# Patient Record
Sex: FEMALE | Race: WHITE | Employment: OTHER | URBAN - METROPOLITAN AREA
[De-identification: names, ages, dates, MRNs, and addresses within clinical notes are randomized per-mention and may not be internally consistent; named-entity substitution may affect disease eponyms.]

---

## 2017-01-02 ENCOUNTER — DOCUMENTATION ONLY (OUTPATIENT)
Dept: CASE MANAGEMENT | Facility: CLINIC | Age: 72
End: 2017-01-02

## 2017-04-10 ENCOUNTER — OFFICE VISIT (OUTPATIENT)
Dept: FAMILY MEDICINE | Facility: CLINIC | Age: 72
End: 2017-04-10

## 2017-04-10 VITALS
SYSTOLIC BLOOD PRESSURE: 126 MMHG | HEART RATE: 88 BPM | OXYGEN SATURATION: 96 % | BODY MASS INDEX: 24.19 KG/M2 | DIASTOLIC BLOOD PRESSURE: 72 MMHG | WEIGHT: 141 LBS

## 2017-04-10 DIAGNOSIS — J43.1 PANLOBULAR EMPHYSEMA (H): Primary | ICD-10-CM

## 2017-04-10 PROCEDURE — 99214 OFFICE O/P EST MOD 30 MIN: CPT | Performed by: FAMILY MEDICINE

## 2017-04-10 RX ORDER — BUDESONIDE AND FORMOTEROL FUMARATE DIHYDRATE 160; 4.5 UG/1; UG/1
AEROSOL RESPIRATORY (INHALATION)
Refills: 3 | COMMUNITY
Start: 2017-03-31 | End: 2018-03-06

## 2017-04-10 NOTE — MR AVS SNAPSHOT
After Visit Summary   4/10/2017    Sydni Mendoza    MRN: [id number]           Patient Information     Date Of Birth          1945        Visit Information        Provider Department      4/10/2017 9:45 AM Lyn Lui MD [facility]        Care Instructions    Samples of Spiriva one inhalation daily and Dulera 100/5 2 puffs twice daily are given. We will try for prior authorization for these meds as they worked better in the past.        Follow-ups after your visit        Who to contact     If you have questions or need follow up information about today's clinic visit or your schedule please contact [facility] directly at [phone].  Normal or non-critical lab and imaging results will be communicated to you by [employer], letter or phone within 4 business days after the clinic has received the results. If you do not hear from us within 7 days, please contact the clinic through [employer] or phone. If you have a critical or abnormal lab result, we will notify you by phone as soon as possible.  Submit refill requests through [employer] or call your pharmacy and they will forward the refill request to us. Please allow 3 business days for your refill to be completed.          Additional Information About Your Visit        MyChart Information     [employer] gives you secure access to your electronic health record. If you see a primary care provider, you can also send messages to your care team and make appointments. If you have questions, please call your primary care clinic.  If you do not have a primary care provider, please call [phone] and they will assist you.        Care EveryWhere ID     This is your Care EveryWhere ID. This could be used by other organizations to access your [place] medical records  [id number]        Your Vitals Were     Pulse Pulse Oximetry BMI (Body Mass Index)             88 96% 24.19 kg/m2          Blood Pressure from Last 3  Encounters:   04/10/17 126/72   12/02/16 116/70   09/26/16 122/70    Weight from Last 3 Encounters:   04/10/17 64 kg (141 lb)   12/02/16 62.6 kg (138 lb)   09/26/16 59 kg (130 lb)              Today, you had the following     No orders found for display       Primary Care Provider Office Phone # Fax #    Lyn Darlene Lui [phone]       [facility] [address]  [facility] [address]        Thank you!     Thank you for choosing [facility]  for your care. Our goal is always to provide you with excellent care. Hearing back from our patients is one way we can continue to improve our services. Please take a few minutes to complete the written survey that you may receive in the mail after your visit with us. Thank you!             Your Updated Medication List - Protect others around you: Learn how to safely use, store and throw away your medicines at www.disposemymeds.org.          This list is accurate as of: 4/10/17 10:29 AM.  Always use your most recent med list.                   Brand Name Dispense Instructions for use    aspirin 81 MG tablet      Take 1 tablet by mouth daily.       guaiFENesin 600 MG 12 hr tablet    MUCINEX    28 tablet    Take 2 tablets (1,200 mg) by mouth 2 times daily       hydrochlorothiazide 25 MG tablet    HYDRODIURIL     Take 25 mg by mouth daily       losartan 50 MG tablet    COZAAR    90 tablet    TAKE 1 TABLET BY MOUTH ONCE DAILY       Multi-vitamin Tabs tablet   Generic drug:  multivitamin, therapeutic with minerals      Take 1 tablet by mouth daily.       SYMBICORT 160-4.5 MCG/ACT Inhaler   Generic drug:  budesonide-formoterol      INL 2 PFS PO BID IN THE MORNING AND IN THE VERONICA       VENTOLIN  (90 BASE) MCG/ACT Inhaler   Generic drug:  albuterol      INL 2 PUFFS PO Q 4 TO 6 H PRN       vitamin D 2000 UNITS tablet     100 tablet    Take 2,000 Units by mouth daily.

## 2017-04-10 NOTE — PATIENT INSTRUCTIONS
Samples of Spiriva one inhalation daily and Dulera 100/5 2 puffs twice daily are given. We will try for prior authorization for these meds as they worked better in the past.

## 2017-04-10 NOTE — PROGRESS NOTES
Problem(s) Oriented visit        SUBJECTIVE:                                                    Sydni Mendoza is a 72 year old female who presents to clinic today for follow up on COPD. She had previously been on Spiriva and Dulera and got enough control that she could actually be off her oxygen for short time periods. She is no longer on these because her insurance won't pay for it any longer. She was changed to Symbicort and ventolin and is not doing well. She didn't realize how well her previous regimen was working until she was no longer on them. She now gets short of breath just getting dressed in the morning. She cannot tolerate walking even very short distances. She continues to use her pulmonary vest twice daily for 30 minutes.       Problem list, Medication list, Allergies, and Medical/Social/Surgical histories reviewed in EPIC and updated as appropriate.   Additional history: as documented    ROS:  5 point ROS completed and negative except noted above, including Gen, CV, Resp, GI, MS      Histories:   Patient Active Problem List   Diagnosis     COPD (chronic obstructive pulmonary disease) (H)     Smoking     Lung nodule     Pneumothorax, post biopsy, right     Health Care Home     Pneumonia     COPD with acute exacerbation (H)     Past Surgical History:   Procedure Laterality Date     CHEST TUBE INSERTION  9-14-12    Right ,pneumothorax post, lung biopsy     GYN SURGERY      Tubal Ligation       Social History   Substance Use Topics     Smoking status: Former Smoker     Packs/day: 1.00     Types: Cigarettes     Quit date: 8/20/2011     Smokeless tobacco: Never Used     Alcohol use 0.0 oz/week     0 Standard drinks or equivalent per week      Comment: occasional     No family history on file.        OBJECTIVE:                                                    /72  Pulse 88  Wt 64 kg (141 lb)  SpO2 96%  BMI 24.19 kg/m2  Body mass index is 24.19 kg/(m^2).   APPEARANCE: = Relaxed and in no  distress  Conj/Eyelids = noninjected and lids and lashes are without inflammation  PERRLA/Irises = Pupils Round Reactive to Light and Irisis without inflammation  Ears/Nose = External structures and Nares have usual shape and form  Ear canals and TM's = Canals are not inflammed and have none or little wax and the drums are not injected and have a light reflex   Lips/Teeth/Gums = No lesions seen, good dentition, and gums seem healthy  Oropharynx = No leukoplakia, No injection to the tissues, Normal Uvula  Neck = No anterior or posterior adenopathy appreciated.  Thyroid = Not enlarged and no masses felt  Resp effort =  Nasal cannula is on for oxygen therapy.  Breath Sounds =  distant breath sounds  Heart Rate, Rythym, & sounds (no Murm)  = Regular rate and rythym with no S3, S4, or murmer appreciated.  Recent/Remote Memory = Alert and Oriented x 3  Mood/Affect = Cooperative and interested   Labs Resulted Today:   Results for orders placed or performed in visit on 07/29/16   Basic Metabolic Panel (8) ([facility])   Result Value Ref Range    Glucose 120 (H) 65 - 99 mg/dL    Urea Nitrogen 15 8 - 27 mg/dL    Creatinine 0.58 0.57 - 1.00 mg/dL    eGFR If NonAfricn Am 94 >59 mL/min/1.73    eGFR If Africn Am 109 >59 mL/min/1.73    BUN/Creatinine Ratio 26 11 - 26    Sodium 141 134 - 144 mmol/L    Potassium 4.9 3.5 - 5.2 mmol/L    Chloride 92 (L) 97 - 108 mmol/L    Total CO2 32 (H) 18 - 28 mmol/L    Calcium 9.2 8.7 - 10.3 mg/dL    Narrative    Performed at:  01 - [facility] [address], [place], [place]  [id number]  : Vasquez Ricardo MD, Phone:  [phone]     ASSESSMENT/PLAN:                                                    Tobacco Cessation:   reports that she quit smoking about 5 years ago. Her smoking use included Cigarettes. She smoked 1.00 pack per day. She has never used smokeless tobacco.      BMI:   Estimated body mass index is 24.19 kg/(m^2) as calculated from the following:    Height as of 7/20/16:  "1.626 m (5' 4.02\").    Weight as of this encounter: 64 kg (141 lb).         Sydni was seen today for forms.    Diagnoses and all orders for this visit:    Panlobular emphysema (H)  Poor tolerance of current inhaler regimen. Prior authorization is requested from her insurance. Continue vest therapy and O2 by [place]. Forms are filled out for disability. Her current poor pulmonary status does not allow her to even get to her work place safely.      Patient Instructions   Samples of Spiriva one inhalation daily and Dulera 100/5 2 puffs twice daily are given. We will try for prior authorization for these meds as they worked better in the past.      The following health maintenance items are reviewed in Epic and correct as of today:  Health Maintenance   Topic Date Due     MAMMO SCREEN Q2 YR (SYSTEM ASSIGNED)  02/19/1987     COLON CANCER SCREEN (SYSTEM ASSIGNED)  02/19/1997     DEXA SCAN SCREENING (SYSTEM ASSIGNED)  02/19/2012     LIPID SCREEN Q5 YR FEMALE (SYSTEM ASSIGNED)  08/25/2016     PNEUMOCOCCAL (2 of 2 - PPSV23) 02/12/2017     INFLUENZA VACCINE (SYSTEM ASSIGNED)  09/01/2017     FALL RISK ASSESSMENT  04/10/2018     ADVANCE DIRECTIVE PLANNING Q5 YRS (NO INBASKET)  02/12/2021     TETANUS IMMUNIZATION (SYSTEM ASSIGNED)  02/12/2026     HEPATITIS C SCREENING  Addressed       Lyn Lui MD  [facility]  Family Practice  [facility]  [phone]    For any issues my office # is [phone]        "

## 2017-04-17 NOTE — PROGRESS NOTES
4/14/17 Philomena faxed this office note to [employer] [facility] @ [phone]    Wesley Lozada,   [facility]  [phone]

## 2017-04-26 ENCOUNTER — TRANSFERRED RECORDS (OUTPATIENT)
Dept: FAMILY MEDICINE | Facility: CLINIC | Age: 72
End: 2017-04-26

## 2017-05-09 ENCOUNTER — TELEPHONE (OUTPATIENT)
Dept: FAMILY MEDICINE | Facility: CLINIC | Age: 72
End: 2017-05-09

## 2017-05-09 NOTE — TELEPHONE ENCOUNTER
Patient stated she needs PA for her Spiriva and Dulera.  Submitted PA for Spiriva to [employer].  Received fax back stating Spiriva Handihaler was approved.  Approval dates 02/01/2017-05/02/2018.   Called [employer] for approval for Dulera.   Over the phone was able to get this  approved.  Received fax back stating approval dates 02/01/2017-05/02/2018.    Called patient to inform her of the approval. ---Per patient she tried to fill at [facility] and the price was still very high for patient.  Dulera $153.00  Spiriva $193 for 1 month.  Patient had her medication changed for now by pulmonary and she will continue with their suggestions-Symbicort and Incruse samples to try.

## 2017-06-12 DIAGNOSIS — I10 BENIGN ESSENTIAL HYPERTENSION: Primary | ICD-10-CM

## 2017-06-13 RX ORDER — HYDROCHLOROTHIAZIDE 25 MG/1
25 TABLET ORAL DAILY
Qty: 90 TABLET | Refills: 3 | Status: SHIPPED | OUTPATIENT
Start: 2017-06-13 | End: 2018-06-27

## 2017-07-19 ENCOUNTER — TRANSFERRED RECORDS (OUTPATIENT)
Dept: FAMILY MEDICINE | Facility: CLINIC | Age: 72
End: 2017-07-19

## 2018-03-06 ENCOUNTER — OFFICE VISIT (OUTPATIENT)
Dept: FAMILY MEDICINE | Facility: CLINIC | Age: 73
End: 2018-03-06

## 2018-03-06 VITALS
OXYGEN SATURATION: 99 % | SYSTOLIC BLOOD PRESSURE: 122 MMHG | RESPIRATION RATE: 22 BRPM | HEART RATE: 98 BPM | TEMPERATURE: 98.8 F | BODY MASS INDEX: 25.59 KG/M2 | WEIGHT: 149.2 LBS | DIASTOLIC BLOOD PRESSURE: 74 MMHG

## 2018-03-06 DIAGNOSIS — J44.1 COPD EXACERBATION (H): ICD-10-CM

## 2018-03-06 DIAGNOSIS — M25.561 ACUTE PAIN OF RIGHT KNEE: ICD-10-CM

## 2018-03-06 DIAGNOSIS — R23.3 EASY BRUISING: Primary | ICD-10-CM

## 2018-03-06 DIAGNOSIS — S81.811A SKIN TEAR OF RIGHT LOWER LEG WITHOUT COMPLICATION, INITIAL ENCOUNTER: ICD-10-CM

## 2018-03-06 LAB
% GRANULOCYTES: 84.3 % (ref 42.2–75.2)
HCT VFR BLD AUTO: 34.8 % (ref 35–46)
HEMOGLOBIN: 11.7 G/DL (ref 11.8–15.5)
LYMPHOCYTES NFR BLD AUTO: 11.8 % (ref 20.5–51.1)
MCH RBC QN AUTO: 30.3 PG (ref 27–31)
MCHC RBC AUTO-ENTMCNC: 33.7 G/DL (ref 33–37)
MCV RBC AUTO: 90.1 FL (ref 80–100)
MONOCYTES NFR BLD AUTO: 3.9 % (ref 1.7–9.3)
PLATELET # BLD AUTO: 298 K/UL (ref 140–450)
RBC # BLD AUTO: 3.86 X10/CMM (ref 3.7–5.2)
WBC # BLD AUTO: 9.5 X10/CMM (ref 3.8–11)

## 2018-03-06 PROCEDURE — 99214 OFFICE O/P EST MOD 30 MIN: CPT | Performed by: FAMILY MEDICINE

## 2018-03-06 PROCEDURE — 36415 COLL VENOUS BLD VENIPUNCTURE: CPT | Performed by: FAMILY MEDICINE

## 2018-03-06 PROCEDURE — 85025 COMPLETE CBC W/AUTO DIFF WBC: CPT | Performed by: FAMILY MEDICINE

## 2018-03-06 RX ORDER — IPRATROPIUM BROMIDE AND ALBUTEROL SULFATE 2.5; .5 MG/3ML; MG/3ML
3 SOLUTION RESPIRATORY (INHALATION) 2 TIMES DAILY
Refills: 0 | COMMUNITY
Start: 2018-02-22 | End: 2019-07-30

## 2018-03-06 RX ORDER — MOMETASONE FUROATE AND FORMOTEROL FUMARATE DIHYDRATE 200; 5 UG/1; UG/1
2 AEROSOL RESPIRATORY (INHALATION) 2 TIMES DAILY
COMMUNITY
Start: 2018-03-04 | End: 2021-02-23

## 2018-03-06 NOTE — MR AVS SNAPSHOT
After Visit Summary   3/6/2018    Sydni Mendoza    MRN: [id number]           Patient Information     Date Of Birth          1945        Visit Information        Provider Department      3/6/2018 9:45 AM Lyn Lui MD [facility]        Today's Diagnoses     Easy bruising    -  1    Acute pain of right knee        Skin tear of right lower leg without complication, initial encounter        COPD exacerbation (H)          Care Instructions    1. Change dressing on skin tear twice daily. Wash with soapy water daily.  2. Increase Duoneb to twice daily until cough improves.          Follow-ups after your visit        Who to contact     If you have questions or need follow up information about today's clinic visit or your schedule please contact [facility] directly at [phone].  Normal or non-critical lab and imaging results will be communicated to you by Legions[employer], letter or phone within 4 business days after the clinic has received the results. If you do not hear from us within 7 days, please contact the clinic through [employer] or phone. If you have a critical or abnormal lab result, we will notify you by phone as soon as possible.  Submit refill requests through "[employer]" or call your pharmacy and they will forward the refill request to us. Please allow 3 business days for your refill to be completed.          Additional Information About Your Visit        MyChart Information     "[employer]" gives you secure access to your electronic health record. If you see a primary care provider, you can also send messages to your care team and make appointments. If you have questions, please call your primary care clinic.  If you do not have a primary care provider, please call [phone] and they will assist you.        Care EveryWhere ID     This is your Care EveryWhere ID. This could be used by other organizations to access your [place] medical records  [id number]         Your Vitals Were     Pulse Temperature Respirations Pulse Oximetry Breastfeeding? BMI (Body Mass Index)    98 98.8  F (37.1  C) 22 99% No 25.59 kg/m2       Blood Pressure from Last 3 Encounters:   03/06/18 122/74   04/10/17 126/72   12/02/16 116/70    Weight from Last 3 Encounters:   03/06/18 67.7 kg (149 lb 3.2 oz)   04/10/17 64 kg (141 lb)   12/02/16 62.6 kg (138 lb)              Today, you had the following     No orders found for display       Primary Care Provider Office Phone # Fax #    Lyn Darlene Lui [phone]       [facility] [address]  [facility] [address]        Equal Access to Services     LIGIA LARIOS : Hadii luba jarvis hadasho Soomaali, waaxda luqadaha, qaybta kaalmada adeegyada, waxluis miguel martinez hayjose campuzano . So [facility] [phone].    ATENCIÓN: Si habla español, tiene a easton disposición servicios gratuitos de asistencia lingüística. Llame al [phone].    We comply with applicable federal civil rights laws and [place] laws. We do not discriminate on the basis of race, color, national origin, age, disability, sex, sexual orientation, or gender identity.            Thank you!     Thank you for choosing [facility]  for your care. Our goal is always to provide you with excellent care. Hearing back from our patients is one way we can continue to improve our services. Please take a few minutes to complete the written survey that you may receive in the mail after your visit with us. Thank you!             Your Updated Medication List - Protect others around you: Learn how to safely use, store and throw away your medicines at www.disposemymeds.org.          This list is accurate as of 3/6/18 10:18 AM.  Always use your most recent med list.                   Brand Name Dispense Instructions for use Diagnosis    aspirin 81 MG tablet      Take 1 tablet by mouth daily.        DULERA 200-5 MCG/ACT oral inhaler   Generic drug:   mometasone-formoterol      Inhale 2 puffs into the lungs 2 times daily        guaiFENesin 600 MG 12 hr tablet    MUCINEX    28 tablet    Take 2 tablets (1,200 mg) by mouth 2 times daily    Acute on chronic respiratory failure, unspecified whether with hypoxia or hypercapnia (H)       hydrochlorothiazide 25 MG tablet    HYDRODIURIL    90 tablet    Take 1 tablet (25 mg) by mouth daily    Benign essential hypertension       ipratropium - albuterol 0.5 mg/2.5 mg/3 mL 0.5-2.5 (3) MG/3ML neb solution    DUONEB     Inhale 3 mLs into the lungs 2 times daily        losartan 50 MG tablet    COZAAR    90 tablet    TAKE 1 TABLET BY MOUTH EVERY DAY    Peripheral edema, Benign essential hypertension       Multi-vitamin Tabs tablet   Generic drug:  multivitamin, therapeutic with minerals      Take 1 tablet by mouth daily.        SPIRIVA HANDIHALER 18 MCG capsule   Generic drug:  tiotropium     90 capsule    INHALE 1 CAPSULE BY MOUTH EVERY DAY    Chronic obstructive pulmonary disease, unspecified COPD type (H)       VENTOLIN  (90 BASE) MCG/ACT Inhaler   Generic drug:  albuterol      INL 2 PUFFS PO Q 4 TO 6 H PRN        vitamin D 2000 UNITS tablet     100 tablet    Take 2,000 Units by mouth daily.    Vitamin D deficiency

## 2018-03-06 NOTE — PATIENT INSTRUCTIONS
1. Change dressing on skin tear twice daily. Wash with soapy water daily.  2. Increase Duoneb to twice daily until cough improves.

## 2018-03-06 NOTE — PROGRESS NOTES
Problem(s) Oriented visit        SUBJECTIVE:                                                    Sydni Mendoza is a 73 year old female who presents to clinic today for cough. She has history of COPD, oxygen dependent. Cough has been present for a few weeks. She is mildly more short of breath than usual. She is using Mucinex. She gets into some coughing spells. No fever. She uses Duoneb twice daily.     She fell and injured her right knee on 3/3/18. She missed a step and fell injuring her wrists and chin as well. She seems to bruise very easily. They have been keeping it covered with Bacitracin and non-stick dressing.      Problem list, Medication list, Allergies, and Medical/Social/Surgical histories reviewed in [facility] and updated as appropriate.   Additional history: as documented    ROS:  5 point ROS completed and negative except noted above, including Gen, CV, Resp, GI, MS      Histories:   Patient Active Problem List   Diagnosis     COPD (chronic obstructive pulmonary disease) (H)     Smoking     Lung nodule     Pneumothorax, post biopsy, right     Health Care Home     Pneumonia     COPD with acute exacerbation (H)     Past Surgical History:   Procedure Laterality Date     CHEST TUBE INSERTION  9-14-12    Right ,pneumothorax post, lung biopsy     GYN SURGERY      Tubal Ligation       Social History   Substance Use Topics     Smoking status: Former Smoker     Packs/day: 1.00     Types: Cigarettes     Quit date: 8/20/2011     Smokeless tobacco: Never Used     Alcohol use 0.0 oz/week     0 Standard drinks or equivalent per week      Comment: occasional     History reviewed. No pertinent family history.        OBJECTIVE:                                                    /74  Pulse 98  Temp 98.8  F (37.1  C)  Resp 22  Wt 67.7 kg (149 lb 3.2 oz)  SpO2 99%  Breastfeeding? No  BMI 25.59 kg/m2  Body mass index is 25.59 kg/(m^2).   GENERAL APPEARANCE: Alert, no acute distress  EYES: PERRL, EOM normal,  conjunctiva and lids normal  HENT: Ears and TMs normal, oral mucosa and posterior oropharynx normal  NECK: No adenopathy,masses or thyromegaly  RESP: lungs clear to auscultation , breath sounds are distant. No apparent wheeze.   CV: normal rate, regular rhythm, no murmur or gallop  MS: extremities normal, no peripheral edema  SKIN: large full thickness skin tear is noted on the right lower leg near the anterior knee. The skin has retracted medially, there is no area to be trimmed. Area measures about 8x12 cm. No surrounding erythema. Some bruising is noted in the lateral knee. There is mild serosanguinous drainage on the dressing that is removed, but the area is dry in appearance with subcutaneous fat exposed in the entire area.   NEURO: Alert, oriented, speech and mentation normal  PSYCH: mentation appears normal, affect and mood normal   Labs Resulted Today:   Results for orders placed or performed in visit on 03/06/18   CBC with Diff/Plt ([facility])   Result Value Ref Range    WBC x10/cmm 9.5 3.8 - 11.0 x10/cmm    % Lymphocytes 11.8 (A) 20.5 - 51.1 %    % Monocytes 3.9 1.7 - 9.3 %    % Granulocytes 84.3 (A) 42.2 - 75.2 %    RBC x10/cmm 3.86 3.7 - 5.2 x10/cmm    Hemoglobin 11.7 (A) 11.8 - 15.5 g/dl    Hematocrit 34.8 (A) 35 - 46 %    MCV 90.1 80 - 100 fL    MCH 30.3 27.0 - 31.0 pg    MCHC 33.7 33.0 - 37.0 g/dL    Platelet Count 298 140 - 450 K/uL     ASSESSMENT/PLAN:                                                        Sydni was seen today for cough and fall.    Diagnoses and all orders for this visit:    Easy bruising  -     CBC with Diff/Plt ([facility])  Verify normal platelets. I suspect this is simply due to age related loss of subcutaneous fat.     Acute pain of right knee  Recommend ice and ACE.    Skin tear of right lower leg without complication, initial encounter  Bandage is changed using a petroleum jelly gauze followed by non-stick dressing, kerlix, and Coban. See instructions below. Follow up in one week for  wound check.    COPD exacerbation (H)  See instructions below. Follow up if failure to improve.          Patient Instructions   1. Change dressing on skin tear twice daily. Wash with soapy water daily.  2. Increase Duoneb to twice daily until cough improves.      The following health maintenance items are reviewed in Epic and correct as of today:  Health Maintenance   Topic Date Due     SPIROMETRY ONETIME  1945     COPD ACTION PLAN Q1 YR  1945     MAMMO SCREEN Q2 YR (SYSTEM ASSIGNED)  02/19/1997     COLON CANCER SCREEN (SYSTEM ASSIGNED)  02/19/1997     DEXA SCAN SCREENING (SYSTEM ASSIGNED)  02/19/2012     LIPID SCREEN Q5 YR FEMALE (SYSTEM ASSIGNED)  08/25/2016     PNEUMOCOCCAL (2 of 2 - PPSV23) 02/12/2017     FALL RISK ASSESSMENT  04/10/2018     INFLUENZA VACCINE (SYSTEM ASSIGNED)  09/01/2018     ADVANCE DIRECTIVE PLANNING Q5 YRS  02/12/2021     TETANUS IMMUNIZATION (SYSTEM ASSIGNED)  02/12/2026     HEPATITIS C SCREENING  Addressed       Lyn Lui MD  [facility]  Family Practice  [facility]  [phone]    For any issues my office # is [phone]

## 2018-03-14 ENCOUNTER — OFFICE VISIT (OUTPATIENT)
Dept: FAMILY MEDICINE | Facility: CLINIC | Age: 73
End: 2018-03-14

## 2018-03-14 VITALS
TEMPERATURE: 98.1 F | SYSTOLIC BLOOD PRESSURE: 104 MMHG | OXYGEN SATURATION: 94 % | WEIGHT: 148 LBS | HEART RATE: 110 BPM | RESPIRATION RATE: 22 BRPM | BODY MASS INDEX: 25.39 KG/M2 | DIASTOLIC BLOOD PRESSURE: 80 MMHG

## 2018-03-14 DIAGNOSIS — S81.811D SKIN TEAR OF LOWER LEG WITHOUT COMPLICATION, RIGHT, SUBSEQUENT ENCOUNTER: Primary | ICD-10-CM

## 2018-03-14 PROCEDURE — 99213 OFFICE O/P EST LOW 20 MIN: CPT | Performed by: FAMILY MEDICINE

## 2018-03-14 NOTE — PROGRESS NOTES
Problem(s) Oriented visit        SUBJECTIVE:                                                    Sydni Mendoza is a 73 year old female who presents to clinic today for follow up on full thickness skin tear. She has been cleaning it with warm soapy water. No significant pain. Minimal weeping on the bandage.       Problem list, Medication list, Allergies, and Medical/Social/Surgical histories reviewed in [facility] and updated as appropriate.   Additional history: as documented    ROS:  5 point ROS completed and negative except noted above, including Gen, CV, Resp, GI, MS      Histories:   Patient Active Problem List   Diagnosis     COPD (chronic obstructive pulmonary disease) (H)     Smoking     Lung nodule     Pneumothorax, post biopsy, right     Health Care Home     Pneumonia     COPD with acute exacerbation (H)     Past Surgical History:   Procedure Laterality Date     CHEST TUBE INSERTION  9-14-12    Right ,pneumothorax post, lung biopsy     GYN SURGERY      Tubal Ligation       Social History   Substance Use Topics     Smoking status: Former Smoker     Packs/day: 1.00     Types: Cigarettes     Quit date: 8/20/2011     Smokeless tobacco: Never Used     Alcohol use 0.0 oz/week     0 Standard drinks or equivalent per week      Comment: occasional     History reviewed. No pertinent family history.        OBJECTIVE:                                                    /80  Pulse 110  Temp 98.1  F (36.7  C)  Resp 22  Wt 67.1 kg (148 lb)  SpO2 94%  Breastfeeding? No  BMI 25.39 kg/m2  Body mass index is 25.39 kg/(m^2).   GENERAL APPEARANCE: Alert, no acute distress  MS: extremities normal, no peripheral edema  SKIN: no suspicious lesions or rashes, resolving ecchymosis is seen around the right knee. Skin tear is with granulation tissue, no apparent purulence. No surrounding erythema. Prior noted swelling is greatly reduced. Triangular shaped wound now measures 4.5 cm x 10.5 cm. Wound is dressed with vaseline  gauze, non-stick pad and Coban.  NEURO: Alert, oriented, speech and mentation normal  PSYCH: mentation appears normal, affect and mood normal   Labs Resulted Today:   Results for orders placed or performed in visit on 03/06/18   CBC with Diff/Plt ([facility])   Result Value Ref Range    WBC x10/cmm 9.5 3.8 - 11.0 x10/cmm    % Lymphocytes 11.8 (A) 20.5 - 51.1 %    % Monocytes 3.9 1.7 - 9.3 %    % Granulocytes 84.3 (A) 42.2 - 75.2 %    RBC x10/cmm 3.86 3.7 - 5.2 x10/cmm    Hemoglobin 11.7 (A) 11.8 - 15.5 g/dl    Hematocrit 34.8 (A) 35 - 46 %    MCV 90.1 80 - 100 fL    MCH 30.3 27.0 - 31.0 pg    MCHC 33.7 33.0 - 37.0 g/dL    Platelet Count 298 140 - 450 K/uL     ASSESSMENT/PLAN:                                                        Sydni was seen today for recheck of right knee wound.    Diagnoses and all orders for this visit:    Skin tear of lower leg without complication, right, subsequent encounter  -     DME REFERRAL for supplies to continue with dressing changes.  -     PLASTIC SURGERY REFERRAL is given as I think a skin graft will be necessary.         There are no Patient Instructions on file for this visit.    The following health maintenance items are reviewed in Epic and correct as of today:  Health Maintenance   Topic Date Due     SPIROMETRY ONETIME  1945     COPD ACTION PLAN Q1 YR  1945     MAMMO SCREEN Q2 YR (SYSTEM ASSIGNED)  02/19/1997     COLON CANCER SCREEN (SYSTEM ASSIGNED)  02/19/1997     DEXA SCAN SCREENING (SYSTEM ASSIGNED)  02/19/2012     LIPID SCREEN Q5 YR FEMALE (SYSTEM ASSIGNED)  08/25/2016     PNEUMOCOCCAL (2 of 2 - PPSV23) 02/12/2017     FALL RISK ASSESSMENT  04/10/2018     INFLUENZA VACCINE (SYSTEM ASSIGNED)  09/01/2018     ADVANCE DIRECTIVE PLANNING Q5 YRS  02/12/2021     TETANUS IMMUNIZATION (SYSTEM ASSIGNED)  02/12/2026     HEPATITIS C SCREENING  Addressed       Lyn Lui MD  [facility]  [phone]    For any issues  my office # is [phone]

## 2018-03-14 NOTE — MR AVS SNAPSHOT
"              After Visit Summary   3/14/2018    Sydni Mendoza    MRN: [id number]           Patient Information     Date Of Birth          1945        Visit Information        Provider Department      3/14/2018 1:45 PM Lyn Lui MD [facility]        Today's Diagnoses     Skin tear of lower leg without complication, right, subsequent encounter    -  1       Follow-ups after your visit        Additional Services     DME REFERRAL       Non adherent pad 3\"x4\"-1 box  Vaseline dressing 6\"x36\"-1 box  Coban Wrap 3\"-3 rolls  Refill as needed  Please be aware that coverage of these services is subject to the terms and limitations of your health insurance plan.  Call member services at your health plan with any benefit or coverage questions.      Please bring the following to your appointment:  Any x-rays, CTs or MRIs which have been performed.  Contact the facility where they were done to arrange for  prior to your scheduled appointment.    List of current medications   This referral request   Any documents/labs given to you for this referral            PLASTIC SURGERY REFERRAL       [facility]  [address]   [address]  [facility]  [address]  [phone]            PLASTIC SURGERY REFERRAL       [facility]  [address]   [address]  [facility]   [address]  [phone]  [phone]  Right lower leg extrem-Full thickness shin tear                  Who to contact     If you have questions or need follow up information about today's clinic visit or your schedule please contact [facility] directly at [phone].  Normal or non-critical lab and imaging results will be communicated to you by MyChart, letter or phone within 4 business days after the clinic has received the results. If you do not hear from us within 7 days, please contact the clinic through MyChart or phone. If you have a critical or abnormal lab result, we will notify you by " phone as soon as possible.  Submit refill requests through [employer] or call your pharmacy and they will forward the refill request to us. Please allow 3 business days for your refill to be completed.          Additional Information About Your Visit        [employer] Information     [employer] gives you secure access to your electronic health record. If you see a primary care provider, you can also send messages to your care team and make appointments. If you have questions, please call your primary care clinic.  If you do not have a primary care provider, please call [phone] and they will assist you.        Care EveryWhere ID     This is your Care EveryWhere ID. This could be used by other organizations to access your [place] medical records  [id number]        Your Vitals Were     Pulse Temperature Respirations Pulse Oximetry Breastfeeding? BMI (Body Mass Index)    110 98.1  F (36.7  C) 22 94% No 25.39 kg/m2       Blood Pressure from Last 3 Encounters:   03/14/18 104/80   03/06/18 122/74   04/10/17 126/72    Weight from Last 3 Encounters:   03/14/18 67.1 kg (148 lb)   03/06/18 67.7 kg (149 lb 3.2 oz)   04/10/17 64 kg (141 lb)              We Performed the Following     DME REFERRAL     PLASTIC SURGERY REFERRAL     PLASTIC SURGERY REFERRAL        Primary Care Provider Office Phone # Fax #    Lyn Darlene Lui [phone]       [facility] [address] [place] [address]        Equal Access to Services     [facility]: Hadii aad ku hadasho Soomaali, waaxda luqadaha, qaybta kaalmada adeegyada, adrienne campuzano . So [facility] [phone].    ATENCIÓN: Si habla español, tiene a easton disposición servicios gratuitos de asistencia lingüística. Llame al [phone].    We comply with applicable federal civil rights laws and [place] laws. We do not discriminate on the basis of race, color, national origin, age, disability, sex, sexual orientation, or gender  identity.            Thank you!     Thank you for choosing [facility]  for your care. Our goal is always to provide you with excellent care. Hearing back from our patients is one way we can continue to improve our services. Please take a few minutes to complete the written survey that you may receive in the mail after your visit with us. Thank you!             Your Updated Medication List - Protect others around you: Learn how to safely use, store and throw away your medicines at www.disposemymeds.org.          This list is accurate as of 3/14/18  9:15 PM.  Always use your most recent med list.                   Brand Name Dispense Instructions for use Diagnosis    aspirin 81 MG tablet      Take 1 tablet by mouth daily.        DULERA 200-5 MCG/ACT oral inhaler   Generic drug:  mometasone-formoterol      Inhale 2 puffs into the lungs 2 times daily        guaiFENesin 600 MG 12 hr tablet    MUCINEX    28 tablet    Take 2 tablets (1,200 mg) by mouth 2 times daily    Acute on chronic respiratory failure, unspecified whether with hypoxia or hypercapnia (H)       hydrochlorothiazide 25 MG tablet    HYDRODIURIL    90 tablet    Take 1 tablet (25 mg) by mouth daily    Benign essential hypertension       ipratropium - albuterol 0.5 mg/2.5 mg/3 mL 0.5-2.5 (3) MG/3ML neb solution    DUONEB     Inhale 3 mLs into the lungs 2 times daily        losartan 50 MG tablet    COZAAR    90 tablet    TAKE 1 TABLET BY MOUTH EVERY DAY    Peripheral edema, Benign essential hypertension       Multi-vitamin Tabs tablet   Generic drug:  multivitamin, therapeutic with minerals      Take 1 tablet by mouth daily.        SPIRIVA HANDIHALER 18 MCG capsule   Generic drug:  tiotropium     90 capsule    INHALE 1 CAPSULE BY MOUTH EVERY DAY    Chronic obstructive pulmonary disease, unspecified COPD type (H)       VENTOLIN  (90 BASE) MCG/ACT Inhaler   Generic drug:  albuterol      INL 2 PUFFS PO Q 4 TO 6 H PRN        vitamin D 2000 UNITS  tablet     100 tablet    Take 2,000 Units by mouth daily.    Vitamin D deficiency

## 2018-03-19 ENCOUNTER — HOSPITAL ENCOUNTER (OUTPATIENT)
Dept: WOUND CARE | Facility: CLINIC | Age: 73
Discharge: HOME | End: 2018-03-19
Attending: SURGERY | Admitting: SURGERY
Payer: MEDICARE

## 2018-03-19 VITALS
DIASTOLIC BLOOD PRESSURE: 83 MMHG | HEART RATE: 111 BPM | BODY MASS INDEX: 24.92 KG/M2 | HEIGHT: 64 IN | SYSTOLIC BLOOD PRESSURE: 185 MMHG | TEMPERATURE: 97.4 F | WEIGHT: 146 LBS

## 2018-03-19 DIAGNOSIS — S81.801A TRAUMATIC OPEN WOUND OF RIGHT LOWER LEG, INITIAL ENCOUNTER: Primary | ICD-10-CM

## 2018-03-19 PROCEDURE — G0463 HOSPITAL OUTPT CLINIC VISIT: HCPCS | Mod: 25

## 2018-03-19 PROCEDURE — 11042 DBRDMT SUBQ TIS 1ST 20SQCM/<: CPT | Performed by: SURGERY

## 2018-03-19 PROCEDURE — 11042 DBRDMT SUBQ TIS 1ST 20SQCM/<: CPT

## 2018-03-19 PROCEDURE — 93923 UPR/LXTR ART STDY 3+ LVLS: CPT

## 2018-03-19 PROCEDURE — 99203 OFFICE O/P NEW LOW 30 MIN: CPT | Mod: 25 | Performed by: SURGERY

## 2018-03-19 PROCEDURE — A6022 COLLAGEN DRSG>16<=48 SQ IN: HCPCS

## 2018-03-19 NOTE — PROGRESS NOTES
"[facility] Wound Healing [place] Progress Note    Subject: Sydni Mendoza was referred to see us at the wound clinic today by her primary care physician Dr. Jc Lui.  This 73-year-old patient on home oxygen due to smoking related COPD/emphysema slipped and fell 2 weeks ago hitting her right knee.  She had a large skin tear where the skin edges nonviable.  She been followed by Dr. Lui who is been applying Vaseline gauze to the area along with a gauze and Coban.  There is been some improvement of the ulceration but due to the large size was felt that she should be evaluated and treated here in our wound clinic.     PMH: Allergies: None               Current Outpatient Prescriptions:      order for DME, Equipment being ordered: [facility] Phone [phone] Fax [phone]  Primary Dressing Fibracol   Qty 1 box Secondary Dressing 4x4 nonsterile gauze Qty 200ct loaf Secondary Dressing 4\" blanquita Qty 30 Secondary Dressing 2\" medipore Qty 1 roll Secondary Dressing 4\" coban Qty  30 Length of Need: 1 month Frequency of dressing change: daily, Disp: 30 days, Rfl: 0     ipratropium - albuterol 0.5 mg/2.5 mg/3 mL (DUONEB) 0.5-2.5 (3) MG/3ML neb solution, Inhale 3 mLs into the lungs 2 times daily, Disp: , Rfl: 0     DULERA 200-5 MCG/ACT oral inhaler, Inhale 2 puffs into the lungs 2 times daily, Disp: , Rfl:      SPIRIVA HANDIHALER 18 MCG capsule, INHALE 1 CAPSULE BY MOUTH EVERY DAY, Disp: 90 capsule, Rfl: 1     losartan (COZAAR) 50 MG tablet, TAKE 1 TABLET BY MOUTH EVERY DAY, Disp: 90 tablet, Rfl: 1     hydrochlorothiazide (HYDRODIURIL) 25 MG tablet, Take 1 tablet (25 mg) by mouth daily, Disp: 90 tablet, Rfl: 3     guaiFENesin (MUCINEX) 600 MG 12 hr tablet, Take 2 tablets (1,200 mg) by mouth 2 times daily, Disp: 28 tablet, Rfl: 1     VENTOLIN  (90 BASE) MCG/ACT inhaler, INL 2 PUFFS PO Q 4 TO 6 H PRN, Disp: , Rfl: 12     aspirin 81 MG tablet, Take 1 tablet by mouth daily., Disp: , Rfl:      " "Cholecalciferol (VITAMIN D) 2000 UNITS tablet, Take 2,000 Units by mouth daily., Disp: 100 tablet, Rfl: 3     Multiple Vitamin (MULTI-VITAMIN) per tablet, Take 1 tablet by mouth daily.  , Disp: , Rfl:               Medical: COPD/emphysema related to her chronic tobacco abuse.  She quit smoking 2011.  She has been on home oxygen for approximately 6 years usually at 2-1/2 L/min.  She does increase this with activities.                          Lung biopsy for a nodule that was benign with a complication of a pneumothorax.                          Well-controlled hypertension.                            No history of diabetes, PAD, venous issues.    Lives independently with her .    FMH: Noncontributory to her particular problem.    ROS: 12 point is negative except for breathing issues and present wound issues.  She is limited in her activities due to her breathing issues.  No history of cardiac problems.   Did bruise her right calf hitting a car door many years ago with some ongoing skin discoloration but no history of ulcerations.                       Exam:  /83  Pulse 111  Temp 97.4  F (36.3  C) (Temporal)  Ht 1.626 m (5' 4\")  Wt 66.2 kg (146 lb)  BMI 25.06 kg/m2  Very pleasant alert appropriate woman.  She is on her oxygen.  Normal affect.  HEENT= normal with no carotid bruits.  Chest= somewhat distant breath sounds but no wheezing.  Cardiovascular= occasional skipped beat.  No murmur.  Abdomen= soft and nontender.  Extremities= No distal edema.  Normal sensation.                      Pedal pulses are palpable with triphasic Doppler signals in both posterior tibial arteries and distal anterior tibial arteries and right dorsalis pedis artery with some decrease in the left dorsalis pedis artery of no clinical concern.    Ulceration over the right lateral knee area measures 9.8 x 4.4 x 0.1 cm.  Small amount of skin necrosis is located at the 2 o'clock position.  Undermining of 0.8 cm from the 3 to 5 " o'clock position.  A thick fibrin is noted over the wound but otherwise granulation tissue underlying appears to be healthy.    Procedure:  Time out was called, informed consent obtained, and topical anesthetic of 4% lidocaine was applied per standing protocol, debridement was performed using a #15 blade down to and including subcutaneous tissue.  We removed all fibrin and slough with over 90% of the wound had very healthy robust granulation tissue.  Necrotic skin edge with underlying hematoma at the 2 o'clock position was excised on the bleeding epithelium.  Rest of the skin edges were slightly debrided also.  Good bleeding was noted indicative of a well-perfused wound.  Bleeding controlled with light pressure. Patient tolerated procedure well.    Impression: Traumatic calf ulceration.  We excised down to healthy margins still leaving a small amount of undermining from the 3 to 5 o'clock position that should be able to heal over.  We discussed whether one would allow this wound to heal by secondary intent or whether split-thickness skin graft would be beneficial.  At the present time she wants to continue with conservative treatment.    We will change to a Fibracol dressing that she will change daily which may hasten wound healing.  She will need weekly to every other week wound debridement to remove the fibrin slough and stimulate the wound healing if she decides to go conservatively.  If she decides on split-thickness skin grafting I would suspect that the wound/ulcer may be ready for this in approximately 2 weeks and we discussed the surgical procedure which can be performed under local anesthetic with sedation which would be better with her underlying pulmonary problems as an outpatient or an overnight stay.    Plan: We will dress the wounds with Fibracol.  Patient will return to the clinic in 1 weeks time    Mamadou Calvo MD  03/19/18 10:57 AM

## 2018-03-19 NOTE — DISCHARGE INSTRUCTIONS
[facility]  [address] [facility] [address], [place] [address]  Appointment Phone [phone] [facility]visors [phone]    Sydni Mendoza      1945    Wound Dressing Change to right knee  Cleanse wound and surrounding skin with: soap and water  Cover wound with Fibracol cut to size of wound (this will dissolve into the wound)  Cover dressing with gauze, secure with roll gauze and coban  Change dressing daily    Skin grafting is an option. This would require a surgery in the operating room then wearing a wound vac for one week post op.    A diet high in protein is important for wound healing, we recommend getting 60 grams of protein per day. Taking protein shakes or bars are a good way to get extra protein in your diet.      Mamadou Calvo M.D.. March 19, 2018    Call us at [phone] if you have any questions about your wounds, have redness or swelling around your wound, have a fever of 101 or greater or if you have any other problems or concerns. We answer the phone Monday through Friday 8 am to 4 pm, please leave a message as we check the voicemail frequently throughout the day.     Follow up with Justine Kc PA-C in one week and Dr. Calvo in 2 weeks

## 2018-03-19 NOTE — MR AVS SNAPSHOT
MRN:[id number]                      After Visit Summary   3/19/2018    Sydni Mendoza    MRN: [id number]           Visit Information        Provider Department      3/19/2018 10:15 AM Mamadou Calvo MD [facility] [place]          Further instructions from your care team             [facility] [place]  [address] [facility] [address], [place] [address]  Appointment Phone [phone] Nurse Advisors [phone]    Sydni Mendoza      1945    Wound Dressing Change to right knee  Cleanse wound and surrounding skin with: soap and water  Cover wound with Fibracol cut to size of wound (this will dissolve into the wound)  Cover dressing with gauze, secure with roll gauze and coban  Change dressing daily    Skin grafting is an option. This would require a surgery in the operating room then wearing a wound vac for one week post op.    A diet high in protein is important for wound healing, we recommend getting 60 grams of protein per day. Taking protein shakes or bars are a good way to get extra protein in your diet.      Mamadou Calvo M.D.. March 19, 2018    Call us at [phone] if you have any questions about your wounds, have redness or swelling around your wound, have a fever of 101 or greater or if you have any other problems or concerns. We answer the phone Monday through Friday 8 am to 4 pm, please leave a message as we check the voicemail frequently throughout the day.     Follow up with Justine cK PA-C in one week and Dr. Calvo in 2 weeks            MyChart Information     [employer] gives you secure access to your electronic health record. If you see a primary care provider, you can also send messages to your care team and make appointments. If you have questions, please call your primary care clinic.  If you do not have a primary care provider, please call [phone] and they will assist you.        Care  EveryWhere ID     This is your Care EveryWhere ID. This could be used by other organizations to access your [place] medical records  [id number]        Equal Access to Services     LIGIA LARIOS : Andrzej Herman, srini crespo, rochelle malave, adrienne saeed. So [facility] [phone].    ATENCIÓN: Si habla español, tiene a easton disposición servicios gratuitos de asistencia lingüística. Llame al [phone].    We comply with applicable federal civil rights laws and [place] laws. We do not discriminate on the basis of race, color, national origin, age, disability, sex, sexual orientation, or gender identity.

## 2018-03-27 ENCOUNTER — HOSPITAL ENCOUNTER (OUTPATIENT)
Dept: WOUND CARE | Facility: CLINIC | Age: 73
Discharge: HOME | End: 2018-03-27
Attending: PHYSICIAN ASSISTANT | Admitting: PHYSICIAN ASSISTANT
Payer: MEDICARE

## 2018-03-27 VITALS — TEMPERATURE: 97.2 F | SYSTOLIC BLOOD PRESSURE: 161 MMHG | DIASTOLIC BLOOD PRESSURE: 97 MMHG | HEART RATE: 110 BPM

## 2018-03-27 PROCEDURE — 11042 DBRDMT SUBQ TIS 1ST 20SQCM/<: CPT

## 2018-03-27 PROCEDURE — A6022 COLLAGEN DRSG>16<=48 SQ IN: HCPCS

## 2018-03-27 PROCEDURE — 11042 DBRDMT SUBQ TIS 1ST 20SQCM/<: CPT | Performed by: PHYSICIAN ASSISTANT

## 2018-03-27 NOTE — DISCHARGE INSTRUCTIONS
[facility]  [address] [facility] [address], [place] [address]  Appointment Phone [phone] Nurse [facility]visors [phone]     Sydni Mendoza      1945     Wound Dressing Change to right knee  Cleanse wound and surrounding skin with: soap and water  Cover wound with Fibracol cut to size of wound (this will dissolve into the wound)  Cover dressing with gauze, secure with roll gauze and coban  Change dressing daily     Skin grafting is an option. This would require a surgery in the operating room then wearing a wound vac for one week post op.     A diet high in protein is important for wound healing, we recommend getting 60 grams of protein per day. Taking protein shakes or bars are a good way to get extra protein in your diet.      Philomena Kc PA-C     Call us at [phone] if you have any questions about your wounds, have redness or swelling around your wound, have a fever of 101 or greater or if you have any other problems or concerns. We answer the phone Monday through Friday 8 am to 4 pm, please leave a message as we check the voicemail frequently throughout the day.      Follow up with Philomena Kc PA-C in one week and Dr. Calvo in 2 weeks

## 2018-03-27 NOTE — PROGRESS NOTES
[place] [facility]    HISTORY OF PRESENT ILLNESS: Ms. Sydni Mendoza is a 73-year-old woman who returns to our clinic to follow-up on a traumatic right knee ulceration. She slipped and hit her knee and suffered a large skin tear. She saw Dr. Calvo last week who discussed a possible skin graft and recommended weekly debridements.     WOUND CARE: Dressing wound with FibraCol and gauze daily.     DATE WOUND ACQUIRED: 3/2018    PAST MEDICAL HISTORY:  has a past medical history of COPD (chronic obstructive pulmonary disease) (H); Lung nodule (9-14-12); and Smoker (9-15-12).    MEDICATIONS:   Current Outpatient Prescriptions   Medication     order for DME     ipratropium - albuterol 0.5 mg/2.5 mg/3 mL (DUONEB) 0.5-2.5 (3) MG/3ML neb solution     DULERA 200-5 MCG/ACT oral inhaler     SPIRIVA HANDIHALER 18 MCG capsule     losartan (COZAAR) 50 MG tablet     hydrochlorothiazide (HYDRODIURIL) 25 MG tablet     guaiFENesin (MUCINEX) 600 MG 12 hr tablet     VENTOLIN  (90 BASE) MCG/ACT inhaler     aspirin 81 MG tablet     Cholecalciferol (VITAMIN D) 2000 UNITS tablet     Multiple Vitamin (MULTI-VITAMIN) per tablet     No current facility-administered medications for this encounter.        REVIEW OF SYSTEMS:  CONSTITUTIONAL: Denies fevers or acute illness  CARDIOVASCULAR: Denies circulatory issues or previous vascular procedures  ENDOCRINE: Denies diabetes    VITALS: BP (!) 161/97  Pulse 110  Temp 97.2  F (36.2  C) (Temporal)    PHYSICAL EXAM:  GENERAL: Patient is alert and oriented and in no acute distress  INTEGUMENTARY:   WOUND ASSESSMENT #1:     Location: right knee     Size: 8.4 cm x 3.1 cm with a depth of 0.1 cm    Drainage: moderate and copious amount of serosanguinous drainage    Wound description: primarily granular, minimal dried exudate and slough    PROCEDURE: Per standing orders, topical 4% lidocaine was applied to the wound by the [facility]. Timeout was called and informed consent was obtained.  Using a 15 blade a surgical debridement was performed down to and including subcutaneous tissue of <20cm. Hemostasis was achieved with pressure. The patient tolerated the procedure well.     ASSESSMENT: ulcer of right knee with fat-layer exposed    PLAN:   1. Primary dressing: FibraCol; Secondary dressing: gauze    FOLLOW-UP: 1 week    SELINA MORALES PA-C

## 2018-03-27 NOTE — MR AVS SNAPSHOT
MRN:[id number]                      After Visit Summary   3/27/2018    Sydni Mendoza    MRN: [id number]           Visit Information        Provider Department      3/27/2018 10:00 AM Gia Kc PA-C [facility]        Your next 10 appointments already scheduled     Apr 03, 2018 10:00 AM CDT   Return Visit with Gia Kc PA-C   [facility] ([facility])    [address]  [address]  [facility] [address] [phone]            Apr 09, 2018 10:30 AM CDT   Return Visit with Mamadou Calvo MD   [facility] ([facility])    [address]  [address]  [facility] [address] [phone]                Further instructions from your care team       [facility]  [address] [place] [address] [facility] [address], [facility] [address]  Appointment Phone [phone] Nurse Advisors [phone]     Sydni E Reji      1945     Wound Dressing Change to right knee  Cleanse wound and surrounding skin with: soap and water  Cover wound with Fibracol cut to size of wound (this will dissolve into the wound)  Cover dressing with gauze, secure with roll gauze and coban  Change dressing daily     Skin grafting is an option. This would require a surgery in the operating room then wearing a wound vac for one week post op.     A diet high in protein is important for wound healing, we recommend getting 60 grams of protein per day. Taking protein shakes or bars are a good way to get extra protein in your diet.      Philomena Kc PA-C     Call us at [phone] if you have any questions about your wounds, have redness or swelling around your wound, have a fever of 101 or greater or if you have any other problems or concerns. We answer the phone Monday through Friday 8 am to 4 pm, please leave a message as we check the voicemail  frequently throughout the day.      Follow up with Philomena Kc PA-C in one week and Dr. Calvo in 2 weeks      [employer] Information     [employer] gives you secure access to your electronic health record. If you see a primary care provider, you can also send messages to your care team and make appointments. If you have questions, please call your primary care clinic.  If you do not have a primary care provider, please call [phone] and they will assist you.        Care EveryWhere ID     This is your Care EveryWhere ID. This could be used by other organizations to access your [place] medical records  [id number]        Equal Access to Services     LIGIA LARIOS : Andrzej Herman, srini crespo, adrienne espinosa. So [facility] [phone].    ATENCIÓN: Si habla español, tiene a easton disposición servicios gratuitos de asistencia lingüística. Llame al [phone].    We comply with applicable federal civil rights laws and [place] laws. We do not discriminate on the basis of race, color, national origin, age, disability, sex, sexual orientation, or gender identity.

## 2018-04-02 ENCOUNTER — TRANSFERRED RECORDS (OUTPATIENT)
Dept: FAMILY MEDICINE | Facility: CLINIC | Age: 73
End: 2018-04-02

## 2018-04-02 ENCOUNTER — TRANSFERRED RECORDS (OUTPATIENT)
Dept: HEALTH INFORMATION MANAGEMENT | Facility: CLINIC | Age: 73
End: 2018-04-02

## 2018-04-02 DIAGNOSIS — J44.9 CHRONIC OBSTRUCTIVE PULMONARY DISEASE, UNSPECIFIED COPD TYPE (H): ICD-10-CM

## 2018-04-03 ENCOUNTER — HOSPITAL ENCOUNTER (OUTPATIENT)
Dept: WOUND CARE | Facility: CLINIC | Age: 73
Discharge: HOME | End: 2018-04-03
Attending: PHYSICIAN ASSISTANT | Admitting: PHYSICIAN ASSISTANT
Payer: MEDICARE

## 2018-04-03 VITALS
TEMPERATURE: 97.2 F | DIASTOLIC BLOOD PRESSURE: 80 MMHG | HEART RATE: 100 BPM | SYSTOLIC BLOOD PRESSURE: 150 MMHG | RESPIRATION RATE: 16 BRPM

## 2018-04-03 DIAGNOSIS — I10 BENIGN ESSENTIAL HYPERTENSION: ICD-10-CM

## 2018-04-03 DIAGNOSIS — L97.812: Primary | ICD-10-CM

## 2018-04-03 DIAGNOSIS — R60.0 PERIPHERAL EDEMA: ICD-10-CM

## 2018-04-03 PROCEDURE — 11042 DBRDMT SUBQ TIS 1ST 20SQCM/<: CPT | Performed by: PHYSICIAN ASSISTANT

## 2018-04-03 PROCEDURE — 11042 DBRDMT SUBQ TIS 1ST 20SQCM/<: CPT

## 2018-04-03 PROCEDURE — A6022 COLLAGEN DRSG>16<=48 SQ IN: HCPCS

## 2018-04-03 PROCEDURE — A6213 FOAM DRG >16<=48 SQ IN W/BDR: HCPCS

## 2018-04-03 RX ORDER — TIOTROPIUM BROMIDE 18 UG/1
CAPSULE ORAL; RESPIRATORY (INHALATION)
Qty: 90 CAPSULE | Refills: 3 | Status: SHIPPED | OUTPATIENT
Start: 2018-04-03 | End: 2018-07-02 | Stop reason: ALTCHOICE

## 2018-04-03 NOTE — IP AVS SNAPSHOT
[facility] [place]    [address] [facility] [address] [place] [address]    Phone:  [phone]                                       After Visit Summary   4/3/2018    Sydni Mendoza    MRN: [id number]           After Visit Summary Signature Page     I have received my discharge instructions, and my questions have been answered. I have discussed any challenges I see with this plan with the nurse or doctor.    ..........................................................................................................................................  Patient/Patient Representative Signature      ..........................................................................................................................................  Patient Representative Print Name and Relationship to Patient    ..................................................               ................................................  Date                                            Time    ..........................................................................................................................................  Reviewed by Signature/Title    ...................................................              ..............................................  Date                                                            Time

## 2018-04-03 NOTE — DISCHARGE INSTRUCTIONS
[facility]  [address] [facility] [address], [place] [address]  Appointment Phone [phone] Nurse Advisors [phone]      Sydni Mendoza      1945      Wound Dressing Change to right knee  Cleanse wound and surrounding skin with: soap and water  Cover wound with Fibracol cut to size of wound (this will dissolve into the wound)  Cover dressing with gauze, secure with roll gauze and coban and change daily  *OR COVER WITH MEPILEX BORDER AND CHANGE EVERY OTHER DAY*      Skin grafting is an option. This would require a surgery in the operating room then wearing a wound vac for one week post op.      A diet high in protein is important for wound healing, we recommend getting 60 grams of protein per day. Taking protein shakes or bars are a good way to get extra protein in your diet.       Philomena Kc PA-C      Call us at [phone] if you have any questions about your wounds, have redness or swelling around your wound, have a fever of 101 or greater or if you have any other problems or concerns. We answer the phone Monday through Friday 8 am to 4 pm, please leave a message as we check the voicemail frequently throughout the day.     CALL US IF YOU'D LIKE US TO ORDER MORE MEPILEX COVER BANDAGES      Follow up with Dr. Calvo in 1 week

## 2018-04-03 NOTE — MR AVS SNAPSHOT
MRN:[id number]                      After Visit Summary   4/3/2018    Sydni Mendoza    MRN: [id number]           Visit Information        Provider Department      4/3/2018 10:00 AM Gia Kc PA-C [facility] [place]        Your next 10 appointments already scheduled     Apr 09, 2018 10:30 AM CDT   Return Visit with Mamadou Calvo MD   [facility] [place] ([facility])    [address]  [address]  [facility] [address] [phone]                Further instructions from your care team       [facility] [place]  [address] [facility] [address], [facility] [address]  Appointment Phone [phone] Nurse Advisors [phone]      Sydni E Reji      1945      Wound Dressing Change to right knee  Cleanse wound and surrounding skin with: soap and water  Cover wound with Fibracol cut to size of wound (this will dissolve into the wound)  Cover dressing with gauze, secure with roll gauze and coban and change daily  *OR COVER WITH MEPILEX BORDER AND CHANGE EVERY OTHER DAY*      Skin grafting is an option. This would require a surgery in the operating room then wearing a wound vac for one week post op.      A diet high in protein is important for wound healing, we recommend getting 60 grams of protein per day. Taking protein shakes or bars are a good way to get extra protein in your diet.       Philomena Kc PA-C      Call us at [phone] if you have any questions about your wounds, have redness or swelling around your wound, have a fever of 101 or greater or if you have any other problems or concerns. We answer the phone Monday through Friday 8 am to 4 pm, please leave a message as we check the voicemail frequently throughout the day.     CALL US IF YOU'D LIKE US TO ORDER MORE MEPILEX COVER BANDAGES      Follow up with Dr. Calvo in 1 week      MyChart Information     MyChart  gives you secure access to your electronic health record. If you see a primary care provider, you can also send messages to your care team and make appointments. If you have questions, please call your primary care clinic.  If you do not have a primary care provider, please call [phone] and they will assist you.        Care EveryWhere ID     This is your Care EveryWhere ID. This could be used by other organizations to access your [place] medical records  [id number]        Equal Access to Services     LIGIA LARIOS : Andrzej Herman, srini crespo, qajoselito kaalfrancisco j malave, adrienne saeed. So [facility] [phone].    ATENCIÓN: Si habla español, tiene a easton disposición servicios gratuitos de asistencia lingüística. Billyame al [phone].    We comply with applicable federal civil rights laws and [place] laws. We do not discriminate on the basis of race, color, national origin, age, disability, sex, sexual orientation, or gender identity.

## 2018-04-03 NOTE — PROGRESS NOTES
[facility]    HISTORY OF PRESENT ILLNESS: Ms. Sydni Mendoza is a 73-year-old woman who returns to our clinic to follow-up on a traumatic right knee ulceration. She slipped and hit her knee and suffered a large skin tear. She initially saw Dr. Calvo who discussed a possible skin graft and recommended weekly debridements. Her wound has improved significantly in size and is granular today.     WOUND CARE: Dressing wound with FibraCol and gauze daily.     DATE WOUND ACQUIRED: 3/2018    PAST MEDICAL HISTORY:  has a past medical history of COPD (chronic obstructive pulmonary disease) (H); Lung nodule (9-14-12); and Smoker (9-15-12).    MEDICATIONS:   Current Outpatient Prescriptions   Medication     order for DME     ipratropium - albuterol 0.5 mg/2.5 mg/3 mL (DUONEB) 0.5-2.5 (3) MG/3ML neb solution     DULERA 200-5 MCG/ACT oral inhaler     SPIRIVA HANDIHALER 18 MCG capsule     losartan (COZAAR) 50 MG tablet     hydrochlorothiazide (HYDRODIURIL) 25 MG tablet     guaiFENesin (MUCINEX) 600 MG 12 hr tablet     VENTOLIN  (90 BASE) MCG/ACT inhaler     aspirin 81 MG tablet     Cholecalciferol (VITAMIN D) 2000 UNITS tablet     Multiple Vitamin (MULTI-VITAMIN) per tablet     No current facility-administered medications for this encounter.      VITALS: /80  Pulse 100  Temp 97.2  F (36.2  C) (Temporal)  Resp 16    PHYSICAL EXAM:  GENERAL: Patient is alert and oriented and in no acute distress  INTEGUMENTARY:   WOUND ASSESSMENT #1:     Location: right knee     Size: 7.6 cm x 2.7 cm with a depth of 0.1 cm, undermining on medial edge of 0.6 cm    Drainage: moderate and copious amount of serosanguinous drainage    Wound description: primarily granular, minimal dried exudate and slough    PROCEDURE: Per standing orders, topical 4% lidocaine was applied to the wound by the [facility]. Timeout was called and informed consent was obtained. Using a 15 blade a surgical debridement was performed down to and  including subcutaneous tissue of <20cm. Hemostasis was achieved with pressure. The patient tolerated the procedure well.     ASSESSMENT: ulcer of right knee with fat-layer exposed    PLAN:   1. Continue dressing with FibraCol  2. Will switch to Mepilex Border cover dressings and decrease frequency of dressing changes to every other day    FOLLOW-UP: 1 week    SELINA MORALES PA-C

## 2018-04-03 NOTE — IP AVS SNAPSHOT
MRN:[id number]                      After Visit Summary   4/3/2018    Sydni Mendoza    MRN: [id number]           Visit Information        Provider Department      4/3/2018 10:00 AM Gia Kc PA-C [facility]           Review of your medicines      Notice     Cannot display patient medications because the patient has not yet been checked in.             Protect others around you: Learn how to safely use, store and throw away your medicines at www.disposemymeds.org.         Follow-ups after your visit        Your next 10 appointments already scheduled     Apr 03, 2018 10:00 AM CDT   Return Visit with Gia Kc PA-C   [facility] ([facility])    [address]  [address]  [facility] [address] [phone]            Apr 09, 2018 10:30 AM CDT   Return Visit with Mamadou Calvo MD   [facility] ([facility])    [address]  [address]  [facility] [address] [phone]               Care Instructions         Additional Information About Your Visit        MyChart Information     [employer] gives you secure access to your electronic health record. If you see a primary care provider, you can also send messages to your care team and make appointments. If you have questions, please call your primary care clinic.  If you do not have a primary care provider, please call [phone] and they will assist you.        Care EveryWhere ID     This is your Care EveryWhere ID. This could be used by other organizations to access your [place] medical records  [id number]         Primary Care Provider Office Phone # Fax #    Lyn Darlene Lui [phone]      Equal Access to Services     LIGIA LARIOS AH: Hadii luba Herman, waaxda luqadaha, qaybta kaalmada frieda, adrienne saeed. So  [facility] [place] [facility] [phone].    ATENCIÓN: Si habla gabriella, tiene a easton disposición servicios gratuitos de asistencia lingüística. Jin al [phone].    We comply with applicable federal civil rights laws and [place] laws. We do not discriminate on the basis of race, color, national origin, age, disability, sex, sexual orientation, or gender identity.            Thank you!     Thank you for choosing [facility] for your care. Our goal is always to provide you with excellent care. Hearing back from our patients is one way we can continue to improve our services. Please take a few minutes to complete the written survey that you may receive in the mail after you visit with us. Thank you!             Medication List: This is a list of all your medications and when to take them. Check marks below indicate your daily home schedule. Keep this list as a reference.      Notice     Cannot display patient medications because the patient has not yet been checked in.

## 2018-04-04 RX ORDER — LOSARTAN POTASSIUM 50 MG/1
50 TABLET ORAL DAILY
Qty: 90 TABLET | Refills: 1 | Status: SHIPPED | OUTPATIENT
Start: 2018-04-04 | End: 2018-09-28

## 2018-04-09 ENCOUNTER — HOSPITAL ENCOUNTER (OUTPATIENT)
Dept: WOUND CARE | Facility: CLINIC | Age: 73
Discharge: HOME | End: 2018-04-09
Attending: SURGERY | Admitting: SURGERY
Payer: MEDICARE

## 2018-04-09 VITALS — HEART RATE: 105 BPM | SYSTOLIC BLOOD PRESSURE: 141 MMHG | DIASTOLIC BLOOD PRESSURE: 116 MMHG | TEMPERATURE: 97.8 F

## 2018-04-09 PROCEDURE — 11042 DBRDMT SUBQ TIS 1ST 20SQCM/<: CPT

## 2018-04-09 PROCEDURE — A6022 COLLAGEN DRSG>16<=48 SQ IN: HCPCS

## 2018-04-09 PROCEDURE — A6213 FOAM DRG >16<=48 SQ IN W/BDR: HCPCS

## 2018-04-09 PROCEDURE — 11042 DBRDMT SUBQ TIS 1ST 20SQCM/<: CPT | Performed by: SURGERY

## 2018-04-09 NOTE — DISCHARGE INSTRUCTIONS
[facility]  [address] [facility] [address], [place] [address]  Appointment Phone [phone] Nurse Advisors [phone]    Sydni Mendoza      1945    Wound Dressing Change to right knee  Cleanse wound with soap and water  Cover wound with Fibracol cut to size of wound (this will dissolve into the wound)  Cover wound with 6x6 mepilex border  Change dressing every other day    Skin grafting not needed, wound is healing on it's own     Mamadou Calvo M.D. April 9, 2018    Call us at [phone] if you have any questions about your wounds, have redness or swelling around your wound, have a fever of 101 or greater or if you have any other problems or concerns. We answer the phone Monday through Friday 8 am to 4 pm, please leave a message as we check the voicemail frequently throughout the day.     Follow up with Philomena Kc PA-C in 2 weeks

## 2018-04-09 NOTE — PROGRESS NOTES
[facility] [place] Progress Note    Subject: Sydni Mendoza returns for follow-up of her traumatic right lateral ankle ulcer.  This occurred during a fall.  She is very good arterial blood supply.  She is on home oxygen due to COPD but is no longer a smoker and this is been a concern about healing.    When I first evaluated her the ulcer measured 9.8 x 4.4 cm.  She has been using Fibracol and changing this every other day with a Mepilex border for the last several weeks.  Last measurement was 7.6 x 2.7.    She denies any pain.  Reports her appetite is good.      Exam:  BP (!) 141/116  Pulse 105  Temp 97.8  F (36.6  C) (Temporal)  Alert and appropriate.  She is on her oxygen.  No edema nor cellulitis.  +3 dorsalis pedis pulse.  The ulcerated area measures 6.3 x 2.5 cm the depth of 0.1 cm    There is an area of undermining from that to the 4 o'clock position of 0.8 cm.  She has been placing the Fibracol under this undermined area.    Mild fibrin and slough is noted of the wound but overall granulation bed looks excellent.    Blood pressure was rechecked was 136/94    Procedure:  Time out was called, informed consent obtained, and topical anesthetic of 4% lidocaine was applied per standing protocol, debridement was performed using a #15 blade down to and including subcutaneous tissue.  We removed also fibrin and slough down to very healthy granulation tissue.  I excised the undermined anterior flap preserving the skin edge with good bleeding being noted.  100% granulation is noted on the posterior tissue aspect of the flap.  Skin edges were debrided 0.1 cm circumferentially down to good healthy bleeding tissue.  Bleeding controlled with light pressure. Patient tolerated procedure well.    Impression: Wound is doing well.'s decreasing in size.  I do not feel the split-thickness skin graft is indicated and I discussed this with the patient.  We will continue with the Fibracol but avoid placing this  under the flap to see if it will become adherent.  She will changes every other day.    If there is still an undermined area on her next visit in 2 weeks consider excision of the overlying skin edge which actually may hasten healing and this is been discussed.    Plan: We will dress the wounds with Fibracol and Mepilex foam border every other day.  Patient will return to the clinic in 2 weeks time    Mamadou Calvo MD  04/09/18 10:42 AM

## 2018-04-09 NOTE — MR AVS SNAPSHOT
MRN:[id number]                      After Visit Summary   4/9/2018    Sydni Mendoza    MRN: [id number]           Visit Information        Provider Department      4/9/2018 10:30 AM Mamadou Calvo MD [facility] [place]        Your next 10 appointments already scheduled     May 03, 2018 10:30 AM CDT   Pulmonary Eval with Sh Pulmonary Rehab 1   [facility] ([facility])    [address]  [facility] [address] [phone]                Further instructions from your care team             [facility] [place]  [address] [facility] [address], [facility] [address]  Appointment Phone [phone] Nurse Advisors [phone]    Sydni Joyneryesica      1945    Wound Dressing Change to right knee  Cleanse wound with soap and water  Cover wound with Fibracol cut to size of wound (this will dissolve into the wound)  Cover wound with 6x6 mepilex border  Change dressing every other day    Skin grafting not needed, wound is healing on it's own     Mamadou Calvo M.D. April 9, 2018    Call us at [phone] if you have any questions about your wounds, have redness or swelling around your wound, have a fever of 101 or greater or if you have any other problems or concerns. We answer the phone Monday through Friday 8 am to 4 pm, please leave a message as we check the voicemail frequently throughout the day.     Follow up with Philomena Kc PA-C in 2 weeks             [employer] Information     [employer] gives you secure access to your electronic health record. If you see a primary care provider, you can also send messages to your care team and make appointments. If you have questions, please call your primary care clinic.  If you do not have a primary care provider, please call [phone] and they will assist you.        Care EveryWhere ID     This is your Care EveryWhere ID. This could be used  by other organizations to access your [place] medical records  [id number]        Equal Access to Services     LIGIA LARIOS : Andrzej Herman, srini crespo, adrienne espinosa. [facility] [phone].    ATENCIÓN: Si habla español, tiene a easton disposición servicios gratuitos de asistencia lingüística. Llame al [phone].    We comply with applicable federal civil rights laws and [place] laws. We do not discriminate on the basis of race, color, national origin, age, disability, sex, sexual orientation, or gender identity.

## 2018-04-24 ENCOUNTER — HOSPITAL ENCOUNTER (OUTPATIENT)
Dept: WOUND CARE | Facility: CLINIC | Age: 73
Discharge: HOME | End: 2018-04-24
Attending: PHYSICIAN ASSISTANT | Admitting: PHYSICIAN ASSISTANT
Payer: MEDICARE

## 2018-04-24 VITALS
SYSTOLIC BLOOD PRESSURE: 158 MMHG | TEMPERATURE: 97.2 F | HEART RATE: 108 BPM | DIASTOLIC BLOOD PRESSURE: 80 MMHG | RESPIRATION RATE: 16 BRPM

## 2018-04-24 DIAGNOSIS — T14.8XXA OPEN WOUND: Primary | ICD-10-CM

## 2018-04-24 PROCEDURE — A6212 FOAM DRG <=16 SQ IN W/BORDER: HCPCS

## 2018-04-24 PROCEDURE — 17250 CHEM CAUT OF GRANLTJ TISSUE: CPT | Performed by: PHYSICIAN ASSISTANT

## 2018-04-24 PROCEDURE — 17250 CHEM CAUT OF GRANLTJ TISSUE: CPT

## 2018-04-24 PROCEDURE — A6248 HYDROGEL DRSG GEL FILLER: HCPCS

## 2018-04-24 NOTE — DISCHARGE INSTRUCTIONS
[facility]  [address] [facility] [address], [place] [address]  Appointment Phone [phone] Nurse Advisors [phone]    Sydni Mendoza      1945    Wound Dressing Change:Right Knee  Cleanse wound with:soap and water  Use Fibracol until gone than switch to wound gel  Cover wound with woundres gel   Cover wound with mepilex   Change dressing Monday Wed and Friday .     Gia Kc PA-C. April 24, 2018    Call us at [phone] if you have any questions about your wounds, have redness or swelling around your wound, have a fever of 101 or greater or if you have any other problems or concerns. We answer the phone Monday through Friday 8 am to 4 pm, please leave a message as we check the voicemail frequently throughout the day.     Follow up with Provider - 2 weeks

## 2018-04-24 NOTE — PROGRESS NOTES
[facility]    HISTORY OF PRESENT ILLNESS: Ms. Sydni Mendoza is a 73-year-old woman who returns to our clinic to follow-up on a traumatic right knee ulceration. She slipped and hit her knee and suffered a large skin tear. She continues to steadily improve with each visit. The size and depth have both decreased since last visit.     WOUND CARE: Dressing wound with FibraCol and Mepilex.      DATE WOUND ACQUIRED: 3/2018    PAST MEDICAL HISTORY:  has a past medical history of COPD (chronic obstructive pulmonary disease) (H); Lung nodule (9-14-12); and Smoker (9-15-12).    MEDICATIONS:   Current Outpatient Prescriptions   Medication     aspirin 81 MG tablet     Cholecalciferol (VITAMIN D) 2000 UNITS tablet     DULERA 200-5 MCG/ACT oral inhaler     guaiFENesin (MUCINEX) 600 MG 12 hr tablet     hydrochlorothiazide (HYDRODIURIL) 25 MG tablet     ipratropium - albuterol 0.5 mg/2.5 mg/3 mL (DUONEB) 0.5-2.5 (3) MG/3ML neb solution     losartan (COZAAR) 50 MG tablet     Multiple Vitamin (MULTI-VITAMIN) per tablet     tiotropium (SPIRIVA HANDIHALER) 18 MCG capsule     VENTOLIN  (90 BASE) MCG/ACT inhaler     No current facility-administered medications for this encounter.      VITALS: /80  Pulse 108  Temp 97.2  F (36.2  C)  Resp 16    PHYSICAL EXAM:  GENERAL: Patient is alert and oriented and in no acute distress  INTEGUMENTARY:   WOUND ASSESSMENT #1:     Location: right knee     Size: 2.9 cm x 1.5 cm with a depth of 0.1 cm    Drainage: moderate and copious amount of serosanguinous drainage    Wound description: primarily granular, minimal dried exudate and slough    PROCEDURE: After verbal consent was obtained, hypergranulation tissue was treated with silver nitrate. Patient tolerated this well.     ASSESSMENT: ulcer of right knee with fat-layer exposed    PLAN:   1. Continue dressing with FibraCol    FOLLOW-UP: 2 weeks    SELINA MORALES PA-C

## 2018-04-24 NOTE — MR AVS SNAPSHOT
MRN:[id number]                      After Visit Summary   4/24/2018    Sydni Mendoza    MRN: [id number]           Visit Information        Provider Department      4/24/2018 10:15 AM Gia Kc PA-C [facility] [place]        Your next 10 appointments already scheduled     May 03, 2018 10:30 AM CDT   Pulmonary Eval with Sh Pulmonary Rehab 1   [facility] ([facility])    [address], [address]  [facility] [address] [phone]                Further instructions from your care team             [facility] [place]  [address] [facility] [address], [facility] [address]  Appointment Phone [phone] Nurse Advisors [phone]    Sydni E Reji      1945    Wound Dressing Change:Right Knee  Cleanse wound with:soap and water  Use Fibracol until gone than switch to wound gel  Cover wound with woundres gel   Cover wound with mepilex   Change dressing Monday Wed and Friday .     Gia Kc PA-C. April 24, 2018    Call us at [phone] if you have any questions about your wounds, have redness or swelling around your wound, have a fever of 101 or greater or if you have any other problems or concerns. We answer the phone Monday through Friday 8 am to 4 pm, please leave a message as we check the voicemail frequently throughout the day.     Follow up with Provider - 2 weeks             MyChart Information     [employer] gives you secure access to your electronic health record. If you see a primary care provider, you can also send messages to your care team and make appointments. If you have questions, please call your primary care clinic.  If you do not have a primary care provider, please call [phone] and they will assist you.        Care EveryWhere ID     This is your Care EveryWhere ID. This could be used by other organizations to access your [facility]  records  [id number]        Equal Access to Services     LIGIA LARIOS : Andrzej Herman, srini crespo, rochelle malave, adrienne saeed. So [facility] [phone].    ATENCIÓN: Si habla español, tiene a easton disposición servicios gratuitos de asistencia lingüística. Llame al [phone].    We comply with applicable federal civil rights laws and [place] laws. We do not discriminate on the basis of race, color, national origin, age, disability, sex, sexual orientation, or gender identity.

## 2018-05-03 ENCOUNTER — HOSPITAL ENCOUNTER (OUTPATIENT)
Dept: CARDIAC REHAB | Facility: CLINIC | Age: 73
End: 2018-05-03
Attending: INTERNAL MEDICINE
Payer: MEDICARE

## 2018-05-08 ENCOUNTER — HOSPITAL ENCOUNTER (OUTPATIENT)
Dept: WOUND CARE | Facility: CLINIC | Age: 73
Discharge: HOME | End: 2018-05-08
Attending: PHYSICIAN ASSISTANT | Admitting: PHYSICIAN ASSISTANT
Payer: MEDICARE

## 2018-05-08 VITALS
RESPIRATION RATE: 14 BRPM | SYSTOLIC BLOOD PRESSURE: 153 MMHG | TEMPERATURE: 98.3 F | HEART RATE: 14 BPM | DIASTOLIC BLOOD PRESSURE: 69 MMHG

## 2018-05-08 PROCEDURE — 99213 OFFICE O/P EST LOW 20 MIN: CPT | Performed by: PHYSICIAN ASSISTANT

## 2018-05-08 PROCEDURE — 97602 WOUND(S) CARE NON-SELECTIVE: CPT

## 2018-05-08 NOTE — PROGRESS NOTES
[facility]    HISTORY OF PRESENT ILLNESS: Ms. Sydni Mendoza is a 73-year-old woman who returns to our clinic to follow-up on a traumatic right knee ulceration. She slipped and hit her knee and suffered a large skin tear. She continues to steadily improve with each visit. There is only a very small area of ulceration today.    WOUND CARE: Dressing wound with wound gel and a BandAid    DATE WOUND ACQUIRED: 3/2018    PAST MEDICAL HISTORY:  has a past medical history of COPD (chronic obstructive pulmonary disease) (H); Lung nodule (9-14-12); and Smoker (9-15-12).    MEDICATIONS:   Current Outpatient Prescriptions   Medication     aspirin 81 MG tablet     Cholecalciferol (VITAMIN D) 2000 UNITS tablet     DULERA 200-5 MCG/ACT oral inhaler     guaiFENesin (MUCINEX) 600 MG 12 hr tablet     hydrochlorothiazide (HYDRODIURIL) 25 MG tablet     ipratropium - albuterol 0.5 mg/2.5 mg/3 mL (DUONEB) 0.5-2.5 (3) MG/3ML neb solution     losartan (COZAAR) 50 MG tablet     Multiple Vitamin (MULTI-VITAMIN) per tablet     tiotropium (SPIRIVA HANDIHALER) 18 MCG capsule     VENTOLIN  (90 BASE) MCG/ACT inhaler     No current facility-administered medications for this encounter.      VITALS: /69  Pulse (!) 14  Temp 98.3  F (36.8  C)  Resp 14    PHYSICAL EXAM:  GENERAL: Patient is alert and oriented and in no acute distress  INTEGUMENTARY:   WOUND ASSESSMENT #1:     Location: right knee     Size: 0.5 cm x 0.5 cm with a depth of 0.1 cm    Drainage: moderate and copious amount of serosanguinous drainage    Wound description: granular    PROCEDURE: Per standing order, topical 4% lidocaine was applied to the wound by the [facility]. The wound was mechanically debrided.     ASSESSMENT: ulcer of right knee with fat-layer exposed    PLAN:   1. Continue dressing with gel and a bandaid until completely epithelialized and drainage stops.     FOLLOW-UP: EDGAR MORALES PA-C

## 2018-05-08 NOTE — DISCHARGE INSTRUCTIONS
[facility]  [address] [facility] [address], [place] [address]  Appointment Phone [phone] Nurse Advisors [phone]    Sydni Mendoza      1945    Wound Dressing Change:Right knee  Cleanse wound with:soap and water  Cover wound with small amount of gel   Cover wound with bandaid   Change dressing daily.     Gia Kc PA-C. May 8, 2018    Call us at [phone] if you have any questions about your wounds, have redness or swelling around your wound, have a fever of 101 or greater or if you have any other problems or concerns. We answer the phone Monday through Friday 8 am to 4 pm, please leave a message as we check the voicemail frequently throughout the day.     Follow up with Provider - If needed.

## 2018-05-08 NOTE — MR AVS SNAPSHOT
MRN:[id number]                      After Visit Summary   5/8/2018    Sydni Mendoza    MRN: [id number]           Visit Information        Provider Department      5/8/2018 10:15 AM Gia Kc PA-C [facility] [place]        Your next 10 appointments already scheduled     May 10, 2018 10:30 AM CDT   CONSULT with  Pulmonary Rehab 2   [facility])    [address]  [place] [address]   [phone]            May 15, 2018 12:00 PM CDT   Pulmonary Treatment with  Pulmonary Rehab 2   [facility])    [address]  [facility] [address]   [phone]            May 18, 2018 11:00 AM CDT   Pulmonary Treatment with  Pulmonary Rehab 1   [facility])    [address]  [facility] [address]   [phone]            May 22, 2018 12:00 PM CDT   Pulmonary Treatment with  Pulmonary Rehab 2   [facility] ([facility])    [address]  [facility] [address]   [phone]            May 24, 2018 12:00 PM CDT   Pulmonary Treatment with  Pulmonary Rehab 2   [facility] ([facility])    [address]  [place] [address]   [phone]            May 29, 2018 12:00 PM CDT   Pulmonary Treatment with  Pulmonary Rehab 2   [facility] ([facility])    [address]  [place] [address]   [phone]            May 31, 2018 12:00 PM CDT   Pulmonary Treatment with  Pulmonary Rehab 2   [facility] ([facility])    [address]  [facility] [address]   [phone]            Jun 05, 2018 12:00 PM CDT   Pulmonary Treatment with  Pulmonary Rehab 2   [place]  [facility] ([facility])    [address]  [place] [address]   [phone]            Jun 07, 2018 12:00 PM CDT   Pulmonary Treatment with Sh Pulmonary Rehab 2   [facility] ([facility])    [address]  [place] [address] [phone]            Jun 12, 2018 12:00 PM CDT   Pulmonary Treatment with Sh Pulmonary Rehab 2   [facility] ([facility])    [address]  [place] [address] [phone]                Further instructions from your care team             [facility]  [address], [place] [address]  Appointment Phone [phone] Nurse Advisors [phone]    Sydni Mendoza      1945    Wound Dressing Change:Right knee  Cleanse wound with:soap and water  Cover wound with small amount of gel   Cover wound with bandaid   Change dressing daily.     Gia Kc PA-C. May 8, 2018    Call us at [phone] if you have any questions about your wounds, have redness or swelling around your wound, have a fever of 101 or greater or if you have any other problems or concerns. We answer the phone Monday through Friday 8 am to 4 pm, please leave a message as we check the voicemail frequently throughout the day.     Follow up with Provider - If needed.              [employer] Information     [employer] gives you secure access to your electronic health record. If you see a primary care provider, you can also send messages to your care team and make appointments. If you have questions, please call your primary care clinic.  If you do not have a primary care provider, please call [phone] and they will assist you.        Care EveryWhere ID     This is your Care EveryWhere ID. This could be used by other organizations to access your [place] medical records  [id number]        Equal Access to  Services     [facility]: Andrzej Herman, waadada luqadaha, qaybta kaalfrancisco j malave, adrienne saeed. [facility] [phone].    ATENCIÓN: Si habla español, tiene a easton disposición servicios gratuitos de asistencia lingüística. Llame al [phone].    We comply with applicable federal civil rights laws and [place] laws. We do not discriminate on the basis of race, color, national origin, age, disability, sex, sexual orientation, or gender identity.

## 2018-05-10 ENCOUNTER — HOSPITAL ENCOUNTER (OUTPATIENT)
Dept: CARDIAC REHAB | Facility: CLINIC | Age: 73
End: 2018-05-10
Attending: INTERNAL MEDICINE
Payer: MEDICARE

## 2018-05-10 PROCEDURE — 40000244 ZZH STATISTIC VISIT PULM REHAB: Performed by: CLINICAL EXERCISE PHYSIOLOGIST

## 2018-05-10 PROCEDURE — G0424 PULMONARY REHAB W EXER: HCPCS | Performed by: CLINICAL EXERCISE PHYSIOLOGIST

## 2018-05-15 ENCOUNTER — HOSPITAL ENCOUNTER (OUTPATIENT)
Dept: CARDIAC REHAB | Facility: CLINIC | Age: 73
End: 2018-05-15
Attending: INTERNAL MEDICINE
Payer: MEDICARE

## 2018-05-15 PROCEDURE — 40000244 ZZH STATISTIC VISIT PULM REHAB: Performed by: CLINICAL EXERCISE PHYSIOLOGIST

## 2018-05-15 PROCEDURE — G0424 PULMONARY REHAB W EXER: HCPCS | Performed by: CLINICAL EXERCISE PHYSIOLOGIST

## 2018-05-18 ENCOUNTER — HOSPITAL ENCOUNTER (OUTPATIENT)
Dept: CARDIAC REHAB | Facility: CLINIC | Age: 73
End: 2018-05-18
Attending: INTERNAL MEDICINE
Payer: MEDICARE

## 2018-05-18 PROCEDURE — 40000244 ZZH STATISTIC VISIT PULM REHAB: Performed by: CLINICAL EXERCISE PHYSIOLOGIST

## 2018-05-18 PROCEDURE — 40000116 ZZH STATISTIC OP CR VISIT: Performed by: CLINICAL EXERCISE PHYSIOLOGIST

## 2018-05-18 PROCEDURE — G0424 PULMONARY REHAB W EXER: HCPCS | Performed by: CLINICAL EXERCISE PHYSIOLOGIST

## 2018-05-18 PROCEDURE — 93798 PHYS/QHP OP CAR RHAB W/ECG: CPT | Performed by: CLINICAL EXERCISE PHYSIOLOGIST

## 2018-05-22 ENCOUNTER — HOSPITAL ENCOUNTER (OUTPATIENT)
Dept: CARDIAC REHAB | Facility: CLINIC | Age: 73
End: 2018-05-22
Attending: INTERNAL MEDICINE
Payer: MEDICARE

## 2018-05-22 PROCEDURE — 40000244 ZZH STATISTIC VISIT PULM REHAB: Performed by: CLINICAL EXERCISE PHYSIOLOGIST

## 2018-05-22 PROCEDURE — G0424 PULMONARY REHAB W EXER: HCPCS | Performed by: CLINICAL EXERCISE PHYSIOLOGIST

## 2018-05-25 ENCOUNTER — OFFICE VISIT (OUTPATIENT)
Dept: FAMILY MEDICINE | Facility: CLINIC | Age: 73
End: 2018-05-25

## 2018-05-25 VITALS
OXYGEN SATURATION: 98 % | DIASTOLIC BLOOD PRESSURE: 78 MMHG | WEIGHT: 145 LBS | HEART RATE: 89 BPM | SYSTOLIC BLOOD PRESSURE: 158 MMHG | BODY MASS INDEX: 24.89 KG/M2

## 2018-05-25 DIAGNOSIS — J44.1 COPD WITH ACUTE EXACERBATION (H): Primary | ICD-10-CM

## 2018-05-25 PROCEDURE — 99213 OFFICE O/P EST LOW 20 MIN: CPT | Performed by: NURSE PRACTITIONER

## 2018-05-25 RX ORDER — AZITHROMYCIN 250 MG/1
TABLET, FILM COATED ORAL
Qty: 6 TABLET | Refills: 0 | Status: SHIPPED | OUTPATIENT
Start: 2018-05-25 | End: 2018-08-15

## 2018-05-25 RX ORDER — IPRATROPIUM BROMIDE AND ALBUTEROL 20; 100 UG/1; UG/1
SPRAY, METERED RESPIRATORY (INHALATION)
Refills: 11 | COMMUNITY
Start: 2018-04-02 | End: 2019-07-30

## 2018-05-25 RX ORDER — PREDNISONE 20 MG/1
40 TABLET ORAL DAILY
Qty: 10 TABLET | Refills: 0 | Status: SHIPPED | OUTPATIENT
Start: 2018-05-25 | End: 2018-05-30

## 2018-05-25 NOTE — MR AVS SNAPSHOT
After Visit Summary   5/25/2018    Sydni Mendoza    MRN: [id number]           Patient Information     Date Of Birth          1945        Visit Information        Provider Department      5/25/2018 8:30 AM Chayito Mixon APRN CNP [facility]        Today's Diagnoses     COPD with acute exacerbation (H)    -  1       Follow-ups after your visit        Follow-up notes from your care team     Return if symptoms worsen or fail to improve, for Recheck If symptoms worsen or fail to improve..      Your next 10 appointments already scheduled     May 29, 2018 12:00 PM CDT   Pulmonary Treatment with Sh Pulmonary Rehab 2   [facility] ([facility])    [address]  [facility] [address]   [phone]            May 31, 2018 12:00 PM CDT   Pulmonary Treatment with  Pulmonary Rehab 2   [facility])    [address]  [facility] [address]   [phone]            Jun 05, 2018 12:00 PM CDT   Pulmonary Treatment with  Pulmonary Rehab 2   [facility] ([facility])    [address]  [facility] [address]   [phone]            Jun 07, 2018 12:00 PM CDT   Pulmonary Treatment with Sh Pulmonary Rehab 2   [facility] ([facility])    [address]  [facility] [address]   [phone]            Jun 12, 2018 12:00 PM CDT   Pulmonary Treatment with Sh Pulmonary Rehab 2   [facility] ([facility])    [address]  [facility] [address]   [phone]            George 15, 2018  2:00 PM CDT   Pulmonary Treatment with  Pulmonary Rehab 1   [facility] ([facility])    [address]  [facility] [address]   [phone]            Jun 19, 2018 12:00 PM CDT   Pulmonary  Treatment with Sh Pulmonary Rehab 2   [facility] ([facility])    [address]  [place] [address]   [phone]            Jun 21, 2018 12:00 PM CDT   Pulmonary Treatment with Sh Pulmonary Rehab 2   [facility] ([facility])    [address]  [place] [address]   [phone]            Jun 26, 2018 12:00 PM CDT   Pulmonary Treatment with Sh Pulmonary Rehab 2   [facility] ([facility])    [address]  [place] [address]   [phone]            Jun 28, 2018 12:00 PM CDT   Pulmonary Treatment with Sh Pulmonary Rehab 2   [facility] ([facility])    [address]  [place] [address]   [phone]              Who to contact     If you have questions or need follow up information about today's clinic visit or your schedule please contact [facility] directly at [phone].  Normal or non-critical lab and imaging results will be communicated to you by [employer], letter or phone within 4 business days after the clinic has received the results. If you do not hear from us within 7 days, please contact the clinic through [employer] or phone. If you have a critical or abnormal lab result, we will notify you by phone as soon as possible.  Submit refill requests through [employer] or call your pharmacy and they will forward the refill request to us. Please allow 3 business days for your refill to be completed.          Additional Information About Your Visit        [employer] Information     [employer] gives you secure access to your electronic health record. If you see a primary care provider, you can also send messages to your care team and make appointments. If you have questions, please call your primary care clinic.  If you do not have a primary care provider, please call [phone] and they  will assist you.        Care EveryWhere ID     This is your Care EveryWhere ID. This could be used by other organizations to access your [facility] medical records  [id number]        Your Vitals Were     Pulse Pulse Oximetry BMI (Body Mass Index)             89 98% 24.89 kg/m2          Blood Pressure from Last 3 Encounters:   05/25/18 158/78   05/08/18 153/69   04/24/18 158/80    Weight from Last 3 Encounters:   05/25/18 65.8 kg (145 lb)   03/19/18 66.2 kg (146 lb)   03/14/18 67.1 kg (148 lb)              Today, you had the following     No orders found for display         Today's Medication Changes          These changes are accurate as of 5/25/18  9:31 AM.  If you have any questions, ask your nurse or doctor.               Start taking these medicines.        Dose/Directions    azithromycin 250 MG tablet   Commonly known as:  ZITHROMAX   Used for:  COPD with acute exacerbation (H)   Started by:  Chayito Mixon APRN CNP        Two tablets first day, then one tablet daily for four days.   Quantity:  6 tablet   Refills:  0       predniSONE 20 MG tablet   Commonly known as:  DELTASONE   Used for:  COPD with acute exacerbation (H)   Started by:  Chayito Mixon APRN CNP        Dose:  40 mg   Take 2 tablets (40 mg) by mouth daily for 5 days   Quantity:  10 tablet   Refills:  0            Where to get your medicines      Some of these will need a paper prescription and others can be bought over the counter.  Ask your nurse if you have questions.     Bring a paper prescription for each of these medications     azithromycin 250 MG tablet    predniSONE 20 MG tablet                Primary Care Provider Office Phone # Fax #    Lyn Lui [phone] [address] [place] [address]        Equal Access to Services     LIGIA LARIOS AH: Andrzej Herman, srini crespo, adrienne espinosa. So [facility]  [phone].    ATENCIÓN: Si mindy quirzo, tiene a easton disposición servicios gratuitos de asistencia lingüística. Jin negrete [phone].    We comply with applicable federal civil rights laws and [place] laws. We do not discriminate on the basis of race, color, national origin, age, disability, sex, sexual orientation, or gender identity.            Thank you!     Thank you for choosing [facility]  for your care. Our goal is always to provide you with excellent care. Hearing back from our patients is one way we can continue to improve our services. Please take a few minutes to complete the written survey that you may receive in the mail after your visit with us. Thank you!             Your Updated Medication List - Protect others around you: Learn how to safely use, store and throw away your medicines at www.disposemymeds.org.          This list is accurate as of 5/25/18  9:31 AM.  Always use your most recent med list.                   Brand Name Dispense Instructions for use Diagnosis    aspirin 81 MG tablet      Take 1 tablet by mouth daily.        azithromycin 250 MG tablet    ZITHROMAX    6 tablet    Two tablets first day, then one tablet daily for four days.    COPD with acute exacerbation (H)       DULERA 200-5 MCG/ACT oral inhaler   Generic drug:  mometasone-formoterol      Inhale 2 puffs into the lungs 2 times daily        guaiFENesin 600 MG 12 hr tablet    MUCINEX    28 tablet    Take 2 tablets (1,200 mg) by mouth 2 times daily    Acute on chronic respiratory failure, unspecified whether with hypoxia or hypercapnia (H)       hydrochlorothiazide 25 MG tablet    HYDRODIURIL    90 tablet    Take 1 tablet (25 mg) by mouth daily    Benign essential hypertension       * ipratropium - albuterol 0.5 mg/2.5 mg/3 mL 0.5-2.5 (3) MG/3ML neb solution    DUONEB     Inhale 3 mLs into the lungs 2 times daily        * COMBIVENT RESPIMAT  MCG/ACT inhaler   Generic drug:  Ipratropium-Albuterol       INHALE 1 PUFF 4 TIMES DAILY, MAY TAKE ADDITIONAL PUFFS AS NEEDED. DO NOT EXCEED 6 PUFFS/24 HOURS        losartan 50 MG tablet    COZAAR    90 tablet    Take 1 tablet (50 mg) by mouth daily    Peripheral edema, Benign essential hypertension       Multi-vitamin Tabs tablet   Generic drug:  multivitamin, therapeutic with minerals      Take 1 tablet by mouth daily.        predniSONE 20 MG tablet    DELTASONE    10 tablet    Take 2 tablets (40 mg) by mouth daily for 5 days    COPD with acute exacerbation (H)       tiotropium 18 MCG capsule    SPIRIVA HANDIHALER    90 capsule    INHALE 1 CAPSULE BY MOUTH EVERY DAY    Chronic obstructive pulmonary disease, unspecified COPD type (H)       VENTOLIN  (90 Base) MCG/ACT Inhaler   Generic drug:  albuterol      INL 2 PUFFS PO Q 4 TO 6 H PRN        vitamin D 2000 units tablet     100 tablet    Take 2,000 Units by mouth daily.    Vitamin D deficiency       * Notice:  This list has 2 medication(s) that are the same as other medications prescribed for you. Read the directions carefully, and ask your doctor or other care provider to review them with you.

## 2018-05-25 NOTE — PROGRESS NOTES
SUBJECTIVE:   Sydni Mendoza is a 73 year old female who is here today with: Pt has COPD x 6 years.  CC:Coughand symptoms of no fever, no chills and positive wheeze.    Serious symptoms include: none applicable.    Onset of symptoms was 1 month ago.   Course of illness is worsening.  none applicable exposures.   Treatment measures tried include Inhaler and Nebulizer.    All Medications, Allergies and Histories reviewed and current as of todays visit.    OBJECTIVE:   /78  Pulse 89  Wt 65.8 kg (145 lb)  SpO2 98%  BMI 24.89 kg/m2    GENERAL: healthy, alert and mild distress  EYES:Lids and Conjunctival normal  EAR: CANAL and Left TM is normal: no effusions, no erythema, and normal landmarks, CANAL and Right TM is normal: no effusions, no erythema, and normal landmarks.  NOSE: normal  OROPHARYNX:normal.  NECK: normal, supple and no adenopathy  LUNGS:inspiratory wheezes throughout  HEART :regular rate and rhythm and no murmurs, clicks, or gallops  ABDOMEN:non-tender  SKIN:Warm, Dry and No Rash    ASSESSMENT:   1)    (J44.1) COPD with acute exacerbation (H)  (primary encounter diagnosis)  Comment:   Plan: predniSONE (DELTASONE) 20 MG tablet,         azithromycin (ZITHROMAX) 250 MG tablet

## 2018-05-29 ENCOUNTER — HOSPITAL ENCOUNTER (OUTPATIENT)
Dept: CARDIAC REHAB | Facility: CLINIC | Age: 73
End: 2018-05-29
Attending: INTERNAL MEDICINE
Payer: MEDICARE

## 2018-05-29 PROCEDURE — 40000244 ZZH STATISTIC VISIT PULM REHAB

## 2018-05-29 PROCEDURE — G0424 PULMONARY REHAB W EXER: HCPCS

## 2018-05-31 ENCOUNTER — HOSPITAL ENCOUNTER (OUTPATIENT)
Dept: CARDIAC REHAB | Facility: CLINIC | Age: 73
End: 2018-05-31
Attending: INTERNAL MEDICINE
Payer: MEDICARE

## 2018-05-31 PROCEDURE — G0424 PULMONARY REHAB W EXER: HCPCS | Performed by: CLINICAL EXERCISE PHYSIOLOGIST

## 2018-05-31 PROCEDURE — 40000244 ZZH STATISTIC VISIT PULM REHAB: Performed by: CLINICAL EXERCISE PHYSIOLOGIST

## 2018-06-05 ENCOUNTER — HOSPITAL ENCOUNTER (OUTPATIENT)
Dept: CARDIAC REHAB | Facility: CLINIC | Age: 73
End: 2018-06-05
Attending: INTERNAL MEDICINE
Payer: MEDICARE

## 2018-06-05 PROCEDURE — 40000244 ZZH STATISTIC VISIT PULM REHAB: Performed by: CLINICAL EXERCISE PHYSIOLOGIST

## 2018-06-05 PROCEDURE — G0424 PULMONARY REHAB W EXER: HCPCS | Performed by: CLINICAL EXERCISE PHYSIOLOGIST

## 2018-06-07 ENCOUNTER — HOSPITAL ENCOUNTER (OUTPATIENT)
Dept: CARDIAC REHAB | Facility: CLINIC | Age: 73
End: 2018-06-07
Attending: INTERNAL MEDICINE
Payer: MEDICARE

## 2018-06-07 PROCEDURE — G0424 PULMONARY REHAB W EXER: HCPCS | Performed by: CLINICAL EXERCISE PHYSIOLOGIST

## 2018-06-07 PROCEDURE — 40000244 ZZH STATISTIC VISIT PULM REHAB: Performed by: CLINICAL EXERCISE PHYSIOLOGIST

## 2018-06-12 ENCOUNTER — HOSPITAL ENCOUNTER (OUTPATIENT)
Dept: CARDIAC REHAB | Facility: CLINIC | Age: 73
End: 2018-06-12
Attending: INTERNAL MEDICINE
Payer: MEDICARE

## 2018-06-12 PROCEDURE — 40000244 ZZH STATISTIC VISIT PULM REHAB: Performed by: CLINICAL EXERCISE PHYSIOLOGIST

## 2018-06-12 PROCEDURE — G0424 PULMONARY REHAB W EXER: HCPCS | Performed by: CLINICAL EXERCISE PHYSIOLOGIST

## 2018-06-15 ENCOUNTER — HOSPITAL ENCOUNTER (OUTPATIENT)
Dept: CARDIAC REHAB | Facility: CLINIC | Age: 73
End: 2018-06-15
Attending: INTERNAL MEDICINE
Payer: MEDICARE

## 2018-06-15 PROCEDURE — 40000244 ZZH STATISTIC VISIT PULM REHAB: Performed by: REHABILITATION PRACTITIONER

## 2018-06-15 PROCEDURE — G0424 PULMONARY REHAB W EXER: HCPCS | Performed by: REHABILITATION PRACTITIONER

## 2018-06-19 ENCOUNTER — HOSPITAL ENCOUNTER (OUTPATIENT)
Dept: CARDIAC REHAB | Facility: CLINIC | Age: 73
End: 2018-06-19
Attending: INTERNAL MEDICINE
Payer: MEDICARE

## 2018-06-19 PROCEDURE — 40000244 ZZH STATISTIC VISIT PULM REHAB: Performed by: CLINICAL EXERCISE PHYSIOLOGIST

## 2018-06-19 PROCEDURE — G0424 PULMONARY REHAB W EXER: HCPCS | Performed by: CLINICAL EXERCISE PHYSIOLOGIST

## 2018-06-26 ENCOUNTER — HOSPITAL ENCOUNTER (OUTPATIENT)
Dept: CARDIAC REHAB | Facility: CLINIC | Age: 73
End: 2018-06-26
Attending: INTERNAL MEDICINE
Payer: MEDICARE

## 2018-06-26 PROCEDURE — G0424 PULMONARY REHAB W EXER: HCPCS

## 2018-06-26 PROCEDURE — 40000244 ZZH STATISTIC VISIT PULM REHAB

## 2018-06-27 DIAGNOSIS — I10 BENIGN ESSENTIAL HYPERTENSION: ICD-10-CM

## 2018-06-27 RX ORDER — HYDROCHLOROTHIAZIDE 25 MG/1
25 TABLET ORAL DAILY
Qty: 30 TABLET | Refills: 1 | Status: SHIPPED | OUTPATIENT
Start: 2018-06-27 | End: 2018-08-29

## 2018-06-28 ENCOUNTER — HOSPITAL ENCOUNTER (OUTPATIENT)
Dept: CARDIAC REHAB | Facility: CLINIC | Age: 73
End: 2018-06-28
Attending: INTERNAL MEDICINE
Payer: MEDICARE

## 2018-06-28 PROCEDURE — 40000244 ZZH STATISTIC VISIT PULM REHAB: Performed by: REHABILITATION PRACTITIONER

## 2018-06-28 PROCEDURE — G0424 PULMONARY REHAB W EXER: HCPCS | Performed by: REHABILITATION PRACTITIONER

## 2018-07-02 ENCOUNTER — TELEPHONE (OUTPATIENT)
Dept: FAMILY MEDICINE | Facility: CLINIC | Age: 73
End: 2018-07-02

## 2018-07-02 DIAGNOSIS — J44.1 COPD EXACERBATION (H): Primary | ICD-10-CM

## 2018-07-03 ENCOUNTER — HOSPITAL ENCOUNTER (OUTPATIENT)
Dept: CARDIAC REHAB | Facility: CLINIC | Age: 73
End: 2018-07-03
Attending: INTERNAL MEDICINE
Payer: MEDICARE

## 2018-07-03 PROCEDURE — G0424 PULMONARY REHAB W EXER: HCPCS

## 2018-07-03 PROCEDURE — 40000244 ZZH STATISTIC VISIT PULM REHAB

## 2018-07-05 ENCOUNTER — HOSPITAL ENCOUNTER (OUTPATIENT)
Dept: CARDIAC REHAB | Facility: CLINIC | Age: 73
End: 2018-07-05
Attending: INTERNAL MEDICINE
Payer: MEDICARE

## 2018-07-05 PROCEDURE — G0424 PULMONARY REHAB W EXER: HCPCS | Performed by: CLINICAL EXERCISE PHYSIOLOGIST

## 2018-07-05 PROCEDURE — 40000244 ZZH STATISTIC VISIT PULM REHAB: Performed by: CLINICAL EXERCISE PHYSIOLOGIST

## 2018-07-10 ENCOUNTER — HOSPITAL ENCOUNTER (OUTPATIENT)
Dept: CARDIAC REHAB | Facility: CLINIC | Age: 73
End: 2018-07-10
Attending: INTERNAL MEDICINE
Payer: MEDICARE

## 2018-07-10 PROCEDURE — 40000244 ZZH STATISTIC VISIT PULM REHAB: Performed by: REHABILITATION PRACTITIONER

## 2018-07-10 PROCEDURE — G0424 PULMONARY REHAB W EXER: HCPCS | Performed by: REHABILITATION PRACTITIONER

## 2018-07-13 ENCOUNTER — HOSPITAL ENCOUNTER (OUTPATIENT)
Dept: CARDIAC REHAB | Facility: CLINIC | Age: 73
End: 2018-07-13
Attending: INTERNAL MEDICINE
Payer: MEDICARE

## 2018-07-13 PROCEDURE — 40000116 ZZH STATISTIC OP CR VISIT: Performed by: CLINICAL EXERCISE PHYSIOLOGIST

## 2018-07-13 PROCEDURE — 93798 PHYS/QHP OP CAR RHAB W/ECG: CPT | Performed by: CLINICAL EXERCISE PHYSIOLOGIST

## 2018-07-17 ENCOUNTER — HOSPITAL ENCOUNTER (OUTPATIENT)
Dept: CARDIAC REHAB | Facility: CLINIC | Age: 73
End: 2018-07-17
Attending: INTERNAL MEDICINE
Payer: MEDICARE

## 2018-07-17 PROCEDURE — G0424 PULMONARY REHAB W EXER: HCPCS

## 2018-07-17 PROCEDURE — 40000244 ZZH STATISTIC VISIT PULM REHAB

## 2018-08-15 ENCOUNTER — OFFICE VISIT (OUTPATIENT)
Dept: FAMILY MEDICINE | Facility: CLINIC | Age: 73
End: 2018-08-15

## 2018-08-15 VITALS
RESPIRATION RATE: 14 BRPM | HEART RATE: 90 BPM | WEIGHT: 143.2 LBS | DIASTOLIC BLOOD PRESSURE: 70 MMHG | SYSTOLIC BLOOD PRESSURE: 132 MMHG | BODY MASS INDEX: 24.58 KG/M2 | OXYGEN SATURATION: 94 %

## 2018-08-15 DIAGNOSIS — J43.1 PANLOBULAR EMPHYSEMA (H): Primary | ICD-10-CM

## 2018-08-15 DIAGNOSIS — I10 BENIGN ESSENTIAL HYPERTENSION: ICD-10-CM

## 2018-08-15 DIAGNOSIS — Z13.6 SCREENING FOR CARDIOVASCULAR CONDITION: ICD-10-CM

## 2018-08-15 DIAGNOSIS — Z87.891 HISTORY OF SMOKING GREATER THAN 50 PACK YEARS: ICD-10-CM

## 2018-08-15 DIAGNOSIS — R23.3 BRUISES EASILY: ICD-10-CM

## 2018-08-15 PROCEDURE — 99214 OFFICE O/P EST MOD 30 MIN: CPT | Performed by: FAMILY MEDICINE

## 2018-08-15 PROCEDURE — 80061 LIPID PANEL: CPT | Mod: 90 | Performed by: FAMILY MEDICINE

## 2018-08-15 PROCEDURE — 80053 COMPREHEN METABOLIC PANEL: CPT | Mod: 90 | Performed by: FAMILY MEDICINE

## 2018-08-15 PROCEDURE — 36415 COLL VENOUS BLD VENIPUNCTURE: CPT | Performed by: FAMILY MEDICINE

## 2018-08-15 NOTE — MR AVS SNAPSHOT
After Visit Summary   8/15/2018    Sydni Mendoza    MRN: [id number]           Patient Information     Date Of Birth          1945        Visit Information        Provider Department      8/15/2018 10:45 AM Lyn Lui MD [facility]        Today's Diagnoses     Panlobular emphysema (H)    -  1    Benign essential hypertension        Screening for cardiovascular condition        Bruises easily        History of smoking greater than 50 pack years          Care Instructions    Decrease aspirin to 81 mg every other day.          Follow-ups after your visit        Additional Services     [facility]       Referral to [facility]  Phone: [phone]  Fax: [phone]  Reason for referral: CT-screen lung cancer                  Who to contact     If you have questions or need follow up information about today's clinic visit or your schedule please contact [facility] directly at [phone].  Normal or non-critical lab and imaging results will be communicated to you by SupplierSynchart, letter or phone within 4 business days after the clinic has received the results. If you do not hear from us within 7 days, please contact the clinic through SupplierSynchart or phone. If you have a critical or abnormal lab result, we will notify you by phone as soon as possible.  Submit refill requests through [employer] or call your pharmacy and they will forward the refill request to us. Please allow 3 business days for your refill to be completed.          Additional Information About Your Visit        MyChart Information     [employer] gives you secure access to your electronic health record. If you see a primary care provider, you can also send messages to your care team and make appointments. If you have questions, please call your primary care clinic.  If you do not have a primary care provider, please call [phone] and they will assist you.        Care EveryWhere ID      This is your Care EveryWhere ID. This could be used by other organizations to access your [facility] medical records  [id number]        Your Vitals Were     Pulse Respirations Pulse Oximetry Breastfeeding? BMI (Body Mass Index)       90 14 94% No 24.58 kg/m2        Blood Pressure from Last 3 Encounters:   08/15/18 132/70   05/25/18 158/78   05/08/18 153/69    Weight from Last 3 Encounters:   08/15/18 65 kg (143 lb 3.2 oz)   05/25/18 65.8 kg (145 lb)   03/19/18 66.2 kg (146 lb)              We Performed the Following     Comp. Metabolic Panel (14) ([facility])     Lipid Panel ([facility])     Referral to [facility]        Primary Care Provider Office Phone # Fax #    Lyn Darlene Lui [phone] [address] [place] [address]        Equal Access to Services     TARUN LARIOS : Hadii aad ku hadasho Soomaali, waaxda luqadaha, qaybta kaalmada adeegyada, waxay katharinein hayjessen minh campuzano . So [facility] [phone].    ATENCIÓN: Si habla español, tiene a easton disposición servicios gratuitos de asistencia lingüística. Llame al [phone].    We comply with applicable federal civil rights laws and [place] laws. We do not discriminate on the basis of race, color, national origin, age, disability, sex, sexual orientation, or gender identity.            Thank you!     Thank you for choosing [facility]  for your care. Our goal is always to provide you with excellent care. Hearing back from our patients is one way we can continue to improve our services. Please take a few minutes to complete the written survey that you may receive in the mail after your visit with us. Thank you!             Your Updated Medication List - Protect others around you: Learn how to safely use, store and throw away your medicines at www.disposemymeds.org.          This list is accurate as of 8/15/18  5:41 PM.  Always use your most recent med list.                   Brand Name Dispense  Instructions for use Diagnosis    aspirin 81 MG tablet      Take 1 tablet by mouth daily.        DULERA 200-5 MCG/ACT oral inhaler   Generic drug:  mometasone-formoterol      Inhale 2 puffs into the lungs 2 times daily        guaiFENesin 600 MG 12 hr tablet    MUCINEX    28 tablet    Take 2 tablets (1,200 mg) by mouth 2 times daily    Acute on chronic respiratory failure, unspecified whether with hypoxia or hypercapnia (H)       hydrochlorothiazide 25 MG tablet    HYDRODIURIL    30 tablet    Take 1 tablet (25 mg) by mouth daily    Benign essential hypertension       * ipratropium - albuterol 0.5 mg/2.5 mg/3 mL 0.5-2.5 (3) MG/3ML neb solution    DUONEB     Inhale 3 mLs into the lungs 2 times daily        * COMBIVENT RESPIMAT  MCG/ACT inhaler   Generic drug:  Ipratropium-Albuterol      INHALE 1 PUFF 4 TIMES DAILY, MAY TAKE ADDITIONAL PUFFS AS NEEDED. DO NOT EXCEED 6 PUFFS/24 HOURS        losartan 50 MG tablet    COZAAR    90 tablet    Take 1 tablet (50 mg) by mouth daily    Peripheral edema, Benign essential hypertension       Multi-vitamin Tabs tablet   Generic drug:  multivitamin, therapeutic with minerals      Take 1 tablet by mouth daily.        umeclidinium 62.5 MCG/INH inhaler    INCRUSE ELLIPTA    3 Inhaler    Inhale 1 puff into the lungs daily    COPD exacerbation (H)       vitamin D 2000 units tablet     100 tablet    Take 2,000 Units by mouth daily.    Vitamin D deficiency       * Notice:  This list has 2 medication(s) that are the same as other medications prescribed for you. Read the directions carefully, and ask your doctor or other care provider to review them with you.

## 2018-08-15 NOTE — PROGRESS NOTES
Problem(s) Oriented visit        SUBJECTIVE:                                                    Sydni Mendoza is a 73 year old female who presents to clinic today for recheck of blood pressure. SHe does not take her blood pressure at home, although she has a home cuff. She attended Pulmonary Rehab weekly and blood pressures done there were consistently 120-130s/60-80s. She denies any chest pain or pressure with exertion. The Pulmonary Rehab did not help her to tolerate more activity.     She also points out significant bruising all over. She takes an 81 mg ASA. She gets large hematomas from small bumps.       Problem list, Medication list, Allergies, and Medical/Social/Surgical histories reviewed in EPIC and updated as appropriate.   Additional history: as documented    ROS:  5 point ROS completed and negative except noted above, including Gen, CV, Resp, GI, MS      Histories:   Patient Active Problem List   Diagnosis     COPD (chronic obstructive pulmonary disease) (H)     Smoking     Lung nodule     Pneumothorax, post biopsy, right     Health Care Home     Pneumonia     COPD with acute exacerbation (H)     Past Surgical History:   Procedure Laterality Date     CHEST TUBE INSERTION  9-14-12    Right ,pneumothorax post, lung biopsy     GYN SURGERY      Tubal Ligation       Social History   Substance Use Topics     Smoking status: Former Smoker     Packs/day: 1.00     Years: 50.00     Types: Cigarettes     Quit date: 8/20/2011     Smokeless tobacco: Never Used     Alcohol use 0.0 oz/week     0 Standard drinks or equivalent per week      Comment: occasional     No family history on file.        OBJECTIVE:                                                    /70  Pulse 90  Resp 14  Wt 65 kg (143 lb 3.2 oz)  SpO2 94%  Breastfeeding? No  BMI 24.58 kg/m2  Body mass index is 24.58 kg/(m^2).   GENERAL APPEARANCE: Alert, no acute distress  NECK: No adenopathy,masses or thyromegaly  RESP: diminished breath  sounds throughout  CV: normal rate, regular rhythm, no murmur or gallop  MS: extremities normal, no peripheral edema  NEURO: Alert, oriented, speech and mentation normal  PSYCH: mentation appears normal, affect and mood normal   Labs Resulted Today:   Results for orders placed or performed in visit on 03/06/18   CBC with Diff/Plt ([facility])   Result Value Ref Range    WBC x10/cmm 9.5 3.8 - 11.0 x10/cmm    % Lymphocytes 11.8 (A) 20.5 - 51.1 %    % Monocytes 3.9 1.7 - 9.3 %    % Granulocytes 84.3 (A) 42.2 - 75.2 %    RBC x10/cmm 3.86 3.7 - 5.2 x10/cmm    Hemoglobin 11.7 (A) 11.8 - 15.5 g/dl    Hematocrit 34.8 (A) 35 - 46 %    MCV 90.1 80 - 100 fL    MCH 30.3 27.0 - 31.0 pg    MCHC 33.7 33.0 - 37.0 g/dL    Platelet Count 298 140 - 450 K/uL     ASSESSMENT/PLAN:                                                        Sydni was seen today for recheck.    Diagnoses and all orders for this visit:    Panlobular emphysema (H)  Continue current inhalers. Stable exam today.    Benign essential hypertension  Well controlled, continue losartan and hydrochlorothiazide. Continue low salt diet and encouraged better hydration.    Screening for cardiovascular condition  -     Lipid Panel ([facility])  -     Comp. Metabolic Panel (14) ([facility])    Bruises easily  Recent normal CBC. Recommend taking ASA only every other day.    History of smoking greater than 50 pack years  -     Referral to [facility] for CT to screen for Pulmonary nodules.      Patient Instructions   Decrease aspirin to 81 mg every other day.      The following health maintenance items are reviewed in Epic and correct as of today:  Health Maintenance   Topic Date Due     COPD ACTION PLAN Q1 YR  1945     PHQ-2 Q1 YR  02/19/1959     MAMMO SCREEN Q2 YR (SYSTEM ASSIGNED)  02/19/1997     COLON CANCER SCREEN (SYSTEM ASSIGNED)  02/19/1997     DEXA SCAN SCREENING (SYSTEM ASSIGNED)  02/19/2012     LIPID SCREEN Q5 YR FEMALE (SYSTEM ASSIGNED)  08/25/2016     PNEUMOCOCCAL (2  of 2 - PPSV23) 02/12/2017     INFLUENZA VACCINE (1) 09/01/2018     FALL RISK ASSESSMENT  05/25/2019     ADVANCE DIRECTIVE PLANNING Q5 YRS  02/12/2021     TETANUS IMMUNIZATION (SYSTEM ASSIGNED)  02/12/2026     SPIROMETRY ONETIME  Completed     HEPATITIS C SCREENING  Addressed       Lyn Lui MD  [facility]  Family Practice  [facility]  [phone]    For any issues my office # is [phone]

## 2018-08-16 LAB
ALBUMIN SERPL-MCNC: 4.6 G/DL (ref 3.5–4.8)
ALBUMIN/GLOB SERPL: 2.3 {RATIO} (ref 1.2–2.2)
ALP SERPL-CCNC: 57 IU/L (ref 39–117)
ALT SERPL-CCNC: 12 IU/L (ref 0–32)
AST SERPL-CCNC: 15 IU/L (ref 0–40)
BILIRUB SERPL-MCNC: 0.5 MG/DL (ref 0–1.2)
BUN SERPL-MCNC: 14 MG/DL (ref 8–27)
BUN/CREATININE RATIO: 22 (ref 12–28)
CALCIUM SERPL-MCNC: 9.5 MG/DL (ref 8.7–10.3)
CHLORIDE SERPLBLD-SCNC: 94 MMOL/L (ref 96–106)
CHOLEST SERPL-MCNC: 195 MG/DL (ref 100–199)
CREAT SERPL-MCNC: 0.65 MG/DL (ref 0.57–1)
EGFR IF AFRICN AM: 103 ML/MIN/1.73
EGFR IF NONAFRICN AM: 90 ML/MIN/1.73
GLOBULIN, TOTAL: 2 G/DL (ref 1.5–4.5)
GLUCOSE SERPL-MCNC: 113 MG/DL (ref 65–99)
HDLC SERPL-MCNC: 111 MG/DL
LDL/HDL RATIO: 0.7 RATIO (ref 0–3.2)
LDLC SERPL CALC-MCNC: 74 MG/DL (ref 0–99)
POTASSIUM SERPL-SCNC: 4.1 MMOL/L (ref 3.5–5.2)
PROT SERPL-MCNC: 6.6 G/DL (ref 6–8.5)
SODIUM SERPL-SCNC: 139 MMOL/L (ref 134–144)
TOTAL CO2: 29 MMOL/L (ref 20–29)
TRIGL SERPL-MCNC: 50 MG/DL (ref 0–149)
VLDLC SERPL CALC-MCNC: 10 MG/DL (ref 5–40)

## 2018-08-20 ENCOUNTER — TRANSFERRED RECORDS (OUTPATIENT)
Dept: FAMILY MEDICINE | Facility: CLINIC | Age: 73
End: 2018-08-20

## 2018-08-23 ENCOUNTER — TELEPHONE (OUTPATIENT)
Dept: FAMILY MEDICINE | Facility: CLINIC | Age: 73
End: 2018-08-23

## 2018-08-23 DIAGNOSIS — Z13.6 SCREENING FOR ABDOMINAL AORTIC ANEURYSM: Primary | ICD-10-CM

## 2018-08-23 NOTE — TELEPHONE ENCOUNTER
Called patient with result of CT of chest.  Informed her that pulmonary images are stable and CT should be repeated annually.  Also noted are calcifications in renal arteries but patient's creatinine was  good on last blood draw.  Recommended is screening for AAA.  Referral sent to [facility].  They will call patient to schedule.

## 2018-08-29 ENCOUNTER — TRANSFERRED RECORDS (OUTPATIENT)
Dept: FAMILY MEDICINE | Facility: CLINIC | Age: 73
End: 2018-08-29

## 2018-08-29 DIAGNOSIS — I10 BENIGN ESSENTIAL HYPERTENSION: ICD-10-CM

## 2018-08-29 RX ORDER — HYDROCHLOROTHIAZIDE 25 MG/1
25 TABLET ORAL DAILY
Qty: 90 TABLET | Refills: 3 | Status: SHIPPED | OUTPATIENT
Start: 2018-08-29 | End: 2019-08-23

## 2018-08-29 NOTE — LETTER
[facility]  [address] [place]  [address]  Phone: [phone]    August 30, 2018      Sydni Mendoza                                                                                                                                 [address]  [facility] [address]            Dear Sydni,    The results of your recent imaging tests showed  No abdominal aortic aneurysm..   If you have any questions or concerns, please call the clinic at [phone] and make a follow up appointment with me.      Sincerely,    Lyn Lui M.D.

## 2018-09-28 DIAGNOSIS — I10 BENIGN ESSENTIAL HYPERTENSION: ICD-10-CM

## 2018-09-28 DIAGNOSIS — R60.0 PERIPHERAL EDEMA: ICD-10-CM

## 2018-09-29 RX ORDER — LOSARTAN POTASSIUM 50 MG/1
50 TABLET ORAL DAILY
Qty: 90 TABLET | Refills: 1 | Status: SHIPPED | OUTPATIENT
Start: 2018-09-29 | End: 2019-03-27

## 2018-11-07 ENCOUNTER — TRANSFERRED RECORDS (OUTPATIENT)
Dept: FAMILY MEDICINE | Facility: CLINIC | Age: 73
End: 2018-11-07

## 2018-12-14 ENCOUNTER — OFFICE VISIT (OUTPATIENT)
Dept: FAMILY MEDICINE | Facility: CLINIC | Age: 73
End: 2018-12-14

## 2018-12-14 VITALS
OXYGEN SATURATION: 96 % | RESPIRATION RATE: 16 BRPM | HEART RATE: 105 BPM | SYSTOLIC BLOOD PRESSURE: 121 MMHG | BODY MASS INDEX: 23.52 KG/M2 | WEIGHT: 137 LBS | DIASTOLIC BLOOD PRESSURE: 70 MMHG

## 2018-12-14 DIAGNOSIS — Z99.81 OXYGEN DEPENDENT: ICD-10-CM

## 2018-12-14 DIAGNOSIS — J44.1 COPD WITH ACUTE EXACERBATION (H): ICD-10-CM

## 2018-12-14 DIAGNOSIS — Z87.891 FORMER SMOKER: ICD-10-CM

## 2018-12-14 DIAGNOSIS — J44.1 COPD EXACERBATION (H): Primary | ICD-10-CM

## 2018-12-14 PROCEDURE — 99214 OFFICE O/P EST MOD 30 MIN: CPT | Performed by: FAMILY MEDICINE

## 2018-12-14 RX ORDER — AZITHROMYCIN 250 MG/1
TABLET, FILM COATED ORAL
Qty: 6 TABLET | Refills: 1 | Status: SHIPPED | OUTPATIENT
Start: 2018-12-14 | End: 2019-07-30

## 2018-12-14 RX ORDER — PREDNISONE 20 MG/1
40 TABLET ORAL DAILY
Qty: 10 TABLET | Refills: 1 | Status: SHIPPED | OUTPATIENT
Start: 2018-12-14 | End: 2018-12-19

## 2018-12-14 NOTE — PROGRESS NOTES
SUBJECTIVE:   Sydni Mendoza is a 73 year old female who presents to clinic today for the following health issues:    Saw Pulmonary in September Doxycycline helped some, but then right back  No imaging  Travel on  Exposure no  O2 2Liters  No steroids  Nebbing 2 times per day  Compresion vest twice a day  Inhalers    No fever  No chills  No HA  No Sinus pressure  No GERD  No CP  SOB same  Edema chronic ankle    Sleep sleeping upright 5 hours, nap daily 1/2 hour  Appetite ok  Exercise homemaking     helps    Smoking exsmoker  ETOH no  Street drugs/MJ no  Caffeine no        Concern - Cough  Onset: 2 month    Description:   Ongoing cough for 2 month, green phelm    Intensity: severe    Progression of Symptoms:  worsening    Accompanying Signs & Symptoms:  none    Previous history of similar problem:   COPD    Precipitating factors:   Worsened by: none    Alleviating factors:  Improved by: none    Therapies Tried and outcome: none        Problem list and histories reviewed & adjusted, as indicated.  Additional history: as documented    Patient Active Problem List   Diagnosis     COPD (chronic obstructive pulmonary disease) (H)     Smoking     Lung nodule     Pneumothorax, post biopsy, right     Health Care Home     Pneumonia     COPD with acute exacerbation (H)     Past Surgical History:   Procedure Laterality Date     CHEST TUBE INSERTION  12    Right ,pneumothorax post, lung biopsy     GYN SURGERY      Tubal Ligation       Social History     Tobacco Use     Smoking status: Former Smoker     Packs/day: 1.00     Years: 50.00     Pack years: 50.00     Types: Cigarettes     Last attempt to quit: 2011     Years since quittin.3     Smokeless tobacco: Never Used   Substance Use Topics     Alcohol use: Yes     Alcohol/week: 0.0 oz     Comment: occasional     No family history on file.      Current Outpatient Medications   Medication Sig Dispense Refill     Cholecalciferol (VITAMIN D) 2000 UNITS  tablet Take 2,000 Units by mouth daily. 100 tablet 3     DULERA 200-5 MCG/ACT oral inhaler Inhale 2 puffs into the lungs 2 times daily       guaiFENesin (MUCINEX) 600 MG 12 hr tablet Take 2 tablets (1,200 mg) by mouth 2 times daily 28 tablet 1     hydrochlorothiazide (HYDRODIURIL) 25 MG tablet Take 1 tablet (25 mg) by mouth daily 90 tablet 3     ipratropium - albuterol 0.5 mg/2.5 mg/3 mL (DUONEB) 0.5-2.5 (3) MG/3ML neb solution Inhale 3 mLs into the lungs 2 times daily  0     losartan (COZAAR) 50 MG tablet Take 1 tablet (50 mg) by mouth daily 90 tablet 1     Multiple Vitamin (MULTI-VITAMIN) per tablet Take 1 tablet by mouth daily.         umeclidinium (INCRUSE ELLIPTA) 62.5 MCG/INH oral inhaler Inhale 1 puff into the lungs daily 3 Inhaler 1     aspirin 81 MG tablet Take 1 tablet by mouth daily.       COMBIVENT RESPIMAT  MCG/ACT inhaler INHALE 1 PUFF 4 TIMES DAILY, MAY TAKE ADDITIONAL PUFFS AS NEEDED. DO NOT EXCEED 6 PUFFS/24 HOURS  11     No Known Allergies  Recent Labs   Lab Test 08/15/18  1343 07/29/16  1359 07/25/16  0725 07/24/16  0930  07/19/16  0618  02/22/16  0600  08/25/11  0520 08/24/11  1830   A1C  --   --   --   --   --  5.7  --   --   --   --   --    LDL 74  --   --   --   --   --   --   --   --  75  --      --   --   --   --   --   --   --   --  76  --    TRIG 50  --   --   --   --   --   --   --   --  81  --    ALT 12  --   --   --   --   --   --  26  --   --  19   CR 0.65 0.58 0.48* 0.47*   < > 0.41*   < > 0.52   < >  --  0.62   GFRESTIMATED  --   --  >90  Non  GFR Calc   >90  Non  GFR Calc     < > >90  Non  GFR Calc     < > >90  Non  GFR Calc     < >  --  >90   GFRESTBLACK  --   --  >90   GFR Calc   >90   GFR Calc     < > >90   GFR Calc     < > >90   GFR Calc     < >  --  >90   POTASSIUM 4.1 4.9 3.8 4.2   < > 3.7   < > 4.5   < >  --  3.5    < > = values in this  interval not displayed.      BP Readings from Last 3 Encounters:   12/14/18 121/70   08/15/18 132/70   05/25/18 158/78    Wt Readings from Last 3 Encounters:   12/14/18 62.1 kg (137 lb)   08/15/18 65 kg (143 lb 3.2 oz)   05/25/18 65.8 kg (145 lb)                  Labs reviewed in EPIC    Reviewed and updated as needed this visit by clinical staff       Reviewed and updated as needed this visit by Provider         ROS:  Constitutional, HEENT, cardiovascular, pulmonary, GI, , musculoskeletal, neuro, skin, endocrine and psych systems are negative, except as otherwise noted.    OBJECTIVE:     /70   Pulse 105   Resp 16   Wt 62.1 kg (137 lb)   SpO2 96%   BMI 23.52 kg/m    Body mass index is 23.52 kg/m .  GENERAL: healthy, alert and no distress  EYES: Eyes grossly normal to inspection, PERRL and conjunctivae and sclerae normal  HENT: ear canals and TM's normal, nose and mouth without ulcers or lesions  NECK: no adenopathy, no asymmetry, masses, or scars and thyroid normal to palpation  RESP: lungs clear to auscultation - no rales, rhonchi or wheezes Deep bronchial cough  CV: regular rate and rhythm, normal S1 S2, no S3 or S4, no murmur, click or rub, no peripheral edema and peripheral pulses strong  ABDOMEN: soft, nontender, no hepatosplenomegaly, no masses and bowel sounds normal  MS: no gross musculoskeletal defects noted, no edema  SKIN: no suspicious lesions or rashes  NEURO: Normal strength and tone, mentation intact and speech normal  PSYCH: mentation appears normal, affect normal/bright  LYMPH: no cervical, supraclavicular, axillary, or inguinal adenopathy    Diagnostic Test Results:  Results for orders placed or performed in visit on 08/15/18   Lipid Panel ([facility])   Result Value Ref Range    Cholesterol 195 100 - 199 mg/dL    Triglycerides 50 0 - 149 mg/dL    HDL Cholesterol 111 >39 mg/dL    VLDL Cholesterol Wong 10 5 - 40 mg/dL    LDL Cholesterol Calculated 74 0 - 99 mg/dL    LDL/HDL Ratio 0.7 0.0 -  3.2 ratio    Narrative    Performed at:  01 - [facility] [address], [place], [place]  [id number]  : Kai Yo MD, Phone:  [phone]   Comp. Metabolic Panel (14) ([facility])   Result Value Ref Range    Glucose 113 (H) 65 - 99 mg/dL    Urea Nitrogen 14 8 - 27 mg/dL    Creatinine 0.65 0.57 - 1.00 mg/dL    eGFR If NonAfricn Am 90 >59 mL/min/1.73    eGFR If Africn Am 103 >59 mL/min/1.73    BUN/Creatinine Ratio 22 12 - 28    Sodium 139 134 - 144 mmol/L    Potassium 4.1 3.5 - 5.2 mmol/L    Chloride 94 (L) 96 - 106 mmol/L    Total CO2 29 20 - 29 mmol/L    Calcium 9.5 8.7 - 10.3 mg/dL    Protein Total 6.6 6.0 - 8.5 g/dL    Albumin 4.6 3.5 - 4.8 g/dL    Globulin, Total 2.0 1.5 - 4.5 g/dL    A/G Ratio 2.3 (H) 1.2 - 2.2    Bilirubin Total 0.5 0.0 - 1.2 mg/dL    Alkaline Phosphatase 57 39 - 117 IU/L    AST 15 0 - 40 IU/L    ALT 12 0 - 32 IU/L    Narrative    Performed at:  01 - [facility] [address], [place], [place]  [id number]  : Kai Yo MD, Phone:  [phone]       ASSESSMENT/PLAN:     ASSESSMENT / PLAN:  (J44.1) COPD exacerbation (H)  (primary encounter diagnosis)  Comment:   Plan: azithromycin (ZITHROMAX) 250 MG tablet,         predniSONE (DELTASONE) 20 MG tablet                (Z99.81) Oxygen dependent  Comment:   Plan: continue care per pulmonary    (Z87.891) Former smoker  Comment:   Plan: Continue not smoking        Patient Instructions   Zithromax antibiotic  Prednisone burst    Follow up if not better in 4 weeks or getting worse      Kiana Santillan MD  [facility]

## 2019-03-27 DIAGNOSIS — I10 BENIGN ESSENTIAL HYPERTENSION: ICD-10-CM

## 2019-03-27 DIAGNOSIS — R60.0 PERIPHERAL EDEMA: ICD-10-CM

## 2019-03-27 RX ORDER — LOSARTAN POTASSIUM 50 MG/1
50 TABLET ORAL DAILY
Qty: 90 TABLET | Refills: 1 | Status: SHIPPED | OUTPATIENT
Start: 2019-03-27 | End: 2019-08-31

## 2019-03-27 RX ORDER — LOSARTAN POTASSIUM 50 MG/1
50 TABLET ORAL DAILY
Qty: 90 TABLET | Refills: 1 | Status: SHIPPED | OUTPATIENT
Start: 2019-03-27 | End: 2019-09-20

## 2019-05-20 ENCOUNTER — TRANSFERRED RECORDS (OUTPATIENT)
Dept: FAMILY MEDICINE | Facility: CLINIC | Age: 74
End: 2019-05-20

## 2019-06-03 ENCOUNTER — TELEPHONE (OUTPATIENT)
Dept: FAMILY MEDICINE | Facility: CLINIC | Age: 74
End: 2019-06-03

## 2019-06-03 NOTE — TELEPHONE ENCOUNTER
Patient calls asking if due for pneumonia vaccine?   [facility] informed her they received notice from insurance that she is due for pneumonia vaccine.     Reviewed chart and MIIC. Patient had Prevnar-13 on 2/12/16.   Pneumovax-23 in 2002. No mention in pulmonary consults of pneumonia vaccine status.   Advised patient she would be due for a Pneumovax 23 booster. She may get this at our clinic or pharmacy. Patient agrees and plans to get at pharmacy.    Leslie Millard RN

## 2019-07-30 ENCOUNTER — OFFICE VISIT (OUTPATIENT)
Dept: FAMILY MEDICINE | Facility: CLINIC | Age: 74
End: 2019-07-30

## 2019-07-30 VITALS
WEIGHT: 140.4 LBS | SYSTOLIC BLOOD PRESSURE: 122 MMHG | RESPIRATION RATE: 16 BRPM | TEMPERATURE: 98.2 F | BODY MASS INDEX: 24.1 KG/M2 | OXYGEN SATURATION: 97 % | HEART RATE: 114 BPM | DIASTOLIC BLOOD PRESSURE: 80 MMHG

## 2019-07-30 DIAGNOSIS — F17.210 SMOKES WITH GREATER THAN 30 PACK YEAR HISTORY: ICD-10-CM

## 2019-07-30 DIAGNOSIS — R60.0 PERIPHERAL EDEMA: ICD-10-CM

## 2019-07-30 DIAGNOSIS — J44.9 COPD, SEVERE (H): ICD-10-CM

## 2019-07-30 DIAGNOSIS — J44.1 COPD EXACERBATION (H): ICD-10-CM

## 2019-07-30 DIAGNOSIS — Z23 NEED FOR VACCINATION FOR STREP PNEUMONIAE: Primary | ICD-10-CM

## 2019-07-30 DIAGNOSIS — Z99.81 SUPPLEMENTAL OXYGEN DEPENDENT: ICD-10-CM

## 2019-07-30 DIAGNOSIS — R00.0 SINUS TACHYCARDIA: ICD-10-CM

## 2019-07-30 PROCEDURE — 99214 OFFICE O/P EST MOD 30 MIN: CPT | Mod: 25 | Performed by: FAMILY MEDICINE

## 2019-07-30 PROCEDURE — 90732 PPSV23 VACC 2 YRS+ SUBQ/IM: CPT | Performed by: FAMILY MEDICINE

## 2019-07-30 PROCEDURE — 90471 IMMUNIZATION ADMIN: CPT | Performed by: FAMILY MEDICINE

## 2019-07-30 RX ORDER — LEVALBUTEROL INHALATION SOLUTION 1.25 MG/3ML
SOLUTION RESPIRATORY (INHALATION)
Qty: 220 ML | Refills: 11 | Status: SHIPPED | OUTPATIENT
Start: 2019-07-30 | End: 2019-08-31

## 2019-07-30 RX ORDER — AZITHROMYCIN 250 MG/1
TABLET, FILM COATED ORAL
Qty: 6 TABLET | Refills: 0 | Status: SHIPPED | OUTPATIENT
Start: 2019-07-30 | End: 2019-08-12

## 2019-07-30 RX ORDER — IPRATROPIUM BROMIDE 0.2 MG/ML
SOLUTION, NON-ORAL INHALATION
Qty: 180 ML | Refills: 11 | Status: SHIPPED | OUTPATIENT
Start: 2019-07-30 | End: 2019-08-31

## 2019-07-30 RX ORDER — PREDNISONE 20 MG/1
20 TABLET ORAL 2 TIMES DAILY
Qty: 10 TABLET | Refills: 0 | Status: SHIPPED | OUTPATIENT
Start: 2019-07-30 | End: 2019-08-12

## 2019-07-30 NOTE — PROGRESS NOTES
Problem(s) Oriented visit        SUBJECTIVE:                                                    Sydni Mendoza is a 74 year old female who presents to clinic today for two weeks of progressive cough with occasional coughing jags that will last 10-15 minutes. She gets some relief from hot water with honey and lemon. No fever. She is more short of breath than usual. She does need to increase her oxygen if she has a fit or moves around. She typically uses 3L/minute, but will increase it to 4L/min. Cough is only minimally productive.       Problem list, Medication list, Allergies, and Medical/Social/Surgical histories reviewed in [facility] and updated as appropriate.   Additional history: as documented    ROS:  5 point ROS completed and negative except noted above, including Gen, CV, Resp, GI, MS      Histories:   Patient Active Problem List   Diagnosis     COPD (chronic obstructive pulmonary disease) (H)     Smoking     Lung nodule     Pneumothorax, post biopsy, right     Health Care Home     Pneumonia     COPD with acute exacerbation (H)     Past Surgical History:   Procedure Laterality Date     CHEST TUBE INSERTION  12    Right ,pneumothorax post, lung biopsy     GYN SURGERY      Tubal Ligation       Social History     Tobacco Use     Smoking status: Former Smoker     Packs/day: 1.00     Years: 50.00     Pack years: 50.00     Types: Cigarettes     Last attempt to quit: 2011     Years since quittin.9     Smokeless tobacco: Never Used   Substance Use Topics     Alcohol use: Yes     Alcohol/week: 0.0 oz     Comment: occasional     No family history on file.        OBJECTIVE:                                                    /80   Pulse 114   Temp 98.2  F (36.8  C) (Oral)   Resp 16   Wt 63.7 kg (140 lb 6.4 oz)   SpO2 97%   BMI 24.10 kg/m    Body mass index is 24.1 kg/m .   GENERAL APPEARANCE: Alert, no acute distress  HENT: Ears and TMs normal, oral mucosa and posterior oropharynx normal  NECK:  No adenopathy,masses or thyromegaly  RESP: poor breath sounds throughout, dry cough noted  CV: mild tachycardia, regular rhythm, no murmur or gallop  MS: 2+ edema in the feet and ankles, 1+ to the mid tibial area  NEURO: Alert, oriented, speech and mentation normal  PSYCH: mentation appears normal, affect and mood normal   Labs Resulted Today:   Results for orders placed or performed in visit on 08/15/18   Lipid Panel ([facility])   Result Value Ref Range    Cholesterol 195 100 - 199 mg/dL    Triglycerides 50 0 - 149 mg/dL    HDL Cholesterol 111 >39 mg/dL    VLDL Cholesterol Wong 10 5 - 40 mg/dL    LDL Cholesterol Calculated 74 0 - 99 mg/dL    LDL/HDL Ratio 0.7 0.0 - 3.2 ratio    Narrative    Performed at:  01 - [facility] [address], [place], [place]  [id number]  : Kai Yo MD, Phone:  [phone]   Comp. Metabolic Panel (14) ([facility])   Result Value Ref Range    Glucose 113 (H) 65 - 99 mg/dL    Urea Nitrogen 14 8 - 27 mg/dL    Creatinine 0.65 0.57 - 1.00 mg/dL    eGFR If NonAfricn Am 90 >59 mL/min/1.73    eGFR If Africn Am 103 >59 mL/min/1.73    BUN/Creatinine Ratio 22 12 - 28    Sodium 139 134 - 144 mmol/L    Potassium 4.1 3.5 - 5.2 mmol/L    Chloride 94 (L) 96 - 106 mmol/L    Total CO2 29 20 - 29 mmol/L    Calcium 9.5 8.7 - 10.3 mg/dL    Protein Total 6.6 6.0 - 8.5 g/dL    Albumin 4.6 3.5 - 4.8 g/dL    Globulin, Total 2.0 1.5 - 4.5 g/dL    A/G Ratio 2.3 (H) 1.2 - 2.2    Bilirubin Total 0.5 0.0 - 1.2 mg/dL    Alkaline Phosphatase 57 39 - 117 IU/L    AST 15 0 - 40 IU/L    ALT 12 0 - 32 IU/L    Narrative    Performed at:  01 - [facility]  [employer] [place], [place]  [id number]  : Kai Yo MD, Phone:  [phone]     ASSESSMENT/PLAN:                                                        Sydni was seen today for cough.    Diagnoses and all orders for this visit:    Need for vaccination for Strep pneumoniae  -     PNEUMOCOCCAL VACCINE,ADULT,SQ OR IM  -     ADMIN  PNEUMOCOCCAL VACCINE    COPD, severe (H)  -     levalbuterol (XOPENEX) 1.25 MG/3ML neb solution; 1.25 mg by neb BID, may increase to QID as needed  -     ipratropium (ATROVENT) 0.02 % nebulizer solution; 0.5 mg by neb BID, may increase to QID prn cough/dyspnea  -     Referral to [facility]  No tolerance of albuterol due to tachycardia. Change to Xopenex, as she will then be able to use more often.     COPD exacerbation (H)  -     azithromycin (ZITHROMAX) 250 MG tablet; Two tablets first day, then one tablet daily for four days.  -     predniSONE (DELTASONE) 20 MG tablet; Take 1 tablet (20 mg) by mouth 2 times daily for 5 days  Return if failure to improve.    Smokes with greater than 30 pack year history  -     Referral to [facility] for lung cancer screening annual CT.    Supplemental oxygen dependent    Peripheral edema  Recommend increase water, decrease salt, return to using compression stockings, elevate legs.      Patient Instructions   In place of DuoNeb, you will now use levalbuterol (Xopenex) and iprtropium (Atrovent) in nebulizer. This should provide the same benefit without the fast heart rate.     Refer to [facility] for CT chest for lung cancer screening.      The following health maintenance items are reviewed in Epic and correct as of today:  Health Maintenance   Topic Date Due     DEXA  1945     COPD ACTION PLAN  1945     MAMMO SCREENING  1945     COLONOSCOPY  02/19/1957     ZOSTER IMMUNIZATION (1 of 2) 02/19/1997     MEDICARE ANNUAL WELLNESS VISIT  02/19/2012     PNEUMOCOCCAL IMMUNIZATION 65+ LOW/MEDIUM RISK (2 of 2 - PPSV23) 02/12/2017     PHQ-2  01/01/2019     FALL RISK ASSESSMENT  05/25/2019     INFLUENZA VACCINE (1) 09/01/2019     ADVANCE CARE PLANNING  02/12/2021     LIPID  08/15/2023     DTAP/TDAP/TD IMMUNIZATION (2 - Td) 02/12/2026     SPIROMETRY  Completed     HEPATITIS C SCREENING  Addressed     IPV IMMUNIZATION  Aged Out     MENINGITIS IMMUNIZATION  Aged  Out       Lyn Lui MD  [facility]  [phone]    For any issues my office # is [phone]

## 2019-07-30 NOTE — PATIENT INSTRUCTIONS
In place of DuoNeb, you will now use levalbuterol (Xopenex) and iprtropium (Atrovent) in nebulizer. This should provide the same benefit without the fast heart rate.     Refer to [facility] for CT chest for lung cancer screening.

## 2019-08-12 ENCOUNTER — OFFICE VISIT (OUTPATIENT)
Dept: FAMILY MEDICINE | Facility: CLINIC | Age: 74
End: 2019-08-12

## 2019-08-12 VITALS
HEART RATE: 98 BPM | WEIGHT: 137 LBS | DIASTOLIC BLOOD PRESSURE: 80 MMHG | SYSTOLIC BLOOD PRESSURE: 136 MMHG | BODY MASS INDEX: 23.52 KG/M2 | OXYGEN SATURATION: 97 % | RESPIRATION RATE: 16 BRPM

## 2019-08-12 DIAGNOSIS — M25.561 ACUTE PAIN OF RIGHT KNEE: ICD-10-CM

## 2019-08-12 DIAGNOSIS — M54.16 LUMBAR BACK PAIN WITH RADICULOPATHY AFFECTING LOWER EXTREMITY: ICD-10-CM

## 2019-08-12 DIAGNOSIS — J44.1 COPD WITH ACUTE EXACERBATION (H): Primary | ICD-10-CM

## 2019-08-12 PROCEDURE — 99213 OFFICE O/P EST LOW 20 MIN: CPT | Performed by: FAMILY MEDICINE

## 2019-08-12 PROCEDURE — 71046 X-RAY EXAM CHEST 2 VIEWS: CPT | Performed by: FAMILY MEDICINE

## 2019-08-12 RX ORDER — PREDNISONE 10 MG/1
TABLET ORAL
Qty: 30 TABLET | Refills: 0 | Status: SHIPPED | OUTPATIENT
Start: 2019-08-12 | End: 2019-08-31

## 2019-08-12 NOTE — PROGRESS NOTES
Problem(s) Oriented visit        SUBJECTIVE:                                                    Sydni Mendoza is a 74 year old female who presents to clinic today for follow up on COPD exacerbation. She was treated 19 with azithromycin and prednisone. She felt better by about 4 days later. She feels that she went back to her normal baseline and then had return of symptoms one week ago. She is congested with clear mucous in her nose and yellow mucous in her chest. She denies fever. She increased her oxygen from 2.5 L to 3L.       Problem list, Medication list, Allergies, and Medical/Social/Surgical histories reviewed in [facility] and updated as appropriate.   Additional history: as documented    ROS:  5 point ROS completed and negative except noted above, including Gen, CV, Resp, GI, MS      Histories:   Patient Active Problem List   Diagnosis     COPD (chronic obstructive pulmonary disease) (H)     Smoking     Lung nodule     Pneumothorax, post biopsy, right     Health Care Home     Pneumonia     COPD with acute exacerbation (H)     Past Surgical History:   Procedure Laterality Date     CHEST TUBE INSERTION  12    Right ,pneumothorax post, lung biopsy     GYN SURGERY      Tubal Ligation       Social History     Tobacco Use     Smoking status: Former Smoker     Packs/day: 1.00     Years: 50.00     Pack years: 50.00     Types: Cigarettes     Last attempt to quit: 2011     Years since quittin.9     Smokeless tobacco: Never Used   Substance Use Topics     Alcohol use: Yes     Alcohol/week: 0.0 oz     Comment: occasional     No family history on file.        OBJECTIVE:                                                    /80   Pulse 98   Resp 16   Wt 62.1 kg (137 lb)   SpO2 97%   BMI 23.52 kg/m    Body mass index is 23.52 kg/m .   GENERAL APPEARANCE: Alert, no acute distress  EYES: PERRL, EOM normal, conjunctiva and lids normal  HENT: Ears and TMs normal, oral mucosa and posterior oropharynx  normal, clear rhinitis  NECK: No adenopathy,masses or thyromegaly  RESP: prolonged expiratory phase, diminished breath sounds, deep and wet cough appreciated  CV: normal rate, regular rhythm, no murmur or gallop  ABDOMEN: soft, no organomegaly, masses or tenderness  NEURO: Alert, oriented, speech and mentation normal  PSYCH: mentation appears normal, affect and mood normal   Labs Resulted Today:   Results for orders placed or performed in visit on 08/15/18   Lipid Panel ([facility])   Result Value Ref Range    Cholesterol 195 100 - 199 mg/dL    Triglycerides 50 0 - 149 mg/dL    HDL Cholesterol 111 >39 mg/dL    VLDL Cholesterol Wong 10 5 - 40 mg/dL    LDL Cholesterol Calculated 74 0 - 99 mg/dL    LDL/HDL Ratio 0.7 0.0 - 3.2 ratio    Narrative    Performed at:  01 - [facility] [address], [place], [place]  [id number]  : Kai Yo MD, Phone:  [phone]   Comp. Metabolic Panel (14) ([facility])   Result Value Ref Range    Glucose 113 (H) 65 - 99 mg/dL    Urea Nitrogen 14 8 - 27 mg/dL    Creatinine 0.65 0.57 - 1.00 mg/dL    eGFR If NonAfricn Am 90 >59 mL/min/1.73    eGFR If Africn Am 103 >59 mL/min/1.73    BUN/Creatinine Ratio 22 12 - 28    Sodium 139 134 - 144 mmol/L    Potassium 4.1 3.5 - 5.2 mmol/L    Chloride 94 (L) 96 - 106 mmol/L    Total CO2 29 20 - 29 mmol/L    Calcium 9.5 8.7 - 10.3 mg/dL    Protein Total 6.6 6.0 - 8.5 g/dL    Albumin 4.6 3.5 - 4.8 g/dL    Globulin, Total 2.0 1.5 - 4.5 g/dL    A/G Ratio 2.3 (H) 1.2 - 2.2    Bilirubin Total 0.5 0.0 - 1.2 mg/dL    Alkaline Phosphatase 57 39 - 117 IU/L    AST 15 0 - 40 IU/L    ALT 12 0 - 32 IU/L    Narrative    Performed at:  01 - [facility]  [employer] [place] [address]  [id number]  : Kai Yo MD, Phone:  [phone]     ASSESSMENT/PLAN:                                                        Sydni was seen today for cough.    Diagnoses and all orders for this visit:    COPD with acute exacerbation (H)  -     X-ray Chest 2  vws*  -     predniSONE (DELTASONE) 10 MG tablet; Take 4 tablets (40 mg) by mouth daily for 3 days, THEN 3 tablets (30 mg) daily for 3 days, THEN 2 tablets (20 mg) daily for 3 days, THEN 1 tablet (10 mg) daily for 3 days.  Start prednisone again and taper over the next 12 days. If she worsens or fails to improve she will fill Rx for doxycycline 100 mg BID for 10 days.    Lumbar back pain with radiculopathy affecting lower extremity  -     [facility]; Future    Right knee pain  -     [facility]; Future        There are no Patient Instructions on file for this visit.    The following health maintenance items are reviewed in Epic and correct as of today:  Health Maintenance   Topic Date Due     DEXA  1945     COPD ACTION PLAN  1945     MAMMO SCREENING  1945     COLONOSCOPY  02/19/1957     ZOSTER IMMUNIZATION (1 of 2) 02/19/1997     MEDICARE ANNUAL WELLNESS VISIT  02/19/2012     PHQ-2  01/01/2019     FALL RISK ASSESSMENT  05/25/2019     INFLUENZA VACCINE (1) 09/01/2019     ADVANCE CARE PLANNING  02/12/2021     LIPID  08/15/2023     DTAP/TDAP/TD IMMUNIZATION (2 - Td) 02/12/2026     SPIROMETRY  Completed     PNEUMOCOCCAL IMMUNIZATION 65+ LOW/MEDIUM RISK  Completed     HEPATITIS C SCREENING  Addressed     IPV IMMUNIZATION  Aged Out     MENINGITIS IMMUNIZATION  Aged Out       Lyn Lui MD  [facility]  Family Practice  [facility]  [phone]    For any issues my office # is [phone]

## 2019-08-22 DIAGNOSIS — I10 BENIGN ESSENTIAL HYPERTENSION: ICD-10-CM

## 2019-08-22 NOTE — TELEPHONE ENCOUNTER
HYDROCHLOROTHIAZIDE  LOV 8/12/19  LAST LABS 8/15/19    BP Readings from Last 3 Encounters:   08/12/19 136/80   07/30/19 122/80   12/14/18 121/70     Last Comprehensive Metabolic Panel:  Sodium   Date Value Ref Range Status   08/15/2018 139 134 - 144 mmol/L Final     Potassium   Date Value Ref Range Status   08/15/2018 4.1 3.5 - 5.2 mmol/L Final     Chloride   Date Value Ref Range Status   08/15/2018 94 (L) 96 - 106 mmol/L Final     Carbon Dioxide   Date Value Ref Range Status   07/25/2016 41 (H) 20 - 32 mmol/L Final     Anion Gap   Date Value Ref Range Status   07/25/2016 3 3 - 14 mmol/L Final     Glucose   Date Value Ref Range Status   08/15/2018 113 (H) 65 - 99 mg/dL Final     Urea Nitrogen   Date Value Ref Range Status   08/15/2018 14 8 - 27 mg/dL Final     BUN/Creatinine Ratio   Date Value Ref Range Status   08/15/2018 22 12 - 28 Final     Creatinine   Date Value Ref Range Status   08/15/2018 0.65 0.57 - 1.00 mg/dL Final     GFR Estimate   Date Value Ref Range Status   07/25/2016 >90  Non  GFR Calc   >60 mL/min/1.7m2 Final     Calcium   Date Value Ref Range Status   08/15/2018 9.5 8.7 - 10.3 mg/dL Final

## 2019-08-23 RX ORDER — HYDROCHLOROTHIAZIDE 25 MG/1
25 TABLET ORAL DAILY
Qty: 90 TABLET | Refills: 3 | Status: ON HOLD | OUTPATIENT
Start: 2019-08-23 | End: 2019-09-04

## 2019-08-23 NOTE — TELEPHONE ENCOUNTER
Hello, we would like you to come in to have your electrolytes monitored. Please set up an appointment at your convenience.

## 2019-08-28 ENCOUNTER — TELEPHONE (OUTPATIENT)
Dept: FAMILY MEDICINE | Facility: CLINIC | Age: 74
End: 2019-08-28

## 2019-08-28 DIAGNOSIS — J44.1 COPD WITH ACUTE EXACERBATION (H): Primary | ICD-10-CM

## 2019-08-28 RX ORDER — CEFUROXIME AXETIL 500 MG/1
500 TABLET ORAL 2 TIMES DAILY
Qty: 20 TABLET | Refills: 0 | Status: ON HOLD | OUTPATIENT
Start: 2019-08-28 | End: 2019-09-04

## 2019-08-28 NOTE — TELEPHONE ENCOUNTER
Patient calls with update on COPD exacerbation since 8/12/19 visit with Dr. Lui.   Took prednisone taper as ordered but waited a week to start the doxycycline and finished 10 days of this 3 days ago. Using ipratropium nebs QID and Dulera and Incruse as prescribed. Feels about 80% better- still dry cough and breathing still a problem. Denies wheezing, chest tightness or pain.   Plan: per Dr. Lui - cefuroxime 500mg BID x 10 days and RTC if symptoms fail to improve or if worsens. Did offer clinic appt - patient prefers antibiotics and RTC if needed. Rx sent to pharmacy.  Leslie Millard RN

## 2019-08-31 ENCOUNTER — APPOINTMENT (OUTPATIENT)
Dept: GENERAL RADIOLOGY | Facility: CLINIC | Age: 74
DRG: 189 | End: 2019-08-31
Attending: EMERGENCY MEDICINE
Payer: COMMERCIAL

## 2019-08-31 ENCOUNTER — APPOINTMENT (OUTPATIENT)
Dept: CT IMAGING | Facility: CLINIC | Age: 74
DRG: 189 | End: 2019-08-31
Attending: EMERGENCY MEDICINE
Payer: COMMERCIAL

## 2019-08-31 ENCOUNTER — HOSPITAL ENCOUNTER (INPATIENT)
Facility: CLINIC | Age: 74
LOS: 4 days | Discharge: HOME | DRG: 189 | End: 2019-09-04
Attending: EMERGENCY MEDICINE | Admitting: INTERNAL MEDICINE
Payer: COMMERCIAL

## 2019-08-31 DIAGNOSIS — J44.9 COPD, SEVERE (H): ICD-10-CM

## 2019-08-31 DIAGNOSIS — J44.1 COPD EXACERBATION (H): ICD-10-CM

## 2019-08-31 DIAGNOSIS — R60.0 PERIPHERAL EDEMA: ICD-10-CM

## 2019-08-31 DIAGNOSIS — R79.89 LOW TSH LEVEL: ICD-10-CM

## 2019-08-31 DIAGNOSIS — E87.1 HYPONATREMIA: ICD-10-CM

## 2019-08-31 DIAGNOSIS — J96.01 ACUTE RESPIRATORY FAILURE WITH HYPOXIA (H): ICD-10-CM

## 2019-08-31 PROBLEM — J96.21 ACUTE AND CHRONIC RESPIRATORY FAILURE WITH HYPOXIA (H): Status: ACTIVE | Noted: 2019-08-31

## 2019-08-31 LAB
ALBUMIN SERPL-MCNC: 3.5 G/DL (ref 3.4–5)
ALBUMIN UR-MCNC: NEGATIVE MG/DL
ALP SERPL-CCNC: 51 U/L (ref 40–150)
ALT SERPL W P-5'-P-CCNC: 22 U/L (ref 0–50)
ANION GAP SERPL CALCULATED.3IONS-SCNC: 5 MMOL/L (ref 3–14)
APPEARANCE UR: CLEAR
AST SERPL W P-5'-P-CCNC: 18 U/L (ref 0–45)
BASE EXCESS BLDA CALC-SCNC: 5.2 MMOL/L
BASOPHILS # BLD AUTO: 0 10E9/L (ref 0–0.2)
BASOPHILS NFR BLD AUTO: 0.1 %
BILIRUB SERPL-MCNC: 0.6 MG/DL (ref 0.2–1.3)
BILIRUB UR QL STRIP: NEGATIVE
BUN SERPL-MCNC: 9 MG/DL (ref 7–30)
CALCIUM SERPL-MCNC: 8.2 MG/DL (ref 8.5–10.1)
CHLORIDE SERPL-SCNC: 80 MMOL/L (ref 94–109)
CO2 BLDCOV-SCNC: 32 MMOL/L (ref 21–28)
CO2 SERPL-SCNC: 33 MMOL/L (ref 20–32)
COLOR UR AUTO: ABNORMAL
CREAT BLD-MCNC: 0.6 MG/DL (ref 0.52–1.04)
CREAT SERPL-MCNC: 0.49 MG/DL (ref 0.52–1.04)
DIFFERENTIAL METHOD BLD: ABNORMAL
EOSINOPHIL # BLD AUTO: 0 10E9/L (ref 0–0.7)
EOSINOPHIL NFR BLD AUTO: 0.3 %
ERYTHROCYTE [DISTWIDTH] IN BLOOD BY AUTOMATED COUNT: 11.7 % (ref 10–15)
GFR SERPL CREATININE-BSD FRML MDRD: >90 ML/MIN/{1.73_M2}
GFR SERPL CREATININE-BSD FRML MDRD: >90 ML/MIN/{1.73_M2}
GLUCOSE SERPL-MCNC: 129 MG/DL (ref 70–99)
GLUCOSE UR STRIP-MCNC: NEGATIVE MG/DL
HCO3 BLD-SCNC: 30 MMOL/L (ref 21–28)
HCT VFR BLD AUTO: 34.1 % (ref 35–47)
HGB BLD-MCNC: 12.1 G/DL (ref 11.7–15.7)
HGB UR QL STRIP: NEGATIVE
IMM GRANULOCYTES # BLD: 0.1 10E9/L (ref 0–0.4)
IMM GRANULOCYTES NFR BLD: 0.3 %
INTERPRETATION ECG - MUSE: NORMAL
KETONES UR STRIP-MCNC: 10 MG/DL
LACTATE BLD-SCNC: 2.2 MMOL/L (ref 0.7–2.1)
LEUKOCYTE ESTERASE UR QL STRIP: NEGATIVE
LYMPHOCYTES # BLD AUTO: 1.4 10E9/L (ref 0.8–5.3)
LYMPHOCYTES NFR BLD AUTO: 9.1 %
MCH RBC QN AUTO: 30 PG (ref 26.5–33)
MCHC RBC AUTO-ENTMCNC: 35.5 G/DL (ref 31.5–36.5)
MCV RBC AUTO: 85 FL (ref 78–100)
MONOCYTES # BLD AUTO: 1.3 10E9/L (ref 0–1.3)
MONOCYTES NFR BLD AUTO: 8.2 %
MUCOUS THREADS #/AREA URNS LPF: PRESENT /LPF
NEUTROPHILS # BLD AUTO: 12.5 10E9/L (ref 1.6–8.3)
NEUTROPHILS NFR BLD AUTO: 82 %
NITRATE UR QL: NEGATIVE
NRBC # BLD AUTO: 0 10*3/UL
NRBC BLD AUTO-RTO: 0 /100
NT-PROBNP SERPL-MCNC: 159 PG/ML (ref 0–900)
OSMOLALITY SERPL: 247 MMOL/KG (ref 280–301)
OSMOLALITY UR: 439 MMOL/KG (ref 100–1200)
OXYHGB MFR BLD: 99 % (ref 92–100)
PCO2 BLD: 47 MM HG (ref 35–45)
PCO2 BLDV: 66 MM HG (ref 40–50)
PH BLD: 7.42 PH (ref 7.35–7.45)
PH BLDV: 7.3 PH (ref 7.32–7.43)
PH UR STRIP: 6.5 PH (ref 5–7)
PLATELET # BLD AUTO: 356 10E9/L (ref 150–450)
PO2 BLD: 250 MM HG (ref 80–105)
PO2 BLDV: 64 MM HG (ref 25–47)
POTASSIUM SERPL-SCNC: 3.8 MMOL/L (ref 3.4–5.3)
PROT SERPL-MCNC: 6.7 G/DL (ref 6.8–8.8)
RBC # BLD AUTO: 4.03 10E12/L (ref 3.8–5.2)
RBC #/AREA URNS AUTO: <1 /HPF (ref 0–2)
SAO2 % BLDV FROM PO2: 89 %
SODIUM SERPL-SCNC: 118 MMOL/L (ref 133–144)
SODIUM UR-SCNC: 63 MMOL/L
SOURCE: ABNORMAL
SP GR UR STRIP: 1.01 (ref 1–1.03)
SQUAMOUS #/AREA URNS AUTO: <1 /HPF (ref 0–1)
T4 FREE SERPL-MCNC: 1.51 NG/DL (ref 0.76–1.46)
TROPONIN I SERPL-MCNC: <0.015 UG/L (ref 0–0.04)
TSH SERPL DL<=0.005 MIU/L-ACNC: 0.21 MU/L (ref 0.4–4)
UROBILINOGEN UR STRIP-MCNC: NORMAL MG/DL (ref 0–2)
WBC # BLD AUTO: 15.3 10E9/L (ref 4–11)
WBC #/AREA URNS AUTO: 1 /HPF (ref 0–5)

## 2019-08-31 PROCEDURE — 94640 AIRWAY INHALATION TREATMENT: CPT

## 2019-08-31 PROCEDURE — 99207 ZZC CDG-CRITICAL CARE TIME NOT DOCUMENTED: CPT | Performed by: INTERNAL MEDICINE

## 2019-08-31 PROCEDURE — 82565 ASSAY OF CREATININE: CPT

## 2019-08-31 PROCEDURE — 36600 WITHDRAWAL OF ARTERIAL BLOOD: CPT

## 2019-08-31 PROCEDURE — 40000275 ZZH STATISTIC RCP TIME EA 10 MIN

## 2019-08-31 PROCEDURE — 84439 ASSAY OF FREE THYROXINE: CPT | Performed by: EMERGENCY MEDICINE

## 2019-08-31 PROCEDURE — 84443 ASSAY THYROID STIM HORMONE: CPT | Performed by: EMERGENCY MEDICINE

## 2019-08-31 PROCEDURE — 25000125 ZZHC RX 250: Performed by: EMERGENCY MEDICINE

## 2019-08-31 PROCEDURE — 83930 ASSAY OF BLOOD OSMOLALITY: CPT | Performed by: EMERGENCY MEDICINE

## 2019-08-31 PROCEDURE — 25000128 H RX IP 250 OP 636: Performed by: EMERGENCY MEDICINE

## 2019-08-31 PROCEDURE — 85025 COMPLETE CBC W/AUTO DIFF WBC: CPT | Performed by: EMERGENCY MEDICINE

## 2019-08-31 PROCEDURE — 25000128 H RX IP 250 OP 636: Performed by: INTERNAL MEDICINE

## 2019-08-31 PROCEDURE — 71275 CT ANGIOGRAPHY CHEST: CPT

## 2019-08-31 PROCEDURE — 83880 ASSAY OF NATRIURETIC PEPTIDE: CPT | Performed by: EMERGENCY MEDICINE

## 2019-08-31 PROCEDURE — 96367 TX/PROPH/DG ADDL SEQ IV INF: CPT

## 2019-08-31 PROCEDURE — 40000276 ZZH STATISTIC RCP TIME ED VENT EA 10 MIN

## 2019-08-31 PROCEDURE — 96365 THER/PROPH/DIAG IV INF INIT: CPT | Mod: 59

## 2019-08-31 PROCEDURE — 83605 ASSAY OF LACTIC ACID: CPT

## 2019-08-31 PROCEDURE — 87040 BLOOD CULTURE FOR BACTERIA: CPT | Performed by: EMERGENCY MEDICINE

## 2019-08-31 PROCEDURE — 94660 CPAP INITIATION&MGMT: CPT

## 2019-08-31 PROCEDURE — 99223 1ST HOSP IP/OBS HIGH 75: CPT | Mod: AI | Performed by: INTERNAL MEDICINE

## 2019-08-31 PROCEDURE — 99285 EMERGENCY DEPT VISIT HI MDM: CPT | Mod: 25

## 2019-08-31 PROCEDURE — 84300 ASSAY OF URINE SODIUM: CPT | Performed by: INTERNAL MEDICINE

## 2019-08-31 PROCEDURE — 93005 ELECTROCARDIOGRAM TRACING: CPT

## 2019-08-31 PROCEDURE — 81001 URINALYSIS AUTO W/SCOPE: CPT | Performed by: INTERNAL MEDICINE

## 2019-08-31 PROCEDURE — 82803 BLOOD GASES ANY COMBINATION: CPT

## 2019-08-31 PROCEDURE — 93308 TTE F-UP OR LMTD: CPT

## 2019-08-31 PROCEDURE — 25800030 ZZH RX IP 258 OP 636: Performed by: EMERGENCY MEDICINE

## 2019-08-31 PROCEDURE — 96374 THER/PROPH/DIAG INJ IV PUSH: CPT | Mod: 59

## 2019-08-31 PROCEDURE — 21000001 ZZH R&B HEART CARE

## 2019-08-31 PROCEDURE — 83935 ASSAY OF URINE OSMOLALITY: CPT | Performed by: INTERNAL MEDICINE

## 2019-08-31 PROCEDURE — 94640 AIRWAY INHALATION TREATMENT: CPT | Mod: 76

## 2019-08-31 PROCEDURE — 80053 COMPREHEN METABOLIC PANEL: CPT | Performed by: EMERGENCY MEDICINE

## 2019-08-31 PROCEDURE — 84484 ASSAY OF TROPONIN QUANT: CPT | Performed by: EMERGENCY MEDICINE

## 2019-08-31 PROCEDURE — 25000125 ZZHC RX 250: Performed by: INTERNAL MEDICINE

## 2019-08-31 PROCEDURE — 82805 BLOOD GASES W/O2 SATURATION: CPT | Performed by: EMERGENCY MEDICINE

## 2019-08-31 PROCEDURE — 71045 X-RAY EXAM CHEST 1 VIEW: CPT

## 2019-08-31 RX ORDER — METHYLPREDNISOLONE SODIUM SUCCINATE 125 MG/2ML
125 INJECTION, POWDER, LYOPHILIZED, FOR SOLUTION INTRAMUSCULAR; INTRAVENOUS ONCE
Status: COMPLETED | OUTPATIENT
Start: 2019-08-31 | End: 2019-08-31

## 2019-08-31 RX ORDER — LEVALBUTEROL TARTRATE 45 UG/1
2 AEROSOL, METERED ORAL EVERY 6 HOURS PRN
Status: ON HOLD | COMMUNITY
End: 2019-09-04

## 2019-08-31 RX ORDER — PIPERACILLIN SODIUM, TAZOBACTAM SODIUM 3; .375 G/15ML; G/15ML
3.38 INJECTION, POWDER, LYOPHILIZED, FOR SOLUTION INTRAVENOUS ONCE
Status: COMPLETED | OUTPATIENT
Start: 2019-08-31 | End: 2019-08-31

## 2019-08-31 RX ORDER — AMOXICILLIN 250 MG
2 CAPSULE ORAL 2 TIMES DAILY PRN
Status: DISCONTINUED | OUTPATIENT
Start: 2019-08-31 | End: 2019-09-04 | Stop reason: HOSPADM

## 2019-08-31 RX ORDER — IOPAMIDOL 755 MG/ML
59 INJECTION, SOLUTION INTRAVASCULAR ONCE
Status: COMPLETED | OUTPATIENT
Start: 2019-08-31 | End: 2019-08-31

## 2019-08-31 RX ORDER — AMOXICILLIN 250 MG
1 CAPSULE ORAL 2 TIMES DAILY PRN
Status: DISCONTINUED | OUTPATIENT
Start: 2019-08-31 | End: 2019-09-04 | Stop reason: HOSPADM

## 2019-08-31 RX ORDER — LEVALBUTEROL 1.25 MG/.5ML
1.25 SOLUTION, CONCENTRATE RESPIRATORY (INHALATION) EVERY 4 HOURS
Status: DISCONTINUED | OUTPATIENT
Start: 2019-08-31 | End: 2019-09-04 | Stop reason: HOSPADM

## 2019-08-31 RX ORDER — ONDANSETRON 2 MG/ML
4 INJECTION INTRAMUSCULAR; INTRAVENOUS EVERY 6 HOURS PRN
Status: DISCONTINUED | OUTPATIENT
Start: 2019-08-31 | End: 2019-09-04 | Stop reason: HOSPADM

## 2019-08-31 RX ORDER — POTASSIUM CL/LIDO/0.9 % NACL 10MEQ/0.1L
10 INTRAVENOUS SOLUTION, PIGGYBACK (ML) INTRAVENOUS
Status: DISCONTINUED | OUTPATIENT
Start: 2019-08-31 | End: 2019-09-04 | Stop reason: HOSPADM

## 2019-08-31 RX ORDER — SODIUM CHLORIDE 9 MG/ML
INJECTION, SOLUTION INTRAVENOUS CONTINUOUS
Status: DISCONTINUED | OUTPATIENT
Start: 2019-08-31 | End: 2019-08-31 | Stop reason: ALTCHOICE

## 2019-08-31 RX ORDER — LEVOFLOXACIN 5 MG/ML
500 INJECTION, SOLUTION INTRAVENOUS EVERY 24 HOURS
Status: DISCONTINUED | OUTPATIENT
Start: 2019-08-31 | End: 2019-09-02

## 2019-08-31 RX ORDER — CHOLECALCIFEROL (VITAMIN D3) 50 MCG
2000 TABLET ORAL DAILY
Status: DISCONTINUED | OUTPATIENT
Start: 2019-09-01 | End: 2019-09-04 | Stop reason: HOSPADM

## 2019-08-31 RX ORDER — HYDROCODONE BITARTRATE AND ACETAMINOPHEN 5; 325 MG/1; MG/1
1-2 TABLET ORAL EVERY 4 HOURS PRN
Status: DISCONTINUED | OUTPATIENT
Start: 2019-08-31 | End: 2019-09-04 | Stop reason: HOSPADM

## 2019-08-31 RX ORDER — NALOXONE HYDROCHLORIDE 0.4 MG/ML
.1-.4 INJECTION, SOLUTION INTRAMUSCULAR; INTRAVENOUS; SUBCUTANEOUS
Status: DISCONTINUED | OUTPATIENT
Start: 2019-08-31 | End: 2019-09-04 | Stop reason: HOSPADM

## 2019-08-31 RX ORDER — POTASSIUM CHLORIDE 1.5 G/1.58G
20-40 POWDER, FOR SOLUTION ORAL
Status: DISCONTINUED | OUTPATIENT
Start: 2019-08-31 | End: 2019-09-04 | Stop reason: HOSPADM

## 2019-08-31 RX ORDER — ONDANSETRON 4 MG/1
4 TABLET, ORALLY DISINTEGRATING ORAL EVERY 6 HOURS PRN
Status: DISCONTINUED | OUTPATIENT
Start: 2019-08-31 | End: 2019-09-04 | Stop reason: HOSPADM

## 2019-08-31 RX ORDER — IPRATROPIUM BROMIDE AND ALBUTEROL SULFATE 2.5; .5 MG/3ML; MG/3ML
3 SOLUTION RESPIRATORY (INHALATION) ONCE
Status: COMPLETED | OUTPATIENT
Start: 2019-08-31 | End: 2019-08-31

## 2019-08-31 RX ORDER — METHYLPREDNISOLONE SODIUM SUCCINATE 40 MG/ML
40 INJECTION, POWDER, LYOPHILIZED, FOR SOLUTION INTRAMUSCULAR; INTRAVENOUS EVERY 6 HOURS
Status: DISCONTINUED | OUTPATIENT
Start: 2019-09-01 | End: 2019-09-02

## 2019-08-31 RX ORDER — POLYETHYLENE GLYCOL 3350 17 G/17G
17 POWDER, FOR SOLUTION ORAL DAILY PRN
Status: DISCONTINUED | OUTPATIENT
Start: 2019-08-31 | End: 2019-09-04 | Stop reason: HOSPADM

## 2019-08-31 RX ORDER — POTASSIUM CHLORIDE 7.45 MG/ML
10 INJECTION INTRAVENOUS
Status: DISCONTINUED | OUTPATIENT
Start: 2019-08-31 | End: 2019-09-04 | Stop reason: HOSPADM

## 2019-08-31 RX ORDER — LIDOCAINE 40 MG/G
CREAM TOPICAL
Status: DISCONTINUED | OUTPATIENT
Start: 2019-08-31 | End: 2019-09-01

## 2019-08-31 RX ORDER — POTASSIUM CHLORIDE 1500 MG/1
20-40 TABLET, EXTENDED RELEASE ORAL
Status: DISCONTINUED | OUTPATIENT
Start: 2019-08-31 | End: 2019-09-04 | Stop reason: HOSPADM

## 2019-08-31 RX ORDER — BUDESONIDE 0.5 MG/2ML
0.5 INHALANT ORAL 2 TIMES DAILY
Status: DISCONTINUED | OUTPATIENT
Start: 2019-08-31 | End: 2019-09-04 | Stop reason: HOSPADM

## 2019-08-31 RX ORDER — NITROGLYCERIN 0.4 MG/1
0.4 TABLET SUBLINGUAL EVERY 5 MIN PRN
Status: DISCONTINUED | OUTPATIENT
Start: 2019-08-31 | End: 2019-09-01

## 2019-08-31 RX ORDER — IPRATROPIUM BROMIDE AND ALBUTEROL SULFATE 2.5; .5 MG/3ML; MG/3ML
1 SOLUTION RESPIRATORY (INHALATION) 4 TIMES DAILY
Status: ON HOLD | COMMUNITY
End: 2019-09-04

## 2019-08-31 RX ORDER — METOCLOPRAMIDE HYDROCHLORIDE 5 MG/ML
5 INJECTION INTRAMUSCULAR; INTRAVENOUS EVERY 6 HOURS PRN
Status: DISCONTINUED | OUTPATIENT
Start: 2019-08-31 | End: 2019-09-04 | Stop reason: HOSPADM

## 2019-08-31 RX ORDER — METOCLOPRAMIDE 5 MG/1
5 TABLET ORAL EVERY 6 HOURS PRN
Status: DISCONTINUED | OUTPATIENT
Start: 2019-08-31 | End: 2019-09-04 | Stop reason: HOSPADM

## 2019-08-31 RX ORDER — POTASSIUM CHLORIDE 29.8 MG/ML
20 INJECTION INTRAVENOUS
Status: DISCONTINUED | OUTPATIENT
Start: 2019-08-31 | End: 2019-09-04 | Stop reason: HOSPADM

## 2019-08-31 RX ORDER — MAGNESIUM SULFATE HEPTAHYDRATE 40 MG/ML
4 INJECTION, SOLUTION INTRAVENOUS EVERY 4 HOURS PRN
Status: DISCONTINUED | OUTPATIENT
Start: 2019-08-31 | End: 2019-09-04 | Stop reason: HOSPADM

## 2019-08-31 RX ORDER — SODIUM CHLORIDE 9 MG/ML
INJECTION, SOLUTION INTRAVENOUS CONTINUOUS
Status: DISCONTINUED | OUTPATIENT
Start: 2019-08-31 | End: 2019-09-02

## 2019-08-31 RX ADMIN — SODIUM CHLORIDE: 9 INJECTION, SOLUTION INTRAVENOUS at 18:56

## 2019-08-31 RX ADMIN — SODIUM CHLORIDE 85 ML: 9 INJECTION, SOLUTION INTRAVENOUS at 18:26

## 2019-08-31 RX ADMIN — IOPAMIDOL 59 ML: 755 INJECTION, SOLUTION INTRAVENOUS at 18:25

## 2019-08-31 RX ADMIN — LEVOFLOXACIN 500 MG: 5 INJECTION, SOLUTION INTRAVENOUS at 22:40

## 2019-08-31 RX ADMIN — BUDESONIDE 0.5 MG: 0.5 INHALANT RESPIRATORY (INHALATION) at 22:06

## 2019-08-31 RX ADMIN — PIPERACILLIN AND TAZOBACTAM 3.38 G: 3; .375 INJECTION, POWDER, LYOPHILIZED, FOR SOLUTION INTRAVENOUS at 18:01

## 2019-08-31 RX ADMIN — ENOXAPARIN SODIUM 40 MG: 40 INJECTION SUBCUTANEOUS at 22:34

## 2019-08-31 RX ADMIN — LEVALBUTEROL HYDROCHLORIDE 1.25 MG: 1.25 SOLUTION, CONCENTRATE RESPIRATORY (INHALATION) at 22:06

## 2019-08-31 RX ADMIN — SODIUM CHLORIDE 250 ML: 9 INJECTION, SOLUTION INTRAVENOUS at 17:51

## 2019-08-31 RX ADMIN — IPRATROPIUM BROMIDE AND ALBUTEROL SULFATE 3 ML: .5; 3 SOLUTION RESPIRATORY (INHALATION) at 17:54

## 2019-08-31 RX ADMIN — IPRATROPIUM BROMIDE 0.5 MG: 0.5 SOLUTION RESPIRATORY (INHALATION) at 22:12

## 2019-08-31 RX ADMIN — METHYLPREDNISOLONE SODIUM SUCCINATE 125 MG: 125 INJECTION, POWDER, FOR SOLUTION INTRAMUSCULAR; INTRAVENOUS at 19:19

## 2019-08-31 RX ADMIN — VANCOMYCIN HYDROCHLORIDE 1500 MG: 5 INJECTION, POWDER, LYOPHILIZED, FOR SOLUTION INTRAVENOUS at 18:10

## 2019-08-31 ASSESSMENT — ENCOUNTER SYMPTOMS: SHORTNESS OF BREATH: 1

## 2019-08-31 ASSESSMENT — MIFFLIN-ST. JEOR: SCORE: 1161

## 2019-08-31 NOTE — ED NOTES
DATE:  8/31/2019   TIME OF RECEIPT FROM LAB:  1838  LAB TEST:  NA  LAB VALUE:  118  RESULTS GIVEN WITH READ-BACK TO (PROVIDER):  Gosen  TIME LAB VALUE REPORTED TO PROVIDER:   1830

## 2019-08-31 NOTE — ED PROVIDER NOTES
History     Chief Complaint:  Shortness of Breath    HPI   Sydni Mendoza is a 74 year old female with a history of COPD, lung nodule, and tobacco use who presents to the emergency department today for evaluation of shortness of breath.  EMS had wanted to start CPAP but were unable to due to an equipment issue. When they arrived at her home, she was talking in short, 2-3 sentences and SATs in the 80's on room air. Patient is normally on 2 L of oxygen at home, and tried going up to 4 before calling 911. When given O2 in transport, she was up to 99. She complains of shortness of breath, bilateral leg swelling that began today, leg pain, and productive cough. She denies light headedness, dizziness, chest pain, or other symptoms.  During EMS transport, her BP was 230 systollic, and she was given one dose of nitrostat, BP lowered and second dose given. She denies suicidal thoughts, and recent falls. She is on cefuroxime and was on a prednisone taper this month. She was on doxycycline, but now on cefuroxime.     Allergies:  Albuterol, palpitations.    Medications:    Ceftin  Vitamin D  Mucinex  Hydrodiuril  Atrovent  Xopenex  Cozaar  Increase ellipta    Past Medical History:    COPD   Lung nodule  Tobacco abuse    Past Surgical History:    Chest tube insertion  Tubal ligation    Family History:    No family history on file.    Social History:  The patient was accompanied to the ED by her .  Smoking Status: former: 2011  Smokeless Tobacco: no  Alcohol Use: yes, occasional   Marital Status:   [2]     Review of Systems   Unable to perform ROS: Severe respiratory distress   Respiratory: Positive for shortness of breath.    Cardiovascular: Positive for leg swelling.       Physical Exam     Patient Vitals for the past 24 hrs:   BP Temp Temp src Pulse Heart Rate Resp SpO2 Height Weight   08/31/19 1900 103/64 -- -- 101 99 17 100 % -- --   08/31/19 1848 115/68 -- -- 98 104 -- -- -- --   08/31/19 1800 117/75 --  "-- 110 108 20 99 % -- --   08/31/19 1750 125/52 -- -- 114 115 18 99 % -- --   08/31/19 1747 (!) 181/92 98.5  F (36.9  C) Temporal 114 122 20 99 % 1.626 m (5' 4\") 66.6 kg (146 lb 13.2 oz)   08/31/19 1745 -- -- -- -- -- -- 98 % -- --   08/31/19 1740 (!) 181/92 -- -- 124 123 22 99 % -- --        Physical Exam  Eyes:               Sclera white; Pupils are equal and round  ENT:                External ears and nares normal, no goiter  CV:                  Rate as above with regular rhythm                           Symmetric radial pulses                          Marked BLE edema  Resp:               Breath sounds diminished bilaterally, increased work of breathing, 2-3 words at a time  GI:                   Abdomen is soft, non-tender  MS:                  Moves all extremities  Skin:                Warm and dry  Neuro:             Speech is normal and fluent. Alert.    Emergency Department Course   ECG (17:39:09):  Rate 121 bpm. PA interval 150. QRS duration 66. QT/QTc 284/403. P-R-T axes 90 72 73. Sinus tachycardia with premature atrial complexes with aberrant conduction. Biatrial enlargement, Junctional ST depression, probably normal. Abnormal ECG. No significant change from ECG dated 7/18/16.  Interpreted at 1754 by Nataliya Osborne MD.     Imaging:  Radiographic findings were communicated with the patient who voiced understanding of the findings.    CT chest PE  IMPRESSION:   1. No pulmonary embolism demonstrated.  2. No thoracic aortic dissection or aneurysm.   3. New 6 mm nodule in the right middle lobe. Six-month follow-up study  recommended to evaluate for stability.   Reading per radiology    XR chest  IMPRESSION: Lungs are hyperinflated. Bibasilar opacities are present  which are favored to represent atelectasis, unchanged. Heart size is  unchanged.   Reading per radiology     POC US limited  POC US ECHO LIMITED   Final Result   Limited Bedside Pulmonary and Cardiac Ultrasound      Performed by: Dr." Gosen   Indication: Hypoxia, peripheral edema   Body area(s) imaged: Bilateral lungs, cardiac views very limited   Findings: Normal sliding signs, no comet tails, no pericardial effusion, hyperdynamic heart   Impression: No PTx, no pulmonary edema, no pericardial effusion   Images archived to PACS     Laboratory:  Laboratory findings were communicated with the patient who voiced understanding of the findings.   Blood Gas: WNL except CO2 47 (H) O2 250 (H) Bicarb 30 (H)   Blood Culture x2: Pending  Creatinine POCT (Collected 1743): 0.6   1741 - ISTAT Lactate: pH 7.30 (L) , pCO2 66 (H) , pO2 64 (H) , Bicarbonate 32 (H) , Lactic Acid 2.2 (H)   CBC: WBC 15.3 (H) , HGB 12.1,     CMP:  (LL) Chloride 80 (L) Glucose 129 (H) Calcium 8.2 (L) Protein total 6.7 (L) (Creatinine 0.49 (L) )   TSH with free T4 reflex: 0.21 (L)   Troponin (Collected 1727): <0.015   BNP: 159  T4 free: 1.51 (H)     Interventions:  Medications   vancomycin 1500 mg in 0.9% NaCl 250 ml intermittent infusion 1,500 mg (1,500 mg Intravenous Handoff 8/31/19 1923)   sodium chloride 0.9% infusion ( Intravenous Rate/Dose Verify 8/31/19 1922)   piperacillin-tazobactam (ZOSYN) 3.375 g vial to attach to  mL bag (0 g Intravenous Stopped 8/31/19 1852)   0.9% sodium chloride BOLUS (0 mLs Intravenous Stopped 8/31/19 1802)   ipratropium - albuterol 0.5 mg/2.5 mg/3 mL (DUONEB) neb solution 3 mL (3 mLs Nebulization Given 8/31/19 1754)   iopamidol (ISOVUE-370) solution 59 mL (59 mLs Intravenous Given 8/31/19 1825)   sodium chloride 0.9 % bag 500mL for CT scan flush use (85 mLs Intravenous Given 8/31/19 1826)   methylPREDNISolone sodium succinate (solu-MEDROL) injection 125 mg (125 mg Intravenous Given 8/31/19 1919)       Emergency Department Course:  1734 Patient arrived on cPAP  1742 Chest XR performed  IV inserted and blood drawn.   1743 EKG performed  1745 Portable US performed  1752 Lab WBC resulted, 115  1907 I spoke with Dr. Clara Patel  regarding this patient.   Findings and plan explained to the Patient who consents to admission.     1917: Discussed the patient with Dr. Elizabeth, who will admit the patient to a [facility] bed for further monitoring, evaluation, and treatment.         Impression & Plan      CMS Diagnoses: The Lactic acid level is elevated due to bronchitis, at this time there is no sign of severe sepsis or septic shock.     Medical Decision Making:  Sydni Mendoza is a 74 year old female who presents to the emergency department today for evaluation of acute hypoxic resp failure. She has a history of COPD so nebulizations and biPAP were ordered. However, she was also markedly hypertensive with new onset peripheral edema concerning for volume overload and pulmonary edema. This was not found on US or XR. She has diminished air exchange but less wheezing than I would expect for COPD.   I considered other etiologies such as pnuemothroax or PE, however, these were not found on imaging studies. Based on her workup thus far, I think the most likely etiology is COPD.  Had 2 SIRS criteria and lactic of 2.2 but no identified infection to diagnose sepsis.  Given antibiotics for severity of COPD exacerbation.  Labs were notable for hyponatremia, which I think is secondary to her sudden onset of 3rd spacing fluid into her legs.  She initially was given 250 mLs of bolus here and then was switched to an infusion to avoid too rapid of correction and neurologic injury.  Could be side effect of her prednisone this month though unusual in starting after completion. Thyroid studies abnormal which can be further managed inpatient.  She is stabilized well on BiPAP on multiple rechecks and her vital signs are back to normal prior to admission.  Critical Care time:  was 80 minutes for this patient excluding procedures.    Diagnosis:    ICD-10-CM    1. Acute respiratory failure with hypoxia (H) J96.01 TSH with free T4 reflex     Blood gas arterial and oxyhgb      T4 free     T4 free   2. COPD exacerbation (H) J44.1    3. Hyponatremia E87.1    4. Peripheral edema R60.9    5. Low TSH level R79.89        Disposition:  Admitted to [facility]    Shandra Zuleta  8/31/2019    EMERGENCY DEPARTMENT  Scribe Disclosure:  I, Shandra Zuleta, am serving as a scribe at 7:18 PM on 8/31/2019 to document services personally performed by Nataliya Osborne MD based on my observations and the provider's statements to me.       Nataliya Osborne MD  08/31/19 2339

## 2019-08-31 NOTE — ED NOTES
Bed: ST01  Expected date: 8/31/19  Expected time: 5:33 PM  Means of arrival: Ambulance  Comments:  439 74f COPD ETA [id number]

## 2019-09-01 ENCOUNTER — APPOINTMENT (OUTPATIENT)
Dept: ULTRASOUND IMAGING | Facility: CLINIC | Age: 74
DRG: 189 | End: 2019-09-01
Attending: INTERNAL MEDICINE
Payer: COMMERCIAL

## 2019-09-01 LAB
ALBUMIN SERPL-MCNC: 3.1 G/DL (ref 3.4–5)
ALP SERPL-CCNC: 48 U/L (ref 40–150)
ALT SERPL W P-5'-P-CCNC: 25 U/L (ref 0–50)
ANION GAP SERPL CALCULATED.3IONS-SCNC: 7 MMOL/L (ref 3–14)
ANION GAP SERPL CALCULATED.3IONS-SCNC: 7 MMOL/L (ref 3–14)
AST SERPL W P-5'-P-CCNC: 18 U/L (ref 0–45)
BASOPHILS # BLD AUTO: 0 10E9/L (ref 0–0.2)
BASOPHILS NFR BLD AUTO: 0 %
BILIRUB SERPL-MCNC: 0.7 MG/DL (ref 0.2–1.3)
BUN SERPL-MCNC: 9 MG/DL (ref 7–30)
BUN SERPL-MCNC: 9 MG/DL (ref 7–30)
CALCIUM SERPL-MCNC: 8.3 MG/DL (ref 8.5–10.1)
CALCIUM SERPL-MCNC: 8.8 MG/DL (ref 8.5–10.1)
CHLORIDE SERPL-SCNC: 84 MMOL/L (ref 94–109)
CHLORIDE SERPL-SCNC: 88 MMOL/L (ref 94–109)
CO2 SERPL-SCNC: 29 MMOL/L (ref 20–32)
CO2 SERPL-SCNC: 30 MMOL/L (ref 20–32)
CREAT SERPL-MCNC: 0.52 MG/DL (ref 0.52–1.04)
CREAT SERPL-MCNC: 0.55 MG/DL (ref 0.52–1.04)
CREAT SERPL-MCNC: 0.57 MG/DL (ref 0.52–1.04)
DIFFERENTIAL METHOD BLD: ABNORMAL
EOSINOPHIL # BLD AUTO: 0 10E9/L (ref 0–0.7)
EOSINOPHIL NFR BLD AUTO: 0 %
ERYTHROCYTE [DISTWIDTH] IN BLOOD BY AUTOMATED COUNT: 11.8 % (ref 10–15)
GFR SERPL CREATININE-BSD FRML MDRD: >90 ML/MIN/{1.73_M2}
GLUCOSE SERPL-MCNC: 139 MG/DL (ref 70–99)
GLUCOSE SERPL-MCNC: 143 MG/DL (ref 70–99)
GRAM STN SPEC: NORMAL
HCT VFR BLD AUTO: 31.9 % (ref 35–47)
HGB BLD-MCNC: 11.5 G/DL (ref 11.7–15.7)
IMM GRANULOCYTES # BLD: 0 10E9/L (ref 0–0.4)
IMM GRANULOCYTES NFR BLD: 0.1 %
LACTATE BLD-SCNC: 2 MMOL/L (ref 0.7–2)
LYMPHOCYTES # BLD AUTO: 0.3 10E9/L (ref 0.8–5.3)
LYMPHOCYTES NFR BLD AUTO: 2.4 %
Lab: NORMAL
MAGNESIUM SERPL-MCNC: 1.7 MG/DL (ref 1.6–2.3)
MCH RBC QN AUTO: 30.4 PG (ref 26.5–33)
MCHC RBC AUTO-ENTMCNC: 36.1 G/DL (ref 31.5–36.5)
MCV RBC AUTO: 84 FL (ref 78–100)
MONOCYTES # BLD AUTO: 0.1 10E9/L (ref 0–1.3)
MONOCYTES NFR BLD AUTO: 0.8 %
NEUTROPHILS # BLD AUTO: 10 10E9/L (ref 1.6–8.3)
NEUTROPHILS NFR BLD AUTO: 96.7 %
NRBC # BLD AUTO: 0 10*3/UL
NRBC BLD AUTO-RTO: 0 /100
PLATELET # BLD AUTO: 281 10E9/L (ref 150–450)
PLATELET # BLD AUTO: 308 10E9/L (ref 150–450)
POTASSIUM SERPL-SCNC: 3.4 MMOL/L (ref 3.4–5.3)
POTASSIUM SERPL-SCNC: 3.7 MMOL/L (ref 3.4–5.3)
PROCALCITONIN SERPL-MCNC: <0.05 NG/ML
PROT SERPL-MCNC: 6.2 G/DL (ref 6.8–8.8)
RBC # BLD AUTO: 3.78 10E12/L (ref 3.8–5.2)
SODIUM SERPL-SCNC: 120 MMOL/L (ref 133–144)
SODIUM SERPL-SCNC: 122 MMOL/L (ref 133–144)
SODIUM SERPL-SCNC: 125 MMOL/L (ref 133–144)
SPECIMEN SOURCE: NORMAL
WBC # BLD AUTO: 10.3 10E9/L (ref 4–11)

## 2019-09-01 PROCEDURE — 80053 COMPREHEN METABOLIC PANEL: CPT | Performed by: INTERNAL MEDICINE

## 2019-09-01 PROCEDURE — 40000275 ZZH STATISTIC RCP TIME EA 10 MIN

## 2019-09-01 PROCEDURE — 36415 COLL VENOUS BLD VENIPUNCTURE: CPT | Performed by: INTERNAL MEDICINE

## 2019-09-01 PROCEDURE — 25000125 ZZHC RX 250: Performed by: INTERNAL MEDICINE

## 2019-09-01 PROCEDURE — 25000132 ZZH RX MED GY IP 250 OP 250 PS 637: Performed by: INTERNAL MEDICINE

## 2019-09-01 PROCEDURE — 94799 UNLISTED PULMONARY SVC/PX: CPT

## 2019-09-01 PROCEDURE — 99233 SBSQ HOSP IP/OBS HIGH 50: CPT | Performed by: INTERNAL MEDICINE

## 2019-09-01 PROCEDURE — 85049 AUTOMATED PLATELET COUNT: CPT | Performed by: INTERNAL MEDICINE

## 2019-09-01 PROCEDURE — 82565 ASSAY OF CREATININE: CPT | Performed by: INTERNAL MEDICINE

## 2019-09-01 PROCEDURE — 93970 EXTREMITY STUDY: CPT

## 2019-09-01 PROCEDURE — 94640 AIRWAY INHALATION TREATMENT: CPT

## 2019-09-01 PROCEDURE — 84295 ASSAY OF SERUM SODIUM: CPT | Performed by: INTERNAL MEDICINE

## 2019-09-01 PROCEDURE — 25800030 ZZH RX IP 258 OP 636: Performed by: INTERNAL MEDICINE

## 2019-09-01 PROCEDURE — 84145 PROCALCITONIN (PCT): CPT | Performed by: INTERNAL MEDICINE

## 2019-09-01 PROCEDURE — 83735 ASSAY OF MAGNESIUM: CPT | Performed by: INTERNAL MEDICINE

## 2019-09-01 PROCEDURE — 80048 BASIC METABOLIC PNL TOTAL CA: CPT | Performed by: INTERNAL MEDICINE

## 2019-09-01 PROCEDURE — 85025 COMPLETE CBC W/AUTO DIFF WBC: CPT | Performed by: INTERNAL MEDICINE

## 2019-09-01 PROCEDURE — 87070 CULTURE OTHR SPECIMN AEROBIC: CPT | Performed by: INTERNAL MEDICINE

## 2019-09-01 PROCEDURE — 87077 CULTURE AEROBIC IDENTIFY: CPT | Performed by: INTERNAL MEDICINE

## 2019-09-01 PROCEDURE — 83605 ASSAY OF LACTIC ACID: CPT | Performed by: INTERNAL MEDICINE

## 2019-09-01 PROCEDURE — 94660 CPAP INITIATION&MGMT: CPT

## 2019-09-01 PROCEDURE — 87107 FUNGI IDENTIFICATION MOLD: CPT | Performed by: INTERNAL MEDICINE

## 2019-09-01 PROCEDURE — 12000000 ZZH R&B MED SURG/OB

## 2019-09-01 PROCEDURE — 87186 SC STD MICRODIL/AGAR DIL: CPT | Performed by: INTERNAL MEDICINE

## 2019-09-01 PROCEDURE — 94640 AIRWAY INHALATION TREATMENT: CPT | Mod: 76

## 2019-09-01 PROCEDURE — 87205 SMEAR GRAM STAIN: CPT | Performed by: INTERNAL MEDICINE

## 2019-09-01 PROCEDURE — 25000128 H RX IP 250 OP 636: Performed by: INTERNAL MEDICINE

## 2019-09-01 RX ADMIN — IPRATROPIUM BROMIDE 0.5 MG: 0.5 SOLUTION RESPIRATORY (INHALATION) at 11:14

## 2019-09-01 RX ADMIN — ENOXAPARIN SODIUM 40 MG: 40 INJECTION SUBCUTANEOUS at 21:21

## 2019-09-01 RX ADMIN — LEVALBUTEROL HYDROCHLORIDE 1.25 MG: 1.25 SOLUTION, CONCENTRATE RESPIRATORY (INHALATION) at 07:21

## 2019-09-01 RX ADMIN — IPRATROPIUM BROMIDE 0.5 MG: 0.5 SOLUTION RESPIRATORY (INHALATION) at 19:06

## 2019-09-01 RX ADMIN — METHYLPREDNISOLONE SODIUM SUCCINATE 40 MG: 40 INJECTION, POWDER, FOR SOLUTION INTRAMUSCULAR; INTRAVENOUS at 13:34

## 2019-09-01 RX ADMIN — SODIUM CHLORIDE: 9 INJECTION, SOLUTION INTRAVENOUS at 18:24

## 2019-09-01 RX ADMIN — BUDESONIDE 0.5 MG: 0.5 INHALANT RESPIRATORY (INHALATION) at 07:25

## 2019-09-01 RX ADMIN — LEVOFLOXACIN 500 MG: 5 INJECTION, SOLUTION INTRAVENOUS at 21:22

## 2019-09-01 RX ADMIN — METHYLPREDNISOLONE SODIUM SUCCINATE 40 MG: 40 INJECTION, POWDER, FOR SOLUTION INTRAMUSCULAR; INTRAVENOUS at 08:42

## 2019-09-01 RX ADMIN — UMECLIDINIUM 1 PUFF: 62.5 AEROSOL, POWDER ORAL at 08:41

## 2019-09-01 RX ADMIN — CHOLECALCIFEROL TAB 50 MCG (2000 UNIT) 2000 UNITS: 50 TAB at 08:41

## 2019-09-01 RX ADMIN — LEVALBUTEROL HYDROCHLORIDE 1.25 MG: 1.25 SOLUTION, CONCENTRATE RESPIRATORY (INHALATION) at 19:06

## 2019-09-01 RX ADMIN — IPRATROPIUM BROMIDE 0.5 MG: 0.5 SOLUTION RESPIRATORY (INHALATION) at 07:23

## 2019-09-01 RX ADMIN — LEVALBUTEROL HYDROCHLORIDE 1.25 MG: 1.25 SOLUTION, CONCENTRATE RESPIRATORY (INHALATION) at 11:14

## 2019-09-01 RX ADMIN — METHYLPREDNISOLONE SODIUM SUCCINATE 40 MG: 40 INJECTION, POWDER, FOR SOLUTION INTRAMUSCULAR; INTRAVENOUS at 19:50

## 2019-09-01 RX ADMIN — IPRATROPIUM BROMIDE 0.5 MG: 0.5 SOLUTION RESPIRATORY (INHALATION) at 15:26

## 2019-09-01 RX ADMIN — SODIUM CHLORIDE: 9 INJECTION, SOLUTION INTRAVENOUS at 06:56

## 2019-09-01 RX ADMIN — LEVALBUTEROL HYDROCHLORIDE 1.25 MG: 1.25 SOLUTION, CONCENTRATE RESPIRATORY (INHALATION) at 15:26

## 2019-09-01 RX ADMIN — METHYLPREDNISOLONE SODIUM SUCCINATE 40 MG: 40 INJECTION, POWDER, FOR SOLUTION INTRAMUSCULAR; INTRAVENOUS at 02:05

## 2019-09-01 RX ADMIN — BUDESONIDE 0.5 MG: 0.5 INHALANT RESPIRATORY (INHALATION) at 19:05

## 2019-09-01 ASSESSMENT — ACTIVITIES OF DAILY LIVING (ADL)
RETIRED_EATING: 0-->INDEPENDENT
DRESS: 0-->INDEPENDENT
TRANSFERRING: 0-->INDEPENDENT
AMBULATION: 0-->INDEPENDENT
COGNITION: 0 - NO COGNITION ISSUES REPORTED
ADLS_ACUITY_SCORE: 12
TOILETING: 0-->INDEPENDENT
RETIRED_COMMUNICATION: 0-->UNDERSTANDS/COMMUNICATES WITHOUT DIFFICULTY
ADLS_ACUITY_SCORE: 10
SWALLOWING: 0-->SWALLOWS FOODS/LIQUIDS WITHOUT DIFFICULTY
BATHING: 0-->INDEPENDENT
FALL_HISTORY_WITHIN_LAST_SIX_MONTHS: NO

## 2019-09-01 ASSESSMENT — MIFFLIN-ST. JEOR: SCORE: 1157.25

## 2019-09-01 NOTE — ED NOTES
Patient awake, alert and oriented to person, place, time and situation. Patient resting on cart. Patient on BiPap. Patient on ECG NIBP and SaO2 monitoring. VSS. Patient reports improved breathing. Patient medicated as ordered (see EMAR). Patient denies any pain at present. Denies any needs. Continue to monitor.

## 2019-09-01 NOTE — ED NOTES
DATE:  8/31/2019   TIME OF RECEIPT FROM LAB:  1937  LAB TEST:  Serum Osmo  LAB VALUE:  247  RESULTS GIVEN WITH READ-BACK TO (PROVIDER):  Dr. Osborne  TIME LAB VALUE REPORTED TO PROVIDER:   1938

## 2019-09-01 NOTE — PROGRESS NOTES
[facility]    Hospitalist Progress Note    Assessment & Plan   Sydni Mendoza is a 74 year old female with PMHx of chronic hypoxic respiratory failure (on 2-3L NC at baseline), COPD and hypertension who was admitted on 8/31/2019 with increased shortness of breath secondary to a COPD exacerbation.     Acute on Chronic Hypoxic Respiratory Failure dt COPD Exacerbation  Uses 2-3L NC at baseline (has been on supplemental O2 for 7-8 yrs). Follows with Dr. Cardoso of [facility]. Hx of tobacco use, quick smoking 8-9 yrs ago.    Initially treated for COPD exacerbation on 7/30/10 with Zpack and prednisone burst (20mg po x5d). At follow up visit on 8/12, was placed on 12d steroid taper and 10d course of doxycycline given ongoing sx. Finished prednisone taper on 8/24 and doxycycline on 8/25. Prescribed 10d course of cefuroxime on 8/28 per PCP dt concerns ofr bronchitis with complaints of ongoing cough and increased dyspnea with exertion.     Presented to ED on 8/31 for evaluation of increased shortness of breath and coughing. Noted to be hypoxic per EMS. Placed on BiPAP. No reported fever/chills at home. No chest pain. No orthopnea, PND. No dizziness/lightheadedness. Was afebrile and hemodynamically stable in ED. WBC 15.3 (in setting of recent steroid use). Lactate 2.2. ABG 7.42 / 47 / 250. CXR showed hyperinflation of the lungs and bibasilar opacities (likely atelectasis). CT chest was neg for PE, pulmonary infiltrates and pulmonary edema. Started on treatment for COPD exacerbation with BiPAP, IV steroids and scheduled nebs (Pulmicort, Xopenex and Atrovent). Also started on IV antibiotics (given Zosyn and Vanco in ED, Levaquin continued on admission).    -- cont IV steroids today (Solumedrol 40mg q6h)    -- cont sched breathing treatments including Pulmicort, Atrovent and Xopenex  -- cont Levaquin for now -- will check procalcitonin this morning and if low risk will stop abx  -- off BiPAP this morning, O2  needs now back to baseline  -- pulmonary toilet with IS and flutter valve  -- given ongoing sx of exacerbation of the past month now, have placed consult to pulmonary service -- input and assistance with ongoing care much appreciated    Hyponatremia    Na 118 on admission. Had been nl per last check on 8/15/19.   On hydrochlorothiazide for management of blood pressure, which was thought to be the culprit. Elevated urine Na and urine osm but not the most helpful in the setting of diuretics use. Started on IVFs on admission.   -- Na improvin -- 122 -- 120  -- ?component of SIADH in setting of ongoing pulmonary issues as above  -- cont gentle IVFs today, repeat BMP this afternoon  -- cont holding hydrochlorothiazide for now    Hypertension  PTA meds: losartan 50mg daily, hydrochlorothiazide 25mg daily.   -- cont losartan  -- holding hydrochlorothiazide as above dt hyponatremia    Bilateral LE edema  Initially noted per PCP at visit in 2019. Typically takes hydrochlorothiazide 25mg daily (for management of BP). Patient endorses worsening LE edema in the past few days. Bilateral LE US this stay was neg for DVT.  -- diuretic on hold as above given hyponatremia  -- had mention of moderate pulmonary hypertension on last echo in 2016, would presume this is due to her COPD -- consider repeating this stay, could explain her edema  -- steroids may also be contributing  -- keep legs elevated when in bed    Pulmonary nodule  Noted to have 6mm pulmonary nodule in the RML on CT scan this stay. Will need repeat CT in 6 mths for interval assessment.    Abnl Thyroid Studies:  TSH mildly low this stay, FT4 mildly elevated with some question of thyromegaly noted on CT scan this stay -- no prior labs available for review.  Follow up with PCP after discharge with repeat labs once acute illness resolved     FEN: NS@100ml/h, lytes otherwise stable, regular diet now off BIPAP.  DVT Prophylaxis: Lovenox 40mg subQ daily  Code Status:  Full Code    Disposition: Can transfer off IMC status as she has been weaned off BIPAP. Anticipate discharge in 2-3d, pending stable respiratory status.    Geovanna Stokes    Interval History   Seen this morning. Resting comfortably. Weaned off BiPAP back to NC. Breathing okay at rest, still dyspneic with exertion. +cough. No chest pain. LE edema unchanged. Appetite marginal but denies abd pain/n/v.    -Data reviewed today: I reviewed all new labs and imaging results over the last 24 hours. I personally reviewed no images or EKG's today.    Physical Exam   Temp: 97.6  F (36.4  C) Temp src: Oral BP: 130/66 Pulse: 94 Heart Rate: 96 Resp: 18 SpO2: 97 % O2 Device: Nasal cannula Oxygen Delivery: 1 LPM  Vitals:    08/31/19 1747 09/01/19 0623   Weight: 66.6 kg (146 lb 13.2 oz) 66.2 kg (146 lb)     Vital Signs with Ranges  Temp:  [97.6  F (36.4  C)-98.5  F (36.9  C)] 97.6  F (36.4  C)  Pulse:  [] 94  Heart Rate:  [] 96  Resp:  [10-25] 18  BP: ()/(52-97) 130/66  MAP:  [40 mmHg] 40 mmHg  FiO2 (%):  [30 %-40 %] 30 %  SpO2:  [91 %-100 %] 97 %  I/O last 3 completed shifts:  In: 250 [IV Piggyback:250]  Out: 450 [Urine:450]    Constitutional: Resting comfortably, alert and answering questions appropriately, NAD  Respiratory: BS diminished at bilateral lung bases, no wheeze/rales/rhonchi, no accessory M use  Cardiovascular: HRRR, no MGR, +bilateral LE edema to mid shins  GI: S, NT, ND, +BS  Skin/Integumen: warm/dry  Other:      Medications     sodium chloride 100 mL/hr at 09/01/19 0656       budesonide  0.5 mg Nebulization BID     enoxaparin  40 mg Subcutaneous Q24H     ipratropium  0.5 mg Nebulization Q4H     levalbuterol  1.25 mg Nebulization Q4H     levofloxacin  500 mg Intravenous Q24H     methylPREDNISolone  40 mg Intravenous Q6H     vitamin D3  2,000 Units Oral Daily       Data   Recent Labs   Lab 09/01/19  0225 09/01/19  0004 08/31/19  1737   WBC 10.3  --  15.3*   HGB 11.5*  --  12.1   MCV 84  --   85    281 356   * 122* 118*   POTASSIUM 3.7  --  3.8   CHLORIDE 84*  --  80*   CO2 29  --  33*   BUN 9  --  9   CR 0.57 0.52 0.49*   ANIONGAP 7  --  5   MARQUEZ 8.3*  --  8.2*   *  --  129*   ALBUMIN 3.1*  --  3.5   PROTTOTAL 6.2*  --  6.7*   BILITOTAL 0.7  --  0.6   ALKPHOS 48  --  51   ALT 25  --  22   AST 18  --  18   TROPI  --   --  <0.015       Recent Results (from the past 24 hour(s))   POC US ECHO LIMITED    Impression    Limited Bedside Pulmonary and Cardiac Ultrasound    Performed by: Dr. Osborne  Indication: Hypoxia, peripheral edema  Body area(s) imaged: Bilateral lungs, cardiac views very limited  Findings: Normal sliding signs, no comet tails, no pericardial effusion, hyperdynamic heart  Impression: No PTx, no pulmonary edema, no pericardial effusion  Images archived to PACS     XR Chest Port 1 View    Narrative    CHEST PORTABLE ONE VIEW     8/31/2019 5:45 PM     HISTORY: Hypoxic, history of COPD, new leg swelling and hypertension.     COMPARISON: Chest CT 7/20/2016, chest x-ray 7/18/2016.      Impression    IMPRESSION: Lungs are hyperinflated. Bibasilar opacities are present  which are favored to represent atelectasis, unchanged. Heart size is  unchanged.     DARREL MCKEON MD   CT Chest Pulmonary Embolism w Contrast    Narrative    CT CHEST PULMONARY EMBOLISM WITH CONTRAST  8/31/2019 6:28 PM     HISTORY: PE suspected, high pretest probability; hypoxic, new leg  swelling, tachycardic, also history of COPD.    COMPARISON: July 20, 2016.    TECHNIQUE: Volumetric helical acquisition of CT images of the chest  from the lung apices to the kidneys were acquired after the  administration of 59 mL Isovue-370  IV contrast. Radiation dose for  this scan was reduced using automated exposure control, adjustment of  the mA and/or kV according to patient size, or iterative  reconstruction technique.    FINDINGS: There is no pulmonary embolism. Emphysema and bronchial wall  thickening noted. No  definite acute appearing infiltrates. New 6 mm  nodule in the right middle lobe. The heart is not enlarged. Question  thyromegaly, evaluation is limited by streak artifact from adjacent  dense contrast. Thoracic aorta is atherosclerotic without evidence of  dissection or aneurysm. There is no pleural or pericardial effusion.   There is no pneumothorax. Adrenal glands are normal. Remainder of the  visualized upper abdomen is unremarkable.      Impression    IMPRESSION:   1. No pulmonary embolism demonstrated.  2. No thoracic aortic dissection or aneurysm.   3. New 6 mm nodule in the right middle lobe. Six-month follow-up study  recommended to evaluate for stability.     SPENCER GAMEZ MD

## 2019-09-01 NOTE — PROGRESS NOTES
Pt on 2 LPM sat 96% Lung sounds crackles/coarse. Recommend flutter valve/Capella to help mobilize secretions.  Cj  RT    11;29 Instruct Pt on use of Acapella/Flutter valve   Cr  RT

## 2019-09-01 NOTE — ED NOTES
"[facility]  ED Nurse Handoff Report    ED Chief complaint: Shortness of Breath (Pt arrives from home via EMS for difficulty breathing, shortness of breath. Hx of COPD and is on home O2. Dx with Bronchitis )      ED Diagnosis:   Final diagnoses:   Acute respiratory failure with hypoxia (H)   COPD exacerbation (H)   Hyponatremia   Peripheral edema   Low TSH level       Code Status: To be addressed by admitting MD    Allergies:   Allergies   Allergen Reactions     Albuterol Palpitations       Activity level - Baseline/Home:  Independent  Activity Level - Current:   Unable to Assess    Patient's Preferred language: English   Needed?: No    Isolation: No  Infection: Not Applicable  Bariatric?: No    Vital Signs:   Vitals:    08/31/19 1750 08/31/19 1800 08/31/19 1848 08/31/19 1900   BP: 125/52 117/75 115/68 103/64   Pulse: 114 110 98 101   Resp: 18 20 17   Temp:       TempSrc:       SpO2: 99% 99%  100%   Weight:       Height:           Cardiac Rhythm: ,   Cardiac  Cardiac Rhythm: Sinus tachycardia    Pain level: 0-10 Pain Scale: 0    Is this patient confused?: No   Does this patient have a guardian?  No         If yes, is there guardianship documents in the Epic \"Code/ACP\" activity?  N/A         Guardian Notified?  N/A  [place] - Suicide Severity Rating Scale Completed?  Yes  If yes, what color did the patient score?  White    Patient Report: Sydni is a 74 year old female with a history of COPD, lung nodule, and tobacco use who presents to the ER today for evaluation of increased shortness of breath. Patient arrived via EMS on cPAP. When they arrived at her home, she was talking in short, 2-3 sentences and SaO2 was in the 80's on room air. Patient is normally on 2 L of oxygen at home when at rest and at night, 3 L when ambulating. Patient had increased O2 to 4L today before calling 911. Patient placed on CPAP enroute. SaO2 improved to 99.   Patient reports shortness of breath, leg swelling " that began today, leg pain, and productive cough with yellow colored sputum. During EMS transport, her SBP was 230, she was given one dose of SL nitro. She denies suicidal thoughts, and recent falls. She was recently dx'd with Bronchitis by her primary care, she was started on Antbx and prednisone taper on Thursday..   Focused Assessment: Increased work of breathing and decreased SaO2 - improved on BiPap. 2 + edema to bilateral lower extremities. Bruises to bilateral forearms.   Tests Performed: Labs, CT, Xray  Abnormal Results:   Abnormal Labs Reviewed   CBC WITH PLATELETS DIFFERENTIAL - Abnormal; Notable for the following components:       Result Value    WBC 15.3 (*)     Hematocrit 34.1 (*)     Absolute Neutrophil 12.5 (*)     All other components within normal limits   COMPREHENSIVE METABOLIC PANEL - Abnormal; Notable for the following components:    Sodium 118 (*)     Chloride 80 (*)     Carbon Dioxide 33 (*)     Glucose 129 (*)     Creatinine 0.49 (*)     Calcium 8.2 (*)     Protein Total 6.7 (*)     All other components within normal limits   TSH WITH FREE T4 REFLEX - Abnormal; Notable for the following components:    TSH 0.21 (*)     All other components within normal limits   BLOOD GAS ARTERIAL AND OXYHGB - Abnormal; Notable for the following components:    pCO2 Arterial 47 (*)     pO2 Arterial 250 (*)     Bicarbonate Arterial 30 (*)     All other components within normal limits   T4 FREE - Abnormal; Notable for the following components:    T4 Free 1.51 (*)     All other components within normal limits   OSMOLALITY - Abnormal; Notable for the following components:    Osmolality 247 (*)     All other components within normal limits   ISTAT  GASES LACTATE ANDREEA POCT - Abnormal; Notable for the following components:    Ph Venous 7.30 (*)     PCO2 Venous 66 (*)     PO2 Venous 64 (*)     Bicarbonate Venous 32 (*)     Lactic Acid 2.2 (*)     All other components within normal limits     Treatments provided: BiPap,  NS bolus 250 mL, then 75 mL/hr, Antibiotics IV (Zosyn and Vanco)    Family Comments: Spouse, Gordy, at bedside. Lives independently with spouse.    OBS brochure/video discussed/provided to patient/family: N/A              Name of person given brochure if not patient: N/A              Relationship to patient: N/A    ED Medications:   Medications   No lozenges or gum should be given while patient on BIPAP/AVAPS/AVAPS AE (has no administration in time range)   hypromellose-dextran (ARTIFICAL TEARS) 0.1-0.3 % ophthalmic solution 1 drop (has no administration in time range)   HOLD: All Oral Medications (has no administration in time range)   vancomycin 1500 mg in 0.9% NaCl 250 ml intermittent infusion 1,500 mg (1,500 mg Intravenous Handoff 8/31/19 1923)   sodium chloride 0.9% infusion ( Intravenous Rate/Dose Verify 8/31/19 1922)   piperacillin-tazobactam (ZOSYN) 3.375 g vial to attach to  mL bag (0 g Intravenous Stopped 8/31/19 1852)   0.9% sodium chloride BOLUS (0 mLs Intravenous Stopped 8/31/19 1802)   ipratropium - albuterol 0.5 mg/2.5 mg/3 mL (DUONEB) neb solution 3 mL (3 mLs Nebulization Given 8/31/19 1754)   iopamidol (ISOVUE-370) solution 59 mL (59 mLs Intravenous Given 8/31/19 1825)   sodium chloride 0.9 % bag 500mL for CT scan flush use (85 mLs Intravenous Given 8/31/19 1826)   methylPREDNISolone sodium succinate (solu-MEDROL) injection 125 mg (125 mg Intravenous Given 8/31/19 1919)       Drips infusing?:  Yes    For the majority of the shift this patient was Green.   Interventions performed were N/A.    Severe Sepsis OR Septic Shock Diagnosis Present: No    To be done/followed up on inpatient unit:  Sodium levels    ED NURSE PHONE NUMBER: [phone]

## 2019-09-01 NOTE — PLAN OF CARE
"Pt A/O. VSS. Up with sba. Tele shows ST. Pt denies pain. LORENZANA present. Off BIPAP this am, on 1L NC. Needs up to 4L with acitvity. NS for hyponatremia. Transferring to st 66, report given. Pt has no new complaints.    /66   Pulse 94   Temp 97.6  F (36.4  C) (Oral)   Resp 18   Ht 1.626 m (5' 4\")   Wt 66.2 kg (146 lb)   SpO2 97%   BMI 25.06 kg/m        "

## 2019-09-01 NOTE — PLAN OF CARE
A&O x4. Pt denied pain. VSS, on Bipap. IMC. Up w/ SBA. Tele shows ST/SR.  NS @100. Skin tear on spine, covered w/ polymem. IV abx for bronchitis. BLLE +2 in legs/ankles. Plan to wean off bipap today. Continue to monitor.

## 2019-09-01 NOTE — PHARMACY-ADMISSION MEDICATION HISTORY
Admission medication history interview status for the 8/31/2019  admission is complete. See EPIC admission navigator for prior to admission medications     Medication history source reliability:Good    Actions taken by pharmacist (provider contacted, etc):Talked with patient, Reviewed SureScripts    Additional medication history information not noted on PTA med list :None    Medication reconciliation/reorder completed by provider prior to medication history? Yes    Time spent in this activity: 10 minutes    Prior to Admission medications    Medication Sig Last Dose Taking? Auth Provider   cefuroxime (CEFTIN) 500 MG tablet Take 1 tablet (500 mg) by mouth 2 times daily for 10 days 8/31/2019 at am Yes Lyn Lui MD   DULERA 200-5 MCG/ACT oral inhaler Inhale 2 puffs into the lungs 2 times daily 8/31/2019 at am Yes Reported, Patient   hydrochlorothiazide (HYDRODIURIL) 25 MG tablet Take 1 tablet (25 mg) by mouth daily 8/31/2019 at am Yes Katia He NP   ipratropium - albuterol 0.5 mg/2.5 mg/3 mL (DUONEB) 0.5-2.5 (3) MG/3ML neb solution Take 1 vial by nebulization 4 times daily 8/31/2019 at am Yes Unknown, Entered By History   levalbuterol (XOPENEX HFA) 45 MCG/ACT inhaler Inhale 2 puffs into the lungs every 6 hours as needed for shortness of breath / dyspnea or wheezing  at prn Yes Unknown, Entered By History   losartan (COZAAR) 50 MG tablet Take 1 tablet (50 mg) by mouth daily 8/31/2019 at am Yes Kiana Santillan MD   Multiple Vitamin (MULTI-VITAMIN) per tablet Take 1 tablet by mouth daily.   8/31/2019 at am Yes Reported, Patient   umeclidinium (INCRUSE ELLIPTA) 62.5 MCG/INH oral inhaler Inhale 1 puff into the lungs daily 8/31/2019 at noon Yes Lyn Lui MD

## 2019-09-01 NOTE — CONSULTS
Pulmonology Consultation      Sydni Mendoza MRN# [id number]   YOB: 1945 Age: 74 year old   Date of Admission: 8/31/2019     Reason for consult: I was asked by Dr Stokes to evaluate this patient for end stage COPD with declining respirtory status sin LRTI over a month ago..           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 She is much better today.   Prognosis is guarded.   BiPAP can be used while here if she is fatigued or CO2 rises but her ABG suggests she will not qualify for home Trilogy at discharge. She may have sleep disordered breathing, and a sleep test to r/o VAUGHN and hypoventilation is indicated if she is willing; noc ox when at baseline could be used to assess nocturnal hypoxia if she is not.   If Mg or PO4 are low, replete them.   If caloric intake is poor, use frequent small meals & Boost.   Bedside PT/OT when she is better.   CT showed advanced emphysema, possibly some chronic GARY and chronic bronchitis, and a 6 mm indeterminate RML new nodule--repeat CT in 6 months.   Taper steroids slowly to 20 mg prednisone per day and stay there.   Empiric antibiotics should last a week.   No more hydrochlorothiazide; use TIW lasix or similar.   Refer for outpatient respiratory rehab classes on discharge; if she is not able to attend outpatient, consider referral to TCU like [facility] for respiratory rehab as inpatient. She would benefit.   If she declines rehab, consider hospice referral. She indicated she would do rehab and has done outpatient before too. I think she will return to baseline but should remain on steroids for now.    Est LOS at least 2-3 days. We will see interrmitantly and Dr Cardoso will follow  "up after discharge in 2-3 weeks. She must book an appt. [phone]. She already follows every 6 months but should come in this month.    [facility] [place].               Chief Complaint:   Dyspnea and cough    History is obtained from the patient and electronic health record         History of Present Illness:   This patient is a 74 year old female who presents with over 10 years of severe emphysema and en stage COPD on home oxygen for some time at 2-3L who follows with Dr Cardoso. She has declined and has now had another acute exacerbation. Her hx is outlined in the notes:     \"Sydni Mendoza is a 74 year old female with PMHx of chronic hypoxic respiratory failure (on 2-3L NC at baseline), COPD and hypertension who was admitted on 8/31/2019 with increased shortness of breath secondary to a COPD exacerbation.      Acute on Chronic Hypoxic Respiratory Failure dt COPD Exacerbation  Uses 2-3L NC at baseline (has been on supplemental O2 for 7-8 yrs). Follows with Dr. Cardoso of [facility]. Hx of tobacco use, quick smoking 8-9 yrs ago.     Initially treated for COPD exacerbation on 7/30/10 with Zpack and prednisone burst (20mg po x5d). At follow up visit on 8/12, was placed on 12d steroid taper and 10d course of doxycycline given ongoing sx. Finished prednisone taper on 8/24 and doxycycline on 8/25. Prescribed 10d course of cefuroxime on 8/28 per PCP dt concerns ofr bronchitis with complaints of ongoing cough and increased dyspnea with exertion.      Presented to ED on 8/31 for evaluation of increased shortness of breath and coughing. Noted to be hypoxic per EMS. Placed on BiPAP. No reported fever/chills at home. No chest pain. No orthopnea, PND. No dizziness/lightheadedness. Was afebrile and hemodynamically stable in ED. WBC 15.3 (in setting of recent steroid use). Lactate 2.2. ABG 7.42 / 47 / 250. CXR showed hyperinflation of the lungs and bibasilar opacities (likely atelectasis). CT chest was neg " for PE, pulmonary infiltrates and pulmonary edema. Started on treatment for COPD exacerbation with BiPAP, IV steroids and scheduled nebs (Pulmicort, Xopenex and Atrovent). Also started on IV antibiotics (given Zosyn and Vanco in ED, Levaquin continued on admission).     -- cont IV steroids today (Solumedrol 40mg q6h)    -- cont sched breathing treatments including Pulmicort, Atrovent and Xopenex  -- cont Levaquin for now -- will check procalcitonin this morning and if low risk will stop abx  -- off BiPAP this morning, O2 needs now back to baseline  -- pulmonary toilet with IS and flutter valve  -- given ongoing sx of exacerbation of the past month now, have placed consult to pulmonary service -- input and assistance with ongoing care much appreciated     Hyponatremia    Na 118 on admission. Had been nl per last check on 8/15/19.   On hydrochlorothiazide for management of blood pressure, which was thought to be the culprit. Elevated urine Na and urine osm but not the most helpful in the setting of diuretics use. Started on IVFs on admission.   -- Na improvin -- 122 -- 120  -- ?component of SIADH in setting of ongoing pulmonary issues as above  -- cont gentle IVFs today, repeat BMP this afternoon  -- cont holding hydrochlorothiazide for now     Hypertension  PTA meds: losartan 50mg daily, hydrochlorothiazide 25mg daily.   -- cont losartan  -- holding hydrochlorothiazide as above dt hyponatremia     Bilateral LE edema  Initially noted per PCP at visit in 2019. Typically takes hydrochlorothiazide 25mg daily (for management of BP). Patient endorses worsening LE edema in the past few days. Bilateral LE US this stay was neg for DVT.  -- diuretic on hold as above given hyponatremia  -- had mention of moderate pulmonary hypertension on last echo in 2016, would presume this is due to her COPD -- consider repeating this stay, could explain her edema  -- steroids may also be contributing  -- keep legs elevated when in  "bed     Pulmonary nodule  Noted to have 6mm pulmonary nodule in the RML on CT scan this stay. Will need repeat CT in 6 mths for interval assessment.\"    We are asked to comment on her treatment for respiratory failure and hypoxemia and COPD.   Her ABG is better today and does not she hypercapnia.                    Past Medical History:     Past Medical History:   Diagnosis Date     COPD (chronic obstructive pulmonary disease) (H)      Lung nodule 12    Biopsy; fibrosis&chronic inflamation Right     Smoker 9-15-12    quit              Past Surgical History:     Past Surgical History:   Procedure Laterality Date     CHEST TUBE INSERTION  12    Right ,pneumothorax post, lung biopsy     GYN SURGERY      Tubal Ligation               Social History:     Social History     Tobacco Use     Smoking status: Former Smoker     Packs/day: 1.00     Years: 50.00     Pack years: 50.00     Types: Cigarettes     Last attempt to quit: 2011     Years since quittin.0     Smokeless tobacco: Never Used   Substance Use Topics     Alcohol use: Yes     Alcohol/week: 0.0 oz     Comment: occasional             Family History:   History reviewed. No pertinent family history.          Immunizations:     Immunization History   Administered Date(s) Administered     Influenza (High Dose) 3 valent vaccine 2014, 10/15/2015, 2016, 2017, 10/09/2018     Influenza (IIV3) PF 2012     Influenza Vaccine IM > 6 months Valent IIV4 10/12/2013     Pneumo Conj 13-V (2010&after) 2016     Pneumococcal 23 valent 2002, 2019     TDAP Vaccine (Boostrix) 2016             Allergies:     Allergies   Allergen Reactions     Albuterol Palpitations             Medications:     Current Facility-Administered Medications Ordered in Epic   Medication Dose Route Frequency Last Rate Last Dose     budesonide (PULMICORT) neb solution 0.5 mg  0.5 mg Nebulization BID   0.5 mg at 19 0725     enoxaparin (LOVENOX) " injection 40 mg  40 mg Subcutaneous Q24H   40 mg at 08/31/19 2234     HYDROcodone-acetaminophen (NORCO) 5-325 MG per tablet 1-2 tablet  1-2 tablet Oral Q4H PRN         ipratropium (ATROVENT) 0.02 % neb solution 0.5 mg  0.5 mg Nebulization Q4H   0.5 mg at 09/01/19 1114     levalbuterol (XOPENEX CONC) neb solution 1.25 mg  1.25 mg Nebulization Q4H   1.25 mg at 09/01/19 1114     levofloxacin (LEVAQUIN) infusion 500 mg  500 mg Intravenous Q24H 100 mL/hr at 08/31/19 2240 500 mg at 08/31/19 2240     magnesium sulfate 4 g in 100 mL sterile water (premade)  4 g Intravenous Q4H PRN         melatonin tablet 1 mg  1 mg Oral At Bedtime PRN         methylPREDNISolone sodium succinate (solu-MEDROL) injection 40 mg  40 mg Intravenous Q6H   40 mg at 09/01/19 0842     metoclopramide (REGLAN) tablet 5 mg  5 mg Oral Q6H PRN        Or     metoclopramide (REGLAN) injection 5 mg  5 mg Intravenous Q6H PRN         naloxone (NARCAN) injection 0.1-0.4 mg  0.1-0.4 mg Intravenous Q2 Min PRN         ondansetron (ZOFRAN-ODT) ODT tab 4 mg  4 mg Oral Q6H PRN        Or     ondansetron (ZOFRAN) injection 4 mg  4 mg Intravenous Q6H PRN         polyethylene glycol (MIRALAX/GLYCOLAX) Packet 17 g  17 g Oral Daily PRN         potassium chloride (KLOR-CON) Packet 20-40 mEq  20-40 mEq Oral or Feeding Tube Q2H PRN         potassium chloride 10 mEq in 100 mL intermittent infusion with 10 mg lidocaine  10 mEq Intravenous Q1H PRN         potassium chloride 10 mEq in 100 mL sterile water intermittent infusion (premix)  10 mEq Intravenous Q1H PRN         potassium chloride 20 mEq in 50 mL intermittent infusion  20 mEq Intravenous Q1H PRN         potassium chloride ER (K-DUR/KLOR-CON M) CR tablet 20-40 mEq  20-40 mEq Oral Q2H PRN         senna-docusate (SENOKOT-S/PERICOLACE) 8.6-50 MG per tablet 1 tablet  1 tablet Oral BID PRN        Or     senna-docusate (SENOKOT-S/PERICOLACE) 8.6-50 MG per tablet 2 tablet  2 tablet Oral BID PRN         sodium chloride (PF) 0.9%  "PF flush 3 mL  3 mL Intracatheter q1 min prn         sodium chloride 0.9% infusion   Intravenous Continuous   Stopped at 09/01/19 1029     vitamin D3 (CHOLECALCIFEROL) 2000 units (50 mcg) tablet 2,000 Units  2,000 Units Oral Daily   2,000 Units at 09/01/19 0841     No current [facility]-ordered outpatient medications on file.             Review of Systems:   The 5 point Review of Systems is negative other than noted in the HPI            Physical Exam:     Vitals were reviewed  Patient Vitals for the past 24 hrs:   BP Temp Temp src Pulse Heart Rate Resp SpO2 Height Weight   09/01/19 1000 130/66 -- -- 94 96 18 97 % -- --   09/01/19 0845 (!) 141/66 97.6  F (36.4  C) Oral 98 99 16 99 % -- --   09/01/19 0725 -- -- -- -- -- -- 98 % -- --   09/01/19 0623 -- -- -- -- -- -- -- -- 66.2 kg (146 lb)   09/01/19 0620 138/72 -- -- 97 97 14 98 % -- --   09/01/19 0400 128/76 -- -- 96 94 13 99 % -- --   09/01/19 0220 122/69 -- -- 103 104 13 99 % -- --   09/01/19 0210 (!) 190/91 -- -- 114 110 23 -- -- --   09/01/19 0200 (!) 171/85 -- -- 105 102 21 100 % -- --   09/01/19 0000 112/67 -- -- 98 93 10 91 % -- --   08/31/19 2200 131/70 -- -- 101 98 15 -- -- --   08/31/19 2100 (!) 162/81 -- -- 106 106 21 97 % -- --   08/31/19 2055 (!) 183/97 -- -- -- -- -- -- -- --   08/31/19 2024 -- -- -- -- 90 23 99 % -- --   08/31/19 2015 115/65 -- -- 97 92 17 99 % -- --   08/31/19 2000 138/75 -- -- 100 101 21 99 % -- --   08/31/19 1945 114/68 -- -- 101 100 20 99 % -- --   08/31/19 1930 131/72 -- -- 105 100 25 99 % -- --   08/31/19 1915 93/66 -- -- 101 98 22 100 % -- --   08/31/19 1900 103/64 -- -- 101 99 17 100 % -- --   08/31/19 1848 115/68 -- -- 98 104 -- -- -- --   08/31/19 1800 117/75 -- -- 110 108 20 99 % -- --   08/31/19 1750 125/52 -- -- 114 115 18 99 % -- --   08/31/19 1747 (!) 181/92 98.5  F (36.9  C) Temporal 114 122 20 99 % 1.626 m (5' 4\") 66.6 kg (146 lb 13.2 oz)   08/31/19 1745 -- -- -- -- -- -- 98 % -- --   08/31/19 1740 (!) 181/92 -- -- 124 123 " 22 99 % -- --     Pleasant lady sitting up in chair nad ox3   Diffusely decreased but no wheeze or rhonchi or cough  RRR  -CCE  Pale dry skin           Data:     Lab Results   Component Value Date    WBC 10.3 09/01/2019    WBC 15.3 (H) 08/31/2019    WBC 9.5 03/06/2018    HGB 11.5 (L) 09/01/2019    HGB 12.1 08/31/2019    HGB 11.7 (A) 03/06/2018    HCT 31.9 (L) 09/01/2019    HCT 34.1 (L) 08/31/2019    HCT 34.8 (A) 03/06/2018     09/01/2019     09/01/2019     08/31/2019     (L) 09/01/2019     (L) 09/01/2019     (LL) 08/31/2019    POTASSIUM 3.7 09/01/2019    POTASSIUM 3.8 08/31/2019    POTASSIUM 4.1 08/15/2018    CHLORIDE 84 (L) 09/01/2019    CHLORIDE 80 (L) 08/31/2019    CHLORIDE 94 (L) 08/15/2018    CO2 29 09/01/2019    CO2 33 (H) 08/31/2019    CO2 41 (H) 07/25/2016    BUN 9 09/01/2019    BUN 9 08/31/2019    BUN 14 08/15/2018    BUN 22 08/15/2018    CR 0.57 09/01/2019    CR 0.52 09/01/2019    CR 0.49 (L) 08/31/2019     (H) 09/01/2019     (H) 08/31/2019     (H) 08/15/2018    NTBNPI 159 08/31/2019    NTBNPI 940 (H) 07/18/2016    NTBNPI 551 02/24/2016    TROPI <0.015 08/31/2019    TROPI 0.026 07/18/2016    TROPI 0.078 (H) 08/25/2011    AST 18 09/01/2019    AST 18 08/31/2019    AST 15 08/15/2018    ALT 25 09/01/2019    ALT 22 08/31/2019    ALT 12 08/15/2018    ALKPHOS 48 09/01/2019    ALKPHOS 51 08/31/2019    ALKPHOS 57 08/15/2018    BILITOTAL 0.7 09/01/2019    BILITOTAL 0.6 08/31/2019    BILITOTAL 0.5 08/15/2018    INR 0.93 09/14/2012     Results for ZOHAIB KNIGHT (MRN [id number]) as of 9/1/2019 11:28   Ref. Range 7/18/2016 05:38 7/29/2016 13:59 8/15/2018 13:43 8/31/2019 17:41 8/31/2019 18:36   pH Arterial Latest Ref Range: 7.35 - 7.45 pH     7.42   pCO2 Arterial Latest Ref Range: 35 - 45 mm Hg     47 (H)   PO2 Arterial Latest Ref Range: 80 - 105 mm Hg     250 (H)   Bicarbonate Arterial Latest Ref Range: 21 - 28 mmol/L     30 (H)   Total CO2 Latest Ref  Range: 20 - 29 mmol/L  32 (H) 29     Base Excess Art Latest Units: mmol/L     5.2   Oxyhemoglobin Arterial Latest Ref Range: 92 - 100 %     99   Ph Venous Latest Ref Range: 7.32 - 7.43 pH 7.38   7.30 (L)    PCO2 Venous Latest Ref Range: 40 - 50 mm Hg 66 (H)   66 (H)    PO2 Venous Latest Ref Range: 25 - 47 mm Hg 168 (H)   64 (H)    Bicarbonate Venous Latest Ref Range: 21 - 28 mmol/L 39 (H)   32 (H)    Base Excess Venous Latest Units: mmol/L 12.4       Oxyhemoglobin Venous Latest Units: % 99       O2 Sat Venous Latest Units: %    89      CT CHEST PULMONARY EMBOLISM WITH CONTRAST  8/31/2019 6:28 PM      HISTORY: PE suspected, high pretest probability; hypoxic, new leg  swelling, tachycardic, also history of COPD.     COMPARISON: July 20, 2016.     TECHNIQUE: Volumetric helical acquisition of CT images of the chest  from the lung apices to the kidneys were acquired after the  administration of 59 mL Isovue-370  IV contrast. Radiation dose for  this scan was reduced using automated exposure control, adjustment of  the mA and/or kV according to patient size, or iterative  reconstruction technique.     FINDINGS: There is no pulmonary embolism. Emphysema and bronchial wall  thickening noted. No definite acute appearing infiltrates. New 6 mm  nodule in the right middle lobe. The heart is not enlarged. Question  thyromegaly, evaluation is limited by streak artifact from adjacent  dense contrast. Thoracic aorta is atherosclerotic without evidence of  dissection or aneurysm. There is no pleural or pericardial effusion.   There is no pneumothorax. Adrenal glands are normal. Remainder of the  visualized upper abdomen is unremarkable.                                                                      IMPRESSION:   1. No pulmonary embolism demonstrated.  2. No thoracic aortic dissection or aneurysm.   3. New 6 mm nodule in the right middle lobe. Six-month follow-up study  recommended to evaluate for stability.      SPENCER GAMEZ  MD   Order-Level Documents:

## 2019-09-01 NOTE — H&P
Admitted:     08/31/2019      HISTORY OF PRESENT ILLNESS:  This is a 74-year-old female with history of COPD, chronic respiratory failure on home O2 at 2-3 liters, smoker, lung nodule who came to the ER with complaint of worsening shortness of breath.      According to the patient, she quit smoking 8 or 9 years ago and has been on oxygen for 7-8 years.  She presented with worsening shortness of breath, coughing, unable to breathe all day today.  She called 911 and found to be hypoxic.  They put her on BiPAP and brought her to the ER.      The patient was earlier treated with COPD exacerbation this month as well and just finished a prednisone burst tapering dose on 08/24 and was on antibiotics.  She was started back on an antibiotic a few days ago, because of the bronchitis, by her primary care physician.      She denies any fever, chills, nausea, vomiting, headache, dizziness, lightheadedness.  No orthopnea, PND, palpitation or dysuria, hematuria, constipation, or diarrhea at this time.  The rest of the review of systems is negative.      ASSESSMENT AND PLAN:   1.  Acute exacerbation of chronic obstructive pulmonary disease:  This is a 74-year-old female with history of COPD with oxygen dependence.  Now presented with worsening shortness of breath.  Chest x-ray shows hyperinflation.  A CT scan of the chest, PE protocol was negative for acute pulmonary embolism.  No acute infection or infiltrate.  No thoracic aortic dissection.  There is a 6 cm nodule in the right middle lobe that needs a 6-month followup.  At this time, we will treat her with COPD exacerbation, started on IV Solu-Medrol 40 mg q.6h., Xopenex nebulization every 4 hours, ipratropium nebulization every 4 hours, Pulmicort nebulization 0.5 mg b.i.d., IV levofloxacin.  We will see how she does with that.  RT to assess and treat.      2.  Acute on chronic hypoxic respiratory failure:  The patient was found to be hypoxic and was put on BiPAP.  At this time  we will keep her on BiPAP, on aggressive incentive spirometry flutter, aggressive nebulization as mentioned above.  We will try to wean her off from the BiPAP.  Her CT scan is negative for PE or any infiltrate.      3.  New 6 cm nodule in the right middle lobe:  That needs followup as an outpatient.  Given her history, she needs a 6-month followup study with CT scan to make sure it is not enlarging.      4.  Hyponatremia:  The patient's sodium is 118.  She denies drinking a lot of fluids.  No recent change in the medications.  She has been on hydrochlorothiazide 25 mg daily.  I think this is most likely because of that.  I will check her urine spot sodium, urine osmolality, serum osmolality.  Keep her on IV fluids, normal saline at 100 mL per hour.  Check her BMP again in few hours and in the morning.      5.  Deep venous thrombosis prophylaxis with Lovenox.      CODE STATUS:  Full code as per the patient wishes.      The case was discussed with the ER physician and the nursing staff taking care of the patient.         BILL LIANG MD             D: 2019   T: 2019   MT: JASON      Name:     ZOHAIB KNIGHT   MRN:      [id number]        Account:      [id number]   :      1945        Admitted:     2019                   Document: [id number]       cc: Marimar Bower MD

## 2019-09-01 NOTE — ED NOTES
Pt. Alert and Oriented.  is in the room. MD in to update pt. Bipap fio2 decreased at this time from 60% to 40% as sats are 100% and pt. Is tolerating well.

## 2019-09-01 NOTE — H&P
[facility]    History and Physical  Hospitalist       Date of Admission:  8/31/2019    Assessment & Plan     This is a 74-year-old female with history of COPD, chronic respiratory failure on home O2 at 2-3 liters, smoker, lung nodule who came to the ER with complaint of worsening shortness of breath.      ASSESSMENT AND PLAN:   1.  Acute exacerbation of chronic obstructive pulmonary disease:  This is a 74-year-old female with history of COPD with oxygen dependence.  Now presented with worsening shortness of breath.  Chest x-ray shows hyperinflation.  A CT scan of the chest, PE protocol was negative for acute pulmonary embolism.  No acute infection or infiltrate.  No thoracic aortic dissection.  There is a 6 cm nodule in the right middle lobe that needs a 6-month followup.  At this time, we will treat her with COPD exacerbation, started on IV Solu-Medrol 40 mg q.6h., Xopenex nebulization every 4 hours, ipratropium nebulization every 4 hours, Pulmicort nebulization 0.5 mg b.i.d., IV levofloxacin.  We will see how she does with that.  RT to assess and treat.      2.  Acute on chronic hypoxic respiratory failure:  The patient was found to be hypoxic and was put on BiPAP.  At this time we will keep her on BiPAP, on aggressive incentive spirometry flutter, aggressive nebulization as mentioned above.  We will try to wean her off from the BiPAP.  Her CT scan is negative for PE or any infiltrate.      3.  New 6 cm nodule in the right middle lobe:  That needs followup as an outpatient.  Given her history, she needs a 6-month followup study with CT scan to make sure it is not enlarging.      4.  Hyponatremia:  The patient's sodium is 118.  She denies drinking a lot of fluids.  No recent change in the medications.  She has been on hydrochlorothiazide 25 mg daily.  I think this is most likely because of that.  I will check her urine spot sodium, urine osmolality, serum osmolality.  Keep her on IV fluids, normal  saline at 100 mL per hour.  Check her BMP again in few hours and in the morning.      5.  Deep venous thrombosis prophylaxis with Lovenox.      CODE STATUS:  Full code as per the patient wishes.      The case was discussed with the ER physician and the nursing staff taking care of the patient.         TREVON ELIZABETH MD              DVT Prophylaxis: Enoxaparin (Lovenox) SQ  Code Status: Full Code    Disposition: Expected discharge in 2 days once stable.    Trevon Elizabeth MD    Primary Care Physician   Lyn Lui    Chief Complaint   SOB    History is obtained from the patient    History of Present Illness   Admitted:     08/31/2019      HISTORY OF PRESENT ILLNESS:  This is a 74-year-old female with history of COPD, chronic respiratory failure on home O2 at 2-3 liters, smoker, lung nodule who came to the ER with complaint of worsening shortness of breath.      According to the patient, she quit smoking 8 or 9 years ago and has been on oxygen for 7-8 years.  She presented with worsening shortness of breath, coughing, unable to breathe all day today.  She called 911 and found to be hypoxic.  They put her on BiPAP and brought her to the ER.      The patient was earlier treated with COPD exacerbation this month as well and just finished a prednisone burst tapering dose on 08/24 and was on antibiotics.  She was started back on an antibiotic a few days ago, because of the bronchitis, by her primary care physician.      She denies any fever, chills, nausea, vomiting, headache, dizziness, lightheadedness.  No orthopnea, PND, palpitation or dysuria, hematuria, constipation, or diarrhea at this time.  The rest of the review of systems is negative.     Past Medical History    I have reviewed this patient's medical history and updated it with pertinent information if needed.   Past Medical History:   Diagnosis Date     COPD (chronic obstructive pulmonary disease) (H)      Lung nodule 9-14-12    Biopsy; fibrosis&chronic  inflamation Right     Smoker 9-15-12    quit        Past Surgical History   I have reviewed this patient's surgical history and updated it with pertinent information if needed.  Past Surgical History:   Procedure Laterality Date     CHEST TUBE INSERTION  12    Right ,pneumothorax post, lung biopsy     GYN SURGERY      Tubal Ligation       Prior to Admission Medications   Prior to Admission Medications   Prescriptions Last Dose Informant Patient Reported? Taking?   Cholecalciferol (VITAMIN D) 2000 UNITS tablet   No No   Sig: Take 2,000 Units by mouth daily.   DULERA 200-5 MCG/ACT oral inhaler   Yes No   Sig: Inhale 2 puffs into the lungs 2 times daily   Multiple Vitamin (MULTI-VITAMIN) per tablet   Yes No   Sig: Take 1 tablet by mouth daily.     cefuroxime (CEFTIN) 500 MG tablet   No No   Sig: Take 1 tablet (500 mg) by mouth 2 times daily for 10 days   guaiFENesin (MUCINEX) 600 MG 12 hr tablet   No No   Sig: Take 2 tablets (1,200 mg) by mouth 2 times daily   Patient not taking: Reported on 2019   hydrochlorothiazide (HYDRODIURIL) 25 MG tablet   No No   Sig: Take 1 tablet (25 mg) by mouth daily   ipratropium (ATROVENT) 0.02 % nebulizer solution   No No   Si.5 mg by neb BID, may increase to QID prn cough/dyspnea   levalbuterol (XOPENEX) 1.25 MG/3ML neb solution   No No   Si.25 mg by neb BID, may increase to QID as needed   losartan (COZAAR) 50 MG tablet   No No   Sig: Take 1 tablet (50 mg) by mouth daily   losartan (COZAAR) 50 MG tablet   No No   Sig: Take 1 tablet (50 mg) by mouth daily   predniSONE (DELTASONE) 10 MG tablet   No No   Sig: Take 4 tablets (40 mg) by mouth daily for 3 days, THEN 3 tablets (30 mg) daily for 3 days, THEN 2 tablets (20 mg) daily for 3 days, THEN 1 tablet (10 mg) daily for 3 days.   umeclidinium (INCRUSE ELLIPTA) 62.5 MCG/INH oral inhaler   No No   Sig: Inhale 1 puff into the lungs daily      Facility-Administered Medications: None     Allergies   Allergies   Allergen  Reactions     Albuterol Palpitations       Social History   I have reviewed this patient's social history and updated it with pertinent information if needed. Sydni Mendoza  reports that she quit smoking about 8 years ago. Her smoking use included cigarettes. She has a 50.00 pack-year smoking history. She has never used smokeless tobacco. She reports that she drinks alcohol. She reports that she does not use drugs.    Family History   I have reviewed this patient's family history and updated it with pertinent information if needed.   History reviewed. No pertinent family history.    Review of Systems   CONSTITUTIONAL:  negative  EYES:  negative  HEENT:  negative  RESPIRATORY:  positive for  cough with sputum, dyspnea and wheezing  CARDIOVASCULAR:  negative  GASTROINTESTINAL:  negative  GENITOURINARY:  negative  INTEGUMENT/BREAST:  negative  HEMATOLOGIC/LYMPHATIC:  negative  ALLERGIC/IMMUNOLOGIC:  negative  ENDOCRINE:  negative  MUSCULOSKELETAL:  negative  NEUROLOGICAL:  negative  BEHAVIOR/PSYCH:  negative    Physical Exam   Temp: 98.5  F (36.9  C) Temp src: Temporal BP: 103/64 Pulse: 101 Heart Rate: 99 Resp: 17 SpO2: 100 % O2 Device: Cool Aerosol    Vital Signs with Ranges  Temp:  [98.5  F (36.9  C)] 98.5  F (36.9  C)  Pulse:  [] 101  Heart Rate:  [] 99  Resp:  [17-22] 17  BP: (103-181)/(52-92) 103/64  SpO2:  [98 %-100 %] 100 %  146 lbs 13.22 oz    Constitutional: Awake, alert, cooperative, no apparent distress.  Eyes: Conjunctiva and pupils examined and normal.  HEENT: dry mucous membranes, normal dentition.  Respiratory: Decrease air entry bilaterally, no crackles or wheezing.  Cardiovascular: Regular rate and rhythm, normal S1 and S2, and no murmur noted.  GI: Soft, non-distended, non-tender, normal bowel sounds.  Lymph/Hematologic: No anterior cervical or supraclavicular adenopathy.  Skin: No rashes, no cyanosis, 2+ LE edema, mild tenderness.  Musculoskeletal: No joint swelling, erythema or  tenderness.  Neurologic: Cranial nerves 2-12 intact, normal strength and sensation.  Psychiatric: Alert, oriented to person, place and time, no obvious anxiety or depression.    Data   Data reviewed today:  I personally reviewed the EKG tracing showing Sinus tachycardia with Premature atrial complexes with Aberrant conduction  Biatrial enlargement  Junctional ST depression, probably normal  Abnormal ECG  When compared with ECG of 18-JUL-2016 05:59,  Aberrant conduction is now Present  .  Recent Labs   Lab 08/31/19  1737   WBC 15.3*   HGB 12.1   MCV 85      *   POTASSIUM 3.8   CHLORIDE 80*   CO2 33*   BUN 9   CR 0.49*   ANIONGAP 5   MARQUEZ 8.2*   *   ALBUMIN 3.5   PROTTOTAL 6.7*   BILITOTAL 0.6   ALKPHOS 51   ALT 22   AST 18   TROPI <0.015       Recent Results (from the past 24 hour(s))   XR Chest Port 1 View    Narrative    CHEST PORTABLE ONE VIEW     8/31/2019 5:45 PM     HISTORY: Hypoxic, history of COPD, new leg swelling and hypertension.     COMPARISON: Chest CT 7/20/2016, chest x-ray 7/18/2016.      Impression    IMPRESSION: Lungs are hyperinflated. Bibasilar opacities are present  which are favored to represent atelectasis, unchanged. Heart size is  unchanged.     DARREL MCKEON MD   CT Chest Pulmonary Embolism w Contrast    Narrative    CT CHEST PULMONARY EMBOLISM WITH CONTRAST  8/31/2019 6:28 PM     HISTORY: PE suspected, high pretest probability; hypoxic, new leg  swelling, tachycardic, also history of COPD.    COMPARISON: July 20, 2016.    TECHNIQUE: Volumetric helical acquisition of CT images of the chest  from the lung apices to the kidneys were acquired after the  administration of 59 mL Isovue-370  IV contrast. Radiation dose for  this scan was reduced using automated exposure control, adjustment of  the mA and/or kV according to patient size, or iterative  reconstruction technique.    FINDINGS: There is no pulmonary embolism. Emphysema and bronchial wall  thickening noted. No  definite acute appearing infiltrates. New 6 mm  nodule in the right middle lobe. The heart is not enlarged. Question  thyromegaly, evaluation is limited by streak artifact from adjacent  dense contrast. Thoracic aorta is atherosclerotic without evidence of  dissection or aneurysm. There is no pleural or pericardial effusion.   There is no pneumothorax. Adrenal glands are normal. Remainder of the  visualized upper abdomen is unremarkable.      Impression    IMPRESSION:   1. No pulmonary embolism demonstrated.  2. No thoracic aortic dissection or aneurysm.   3. New 6 mm nodule in the right middle lobe. Six-month follow-up study  recommended to evaluate for stability.

## 2019-09-02 LAB
ANION GAP SERPL CALCULATED.3IONS-SCNC: 7 MMOL/L (ref 3–14)
BUN SERPL-MCNC: 11 MG/DL (ref 7–30)
CALCIUM SERPL-MCNC: 8.8 MG/DL (ref 8.5–10.1)
CHLORIDE SERPL-SCNC: 95 MMOL/L (ref 94–109)
CO2 SERPL-SCNC: 29 MMOL/L (ref 20–32)
CREAT SERPL-MCNC: 0.58 MG/DL (ref 0.52–1.04)
GFR SERPL CREATININE-BSD FRML MDRD: >90 ML/MIN/{1.73_M2}
GLUCOSE SERPL-MCNC: 132 MG/DL (ref 70–99)
POTASSIUM SERPL-SCNC: 4.1 MMOL/L (ref 3.4–5.3)
SODIUM SERPL-SCNC: 131 MMOL/L (ref 133–144)

## 2019-09-02 PROCEDURE — 99233 SBSQ HOSP IP/OBS HIGH 50: CPT | Performed by: INTERNAL MEDICINE

## 2019-09-02 PROCEDURE — 40000275 ZZH STATISTIC RCP TIME EA 10 MIN

## 2019-09-02 PROCEDURE — 25000131 ZZH RX MED GY IP 250 OP 636 PS 637

## 2019-09-02 PROCEDURE — 25000125 ZZHC RX 250: Performed by: INTERNAL MEDICINE

## 2019-09-02 PROCEDURE — 25000132 ZZH RX MED GY IP 250 OP 250 PS 637: Performed by: INTERNAL MEDICINE

## 2019-09-02 PROCEDURE — 25000128 H RX IP 250 OP 636: Performed by: INTERNAL MEDICINE

## 2019-09-02 PROCEDURE — 94640 AIRWAY INHALATION TREATMENT: CPT | Mod: 76

## 2019-09-02 PROCEDURE — 94640 AIRWAY INHALATION TREATMENT: CPT

## 2019-09-02 PROCEDURE — 36415 COLL VENOUS BLD VENIPUNCTURE: CPT | Performed by: INTERNAL MEDICINE

## 2019-09-02 PROCEDURE — 25800030 ZZH RX IP 258 OP 636: Performed by: INTERNAL MEDICINE

## 2019-09-02 PROCEDURE — 12000000 ZZH R&B MED SURG/OB

## 2019-09-02 PROCEDURE — 94799 UNLISTED PULMONARY SVC/PX: CPT

## 2019-09-02 PROCEDURE — 80048 BASIC METABOLIC PNL TOTAL CA: CPT | Performed by: INTERNAL MEDICINE

## 2019-09-02 RX ORDER — LEVOFLOXACIN 500 MG/1
500 TABLET, FILM COATED ORAL DAILY
Status: DISCONTINUED | OUTPATIENT
Start: 2019-09-02 | End: 2019-09-04 | Stop reason: HOSPADM

## 2019-09-02 RX ORDER — PREDNISONE 20 MG/1
40 TABLET ORAL DAILY
Status: DISCONTINUED | OUTPATIENT
Start: 2019-09-02 | End: 2019-09-02

## 2019-09-02 RX ORDER — PREDNISONE 20 MG/1
40 TABLET ORAL DAILY
Status: DISCONTINUED | OUTPATIENT
Start: 2019-09-02 | End: 2019-09-04 | Stop reason: HOSPADM

## 2019-09-02 RX ADMIN — LEVALBUTEROL HYDROCHLORIDE 1.25 MG: 1.25 SOLUTION, CONCENTRATE RESPIRATORY (INHALATION) at 06:59

## 2019-09-02 RX ADMIN — METHYLPREDNISOLONE SODIUM SUCCINATE 40 MG: 40 INJECTION, POWDER, FOR SOLUTION INTRAMUSCULAR; INTRAVENOUS at 02:45

## 2019-09-02 RX ADMIN — LEVALBUTEROL HYDROCHLORIDE 1.25 MG: 1.25 SOLUTION, CONCENTRATE RESPIRATORY (INHALATION) at 04:39

## 2019-09-02 RX ADMIN — BUDESONIDE 0.5 MG: 0.5 INHALANT RESPIRATORY (INHALATION) at 18:51

## 2019-09-02 RX ADMIN — BUDESONIDE 0.5 MG: 0.5 INHALANT RESPIRATORY (INHALATION) at 11:13

## 2019-09-02 RX ADMIN — SODIUM CHLORIDE: 9 INJECTION, SOLUTION INTRAVENOUS at 05:13

## 2019-09-02 RX ADMIN — CHOLECALCIFEROL TAB 50 MCG (2000 UNIT) 2000 UNITS: 50 TAB at 08:36

## 2019-09-02 RX ADMIN — METHYLPREDNISOLONE SODIUM SUCCINATE 40 MG: 40 INJECTION, POWDER, FOR SOLUTION INTRAMUSCULAR; INTRAVENOUS at 08:35

## 2019-09-02 RX ADMIN — LEVALBUTEROL HYDROCHLORIDE 1.25 MG: 1.25 SOLUTION, CONCENTRATE RESPIRATORY (INHALATION) at 18:51

## 2019-09-02 RX ADMIN — LEVALBUTEROL HYDROCHLORIDE 1.25 MG: 1.25 SOLUTION, CONCENTRATE RESPIRATORY (INHALATION) at 11:13

## 2019-09-02 RX ADMIN — LEVOFLOXACIN 500 MG: 500 TABLET, FILM COATED ORAL at 21:02

## 2019-09-02 RX ADMIN — IPRATROPIUM BROMIDE 0.5 MG: 0.5 SOLUTION RESPIRATORY (INHALATION) at 01:17

## 2019-09-02 RX ADMIN — IPRATROPIUM BROMIDE 0.5 MG: 0.5 SOLUTION RESPIRATORY (INHALATION) at 04:39

## 2019-09-02 RX ADMIN — IPRATROPIUM BROMIDE 0.5 MG: 0.5 SOLUTION RESPIRATORY (INHALATION) at 14:31

## 2019-09-02 RX ADMIN — PREDNISONE 40 MG: 20 TABLET ORAL at 14:44

## 2019-09-02 RX ADMIN — IPRATROPIUM BROMIDE 0.5 MG: 0.5 SOLUTION RESPIRATORY (INHALATION) at 06:54

## 2019-09-02 RX ADMIN — ENOXAPARIN SODIUM 40 MG: 40 INJECTION SUBCUTANEOUS at 21:04

## 2019-09-02 RX ADMIN — LEVALBUTEROL HYDROCHLORIDE 1.25 MG: 1.25 SOLUTION, CONCENTRATE RESPIRATORY (INHALATION) at 01:18

## 2019-09-02 RX ADMIN — IPRATROPIUM BROMIDE 0.5 MG: 0.5 SOLUTION RESPIRATORY (INHALATION) at 11:13

## 2019-09-02 RX ADMIN — LEVALBUTEROL HYDROCHLORIDE 1.25 MG: 1.25 SOLUTION, CONCENTRATE RESPIRATORY (INHALATION) at 14:32

## 2019-09-02 RX ADMIN — IPRATROPIUM BROMIDE 0.5 MG: 0.5 SOLUTION RESPIRATORY (INHALATION) at 18:51

## 2019-09-02 ASSESSMENT — ACTIVITIES OF DAILY LIVING (ADL)
ADLS_ACUITY_SCORE: 12

## 2019-09-02 ASSESSMENT — MIFFLIN-ST. JEOR: SCORE: 1122.78

## 2019-09-02 NOTE — PLAN OF CARE
Date/Time: 9/2/19 5610-2577  Cognitive Concerns/ Orientation : Alert and oriented x 4    BEHAVIOR & AGGRESSION TOOL COLOR: Green   CIWA SCORE: n/a     ABNL VS/O2: /60, O2 currently high 90s 3L NC. (2-3L baseline). LORENZANA with minimal exertion. Patient requested to keep O2 on 2-3 L.   MOBILITY: SBA   PAIN MANAGMENT: Denies   DIET: Regular   BOWEL/BLADDER: Continent   ABNL LAB/BG: Na 131 (125 on 9/1)  DRAIN/DEVICES: PIV SL  TELEMETRY RHYTHM: n/a   SKIN: Navarro lower extremities. Mepilex on lower back skin tear.  Multiple bruising all over.   TESTS/PROCEDURES: n/a   D/C DAY/GOALS/PLACE: 1-2 days   OTHER IMPORTANT INFO: +2-3 lower extremity edema, compression stocking on. Lung sounds diminished with expiratory wheezes. IV Solu Medrol changed to oral Prednisone 40 mg daily. IV Levaquin changed to oral Levaquin. IVF discontinued. PT/OT following.

## 2019-09-02 NOTE — PLAN OF CARE
ALert and oriented X 4, up with SBA. SOB with minimal exertion. O2 stable on 1L, de-sats into mid-upper 80's with activity but recovers quickly. LS diminished. Tachycardic, HR elevated in 's. Na improved to 125. NS running. Tolerating diet. BLE +3 edema present. Plan to continue IV steroids/abx, scheduled nebulizers. Continue to monitor.

## 2019-09-02 NOTE — PROGRESS NOTES
[facility]    Hospitalist Progress Note    Assessment & Plan   Sydni Mendoza is a 74 year old female with PMHx of chronic hypoxic respiratory failure (on 2-3L NC at baseline), COPD and hypertension who was admitted on 8/31/2019 with increased shortness of breath secondary to a COPD exacerbation.     Acute on Chronic Hypoxic Respiratory Failure dt COPD Exacerbation  Uses 2-3L NC at baseline (has been on supplemental O2 for 7-8 yrs). Follows with Dr. Cardoso of [facility]. Hx of tobacco use, quick smoking 8-9 yrs ago.    Initially treated for COPD exacerbation on 7/30/10 with Zpack and prednisone burst (20mg po x5d). At follow up visit on 8/12, was placed on 12d steroid taper and 10d course of doxycycline given ongoing sx. Finished prednisone taper on 8/24 and doxycycline on 8/25. Prescribed 10d course of cefuroxime on 8/28 per PCP dt concerns ofr bronchitis with complaints of ongoing cough and increased dyspnea with exertion.     Presented to ED on 8/31 for evaluation of increased shortness of breath and coughing. Noted to be hypoxic per EMS. Placed on BiPAP. No reported fever/chills at home. No chest pain. No orthopnea, PND. No dizziness/lightheadedness. Was afebrile and hemodynamically stable in ED. WBC 15.3 (in setting of recent steroid use). Lactate 2.2. ABG 7.42 / 47 / 250. CXR showed hyperinflation of the lungs and bibasilar opacities (likely atelectasis). CT chest was neg for PE, pulmonary infiltrates and pulmonary edema. Started on treatment for COPD exacerbation with BiPAP, IV steroids and scheduled nebs (Pulmicort, Xopenex and Atrovent). Also started on IV antibiotics (given Zosyn and Vanco in ED, Levaquin continued on admission). Procal neg.    -- respiratory status remains stable so will dc IV steroids and change to prednisone 40mg daily today -- per pulm recs will do slow taper to 20mg daily    -- cont sched breathing treatments including Pulmicort, Atrovent and Xopenex  -- cont  Levaquin for now, pulm recommended 7d course of empiric abx (today is d #3/7)   -- off BiPAP this morning, O2 needs now back to baseline  -- cont pulmonary toilet with IS and flutter valve  -- consider pulmonary rehab referral at discharge (though patient states she didn't see much benefit from it in the past)  -- appreciate input per pulmonary service, should follow up with Dr. Cardoso 2-3 wks after discharge (to remain on prednisone 20mg daily until follow up)    Hyponatremia    Na 118 on admission. Had been nl per last check on 8/15/19.   On hydrochlorothiazide for management of blood pressure, which was thought to be the culprit. Elevated urine Na and urine osm but not the most helpful in the setting of diuretics use. Started on IVFs on admission.   -- ?component of SIADH in setting of ongoing pulmonary issues as above  -- Na improving, 131 today  -- stop IVFs today  -- cont holding hydrochlorothiazide for now    Hypertension  PTA meds: losartan 50mg daily, hydrochlorothiazide 25mg daily.   -- cont losartan  -- holding hydrochlorothiazide as above dt hyponatremia    Bilateral LE edema  Initially noted per PCP at visit in 7/2019. Typically takes hydrochlorothiazide 25mg daily (for management of BP). Patient endorses worsening LE edema in days prior to admission (in the setting of recent steroid use). Bilateral LE US this stay was neg for DVT. Edema likely exacerbated by holding diuretic and administering IVFs this stay. Was noted to have moderate pulmonary hypertension on last echo in 7/2016, presumed secondary to her COPD. This is also presumably contributing to her ongoing LE edema.  -- diuretic on hold as above given hyponatremia  -- defer repeating echo for now as unlikely to change treatment plan   -- keep legs elevated when in bed, use compression stockings    Pulmonary nodule  Noted to have 6mm pulmonary nodule in the RML on CT scan this stay. Will need repeat CT in 6 mths for interval assessment.    Abnl  Thyroid Studies:  TSH mildly low this stay, FT4 mildly elevated with some question of thyromegaly noted on CT scan this stay -- no prior labs available for review.  Follow up with PCP after discharge with repeat labs once acute illness resolved     FEN: discontinue IVFs, lytes otherwise stable, low Na diet  DVT Prophylaxis: Lovenox 40mg subQ daily  Code Status: Full Code    Disposition: Anticipate discharge home in the next 1-2d, pending stable respiratory status. PT/OT consulted.    Geovanna Prescott Kike    Interval History    Seen this morning. Had a rough night -- frustrated that O2 was down to 1L NC when she typically uses 2-3L at home. Says that her breathing today is no better than yesterday. Desats with exertion but quickly recovers. Some cough, no wheeze. No chest pain. No abd pain/n/v. Ongoing bilateral LE edema.    -Data reviewed today: I reviewed all new labs and imaging results over the last 24 hours. I personally reviewed no images or EKG's today.    Physical Exam   Temp: 97.5  F (36.4  C) Temp src: Oral BP: 131/60   Heart Rate: 85 Resp: 22 SpO2: 97 % O2 Device: Nasal cannula Oxygen Delivery: 3 LPM  Vitals:    08/31/19 1747 09/01/19 0623 09/02/19 0456   Weight: 66.6 kg (146 lb 13.2 oz) 66.2 kg (146 lb) 62.8 kg (138 lb 6.4 oz)     Vital Signs with Ranges  Temp:  [97.5  F (36.4  C)-98.1  F (36.7  C)] 97.5  F (36.4  C)  Heart Rate:  [] 85  Resp:  [20-22] 22  BP: (122-146)/(58-77) 131/60  SpO2:  [93 %-97 %] 97 %  I/O last 3 completed shifts:  In: 1880 [P.O.:360; I.V.:1520]  Out: -     Constitutional: Resting comfortably, alert and answering questions appropriately, NAD  Respiratory: BS diminished at bilateral lung bases, no wheeze/rales/rhonchi, no accessory M use  Cardiovascular: HRRR, no MGR, +bilateral LE edema to mid shins  GI: S, NT, ND, +BS  Skin/Integumen: warm/dry  Other:      Medications     sodium chloride 100 mL/hr at 09/02/19 0513       budesonide  0.5 mg Nebulization BID     enoxaparin   40 mg Subcutaneous Q24H     ipratropium  0.5 mg Nebulization Q4H     levalbuterol  1.25 mg Nebulization Q4H     levofloxacin  500 mg Intravenous Q24H     methylPREDNISolone  40 mg Intravenous Q6H     vitamin D3  2,000 Units Oral Daily       Data   Recent Labs   Lab 09/02/19  0711 09/01/19  1138 09/01/19  0225 09/01/19  0004 08/31/19  1737   WBC  --   --  10.3  --  15.3*   HGB  --   --  11.5*  --  12.1   MCV  --   --  84  --  85   PLT  --   --  308 281 356   * 125* 120* 122* 118*   POTASSIUM 4.1 3.4 3.7  --  3.8   CHLORIDE 95 88* 84*  --  80*   CO2 29 30 29  --  33*   BUN 11 9 9  --  9   CR 0.58 0.55 0.57 0.52 0.49*   ANIONGAP 7 7 7  --  5   MARQUEZ 8.8 8.8 8.3*  --  8.2*   * 139* 143*  --  129*   ALBUMIN  --   --  3.1*  --  3.5   PROTTOTAL  --   --  6.2*  --  6.7*   BILITOTAL  --   --  0.7  --  0.6   ALKPHOS  --   --  48  --  51   ALT  --   --  25  --  22   AST  --   --  18  --  18   TROPI  --   --   --   --  <0.015       No results found for this or any previous visit (from the past 24 hour(s)).

## 2019-09-02 NOTE — PLAN OF CARE
Cognitive Concerns/ Orientation : Alert and oriented x 4    BEHAVIOR & AGGRESSION TOOL COLOR: Green   CIWA SCORE: n/a     ABNL VS/O2: VSS 93% 1 lpm NC drops to high 80's with activity.  Patient requested to increase o2 to 3 lpm during the night.   MOBILITY: SBA   PAIN MANAGMENT: Denies   DIET: Regular   BOWEL/BLADDER: Continent   ABNL LAB/BG:   DRAIN/DEVICES: Peripheral IV SL left arm.  Peripheral IV right arm infusing NS at 100cc per hour  TELEMETRY RHYTHM: n/a   SKIN: Navarro lower extremities.  Abrasion/skintear lower back.  Bruising.   TESTS/PROCEDURES: n/a   D/C DAY/GOALS/PLACE: Pending   OTHER IMPORTANT INFO: Sputum sent this evening.  +3 lower extremity edema.  Lung sounds diminished with expiratory wheezes.  Productive cough.  SOB with activity.

## 2019-09-03 ENCOUNTER — APPOINTMENT (OUTPATIENT)
Dept: PHYSICAL THERAPY | Facility: CLINIC | Age: 74
DRG: 189 | End: 2019-09-03
Attending: INTERNAL MEDICINE
Payer: COMMERCIAL

## 2019-09-03 ENCOUNTER — APPOINTMENT (OUTPATIENT)
Dept: CARDIOLOGY | Facility: CLINIC | Age: 74
DRG: 189 | End: 2019-09-03
Attending: INTERNAL MEDICINE
Payer: COMMERCIAL

## 2019-09-03 LAB
ANION GAP SERPL CALCULATED.3IONS-SCNC: 6 MMOL/L (ref 3–14)
BUN SERPL-MCNC: 15 MG/DL (ref 7–30)
CALCIUM SERPL-MCNC: 9.2 MG/DL (ref 8.5–10.1)
CHLORIDE SERPL-SCNC: 95 MMOL/L (ref 94–109)
CO2 SERPL-SCNC: 30 MMOL/L (ref 20–32)
CREAT SERPL-MCNC: 0.63 MG/DL (ref 0.52–1.04)
GFR SERPL CREATININE-BSD FRML MDRD: 89 ML/MIN/{1.73_M2}
GLUCOSE SERPL-MCNC: 100 MG/DL (ref 70–99)
PLATELET # BLD AUTO: 326 10E9/L (ref 150–450)
POTASSIUM SERPL-SCNC: 3.3 MMOL/L (ref 3.4–5.3)
POTASSIUM SERPL-SCNC: 3.9 MMOL/L (ref 3.4–5.3)
SODIUM SERPL-SCNC: 131 MMOL/L (ref 133–144)

## 2019-09-03 PROCEDURE — 25000131 ZZH RX MED GY IP 250 OP 636 PS 637

## 2019-09-03 PROCEDURE — 97162 PT EVAL MOD COMPLEX 30 MIN: CPT | Mod: GP | Performed by: PHYSICAL THERAPIST

## 2019-09-03 PROCEDURE — 84132 ASSAY OF SERUM POTASSIUM: CPT | Performed by: INTERNAL MEDICINE

## 2019-09-03 PROCEDURE — 40000275 ZZH STATISTIC RCP TIME EA 10 MIN

## 2019-09-03 PROCEDURE — 97116 GAIT TRAINING THERAPY: CPT | Mod: GP | Performed by: PHYSICAL THERAPIST

## 2019-09-03 PROCEDURE — 36415 COLL VENOUS BLD VENIPUNCTURE: CPT | Performed by: INTERNAL MEDICINE

## 2019-09-03 PROCEDURE — 25000128 H RX IP 250 OP 636: Performed by: INTERNAL MEDICINE

## 2019-09-03 PROCEDURE — 94640 AIRWAY INHALATION TREATMENT: CPT | Mod: 76

## 2019-09-03 PROCEDURE — 93306 TTE W/DOPPLER COMPLETE: CPT

## 2019-09-03 PROCEDURE — 80048 BASIC METABOLIC PNL TOTAL CA: CPT | Performed by: INTERNAL MEDICINE

## 2019-09-03 PROCEDURE — 25000125 ZZHC RX 250: Performed by: INTERNAL MEDICINE

## 2019-09-03 PROCEDURE — 25000132 ZZH RX MED GY IP 250 OP 250 PS 637: Performed by: INTERNAL MEDICINE

## 2019-09-03 PROCEDURE — 97530 THERAPEUTIC ACTIVITIES: CPT | Mod: GP | Performed by: PHYSICAL THERAPIST

## 2019-09-03 PROCEDURE — 12000000 ZZH R&B MED SURG/OB

## 2019-09-03 PROCEDURE — 94640 AIRWAY INHALATION TREATMENT: CPT

## 2019-09-03 PROCEDURE — 94799 UNLISTED PULMONARY SVC/PX: CPT

## 2019-09-03 PROCEDURE — 85049 AUTOMATED PLATELET COUNT: CPT | Performed by: INTERNAL MEDICINE

## 2019-09-03 PROCEDURE — 93306 TTE W/DOPPLER COMPLETE: CPT | Mod: 26 | Performed by: INTERNAL MEDICINE

## 2019-09-03 PROCEDURE — 99232 SBSQ HOSP IP/OBS MODERATE 35: CPT | Performed by: INTERNAL MEDICINE

## 2019-09-03 RX ORDER — FUROSEMIDE 10 MG/ML
40 INJECTION INTRAMUSCULAR; INTRAVENOUS ONCE
Status: COMPLETED | OUTPATIENT
Start: 2019-09-03 | End: 2019-09-03

## 2019-09-03 RX ADMIN — IPRATROPIUM BROMIDE 0.5 MG: 0.5 SOLUTION RESPIRATORY (INHALATION) at 19:03

## 2019-09-03 RX ADMIN — POTASSIUM CHLORIDE 40 MEQ: 1500 TABLET, EXTENDED RELEASE ORAL at 13:12

## 2019-09-03 RX ADMIN — LEVALBUTEROL HYDROCHLORIDE 1.25 MG: 1.25 SOLUTION, CONCENTRATE RESPIRATORY (INHALATION) at 02:50

## 2019-09-03 RX ADMIN — LEVALBUTEROL HYDROCHLORIDE 1.25 MG: 1.25 SOLUTION, CONCENTRATE RESPIRATORY (INHALATION) at 07:11

## 2019-09-03 RX ADMIN — ENOXAPARIN SODIUM 40 MG: 40 INJECTION SUBCUTANEOUS at 22:17

## 2019-09-03 RX ADMIN — LEVALBUTEROL HYDROCHLORIDE 1.25 MG: 1.25 SOLUTION, CONCENTRATE RESPIRATORY (INHALATION) at 12:10

## 2019-09-03 RX ADMIN — POTASSIUM CHLORIDE 20 MEQ: 1500 TABLET, EXTENDED RELEASE ORAL at 15:29

## 2019-09-03 RX ADMIN — LEVALBUTEROL HYDROCHLORIDE 1.25 MG: 1.25 SOLUTION, CONCENTRATE RESPIRATORY (INHALATION) at 15:51

## 2019-09-03 RX ADMIN — IPRATROPIUM BROMIDE 0.5 MG: 0.5 SOLUTION RESPIRATORY (INHALATION) at 02:50

## 2019-09-03 RX ADMIN — LEVALBUTEROL HYDROCHLORIDE 1.25 MG: 1.25 SOLUTION, CONCENTRATE RESPIRATORY (INHALATION) at 23:25

## 2019-09-03 RX ADMIN — IPRATROPIUM BROMIDE 0.5 MG: 0.5 SOLUTION RESPIRATORY (INHALATION) at 12:10

## 2019-09-03 RX ADMIN — IPRATROPIUM BROMIDE 0.5 MG: 0.5 SOLUTION RESPIRATORY (INHALATION) at 07:13

## 2019-09-03 RX ADMIN — FUROSEMIDE 40 MG: 10 INJECTION, SOLUTION INTRAVENOUS at 13:11

## 2019-09-03 RX ADMIN — PREDNISONE 40 MG: 20 TABLET ORAL at 09:33

## 2019-09-03 RX ADMIN — LEVOFLOXACIN 500 MG: 500 TABLET, FILM COATED ORAL at 09:33

## 2019-09-03 RX ADMIN — IPRATROPIUM BROMIDE 0.5 MG: 0.5 SOLUTION RESPIRATORY (INHALATION) at 15:51

## 2019-09-03 RX ADMIN — LEVALBUTEROL HYDROCHLORIDE 1.25 MG: 1.25 SOLUTION, CONCENTRATE RESPIRATORY (INHALATION) at 19:03

## 2019-09-03 RX ADMIN — BUDESONIDE 0.5 MG: 0.5 INHALANT RESPIRATORY (INHALATION) at 19:03

## 2019-09-03 RX ADMIN — CHOLECALCIFEROL TAB 50 MCG (2000 UNIT) 2000 UNITS: 50 TAB at 09:33

## 2019-09-03 RX ADMIN — IPRATROPIUM BROMIDE 0.5 MG: 0.5 SOLUTION RESPIRATORY (INHALATION) at 23:25

## 2019-09-03 ASSESSMENT — MIFFLIN-ST. JEOR: SCORE: 1152

## 2019-09-03 ASSESSMENT — ACTIVITIES OF DAILY LIVING (ADL)
ADLS_ACUITY_SCORE: 12

## 2019-09-03 NOTE — PLAN OF CARE
Date/Time: 9/3/19 3122-1845  Cognitive Concerns/ Orientation : Alert and oriented x 4    BEHAVIOR & AGGRESSION TOOL COLOR: Green   CIWA SCORE: n/a     ABNL VS/O2: /70, O2 currently high 90s 3L NC. (2-3L baseline). LORENZANA with minimal exertion.  MOBILITY: SBA   PAIN MANAGMENT: Denies   DIET: Regular   BOWEL/BLADDER: Continent   ABNL LAB/BG: Na 131 (125 on 9/1), K 3.3, replaced.   DRAIN/DEVICES: PIV SL  TELEMETRY RHYTHM: n/a   SKIN: Navarro lower extremities. Mepilex on lower back skin tear.  Multiple bruising all over.   TESTS/PROCEDURES: n/a   D/C DAY/GOALS/PLACE: 1-2 days   OTHER IMPORTANT INFO: +2-3 lower extremity edema, declined compression stocking. Lung sounds diminished. On Prednisone 40 mg daily and oral Levaquin. PT/OT following. Received one time dose of IV Lasix, adequate UOP.

## 2019-09-03 NOTE — PLAN OF CARE
Cognitive Concerns/ Orientation : Alert and oriented x 4    BEHAVIOR & AGGRESSION TOOL COLOR: Green   CIWA SCORE: n/a   ABNL VS/O2: VSS 93% 3 lpm NC   MOBILITY: SBA   PAIN MANAGMENT: Denies   DIET: 2 gram sodium   BOWEL/BLADDER: Continent   ABNL LAB/BG: sodium 131   DRAIN/DEVICES: Peripheral IV SL bilateral wrists   TELEMETRY RHYTHM: n/a   SKIN: Bruising, lower extremities kathy   TESTS/PROCEDURES: n/a   D/C DAY/GOALS/PLACE: pending   OTHER IMPORTANT INFO: Increased SOB with any activity.  Productive cough, sputum sent yesterday.  CARSON hose on, +2 to +3 lower extremity edema.

## 2019-09-03 NOTE — PLAN OF CARE
Discharge Planner PT   Patient plan for discharge: home with possible home therapy for lymphedema  Current status: PT eval completed and treatment initiated.  Pt admitted for COPD exacerbation.  Pt lives with spouse and is I at baseline, uses 2-3lpm home O2. 1 step to enter.  Pt currently able to complete transfers with SBA and amb with SBA and A for O2, on 3 lpm, limited to 65' before needing seated rest.  Of note pt with significant LE edema and bruising, states it has been going on for about 1 year and has never really been addressed/assessed.  Unable to carlos CARSON hose, too painful and cause bruising.  LE edema affect gait some causing some decreased stability.  Barriers to return to prior living situation: step to enter, limited carlos for functional gait distances  Recommendations for discharge: home with A from spouse for household tasks and SBA as needed for mob, home Lymph edema therapy  Rationale for recommendations: home therapy due to increased effort and extra person to leave home and limited gait carlos.         Entered by: LUKE FOUNTAIN 09/03/2019 10:15 AM

## 2019-09-03 NOTE — PROGRESS NOTES
"PULMONOLOGY PROGRESS NOTE  Date of service: 2019  [facility]    CC/Reason for Visit: COPD exacerbation    SUBJECTIVE      HPI: Events of WE reviewed. Stable night. No new respiratory problems. States breathing is slightly better today. Still has SOB with activity. Up in the chair.    ROS: A Problem Pertinent review of systems was negative except for items noted in HPI.    Past Medical, Family, and Social/Substance History has been reviewed: No interval changes.    OBJECTIVE   BP (!) 140/70 (BP Location: Right arm)   Pulse 94   Temp 97.6  F (36.4  C) (Oral)   Resp 20   Ht 1.626 m (5' 4\")   Wt 65.7 kg (144 lb 13.5 oz)   SpO2 100%   BMI 24.86 kg/m   3 L/min     Temp (24hrs), Av.9  F (36.6  C), Min:97.6  F (36.4  C), Max:98  F (36.7  C)       Intake/Output Summary (Last 24 hours) at 9/3/2019 0949  Last data filed at 2019 2104  Gross per 24 hour   Intake 60 ml   Output --   Net 60 ml     Patient Vitals for the past 96 hrs:   Weight   19 0626 65.7 kg (144 lb 13.5 oz)   19 0456 62.8 kg (138 lb 6.4 oz)   19 0623 66.2 kg (146 lb)   19 1747 66.6 kg (146 lb 13.2 oz)       CONSTITUTIONAL/GENERAL APPEARANCE: Alert. No Apparent Distress.  PSYCHIATRIC: Pleasant and appropriate mood and affect. Oriented x 3.  EARS, NOSE,THROAT,MOUTH: External ears and nose overall normal. Normal oral mucosa.   NECK: Neck appearance normal. No neck masses and the thyroid is not enlarged.   RESPIRATORY: Non-labored effort. Decreased BS, prolonged exp phase.  CARDIOVASCULAR: S1, S2, regular rate and rhythm.      LABORATORY ASSESSMENT      Recent Labs   Lab 19  1836   PH 7.42   PCO2 47*   PO2 250*   HCO3 30*     Recent Labs   Lab 19  0750 19  0225  19  1737   WBC  --  10.3  --  15.3*   RBC  --  3.78*  --  4.03   HGB  --  11.5*  --  12.1   HCT  --  31.9*  --  34.1*   MCV  --  84  --  85   MCH  --  30.4  --  30.0   MCHC  --  36.1  --  35.5   RDW  --  11.8  --  11.7 "    308   < > 356    < > = values in this interval not displayed.     Recent Labs   Lab 09/03/19  0750 09/02/19  0711 09/01/19  1138   * 131* 125*   POTASSIUM 3.3* 4.1 3.4   CHLORIDE 95 95 88*   CO2 30 29 30   ANIONGAP 6 7 7   BUN 15 11 9   CR 0.63 0.58 0.55   GFRESTIMATED 89 >90 >90   GFRESTBLACK >90 >90 >90   MARQUEZ 9.2 8.8 8.8     No lab results found in last 7 days.  No lab results found in last 7 days.    Additional labs and/or comments:    IMAGING      CT Chest 8/31 -  IMPRESSION:   1. No pulmonary embolism demonstrated.  2. No thoracic aortic dissection or aneurysm.   3. New 6 mm nodule in the right middle lobe. Six-month follow-up study  recommended to evaluate for stability.    CXR 8/31 -  IMPRESSION: Lungs are hyperinflated. Bibasilar opacities are present  which are favored to represent atelectasis, unchanged. Heart size is  unchanged.     PFT & OTHER TESTING       ASSESSMENT / PLAN      Active Problems:    COPD exacerbation (H)    Hyponatremia    Acute and chronic respiratory failure with hypoxia (H)    Acute exacerbation of chronic obstructive pulmonary disease (COPD) (H)      ASSESSMENT: 73 yo female former smoker with hx severe O2-dependent COPD admitted with an exacerbation. Patient is followed in our clinic by my partner Dr. Cardoso. CXR on admission showed hyperinflated lung fields with bibasilar atelectasis. CT Chest showed a 6mm RML pulmonary nodule, new vs prior study 7/20/16. Patient clinically improved on current bronchodilators, steroids and antibiotics. Respiratory status stable on usual low flow O2.    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 Adjust oxygen, keep SaO2 > 90%  2. Bronchodilators - Xopenex + Atrovent + Pulmicort nebs  3. Antibiotics - Levaquin  4. Steroids - Prednisone taper.  5. Increase activity and IS.  6. Outpatient Pulmonary  follow-up with Dr. Cardoso in 2-3 weeks - he will arrange for a repeat CT Chest in 6 months to follow-up the RML pulmonary nodule.  7. D/C planning.      Mayito Lindsey MD    [facility] / [facility]  [phone] (pager)  [phone] (office)

## 2019-09-03 NOTE — PROGRESS NOTES
[facility]    Hospitalist Progress Note    Assessment & Plan   Sydni Mendoza is a 74 year old female with PMHx of chronic hypoxic respiratory failure (on 2-3L NC at baseline), COPD and hypertension who was admitted on 8/31/2019 with increased shortness of breath secondary to a COPD exacerbation.     Acute on Chronic Hypoxic Respiratory Failure dt COPD Exacerbation  Uses 2-3L NC at baseline (has been on supplemental O2 for 7-8 yrs). Follows with Dr. Cardoso of [facility]. Hx of tobacco use, quick smoking 8-9 yrs ago.    Initially treated for COPD exacerbation on 7/30/10 with Zpack and prednisone burst (20mg po x5d). At follow up visit on 8/12, was placed on 12d steroid taper and 10d course of doxycycline given ongoing sx. Finished prednisone taper on 8/24 and doxycycline on 8/25. Prescribed 10d course of cefuroxime on 8/28 per PCP dt concerns ofr bronchitis with complaints of ongoing cough and increased dyspnea with exertion.     Presented to ED on 8/31 for evaluation of increased shortness of breath and coughing. Noted to be hypoxic per EMS. Placed on BiPAP. No reported fever/chills at home. No chest pain. No orthopnea, PND. No dizziness/lightheadedness. Was afebrile and hemodynamically stable in ED. WBC 15.3 (in setting of recent steroid use). Lactate 2.2. ABG 7.42 / 47 / 250. CXR showed hyperinflation of the lungs and bibasilar opacities (likely atelectasis). CT chest was neg for PE, pulmonary infiltrates and pulmonary edema. Started on treatment for COPD exacerbation with BiPAP, IV steroids and scheduled nebs (Pulmicort, Xopenex and Atrovent). Also started on IV antibiotics (given Zosyn and Vanco in ED, Levaquin continued on admission). Procal neg.    -- cont prednisone 40mg daily with plans for slow taper  20mg daily    -- cont sched nebs including Pulmicort, Atrovent and Xopenex  -- cont Levaquin for now, pulm recommended 7d course of empiric abx (today is d #4/7)   -- O2 needs now back to  baseline  -- cont pulmonary toilet with IS and flutter valve  -- patient declined pulmonary rehab referral at discharge (states she didn't see much benefit from it in the past)  -- appreciate input per pulmonary service, should follow up with Dr. Cardoso 2-3 wks after discharge (to remain on prednisone 20mg daily until follow up)    Hyponatremia    Na 118 on admission. Had been nl per last check on 8/15/19.   On hydrochlorothiazide for management of blood pressure, which was thought to be the culprit. Elevated urine Na and urine osm but not the most helpful in the setting of diuretics use. Started on IVFs on admission, stopped on 9/2.   -- ?component of SIADH in setting of ongoing pulmonary issues as above  -- Na stable at 131  -- repeat BMP in AM after dose of IV Lasix today    Hypertension  PTA meds: losartan 50mg daily, hydrochlorothiazide 25mg daily.   Hydrochlorothiazide held on admission as above dt hyponatremia  -- cont losartan    Bilateral LE edema  Initially noted per PCP at visit in 7/2019. Typically takes hydrochlorothiazide 25mg daily (for management of BP). Patient endorses worsening LE edema in days prior to admission (in the setting of recent steroid use). Bilateral LE US this stay was neg for DVT. Edema likely exacerbated by holding diuretic and administering IVFs this stay. Was noted to have moderate pulmonary hypertension on last echo in 7/2016, presumed secondary to her COPD. This is also presumably contributing to her ongoing LE edema.  -- repeat echocardiogram today for interval assessment of degree of pulmonary hypertension  -- trial of Lasix 40mg IV x1 on 9/3  -- keep legs elevated when in bed, did not tolerate compression stockings  -- therapy recommending lymphedema wraps at home    Pulmonary nodule  Noted to have 6mm pulmonary nodule in the RML on CT scan this stay. Will need repeat CT in 6 mths for interval assessment.    Abnl Thyroid Studies:  TSH mildly low this stay, FT4 mildly  "elevated with some question of thyromegaly noted on CT scan this stay -- no prior labs available for review.  Follow up with PCP after discharge with repeat labs once acute illness resolved     FEN: off IVFs, lytes otherwise stable, low Na diet  DVT Prophylaxis: Lovenox 40mg subQ daily  Code Status: Full Code    Disposition: Anticipate discharge home tomorrow, pending stable respiratory status. PT/OT consulted.    Geovanna Prescott Kike    Interval History    Seen this morning. Says \"breathing isn't great\" but no worse than yesterday. Thinks \"this is my new normal\". Comfortable at rest, desats with exertion. No cough. LE edema worsening. Unable to tolerate compression stocking dt pain. Appetite marginal, no abd pain/n/v.    -Data reviewed today: I reviewed all new labs and imaging results over the last 24 hours. I personally reviewed no images or EKG's today.    Physical Exam   Temp: 97.6  F (36.4  C) Temp src: Oral BP: (!) 140/70   Heart Rate: 116 Resp: 20 SpO2: 100 % O2 Device: Nasal cannula Oxygen Delivery: 3 LPM  Vitals:    09/01/19 0623 09/02/19 0456 09/03/19 0626   Weight: 66.2 kg (146 lb) 62.8 kg (138 lb 6.4 oz) 65.7 kg (144 lb 13.5 oz)     Vital Signs with Ranges  Temp:  [97.6  F (36.4  C)-98  F (36.7  C)] 97.6  F (36.4  C)  Heart Rate:  [114-120] 116  Resp:  [20] 20  BP: (129-143)/(67-71) 140/70  SpO2:  [93 %-100 %] 100 %  I/O last 3 completed shifts:  In: 60 [P.O.:60]  Out: -     Constitutional: Resting comfortably, alert and answering questions appropriately, NAD  Respiratory: few wheezes towards L base, BS diminished at bilateral lung bases, no rales/rhonchi, no accessory M use  Cardiovascular: HRRR, no MGR, ++bilateral LE edema to mid shins  GI: S, NT, ND, +BS  Skin/Integumen: warm/dry with deeply kathy/purplish appearing skin changes associated w/edema  Other:      Medications       budesonide  0.5 mg Nebulization BID     enoxaparin  40 mg Subcutaneous Q24H     ipratropium  0.5 mg Nebulization Q4H     " levalbuterol  1.25 mg Nebulization Q4H     levofloxacin  500 mg Oral Daily     predniSONE  40 mg Oral Daily     vitamin D3  2,000 Units Oral Daily       Data   Recent Labs   Lab 09/03/19  0750 09/02/19  0711 09/01/19  1138 09/01/19  0225 09/01/19  0004 08/31/19  1737   WBC  --   --   --  10.3  --  15.3*   HGB  --   --   --  11.5*  --  12.1   MCV  --   --   --  84  --  85     --   --  308 281 356   * 131* 125* 120* 122* 118*   POTASSIUM 3.3* 4.1 3.4 3.7  --  3.8   CHLORIDE 95 95 88* 84*  --  80*   CO2 30 29 30 29  --  33*   BUN 15 11 9 9  --  9   CR 0.63 0.58 0.55 0.57 0.52 0.49*   ANIONGAP 6 7 7 7  --  5   MARQUEZ 9.2 8.8 8.8 8.3*  --  8.2*   * 132* 139* 143*  --  129*   ALBUMIN  --   --   --  3.1*  --  3.5   PROTTOTAL  --   --   --  6.2*  --  6.7*   BILITOTAL  --   --   --  0.7  --  0.6   ALKPHOS  --   --   --  48  --  51   ALT  --   --   --  25  --  22   AST  --   --   --  18  --  18   TROPI  --   --   --   --   --  <0.015       No results found for this or any previous visit (from the past 24 hour(s)).

## 2019-09-03 NOTE — PROGRESS NOTES
09/03/19 0900   Quick Adds   Type of Visit Initial PT Evaluation   Living Environment   Lives With spouse   Living Arrangements house   Home Accessibility stairs to enter home   Number of Stairs, Main Entrance 1   Transportation Anticipated family or friend will provide   Self-Care   Usual Activity Tolerance moderate   Current Activity Tolerance fair   Regular Exercise Yes   Activity/Exercise Type strength training  (nustep)   Exercise Amount/Frequency daily   Equipment Currently Used at Home walker, rolling   Functional Level Prior   Ambulation 0-->independent   Transferring 0-->independent   Toileting 0-->independent   Bathing 0-->independent   Communication 0-->understands/communicates without difficulty   Swallowing 0-->swallows foods/liquids without difficulty   Cognition 0 - no cognition issues reported   Fall history within last six months no   Which of the above functional risks had a recent onset or change? none   Prior Functional Level Comment ujses home O2 at 2-3 lpm   General Information   Onset of Illness/Injury or Date of Surgery - Date 09/02/19   Referring Physician Geovanna Stokes   Patient/Family Goals Statement plans to discharge to home, declines further pulmonary rehab   Pertinent History of Current Problem (include personal factors and/or comorbidities that impact the POC) pt admitted for COPD exaccerbation   Precautions/Limitations fall precautions;oxygen therapy device and L/min  (on # lpm)   Weight-Bearing Status - LUE full weight-bearing   Weight-Bearing Status - RUE full weight-bearing   Weight-Bearing Status - LLE full weight-bearing   Weight-Bearing Status - RLE full weight-bearing   Cognitive Status Examination   Orientation orientation to person, place and time   Level of Consciousness alert   Follows Commands and Answers Questions 100% of the time   Personal Safety and Judgment intact   Memory intact   Pain Assessment   Patient Currently in Pain No   Integumentary/Edema  "  Integumentary/Edema Comments B LE edema and bruising   Range of Motion (ROM)   ROM Comment limited ankle ROM with LE edema   Strength   Strength Comments generalized decrease in mm strength and endurance, able to sit to stand without A and lift antigravity   Bed Mobility   Bed Mobility Comments bed mob with SBA   Transfer Skills   Transfer Comments sit to stand with SBA   Gait   Gait Comments amb with SBA and A for equipment 10'   Balance   Balance Comments mild unsteadiness with amb with LE edema   General Therapy Interventions   Planned Therapy Interventions gait training;strengthening;ROM;transfer training   Clinical Impression   Criteria for Skilled Therapeutic Intervention yes, treatment indicated   PT Diagnosis difficutly walking   Influenced by the following impairments limited by respiratory compromise, LE edema, weakness decreased act carlos   Functional limitations due to impairments impaired functional mob I and safety   Clinical Presentation Evolving/Changing   Clinical Presentation Rationale 3-5 deficits   Clinical Decision Making (Complexity) Moderate complexity   Therapy Frequency Daily   Predicted Duration of Therapy Intervention (days/wks) 3 days   Anticipated Discharge Disposition Home with Assist;Home with Home Therapy;Home with Outpatient Therapy   Risk & Benefits of therapy have been explained Yes   Patient, Family & other staff in agreement with plan of care Yes   [facility] [employer] \"6 Clicks\"   2016, Trustees of [facility], under license to [employer].  All rights reserved.   6 Clicks Short Forms Basic Mobility Inpatient Short Form   [facility] [employer]  \"6 Clicks\" V.2 Basic Mobility Inpatient Short Form   1. Turning from your back to your side while in a flat bed without using bedrails? 4 - None   2. Moving from lying on your back to sitting on the side of a flat bed without using bedrails? 4 - None   3. Moving to and from a bed to a chair (including a wheelchair)? 4 - " None   4. Standing up from a chair using your arms (e.g., wheelchair, or bedside chair)? 4 - None   5. To walk in hospital room? 4 - None   6. Climbing 3-5 steps with a railing? 3 - A Little   Basic Mobility Raw Score (Score out of 24.Lower scores equate to lower levels of function) 23   Total Evaluation Time   Total Evaluation Time (Minutes) 12

## 2019-09-04 ENCOUNTER — APPOINTMENT (OUTPATIENT)
Dept: PHYSICAL THERAPY | Facility: CLINIC | Age: 74
DRG: 189 | End: 2019-09-04
Payer: COMMERCIAL

## 2019-09-04 VITALS
DIASTOLIC BLOOD PRESSURE: 61 MMHG | RESPIRATION RATE: 18 BRPM | SYSTOLIC BLOOD PRESSURE: 130 MMHG | TEMPERATURE: 97.5 F | HEIGHT: 64 IN | WEIGHT: 137.8 LBS | OXYGEN SATURATION: 98 % | HEART RATE: 129 BPM | BODY MASS INDEX: 23.52 KG/M2

## 2019-09-04 LAB
ANION GAP SERPL CALCULATED.3IONS-SCNC: 1 MMOL/L (ref 3–14)
BUN SERPL-MCNC: 28 MG/DL (ref 7–30)
CALCIUM SERPL-MCNC: 9.8 MG/DL (ref 8.5–10.1)
CHLORIDE SERPL-SCNC: 99 MMOL/L (ref 94–109)
CO2 SERPL-SCNC: 35 MMOL/L (ref 20–32)
CREAT SERPL-MCNC: 0.68 MG/DL (ref 0.52–1.04)
GFR SERPL CREATININE-BSD FRML MDRD: 87 ML/MIN/{1.73_M2}
GLUCOSE SERPL-MCNC: 123 MG/DL (ref 70–99)
POTASSIUM SERPL-SCNC: 5.9 MMOL/L (ref 3.4–5.3)
SODIUM SERPL-SCNC: 135 MMOL/L (ref 133–144)

## 2019-09-04 PROCEDURE — 94640 AIRWAY INHALATION TREATMENT: CPT | Mod: 76

## 2019-09-04 PROCEDURE — 25000132 ZZH RX MED GY IP 250 OP 250 PS 637: Performed by: INTERNAL MEDICINE

## 2019-09-04 PROCEDURE — 25000125 ZZHC RX 250: Performed by: INTERNAL MEDICINE

## 2019-09-04 PROCEDURE — 25000131 ZZH RX MED GY IP 250 OP 636 PS 637

## 2019-09-04 PROCEDURE — 36415 COLL VENOUS BLD VENIPUNCTURE: CPT | Performed by: INTERNAL MEDICINE

## 2019-09-04 PROCEDURE — 80048 BASIC METABOLIC PNL TOTAL CA: CPT | Performed by: INTERNAL MEDICINE

## 2019-09-04 PROCEDURE — 40000275 ZZH STATISTIC RCP TIME EA 10 MIN

## 2019-09-04 PROCEDURE — 97116 GAIT TRAINING THERAPY: CPT | Mod: GP

## 2019-09-04 PROCEDURE — 94640 AIRWAY INHALATION TREATMENT: CPT

## 2019-09-04 PROCEDURE — 99239 HOSP IP/OBS DSCHRG MGMT >30: CPT | Performed by: INTERNAL MEDICINE

## 2019-09-04 RX ORDER — IPRATROPIUM BROMIDE 0.2 MG/ML
SOLUTION, NON-ORAL INHALATION
Qty: 180 ML | Refills: 0 | Status: SHIPPED | OUTPATIENT
Start: 2019-09-04 | End: 2021-03-01

## 2019-09-04 RX ORDER — PREDNISONE 10 MG/1
TABLET ORAL
Qty: 50 TABLET | Refills: 0 | Status: SHIPPED | OUTPATIENT
Start: 2019-09-04 | End: 2019-09-25

## 2019-09-04 RX ORDER — FUROSEMIDE 40 MG
40 TABLET ORAL DAILY
Qty: 7 TABLET | Refills: 0 | Status: SHIPPED | OUTPATIENT
Start: 2019-09-04 | End: 2019-09-25

## 2019-09-04 RX ORDER — LEVALBUTEROL INHALATION SOLUTION 1.25 MG/3ML
SOLUTION RESPIRATORY (INHALATION)
Qty: 220 ML | Refills: 0 | Status: SHIPPED | OUTPATIENT
Start: 2019-09-04 | End: 2021-03-01

## 2019-09-04 RX ORDER — LEVOFLOXACIN 500 MG/1
500 TABLET, FILM COATED ORAL DAILY
Qty: 2 TABLET | Refills: 0 | Status: SHIPPED | OUTPATIENT
Start: 2019-09-05 | End: 2019-09-25

## 2019-09-04 RX ADMIN — LEVALBUTEROL HYDROCHLORIDE 1.25 MG: 1.25 SOLUTION, CONCENTRATE RESPIRATORY (INHALATION) at 15:18

## 2019-09-04 RX ADMIN — BUDESONIDE 0.5 MG: 0.5 INHALANT RESPIRATORY (INHALATION) at 07:23

## 2019-09-04 RX ADMIN — PREDNISONE 40 MG: 20 TABLET ORAL at 09:17

## 2019-09-04 RX ADMIN — IPRATROPIUM BROMIDE 0.5 MG: 0.5 SOLUTION RESPIRATORY (INHALATION) at 15:20

## 2019-09-04 RX ADMIN — CHOLECALCIFEROL TAB 50 MCG (2000 UNIT) 2000 UNITS: 50 TAB at 09:17

## 2019-09-04 RX ADMIN — LEVALBUTEROL HYDROCHLORIDE 1.25 MG: 1.25 SOLUTION, CONCENTRATE RESPIRATORY (INHALATION) at 07:20

## 2019-09-04 RX ADMIN — LEVALBUTEROL HYDROCHLORIDE 1.25 MG: 1.25 SOLUTION, CONCENTRATE RESPIRATORY (INHALATION) at 11:15

## 2019-09-04 RX ADMIN — LEVOFLOXACIN 500 MG: 500 TABLET, FILM COATED ORAL at 09:17

## 2019-09-04 RX ADMIN — IPRATROPIUM BROMIDE 0.5 MG: 0.5 SOLUTION RESPIRATORY (INHALATION) at 11:19

## 2019-09-04 RX ADMIN — IPRATROPIUM BROMIDE 0.5 MG: 0.5 SOLUTION RESPIRATORY (INHALATION) at 07:22

## 2019-09-04 ASSESSMENT — ACTIVITIES OF DAILY LIVING (ADL)
ADLS_ACUITY_SCORE: 12

## 2019-09-04 ASSESSMENT — MIFFLIN-ST. JEOR: SCORE: 1120.06

## 2019-09-04 NOTE — PLAN OF CARE
Discharge Planner PT   Patient plan for discharge: none stated  Current status: Pt sitting EOB upon arrival and with encouragement, agreeable to session. Pt sit <> stand with supervision and A for O2. Pt on 2 lpm when sitting, but increased to 3 lpm with activity. Pt ambulated total of 150' with A for O2. Required rest break group home through due to SOB. Trialed FWW on return trip and Pt noted improvement (didn't feel as SOB). Pt returned to sitting EOB upon end of session with needs within reach     Screened for OT: Pt has walk-in shower with a shower chair, no grab bars, but Pt's spouse is able to assist as needed. Pt doesn't have any OT concerns at this time.     Barriers to return to prior living situation: Step to enter, Limited activity tolerance  Recommendations for discharge: Home with assist of spouse for household tasks/mobility as needed  Rationale for recommendations: Pt has limited functional ambulation tolerance, but uses O2 at home at baseline. With improvement in hospital, anticipate Pt will be safe to discharge home with assist of spouse for tasks/mobility as needed.       Entered by: Maggie Kenney 09/04/2019 12:10 PM

## 2019-09-04 NOTE — PLAN OF CARE
PT:  Attempted to see patient for PT this AM. Patient sitting at EOB and states she just got back from using the restroom and needing a rest to recover, not agreeable to working with PT at this time but does offer that she would be agreeable later this morning or afternoon.

## 2019-09-04 NOTE — DISCHARGE SUMMARY
[facility]    Discharge Summary  Hospitalist    Date of Admission:  8/31/2019  Date of Discharge:  9/4/2019  Discharging Provider: Geovanna Stokes    Discharge Diagnoses   Acute on Chronic Hypoxic Respiratory Failure dt COPD Exacerbation  Hyponatremia: Resolved   Hypertension  Bilateral LE edema  Pulmonary nodule  Abnl Thyroid Studies    History of Present Illness   Sydni Mendoza is a 74 year old female with PMHx of chronic hypoxic respiratory failure (on 2-3L NC at baseline), COPD and hypertension who was admitted on 8/31/2019 with increased shortness of breath secondary to a COPD exacerbation.     Hospital Course   Sydni Mendoza was admitted on 8/31/2019.  The following problems were addressed during her hospitalization:     Acute on Chronic Hypoxic Respiratory Failure dt COPD Exacerbation  Uses 2-3L NC at baseline (has been on supplemental O2 for 7-8 yrs). Follows with Dr. Cardoso of [facility]. Hx of tobacco use, quick smoking 8-9 yrs ago.     Initially treated for COPD exacerbation on 7/30/10 with Zpack and prednisone burst (20mg po x5d). At follow up visit on 8/12, was placed on 12d steroid taper and 10d course of doxycycline given ongoing sx. Finished prednisone taper on 8/24 and doxycycline on 8/25. Prescribed 10d course of cefuroxime on 8/28 per PCP dt concerns ofr bronchitis with complaints of ongoing cough and increased dyspnea with exertion.      Presented to ED on 8/31 for evaluation of increased shortness of breath and coughing. Noted to be hypoxic per EMS. Placed on BiPAP. No reported fever/chills at home. No chest pain. No orthopnea, PND. No dizziness/lightheadedness. Was afebrile and hemodynamically stable in ED. WBC 15.3 (in setting of recent steroid use). Lactate 2.2. ABG 7.42 / 47 / 250. CXR showed hyperinflation of the lungs and bibasilar opacities (likely atelectasis). CT chest was neg for PE, pulmonary infiltrates and pulmonary edema. Started on treatment for  COPD exacerbation with BiPAP, IV steroids and scheduled nebs (Pulmicort, Xopenex and Atrovent). Also started on IV antibiotics (given Zosyn and Vanco in ED, Levaquin continued on admission). Procal neg. Was weaned off BiPAP back to NC the morning after admission.    Seen by pulmonary service this stay. Agreed with treatment plan including steroids, breathing treatments (Xopenex, Atrovent and Pulmicort nebs) and week long course of antibiotics. Responded well to treatment. O2 needs remained at baseline.     Will cont slow pred taper (40mg po daily x1 wk, then 30mg po daily x1wk then 20mg po daily until seen by pulmonary in follow up)  Continued her Dulera and Incruse Ellipta inhalers at discharge, ordered Xopenex and Atrovent nebs as needed.  Pulm recommended completion of 7d course of antibiotics (discharged on 2d of Levaquin to complete treatment).   Cont pulmonary toilet with IS.   Patient declined pulmonary rehab referral at discharge (states she didn't see much benefit from it in the past)  Will need to follow up with Dr. Cardoso in pulmonary clinic in 2-3 weeks.     Hyponatremia: Resolved.     Na 118 on admission. Had been nl per last check on 8/15/19.   On hydrochlorothiazide for management of blood pressure, which was thought to be the culprit. Elevated urine Na and urine osm but not the most helpful in the setting of diuretics use. May have component of SIADH in setting of ongoing pulmonary issues as above. Started on IVFs on admission, stopped on 9/2. Given worsening LE edema she was given a dose of IV Lasix on 9/3. Na improved to 135 the following day.     Discharged on oral Lasix as below. Should have repeat BMP at PCP visit on 9/6 to assess Na, K and Cr.     Hypertension  PTA meds: losartan 50mg daily, hydrochlorothiazide 25mg daily.   Hydrochlorothiazide held on admission as above dt hyponatremia. Losartan held during stay. BPs were trending up at time of discharge so Losartan was resumed at discharge.  BMP on day of discharge showed K 5.9 but specimen was noted to be hemolyzed. Will need BMP check at PCP follow up on 9/6.      Bilateral LE edema  Initially noted per PCP at visit in 7/2019. Typically takes hydrochlorothiazide 25mg daily (for management of BP). Patient endorses worsening LE edema in days prior to admission (in the setting of recent steroid use). Persistent LE edema noted during stay. Bilateral LE US this stay was neg for DVT. Edema likely exacerbated by holding diuretic and administering IVFs this stay. Was noted to have moderate pulmonary hypertension on last echo in 7/2016, presumed secondary to her COPD. This is also presumably contributing to her ongoing LE edema.    Repeat echocardiogram this stay was limited by tachycardia from breathing treatments -- no significant changes from last echo in 2016, EF >70%. Was intolerant to compression stocking during stay dt pain. Responded well to dose of Lasix (40mg IV x1) on 9/3. Lytes and renal function were stable on discharge so was placed on 7d course of Lasix 40mg po daily. Will follow up with PCP on 9/6. Will need BMP to reassess lytes and renal function. May also want to consider lymphedema wraps for management of edema.      Pulmonary nodule  Noted to have 6mm pulmonary nodule in the RML on CT scan this stay. Will need repeat CT in 6 mths for interval assessment.     Abnl Thyroid Studies:  TSH mildly low this stay, FT4 mildly elevated with some question of thyromegaly noted on CT scan this stay -- no prior labs available for review.  Follow up with PCP after discharge with repeat labs once acute illness resolved    Geovanna Stokes DO    Pending Results   These results will be followed up by PCP  Unresulted Labs Ordered in the Past 30 Days of this Admission     Date and Time Order Name Status Description    8/31/2019 2145 Sputum Culture Aerobic Bacterial Preliminary     8/31/2019 1738 Blood culture Preliminary     8/31/2019 1738 Blood  culture Preliminary     8/31/2019 1737 T4 free In process           Code Status   Full Code       Primary Care Physician   Lyn Lui    Physical Exam   Temp: 97.5  F (36.4  C) Temp src: Oral BP: 130/61 Pulse: 129 Heart Rate: 115 Resp: 18 SpO2: 98 % O2 Device: Nasal cannula Oxygen Delivery: 2 LPM  Vitals:    09/02/19 0456 09/03/19 0626 09/04/19 0553   Weight: 62.8 kg (138 lb 6.4 oz) 65.7 kg (144 lb 13.5 oz) 62.5 kg (137 lb 12.8 oz)     Vital Signs with Ranges  Temp:  [97.3  F (36.3  C)-98.2  F (36.8  C)] 97.5  F (36.4  C)  Pulse:  [129] 129  Heart Rate:  [108-131] 115  Resp:  [18-20] 18  BP: (116-168)/(61-76) 130/61  SpO2:  [91 %-100 %] 98 %  I/O last 3 completed shifts:  In: -   Out: 1225 [Urine:1225]    General: Resting comfortably, alert, conversive, NAD  CVS: HR tachycardic with RR, no MGR, +bilateral LE edema to the mid shins  Respiratory: BS diminished at bilateral lung bases, no wheeze, no increased work of breathing  GI: S, NT, ND, +BS  Skin: Warm/dry with deeply kathy/purplish appearing skin changes associated w/edema    Discharge Disposition   Discharged to home  Condition at discharge: Stable    Consultations This Hospital Stay   PULMONARY IP CONSULT  PHYSICAL THERAPY ADULT IP CONSULT  OCCUPATIONAL THERAPY ADULT IP CONSULT    Time Spent on this Encounter   Geovanna JIMENEZ DO, personally saw the patient today and spent greater than 30 minutes discharging this patient.    Discharge Orders      Reason for your hospital stay    Management of your breathing difficulties, which were due to a flare of your COPD and required treatment with IV steroids, nebulizers and antibiotics.     Follow-up and recommended labs and tests     1. Follow up with Dr. Lui or one of her partners in clinic in the next 3-5 days.   2. Follow up with Dr. Cardoso in the next 2-3 weeks.     Activity    Your activity upon discharge: activity as tolerated     Diet    Follow this diet upon discharge: 2g salt     Discharge  Medications   Current Discharge Medication List      START taking these medications    Details   levofloxacin (LEVAQUIN) 500 MG tablet Take 1 tablet (500 mg) by mouth daily for 2 days  Qty: 2 tablet, Refills: 0    Associated Diagnoses: COPD exacerbation (H)      predniSONE (DELTASONE) 10 MG tablet Take 40mg daily (4 tabs) by mouth for 4 days, then decrease to 30mg (3 tabs) by mouth daily for 7 days, then decrease to 20mg daily (2 tabs) by mouth. You should continue taking 20mg daily until you see Dr. Cardoso in clinic.  Qty: 50 tablet, Refills: 0    Associated Diagnoses: COPD exacerbation (H)         CONTINUE these medications which have CHANGED    Details   ipratropium (ATROVENT) 0.02 % nebulizer solution 0.5 mg by neb BID, may increase to QID prn cough/dyspnea  Qty: 180 mL, Refills: 0    Associated Diagnoses: COPD, severe (H)      levalbuterol (XOPENEX) 1.25 MG/3ML neb solution 1.25 mg by neb BID, may increase to QID as needed  Qty: 220 mL, Refills: 0    Associated Diagnoses: COPD, severe (H)         CONTINUE these medications which have NOT CHANGED    Details   DULERA 200-5 MCG/ACT oral inhaler Inhale 2 puffs into the lungs 2 times daily      losartan (COZAAR) 50 MG tablet Take 1 tablet (50 mg) by mouth daily  Qty: 90 tablet, Refills: 1    Associated Diagnoses: Peripheral edema; Benign essential hypertension      Multiple Vitamin (MULTI-VITAMIN) per tablet Take 1 tablet by mouth daily.        umeclidinium (INCRUSE ELLIPTA) 62.5 MCG/INH oral inhaler Inhale 1 puff into the lungs daily  Qty: 3 Inhaler, Refills: 1    Comments: To replace the Spiriva.  Associated Diagnoses: COPD exacerbation (H)         STOP taking these medications       cefuroxime (CEFTIN) 500 MG tablet Comments:   Reason for Stopping:         Cholecalciferol (VITAMIN D) 2000 UNITS tablet Comments:   Reason for Stopping:         hydrochlorothiazide (HYDRODIURIL) 25 MG tablet Comments:   Reason for Stopping:         ipratropium - albuterol 0.5 mg/2.5  mg/3 mL (DUONEB) 0.5-2.5 (3) MG/3ML neb solution Comments:   Reason for Stopping:         levalbuterol (XOPENEX HFA) 45 MCG/ACT inhaler Comments:   Reason for Stopping:             Allergies   Allergies   Allergen Reactions     Albuterol Palpitations     Data   Most Recent 3 CBC's:  Recent Labs   Lab Test 09/03/19  0750 09/01/19 0225 09/01/19  0004 08/31/19 1737 03/06/18  1035   WBC  --  10.3  --  15.3* 9.5   HGB  --  11.5*  --  12.1 11.7*   MCV  --  84  --  85 90.1    308 281 356 298      Most Recent 3 BMP's:  Recent Labs   Lab Test 09/04/19  1410 09/03/19  2036 09/03/19  0750 09/02/19  0711     --  131* 131*   POTASSIUM 5.9* 3.9 3.3* 4.1   CHLORIDE 99  --  95 95   CO2 35*  --  30 29   BUN 28  --  15 11   CR 0.68  --  0.63 0.58   ANIONGAP 1*  --  6 7   MARQUEZ 9.8  --  9.2 8.8   *  --  100* 132*     Most Recent 2 LFT's:  Recent Labs   Lab Test 09/01/19 0225 08/31/19 1737   AST 18 18   ALT 25 22   ALKPHOS 48 51   BILITOTAL 0.7 0.6     Most Recent 3 Troponin's:  Recent Labs   Lab Test 08/31/19 1737 07/18/16  0538 08/25/11  0520   TROPI <0.015 0.026 0.078*     Most Recent 6 Bacteria Isolates From Any Culture (See EPIC Reports for Culture Details):  Recent Labs   Lab Test 09/01/19  1950 08/31/19  1756 08/31/19  1737 07/18/16  1900 02/21/16  1232 02/21/16  0535   CULT Light growth  Normal dilip    Light growth  Stenotrophomonas maltophilia  Susceptibility testing in progress  *  Light growth  Aspergillus fumigatus  * No growth after 4 days No growth after 4 days Light growth Normal dilip  Light growth Aspergillus fumigatus  * Normal dilip No growth     Most Recent TSH, T4 and A1c Labs:  Recent Labs   Lab Test 08/31/19 1737 07/19/16  0618   TSH 0.21*  --    T4 1.51*  --    A1C  --  5.7     Results for orders placed or performed during the hospital encounter of 08/31/19   XR Chest Port 1 View    Narrative    CHEST PORTABLE ONE VIEW     8/31/2019 5:45 PM     HISTORY: Hypoxic, history of COPD, new  leg swelling and hypertension.     COMPARISON: Chest CT 7/20/2016, chest x-ray 7/18/2016.      Impression    IMPRESSION: Lungs are hyperinflated. Bibasilar opacities are present  which are favored to represent atelectasis, unchanged. Heart size is  unchanged.     DARREL MCKEON MD   POC US ECHO LIMITED    Impression    Limited Bedside Pulmonary and Cardiac Ultrasound    Performed by: Dr. Osborne  Indication: Hypoxia, peripheral edema  Body area(s) imaged: Bilateral lungs, cardiac views very limited  Findings: Normal sliding signs, no comet tails, no pericardial effusion, hyperdynamic heart  Impression: No PTx, no pulmonary edema, no pericardial effusion  Images archived to PACS     CT Chest Pulmonary Embolism w Contrast    Narrative    CT CHEST PULMONARY EMBOLISM WITH CONTRAST  8/31/2019 6:28 PM     HISTORY: PE suspected, high pretest probability; hypoxic, new leg  swelling, tachycardic, also history of COPD.    COMPARISON: July 20, 2016.    TECHNIQUE: Volumetric helical acquisition of CT images of the chest  from the lung apices to the kidneys were acquired after the  administration of 59 mL Isovue-370  IV contrast. Radiation dose for  this scan was reduced using automated exposure control, adjustment of  the mA and/or kV according to patient size, or iterative  reconstruction technique.    FINDINGS: There is no pulmonary embolism. Emphysema and bronchial wall  thickening noted. No definite acute appearing infiltrates. New 6 mm  nodule in the right middle lobe. The heart is not enlarged. Question  thyromegaly, evaluation is limited by streak artifact from adjacent  dense contrast. Thoracic aorta is atherosclerotic without evidence of  dissection or aneurysm. There is no pleural or pericardial effusion.   There is no pneumothorax. Adrenal glands are normal. Remainder of the  visualized upper abdomen is unremarkable.      Impression    IMPRESSION:   1. No pulmonary embolism demonstrated.  2. No thoracic aortic  dissection or aneurysm.   3. New 6 mm nodule in the right middle lobe. Six-month follow-up study  recommended to evaluate for stability.     SPENCER GAMEZ MD   US Lower Extremity Venous Duplex Bilateral    Narrative    VENOUS ULTRASOUND BILATERAL LOWER EXTREMITIES  9/1/2019 10:58 AM     HISTORY: Lower extremity swelling.    COMPARISON: None.    TECHNIQUE: Color Doppler and spectral waveform analysis obtained  throughout the deep veins of both lower extremities.    FINDINGS: Both common femoral, proximal greater saphenous, femoral,  and popliteal veins demonstrate normal blood flow, compression, and  augmentation. Posterior tibial and peroneal veins are compressible.      Impression    IMPRESSION: Negative for deep venous thrombosis throughout both lower  extremities.    REENA ROBLES MD

## 2019-09-04 NOTE — PLAN OF CARE
Discharge    Patient discharged to home via wheelchair with spouse  Care plan note    Date/Time: 9/4/19 5482-4404  Cognitive Concerns/ Orientation : Alert and oriented x 4    BEHAVIOR & AGGRESSION TOOL COLOR: Green   CIWA SCORE: n/a     ABNL VS/O2: BP elevated 130/61, O2 98% 2L NC. (2-3L baseline). LORENZANA with minimal exertion.  MOBILITY: SBA   PAIN MANAGMENT: Denies   DIET: Regular   BOWEL/BLADDER: Continent   ABNL LAB/BG: Na 131 yesterday 9/3, K 3.9 after replacement  DRAIN/DEVICES: PIV removed  TELEMETRY RHYTHM: n/a   SKIN: Navarro lower extremities. Mepilex on lower back skin tear.  Multiple bruising all over. +2-3 lower extremity edema, declined compression stocking.  TESTS/PROCEDURES: n/a   D/C DAY/GOALS/PLACE: discharged home today, 9/4  OTHER IMPORTANT INFO: Lung sounds diminished. On Prednisone 40 mg daily and oral Levaquin. Therapies recommended discharge home with assist from spouse. Pulmonology signed off. belongings sent with the patient. Went through AVS and discharge medication. Patient was understanding.            Listed belongings gathered and returned to patient. Yes  Care Plan and Patient education resolved: Yes  Prescriptions if needed, hard copies sent with patient  NA  Home and hospital acquired medications returned to patient: Yes  Medication Bin checked and emptied on discharge Yes  Follow up appointment made for patient: Yes

## 2019-09-04 NOTE — PROGRESS NOTES
"PULMONOLOGY PROGRESS NOTE  Date of service: 2019  [facility]    CC/Reason for Visit: COPD exacerbation    SUBJECTIVE      HPI: Events reviewed since last seen. Stable night. No new respiratory problems. States breathing is slightly better today. Has been trying to increase her activity level.    ROS: A Problem Pertinent review of systems was negative except for items noted in HPI.    Past Medical, Family, and Social/Substance History has been reviewed: No interval changes.    OBJECTIVE   /61 (BP Location: Right arm)   Pulse 129   Temp 97.5  F (36.4  C) (Oral)   Resp 18   Ht 1.626 m (5' 4\")   Wt 62.5 kg (137 lb 12.8 oz)   SpO2 98%   BMI 23.65 kg/m   2 L/min     Temp (24hrs), Av.9  F (36.6  C), Min:97.6  F (36.4  C), Max:98  F (36.7  C)       Intake/Output Summary (Last 24 hours) at 9/3/2019 0949  Last data filed at 2019 2104  Gross per 24 hour   Intake 60 ml   Output --   Net 60 ml     Patient Vitals for the past 96 hrs:   Weight   19 0553 62.5 kg (137 lb 12.8 oz)   19 0626 65.7 kg (144 lb 13.5 oz)   19 0456 62.8 kg (138 lb 6.4 oz)   19 0623 66.2 kg (146 lb)   19 1747 66.6 kg (146 lb 13.2 oz)       CONSTITUTIONAL/GENERAL APPEARANCE: Alert. No Apparent Distress.  PSYCHIATRIC: Pleasant and appropriate mood and affect. Oriented x 3.  EARS, NOSE,THROAT,MOUTH: External ears and nose overall normal. Normal oral mucosa.   NECK: Neck appearance normal. No neck masses and the thyroid is not enlarged.   RESPIRATORY: Non-labored effort. Decreased BS, prolonged exp phase, few end exp wheezes.  CARDIOVASCULAR: S1, S2, regular rate and rhythm.      LABORATORY ASSESSMENT      Recent Labs   Lab 19  1836   PH 7.42   PCO2 47*   PO2 250*   HCO3 30*     Recent Labs   Lab 19  0750 19  0225  19  1737   WBC  --  10.3  --  15.3*   RBC  --  3.78*  --  4.03   HGB  --  11.5*  --  12.1   HCT  --  31.9*  --  34.1*   MCV  --  84  --  85   MCH  --  " 30.4  --  30.0   MCHC  --  36.1  --  35.5   RDW  --  11.8  --  11.7    308   < > 356    < > = values in this interval not displayed.     Recent Labs   Lab 09/03/19  2036 09/03/19  0750 09/02/19  0711 09/01/19  1138   NA  --  131* 131* 125*   POTASSIUM 3.9 3.3* 4.1 3.4   CHLORIDE  --  95 95 88*   CO2  --  30 29 30   ANIONGAP  --  6 7 7   BUN  --  15 11 9   CR  --  0.63 0.58 0.55   GFRESTIMATED  --  89 >90 >90   GFRESTBLACK  --  >90 >90 >90   MARQUEZ  --  9.2 8.8 8.8     No lab results found in last 7 days.  No lab results found in last 7 days.    Additional labs and/or comments:    IMAGING      CT Chest 8/31 -  IMPRESSION:   1. No pulmonary embolism demonstrated.  2. No thoracic aortic dissection or aneurysm.   3. New 6 mm nodule in the right middle lobe. Six-month follow-up study  recommended to evaluate for stability.    CXR 8/31 -  IMPRESSION: Lungs are hyperinflated. Bibasilar opacities are present  which are favored to represent atelectasis, unchanged. Heart size is  unchanged.     PFT & OTHER TESTING       ASSESSMENT / PLAN      Active Problems:    COPD exacerbation (H)    Hyponatremia    Acute and chronic respiratory failure with hypoxia (H)    Acute exacerbation of chronic obstructive pulmonary disease (COPD) (H)      ASSESSMENT: 75 yo female former smoker with hx severe O2-dependent COPD admitted with an exacerbation. Patient is followed in our clinic by my partner Dr. Cardoso. CXR on admission showed hyperinflated lung fields with bibasilar atelectasis. CT Chest showed a 6mm RML pulmonary nodule, new vs prior study 7/20/16. Patient slowly improving on current bronchodilators, steroids and antibiotics. Respiratory status stable on usual low flow O2.    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 Adjust oxygen, keep SaO2 > 90%  2. Bronchodilators - Xopenex + Atrovent +  Pulmicort nebs  3. Antibiotics - Levaquin  4. Steroids - Prednisone taper.  5. Increase activity and IS.  6. Outpatient Pulmonary follow-up with Dr. Cardoso in 2-3 weeks - he will arrange for a repeat CT Chest in 6 months to follow-up the RML pulmonary nodule.  7. OK for d/c anytime from Pulmonary standpoint.      Mayito Lindsey MD    [facility] / [facility]  [phone] (pager)  [phone] (office)

## 2019-09-04 NOTE — PLAN OF CARE
Discharge Planner OT   Patient plan for discharge: Refer to PT note  Current status: OT orders received, chart reviewed, spoke with PT. PT has evaluated pt and reports that she has no concerns for OT/ADL at this time and her spouse is able to assist with IADL as needed. Pt to be followed by PT while here for mobility and activity tolerance. OT to complete orders, as there is no need for skilled intervention for OT at this time.   Barriers to return to prior living situation: Defer to PT  Recommendations for discharge: Defer to PT  Rationale for recommendations: Defer to PT       Entered by: Harper Enamorado 09/04/2019 2:04 PM

## 2019-09-04 NOTE — PLAN OF CARE
Date/Time: 9/4/19 1900-0730  Cognitive Concerns/ Orientation : A&Ox4    BEHAVIOR & AGGRESSION TOOL COLOR: Green   CIWA SCORE: N/A    ABNL VS/O2: VSS ex on 3L O2 NC. (2-3L baseline). LORENZANA with minimal exertion & SOB.  MOBILITY: SBA   PAIN MANAGMENT: Denies   DIET: Regular   BOWEL/BLADDER: Continent   ABNL LAB/BG: Na 131 (125 on 9/1), K 3.9  DRAIN/DEVICES: PIV SL  TELEMETRY RHYTHM: N/A  SKIN: Navarro lower extremities. Mepilex on lower back skin tear.  Multiple bruising all over.   TESTS/PROCEDURES: N/A  D/C DAY/GOALS/PLACE: Pending   OTHER IMPORTANT INFO: +3 lower extremity edema, navarro, declining compression stocking. Elevated on pillows  On Prednisone 40 mg daily and oral Levaquin. PT/OT following.

## 2019-09-05 LAB
BACTERIA SPEC CULT: ABNORMAL
SPECIMEN SOURCE: ABNORMAL

## 2019-09-05 NOTE — PLAN OF CARE
PT: Stair goal not attempted, all other goals met.    Physical Therapy Discharge Summary    Reason for therapy discharge:    Discharged to home.    Progress towards therapy goal(s). See goals on Care Plan in [facility] electronic health record for goal details.  Goals partially met.  Barriers to achieving goals:   discharge from facility.    Therapy recommendation(s):    No further therapy is recommended.

## 2019-09-06 ENCOUNTER — OFFICE VISIT (OUTPATIENT)
Dept: FAMILY MEDICINE | Facility: CLINIC | Age: 74
End: 2019-09-06

## 2019-09-06 VITALS
OXYGEN SATURATION: 93 % | SYSTOLIC BLOOD PRESSURE: 118 MMHG | DIASTOLIC BLOOD PRESSURE: 60 MMHG | RESPIRATION RATE: 16 BRPM | HEART RATE: 111 BPM

## 2019-09-06 DIAGNOSIS — J44.1 COPD EXACERBATION (H): ICD-10-CM

## 2019-09-06 DIAGNOSIS — F19.982 DRUG-INDUCED INSOMNIA (H): ICD-10-CM

## 2019-09-06 DIAGNOSIS — E87.1 HYPONATREMIA: Primary | ICD-10-CM

## 2019-09-06 DIAGNOSIS — R91.1 LUNG NODULE: ICD-10-CM

## 2019-09-06 DIAGNOSIS — R60.0 PERIPHERAL EDEMA: ICD-10-CM

## 2019-09-06 DIAGNOSIS — Z99.81 DEPENDENCE ON CONTINUOUS SUPPLEMENTAL OXYGEN: ICD-10-CM

## 2019-09-06 LAB
BACTERIA SPEC CULT: NO GROWTH
BACTERIA SPEC CULT: NO GROWTH
Lab: NORMAL
Lab: NORMAL
SPECIMEN SOURCE: NORMAL
SPECIMEN SOURCE: NORMAL

## 2019-09-06 PROCEDURE — A6449 LT COMPRES BAND >=3" <5"/YD: HCPCS | Performed by: FAMILY MEDICINE

## 2019-09-06 PROCEDURE — 36415 COLL VENOUS BLD VENIPUNCTURE: CPT | Performed by: FAMILY MEDICINE

## 2019-09-06 PROCEDURE — 80048 BASIC METABOLIC PNL TOTAL CA: CPT | Mod: 90 | Performed by: FAMILY MEDICINE

## 2019-09-06 PROCEDURE — 99496 TRANSJ CARE MGMT HIGH F2F 7D: CPT | Mod: 25 | Performed by: FAMILY MEDICINE

## 2019-09-06 RX ORDER — ALPRAZOLAM 0.5 MG
TABLET ORAL
Qty: 20 TABLET | Refills: 0 | Status: SHIPPED | OUTPATIENT
Start: 2019-09-06 | End: 2020-01-02

## 2019-09-06 NOTE — PROGRESS NOTES
Problem(s) Oriented visit        SUBJECTIVE:                                                    Sydni Mendoza is a 74 year old female who presents to clinic today for hospital follow up. She was discharged 2 days ago. Her hospital discharge was reviewed. She was in for COPD and hyponatremia. She had been on hydrochlorothiazide prior to admission and this was stopped due to the hyponatremia. Her admission sodium was 118, her discharge sodium was 135. Since being off the hydrochlorothiazide she has tremendous edema of the legs. She notes that she started lasix 40 mg daily yesterday and the swelling is actually better. She notes some weeping from the skin on her legs and on her right arm where an IV infiltrated. Her cough is markedly better and she is back to her baseline supplemental O2.       Problem list, Medication list, Allergies, and Medical/Social/Surgical histories reviewed in EPIC and updated as appropriate.   Additional history: as documented    ROS:  5 point ROS completed and negative except noted above, including Gen, CV, Resp, GI, MS      Histories:   Patient Active Problem List   Diagnosis     COPD (chronic obstructive pulmonary disease) (H)     Smoking     Lung nodule     Pneumothorax, post biopsy, right     Health Care Home     Pneumonia     COPD with acute exacerbation (H)     COPD exacerbation (H)     Hyponatremia     Acute and chronic respiratory failure with hypoxia (H)     Acute exacerbation of chronic obstructive pulmonary disease (COPD) (H)     Past Surgical History:   Procedure Laterality Date     CHEST TUBE INSERTION  12    Right ,pneumothorax post, lung biopsy     GYN SURGERY      Tubal Ligation       Social History     Tobacco Use     Smoking status: Former Smoker     Packs/day: 1.00     Years: 50.00     Pack years: 50.00     Types: Cigarettes     Last attempt to quit: 2011     Years since quittin.0     Smokeless tobacco: Never Used   Substance Use Topics     Alcohol use:  Yes     Alcohol/week: 0.0 oz     Comment: occasional     No family history on file.        OBJECTIVE:                                                    /60   Pulse 111   Resp 16   SpO2 93%   There is no height or weight on file to calculate BMI.   GENERAL APPEARANCE: Alert, no acute distress  NECK: No adenopathy,masses or thyromegaly  RESP: distant breath sounds but no appreciable wheeze, rales, or rhonchi  CV: regular rate and rhythm with occasional skipped beats  MS: marked edema of the feet and legs extending to just below the knees. The feet have a purplish hue and the legs have a spotty red hue. There are small areas of weeping from the legs. Edema is such that I cannot palpate peripheral pulses in her feet. Right UE has ecchymosis and swelling in the fingers and swelling that extends up the posteromedial forearm.   NEURO: Alert, oriented, speech and mentation normal  PSYCH: mentation appears normal, affect and mood normal   Labs Resulted Today:   Results for orders placed or performed during the hospital encounter of 08/31/19   XR Chest Port 1 View    Narrative    CHEST PORTABLE ONE VIEW     8/31/2019 5:45 PM     HISTORY: Hypoxic, history of COPD, new leg swelling and hypertension.     COMPARISON: Chest CT 7/20/2016, chest x-ray 7/18/2016.      Impression    IMPRESSION: Lungs are hyperinflated. Bibasilar opacities are present  which are favored to represent atelectasis, unchanged. Heart size is  unchanged.     DARREL MCKEON MD   POC US ECHO LIMITED    Impression    Limited Bedside Pulmonary and Cardiac Ultrasound    Performed by: Dr. Osborne  Indication: Hypoxia, peripheral edema  Body area(s) imaged: Bilateral lungs, cardiac views very limited  Findings: Normal sliding signs, no comet tails, no pericardial effusion, hyperdynamic heart  Impression: No PTx, no pulmonary edema, no pericardial effusion  Images archived to PACS     CT Chest Pulmonary Embolism w Contrast    Addendum: 9/6/2019    ZOHAIB COLON  [facility]  Accession # [id number]    The original report on this patient was dictated by Dr. Yepez.      Comparison study now available from August 20, 2018. The right middle  lobe nodule is new since August 20, 2018 as well. Consider 3-6 month  follow-up study to evaluate for stability and/or resolution.    SPENCER YEPEZ MD (Date of Addendum: 9/6/2019 11:42 AM)    SPENCER YEPEZ MD      Narrative    CT CHEST PULMONARY EMBOLISM WITH CONTRAST  8/31/2019 6:28 PM     HISTORY: PE suspected, high pretest probability; hypoxic, new leg  swelling, tachycardic, also history of COPD.    COMPARISON: July 20, 2016.    TECHNIQUE: Volumetric helical acquisition of CT images of the chest  from the lung apices to the kidneys were acquired after the  administration of 59 mL Isovue-370  IV contrast. Radiation dose for  this scan was reduced using automated exposure control, adjustment of  the mA and/or kV according to patient size, or iterative  reconstruction technique.    FINDINGS: There is no pulmonary embolism. Emphysema and bronchial wall  thickening noted. No definite acute appearing infiltrates. New 6 mm  nodule in the right middle lobe. The heart is not enlarged. Question  thyromegaly, evaluation is limited by streak artifact from adjacent  dense contrast. Thoracic aorta is atherosclerotic without evidence of  dissection or aneurysm. There is no pleural or pericardial effusion.   There is no pneumothorax. Adrenal glands are normal. Remainder of the  visualized upper abdomen is unremarkable.      Impression    IMPRESSION:   1. No pulmonary embolism demonstrated.  2. No thoracic aortic dissection or aneurysm.   3. New 6 mm nodule in the right middle lobe. Six-month follow-up study  recommended to evaluate for stability.     SPENCER YEPEZ MD   US Lower Extremity Venous Duplex Bilateral    Narrative    VENOUS ULTRASOUND BILATERAL LOWER EXTREMITIES  9/1/2019 10:58 AM     HISTORY: Lower extremity swelling.    COMPARISON:  None.    TECHNIQUE: Color Doppler and spectral waveform analysis obtained  throughout the deep veins of both lower extremities.    FINDINGS: Both common femoral, proximal greater saphenous, femoral,  and popliteal veins demonstrate normal blood flow, compression, and  augmentation. Posterior tibial and peroneal veins are compressible.      Impression    IMPRESSION: Negative for deep venous thrombosis throughout both lower  extremities.    REENA ROBLES MD   CBC with platelets differential   Result Value Ref Range    WBC 15.3 (H) 4.0 - 11.0 10e9/L    RBC Count 4.03 3.8 - 5.2 10e12/L    Hemoglobin 12.1 11.7 - 15.7 g/dL    Hematocrit 34.1 (L) 35.0 - 47.0 %    MCV 85 78 - 100 fl    MCH 30.0 26.5 - 33.0 pg    MCHC 35.5 31.5 - 36.5 g/dL    RDW 11.7 10.0 - 15.0 %    Platelet Count 356 150 - 450 10e9/L    Diff Method Automated Method     % Neutrophils 82.0 %    % Lymphocytes 9.1 %    % Monocytes 8.2 %    % Eosinophils 0.3 %    % Basophils 0.1 %    % Immature Granulocytes 0.3 %    Nucleated RBCs 0 0 /100    Absolute Neutrophil 12.5 (H) 1.6 - 8.3 10e9/L    Absolute Lymphocytes 1.4 0.8 - 5.3 10e9/L    Absolute Monocytes 1.3 0.0 - 1.3 10e9/L    Absolute Eosinophils 0.0 0.0 - 0.7 10e9/L    Absolute Basophils 0.0 0.0 - 0.2 10e9/L    Abs Immature Granulocytes 0.1 0 - 0.4 10e9/L    Absolute Nucleated RBC 0.0    Comprehensive metabolic panel   Result Value Ref Range    Sodium 118 (LL) 133 - 144 mmol/L    Potassium 3.8 3.4 - 5.3 mmol/L    Chloride 80 (L) 94 - 109 mmol/L    Carbon Dioxide 33 (H) 20 - 32 mmol/L    Anion Gap 5 3 - 14 mmol/L    Glucose 129 (H) 70 - 99 mg/dL    Urea Nitrogen 9 7 - 30 mg/dL    Creatinine 0.49 (L) 0.52 - 1.04 mg/dL    GFR Estimate >90 >60 mL/min/[1.73_m2]    GFR Estimate If Black >90 >60 mL/min/[1.73_m2]    Calcium 8.2 (L) 8.5 - 10.1 mg/dL    Bilirubin Total 0.6 0.2 - 1.3 mg/dL    Albumin 3.5 3.4 - 5.0 g/dL    Protein Total 6.7 (L) 6.8 - 8.8 g/dL    Alkaline Phosphatase 51 40 - 150 U/L    ALT 22 0 - 50  U/L    AST 18 0 - 45 U/L   TSH with free T4 reflex   Result Value Ref Range    TSH 0.21 (L) 0.40 - 4.00 mU/L   Troponin I   Result Value Ref Range    Troponin I ES <0.015 0.000 - 0.045 ug/L   Nt probnp inpatient (BNP)   Result Value Ref Range    N-Terminal Pro BNP Inpatient 159 0 - 900 pg/mL   Blood gas arterial and oxyhgb   Result Value Ref Range    pH Arterial 7.42 7.35 - 7.45 pH    pCO2 Arterial 47 (H) 35 - 45 mm Hg    pO2 Arterial 250 (H) 80 - 105 mm Hg    Bicarbonate Arterial 30 (H) 21 - 28 mmol/L    Oxyhemoglobin Arterial 99 92 - 100 %    Base Excess Art 5.2 mmol/L   Creatinine POCT   Result Value Ref Range    Creatinine 0.6 0.52 - 1.04 mg/dL    GFR Estimate >90 >60 mL/min/[1.73_m2]    GFR Estimate If Black >90 >60 mL/min/[1.73_m2]   T4 free   Result Value Ref Range    T4 Free 1.51 (H) 0.76 - 1.46 ng/dL   Osmolality urine   Result Value Ref Range    Urine Osmolality 439 100 - 1,200 mmol/kg   Sodium random urine   Result Value Ref Range    Sodium Urine mmol/L 63 mmol/L   UA with Microscopic reflex to Culture   Result Value Ref Range    Color Urine Light Yellow     Appearance Urine Clear     Glucose Urine Negative NEG^Negative mg/dL    Bilirubin Urine Negative NEG^Negative    Ketones Urine 10 (A) NEG^Negative mg/dL    Specific Gravity Urine 1.015 1.003 - 1.035    Blood Urine Negative NEG^Negative    pH Urine 6.5 5.0 - 7.0 pH    Protein Albumin Urine Negative NEG^Negative mg/dL    Urobilinogen mg/dL Normal 0.0 - 2.0 mg/dL    Nitrite Urine Negative NEG^Negative    Leukocyte Esterase Urine Negative NEG^Negative    Source Catheterized Urine     WBC Urine 1 0 - 5 /HPF    RBC Urine <1 0 - 2 /HPF    Squamous Epithelial /HPF Urine <1 0 - 1 /HPF    Mucous Urine Present (A) NEG^Negative /LPF   Osmolality   Result Value Ref Range    Osmolality 247 (LL) 280 - 301 mmol/kg   Platelet count   Result Value Ref Range    Platelet Count 281 150 - 450 10e9/L   Creatinine   Result Value Ref Range    Creatinine 0.52 0.52 - 1.04 mg/dL     GFR Estimate >90 >60 mL/min/[1.73_m2]    GFR Estimate If Black >90 >60 mL/min/[1.73_m2]   Comprehensive metabolic panel   Result Value Ref Range    Sodium 120 (L) 133 - 144 mmol/L    Potassium 3.7 3.4 - 5.3 mmol/L    Chloride 84 (L) 94 - 109 mmol/L    Carbon Dioxide 29 20 - 32 mmol/L    Anion Gap 7 3 - 14 mmol/L    Glucose 143 (H) 70 - 99 mg/dL    Urea Nitrogen 9 7 - 30 mg/dL    Creatinine 0.57 0.52 - 1.04 mg/dL    GFR Estimate >90 >60 mL/min/[1.73_m2]    GFR Estimate If Black >90 >60 mL/min/[1.73_m2]    Calcium 8.3 (L) 8.5 - 10.1 mg/dL    Bilirubin Total 0.7 0.2 - 1.3 mg/dL    Albumin 3.1 (L) 3.4 - 5.0 g/dL    Protein Total 6.2 (L) 6.8 - 8.8 g/dL    Alkaline Phosphatase 48 40 - 150 U/L    ALT 25 0 - 50 U/L    AST 18 0 - 45 U/L   CBC with platelets differential   Result Value Ref Range    WBC 10.3 4.0 - 11.0 10e9/L    RBC Count 3.78 (L) 3.8 - 5.2 10e12/L    Hemoglobin 11.5 (L) 11.7 - 15.7 g/dL    Hematocrit 31.9 (L) 35.0 - 47.0 %    MCV 84 78 - 100 fl    MCH 30.4 26.5 - 33.0 pg    MCHC 36.1 31.5 - 36.5 g/dL    RDW 11.8 10.0 - 15.0 %    Platelet Count 308 150 - 450 10e9/L    Diff Method Automated Method     % Neutrophils 96.7 %    % Lymphocytes 2.4 %    % Monocytes 0.8 %    % Eosinophils 0.0 %    % Basophils 0.0 %    % Immature Granulocytes 0.1 %    Nucleated RBCs 0 0 /100    Absolute Neutrophil 10.0 (H) 1.6 - 8.3 10e9/L    Absolute Lymphocytes 0.3 (L) 0.8 - 5.3 10e9/L    Absolute Monocytes 0.1 0.0 - 1.3 10e9/L    Absolute Eosinophils 0.0 0.0 - 0.7 10e9/L    Absolute Basophils 0.0 0.0 - 0.2 10e9/L    Abs Immature Granulocytes 0.0 0 - 0.4 10e9/L    Absolute Nucleated RBC 0.0    Sodium   Result Value Ref Range    Sodium 122 (L) 133 - 144 mmol/L   Lactic acid level STAT for sepsis protocol   Result Value Ref Range    Lactate for Sepsis Protocol 2.0 0.7 - 2.0 mmol/L   Basic metabolic panel   Result Value Ref Range    Sodium 125 (L) 133 - 144 mmol/L    Potassium 3.4 3.4 - 5.3 mmol/L    Chloride 88 (L) 94 - 109 mmol/L     Carbon Dioxide 30 20 - 32 mmol/L    Anion Gap 7 3 - 14 mmol/L    Glucose 139 (H) 70 - 99 mg/dL    Urea Nitrogen 9 7 - 30 mg/dL    Creatinine 0.55 0.52 - 1.04 mg/dL    GFR Estimate >90 >60 mL/min/[1.73_m2]    GFR Estimate If Black >90 >60 mL/min/[1.73_m2]    Calcium 8.8 8.5 - 10.1 mg/dL   Procalcitonin   Result Value Ref Range    Procalcitonin <0.05 ng/ml   Magnesium   Result Value Ref Range    Magnesium 1.7 1.6 - 2.3 mg/dL   Basic metabolic panel   Result Value Ref Range    Sodium 131 (L) 133 - 144 mmol/L    Potassium 4.1 3.4 - 5.3 mmol/L    Chloride 95 94 - 109 mmol/L    Carbon Dioxide 29 20 - 32 mmol/L    Anion Gap 7 3 - 14 mmol/L    Glucose 132 (H) 70 - 99 mg/dL    Urea Nitrogen 11 7 - 30 mg/dL    Creatinine 0.58 0.52 - 1.04 mg/dL    GFR Estimate >90 >60 mL/min/[1.73_m2]    GFR Estimate If Black >90 >60 mL/min/[1.73_m2]    Calcium 8.8 8.5 - 10.1 mg/dL   Platelet count   Result Value Ref Range    Platelet Count 326 150 - 450 10e9/L   Basic metabolic panel   Result Value Ref Range    Sodium 131 (L) 133 - 144 mmol/L    Potassium 3.3 (L) 3.4 - 5.3 mmol/L    Chloride 95 94 - 109 mmol/L    Carbon Dioxide 30 20 - 32 mmol/L    Anion Gap 6 3 - 14 mmol/L    Glucose 100 (H) 70 - 99 mg/dL    Urea Nitrogen 15 7 - 30 mg/dL    Creatinine 0.63 0.52 - 1.04 mg/dL    GFR Estimate 89 >60 mL/min/[1.73_m2]    GFR Estimate If Black >90 >60 mL/min/[1.73_m2]    Calcium 9.2 8.5 - 10.1 mg/dL   Potassium   Result Value Ref Range    Potassium 3.9 3.4 - 5.3 mmol/L   Basic metabolic panel   Result Value Ref Range    Sodium 135 133 - 144 mmol/L    Potassium 5.9 (H) 3.4 - 5.3 mmol/L    Chloride 99 94 - 109 mmol/L    Carbon Dioxide 35 (H) 20 - 32 mmol/L    Anion Gap 1 (L) 3 - 14 mmol/L    Glucose 123 (H) 70 - 99 mg/dL    Urea Nitrogen 28 7 - 30 mg/dL    Creatinine 0.68 0.52 - 1.04 mg/dL    GFR Estimate 87 >60 mL/min/[1.73_m2]    GFR Estimate If Black >90 >60 mL/min/[1.73_m2]    Calcium 9.8 8.5 - 10.1 mg/dL     *Note: Due to a large number of  "results and/or encounters for the requested time period, some results have not been displayed. A complete set of results can be found in Results Review.     ASSESSMENT/PLAN:                                                        Sydni was seen today for hospital f/u.    Diagnoses and all orders for this visit:    Hyponatremia  -     Basic Metabolic Panel (8) ([facility])  Need to recheck electrolytes today, follow up pending results.    Peripheral edema  -     Basic Metabolic Panel (8) ([facility])  -     C ACE BANDAGE 3-5\" WIDTH  Absorbant pad is placed on the weeping areas followed by loosely wrapped 6\" ACE wraps on the legs starting at the toes to above the area of edema. Right arm is also wrapped with ACE from the fingers to the elbow. Recommend elevating the legs whenever sitting. Avoid standing too long or sitting with legs dangling.    COPD exacerbation (H)  Improving, finish prednisone, done with Levaquin as of today, return if worsening occurs.    Lung nodule  Needs follow up lung CT in 6 months.    Dependence on continuous supplemental oxygen    Drug-induced insomnia (H)  -     ALPRAZolam (XANAX) 0.5 MG tablet; 1-2 po at bedtime prn insomnia  Warned about over-sedation, use lowest effective dose. She hasn't been able to sleep in 2 days due to the prednisone and she needs rest to allow healing.      There are no Patient Instructions on file for this visit.    The following health maintenance items are reviewed in Epic and correct as of today:  Health Maintenance   Topic Date Due     DEXA  1945     COPD ACTION PLAN  1945     MAMMO SCREENING  1945     COLONOSCOPY  02/19/1957     ZOSTER IMMUNIZATION (1 of 2) 02/19/1997     MEDICARE ANNUAL WELLNESS VISIT  02/19/2012     PHQ-2  01/01/2019     FALL RISK ASSESSMENT  05/25/2019     INFLUENZA VACCINE (1) 09/01/2019     ADVANCE CARE PLANNING  02/12/2021     LIPID  08/15/2023     DTAP/TDAP/TD IMMUNIZATION (2 - Td) 02/12/2026     SPIROMETRY  Completed     " PNEUMOCOCCAL IMMUNIZATION 65+ LOW/MEDIUM RISK  Completed     HEPATITIS C SCREENING  Addressed     IPV IMMUNIZATION  Aged Out     MENINGITIS IMMUNIZATION  Aged Out       Lyn Lui MD  [facility]  Family Practice  [facility]  [phone]    For any issues my office # is [phone]

## 2019-09-06 NOTE — NURSING NOTE
Tough draw- patient dehydrated - very bruised from hospital draws  Only able to obtain 1/2 marble top tube- no other specimens- no EDTA  Chyna Alas MA September 6, 2019 4:39 PM

## 2019-09-07 LAB
BUN SERPL-MCNC: 21 MG/DL (ref 8–27)
BUN/CREATININE RATIO: 25 (ref 12–28)
CALCIUM SERPL-MCNC: 9 MG/DL (ref 8.7–10.3)
CHLORIDE SERPLBLD-SCNC: 89 MMOL/L (ref 96–106)
CREAT SERPL-MCNC: 0.84 MG/DL (ref 0.57–1)
EGFR IF AFRICN AM: 80 ML/MIN/1.73
EGFR IF NONAFRICN AM: 70 ML/MIN/1.73
GLUCOSE SERPL-MCNC: 131 MG/DL (ref 65–99)
POTASSIUM SERPL-SCNC: 4.6 MMOL/L (ref 3.5–5.2)
SODIUM SERPL-SCNC: 138 MMOL/L (ref 134–144)
TOTAL CO2: 32 MMOL/L (ref 20–29)

## 2019-09-09 ENCOUNTER — TELEPHONE (OUTPATIENT)
Dept: FAMILY MEDICINE | Facility: CLINIC | Age: 74
End: 2019-09-09

## 2019-09-09 NOTE — TELEPHONE ENCOUNTER
Called patient with lab results.  Informed her of normal sodium and potassium.  Patient states she has no pain with her legs but they still get swollen if she is up and on her feet a lot.  She does elevate them and wrap them.

## 2019-09-17 ENCOUNTER — TELEPHONE (OUTPATIENT)
Dept: FAMILY MEDICINE | Facility: CLINIC | Age: 74
End: 2019-09-17

## 2019-09-17 DIAGNOSIS — I89.0 LYMPHEDEMA OF UPPER EXTREMITY, BILATERAL: ICD-10-CM

## 2019-09-17 DIAGNOSIS — R60.0 PERIPHERAL EDEMA: Primary | ICD-10-CM

## 2019-09-17 DIAGNOSIS — R60.0 BILATERAL LOWER EXTREMITY EDEMA: ICD-10-CM

## 2019-09-17 NOTE — TELEPHONE ENCOUNTER
Patient calls with update on bilateral lower extremity since visit with Dr. Lui 9/6/19. Continues to ACE wrap and elevate legs during the day and edema is much better, but returns within an hour if legs down or she is up and around. Has 4 days of prednisone left and reports this med typically causes extra swelling for her. Plan: Dr. Lui encourages continue current regimen but also recommends  Lymphedema [facility] eval and treat. Patient agreeable - referral sent to  Rehab and patient will await their call to schedule.   Also patient should follow up with Dr. Lui next week for recheck and labs. Appt scheduled for 9/25 with Dr. Lui.   Leslie Millard RN

## 2019-09-20 DIAGNOSIS — R60.0 PERIPHERAL EDEMA: ICD-10-CM

## 2019-09-20 DIAGNOSIS — I10 BENIGN ESSENTIAL HYPERTENSION: ICD-10-CM

## 2019-09-20 NOTE — TELEPHONE ENCOUNTER
Refill medication Losartan  LOV 09/06/2019  Labs 09/06/2019  Lizabeth Souza MA on 9/20/2019 at 12:36 PM

## 2019-09-22 RX ORDER — LOSARTAN POTASSIUM 50 MG/1
TABLET ORAL
Qty: 90 TABLET | Refills: 1 | Status: SHIPPED | OUTPATIENT
Start: 2019-09-22 | End: 2020-03-23

## 2019-09-25 ENCOUNTER — OFFICE VISIT (OUTPATIENT)
Dept: FAMILY MEDICINE | Facility: CLINIC | Age: 74
End: 2019-09-25

## 2019-09-25 VITALS — HEART RATE: 115 BPM | OXYGEN SATURATION: 96 % | DIASTOLIC BLOOD PRESSURE: 66 MMHG | SYSTOLIC BLOOD PRESSURE: 132 MMHG

## 2019-09-25 DIAGNOSIS — J44.1 COPD EXACERBATION (H): ICD-10-CM

## 2019-09-25 DIAGNOSIS — R60.0 PERIPHERAL EDEMA: ICD-10-CM

## 2019-09-25 PROCEDURE — 36415 COLL VENOUS BLD VENIPUNCTURE: CPT | Performed by: FAMILY MEDICINE

## 2019-09-25 PROCEDURE — 99214 OFFICE O/P EST MOD 30 MIN: CPT | Performed by: FAMILY MEDICINE

## 2019-09-25 PROCEDURE — 80048 BASIC METABOLIC PNL TOTAL CA: CPT | Mod: 90 | Performed by: FAMILY MEDICINE

## 2019-09-25 RX ORDER — PREDNISONE 10 MG/1
TABLET ORAL
Qty: 74 TABLET | Refills: 0 | Status: SHIPPED | OUTPATIENT
Start: 2019-09-25 | End: 2020-01-02

## 2019-09-25 RX ORDER — POTASSIUM CHLORIDE 750 MG/1
10 CAPSULE, EXTENDED RELEASE ORAL 2 TIMES DAILY
Qty: 60 CAPSULE | Refills: 1 | Status: SHIPPED | OUTPATIENT
Start: 2019-09-25 | End: 2019-10-22

## 2019-09-25 RX ORDER — FUROSEMIDE 40 MG
40 TABLET ORAL DAILY
Qty: 30 TABLET | Refills: 1 | Status: SHIPPED | OUTPATIENT
Start: 2019-09-25 | End: 2019-12-02

## 2019-09-25 NOTE — PROGRESS NOTES
Problem(s) Oriented visit        SUBJECTIVE:                                                    Sydni Mendoza is a 74 year old female who presents to clinic today for follow up on respiratory distress. She has noticed a marked worsening of breathing since stopping prednisone. She was sent home from the hospital on steroid taper of 40 mg daily for a week, 30 mg daily for a week, 20 mg daily until seen by pulmonary, but she ran out of pills 4 days ago. She can not even walk from one room to the next while wearing her oxygen. Her baseline O2 need is 2.5-3 L/minute, but has now been up to 4L consistently and still feels miserable. Cough is mostly resolved. No fever. Inhalers only help for a few minutes. She had some response to lasix given three weeks ago. She is out of it now.       Problem list, Medication list, Allergies, and Medical/Social/Surgical histories reviewed in EPIC and updated as appropriate.   Additional history: as documented    ROS:  5 point ROS completed and negative except noted above, including Gen, CV, Resp, GI, MS      Histories:   Patient Active Problem List   Diagnosis     COPD (chronic obstructive pulmonary disease) (H)     Smoking     Lung nodule     Pneumothorax, post biopsy, right     Health Care Home     Pneumonia     COPD with acute exacerbation (H)     COPD exacerbation (H)     Hyponatremia     Acute and chronic respiratory failure with hypoxia (H)     Acute exacerbation of chronic obstructive pulmonary disease (COPD) (H)     Past Surgical History:   Procedure Laterality Date     CHEST TUBE INSERTION  12    Right ,pneumothorax post, lung biopsy     GYN SURGERY      Tubal Ligation       Social History     Tobacco Use     Smoking status: Former Smoker     Packs/day: 1.00     Years: 50.00     Pack years: 50.00     Types: Cigarettes     Last attempt to quit: 2011     Years since quittin.1     Smokeless tobacco: Never Used   Substance Use Topics     Alcohol use: Yes      Alcohol/week: 0.0 standard drinks     Comment: occasional     No family history on file.        OBJECTIVE:                                                    /66 (BP Location: Left arm, Patient Position: Sitting, Cuff Size: Adult Regular)   Pulse 115   SpO2 96%   There is no height or weight on file to calculate BMI.   GENERAL APPEARANCE: Alert, no acute distress  HENT: Ears and TMs normal, oral mucosa and posterior oropharynx normal  NECK: No adenopathy,masses or thyromegaly  RESP: poor air entry, distant breath sounds, no appreciable W/R/R  CV: normal rate, regular rhythm, no murmur or gallop  MS: marked edema in the bilateral legs to the mid tibial area, shifts easily within her thin skin with gentle pressure  NEURO: Alert, oriented, speech and mentation normal  PSYCH: mentation appears normal, affect and mood normal   Labs Resulted Today:   Results for orders placed or performed in visit on 09/06/19   Basic Metabolic Panel (8) ([facility])   Result Value Ref Range    Glucose 131 (H) 65 - 99 mg/dL    Urea Nitrogen 21 8 - 27 mg/dL    Creatinine 0.84 0.57 - 1.00 mg/dL    eGFR If NonAfricn Am 70 >59 mL/min/1.73    eGFR If Africn Am 80 >59 mL/min/1.73    BUN/Creatinine Ratio 25 12 - 28    Sodium 138 134 - 144 mmol/L    Potassium 4.6 3.5 - 5.2 mmol/L    Chloride 89 (L) 96 - 106 mmol/L    Total CO2 32 (H) 20 - 29 mmol/L    Calcium 9.0 8.7 - 10.3 mg/dL    Narrative    Performed at:  01 - [facility] [address], [place], [place]  [id number]  : Kai Yo MD, Phone:  [phone]     ASSESSMENT/PLAN:                                                        Sydni was seen today for copd and edema.    Diagnoses and all orders for this visit:    Peripheral edema  -     Basic Metabolic Panel (8) ([facility])  -     furosemide (LASIX) 40 MG tablet; Take 1 tablet (40 mg) by mouth daily  -     potassium chloride ER (MICRO-K) 10 MEQ CR capsule; Take 1 capsule (10 mEq) by mouth 2 times daily  -     VENOUS  COLLECTION  Her skin is so thin and the edema so pronounced that we need to try to get it down more to avoid losing more skin integrity. She will restart lasix, supplement with potassium, check basic meta today.     COPD exacerbation (H)  -     predniSONE (DELTASONE) 10 MG tablet; Take 4 tablets (40 mg) by mouth daily for 5 days, THEN 3 tablets (30 mg) daily for 5 days, THEN 2 tablets (20 mg) daily for 20 days. You should continue taking 20mg daily until you see Dr. Cardoso in clinic.  Recent worsening with abrupt stopping of prednisone. She is instructed that she is always encouraged to get in touch with me in the future if she runs out of medication without a more cohesive plan. Per her discharge summary she was to continue 20 mg prednisone daily until seen by Janae. Her appointment is in three weeks.     >25 min spent with patient, greater than 50% spent on discussion/education/planning, etc. About Diagnoses of Peripheral edema and COPD exacerbation (H) were pertinent to this visit.        There are no Patient Instructions on file for this visit.    The following health maintenance items are reviewed in Epic and correct as of today:  Health Maintenance   Topic Date Due     DEXA  1945     COPD ACTION PLAN  1945     MAMMO SCREENING  1945     COLONOSCOPY  02/19/1957     ZOSTER IMMUNIZATION (1 of 2) 02/19/1997     MEDICARE ANNUAL WELLNESS VISIT  02/19/2012     PHQ-2  01/01/2019     FALL RISK ASSESSMENT  05/25/2019     INFLUENZA VACCINE (1) 09/01/2019     ADVANCE CARE PLANNING  02/12/2021     LIPID  08/15/2023     DTAP/TDAP/TD IMMUNIZATION (2 - Td) 02/12/2026     SPIROMETRY  Completed     PNEUMOCOCCAL IMMUNIZATION 65+ LOW/MEDIUM RISK  Completed     HEPATITIS C SCREENING  Addressed     IPV IMMUNIZATION  Aged Out     MENINGITIS IMMUNIZATION  Aged Out       Lyn Lui MD  [facility]  Family Practice  [facility]  [phone]    For any issues my office # is [phone]

## 2019-09-26 LAB
BUN SERPL-MCNC: 10 MG/DL (ref 8–27)
BUN/CREATININE RATIO: 16 (ref 12–28)
CALCIUM SERPL-MCNC: 9.5 MG/DL (ref 8.7–10.3)
CHLORIDE SERPLBLD-SCNC: 93 MMOL/L (ref 96–106)
CREAT SERPL-MCNC: 0.63 MG/DL (ref 0.57–1)
EGFR IF AFRICN AM: 104 ML/MIN/1.73
EGFR IF NONAFRICN AM: 90 ML/MIN/1.73
GLUCOSE SERPL-MCNC: 116 MG/DL (ref 65–99)
POTASSIUM SERPL-SCNC: 4.4 MMOL/L (ref 3.5–5.2)
SODIUM SERPL-SCNC: 137 MMOL/L (ref 134–144)
TOTAL CO2: 34 MMOL/L (ref 20–29)

## 2019-09-28 ENCOUNTER — DOCUMENTATION ONLY (OUTPATIENT)
Dept: FAMILY MEDICINE | Facility: CLINIC | Age: 74
End: 2019-09-28

## 2019-09-28 PROCEDURE — G0180 MD CERTIFICATION HHA PATIENT: HCPCS | Performed by: FAMILY MEDICINE

## 2019-09-28 NOTE — PROGRESS NOTES
"[facility] now requests orders and shares plan of care/discharge summaries for some patients through [employer].  Please REPLY TO THIS MESSAGE OR ROUTE BACK TO THE AUTHOR in order to give authorization for orders when needed.  This is considered a verbal order, you will still receive a faxed copy of orders for signature.  Thank you for your assistance in improving collaboration for our patients.    ORDER    SN 3 week 1, 2 week 2, 1 week 3, 5 prn.   OT eval for wheelchair, adl's.   OT lymphedema eval for edema to ble.     MD SUMMARY/PLAN OF CARE    SUMMARY TO MD  SITUATION...Pt is a 74 year old woman who was hospitalized from 8/31 to 9/4 for acute COPD exacerbation. She was discharged home on predisone taper, with instructions to continue on 20mg of prednisone daily until she sees pulmonary. She ran out of tablets on 9/21 and presented to PCP 9/25 with COPD exacerbation and ble edema. She was prescribed lasix and a new prednisone taper, and will continue on 20mg daily until she sees pulmonary.     She is on 2-3L o2 NC baseline, currently using 4L NC with activity and 3L at rest/overnight. She does not use her sheduled or prn nebulizers because she becomes intolerably short of breath while using them. She feels jittery and anxious while using them despite switching to nebs without albuterol in them. She has chest physiotherapy vest that she uses when feeling up to it. She has dry, non productive cough. Her  helps to manage medications. She has 4+ edema to ble with fragile skin. She has one blister that is 1.3 x 1.4 cm on left great toe that has popped. She wears tubi  for this. She is short of breath with minimal exertion. She has a low tolerance for activity and is unable to ambulate onto her deck. She does not use assistive device. She is interested in wheelchair to allow her to spend time on deck and get out of the home. She is able to bathe/shower/change clothing by herself, but it \"takes " "her a while\". Lungs clear/dim. Non productive cough. Sleeps in recliner because she does not tolerate laying on back and her bed is too tall to comfortably get in/out of. HR regular. Denies chest pain/pressure. Denies nausea/vomiting/constipation/diarrhea. Eats small/frequent meals due to shortness of breath while eating. Denies burning with urination. Experiences frequency/urgency with lasix. Mostly continent of urine. Continent of stool. She lives in a clean, single story home. Her  assists with adl's/iadls..    BACKGROUND...She has a past medical history including COPD, acute on chronic respiratory failure, Respiratory Failure dt COPD Exacerbation, Hyponatremia, Hypertension, Bilateral LE edema, Pulmonary nodule, Abnl Thyroid Studies    ANALYSIS...Pt at high risk for rehospitalization related to shortness of breath  RECOMMENDATION...OT lymphedema eval for edema to ble. OT eval for wheelchair, adl's. SN visits for resp status, edema, medication education.         "

## 2019-10-01 ENCOUNTER — HEALTH MAINTENANCE LETTER (OUTPATIENT)
Age: 74
End: 2019-10-01

## 2019-10-08 ENCOUNTER — MEDICAL CORRESPONDENCE (OUTPATIENT)
Dept: FAMILY MEDICINE | Facility: CLINIC | Age: 74
End: 2019-10-08

## 2019-10-10 ENCOUNTER — MEDICAL CORRESPONDENCE (OUTPATIENT)
Dept: FAMILY MEDICINE | Facility: CLINIC | Age: 74
End: 2019-10-10

## 2019-10-11 ENCOUNTER — MEDICAL CORRESPONDENCE (OUTPATIENT)
Dept: FAMILY MEDICINE | Facility: CLINIC | Age: 74
End: 2019-10-11

## 2019-10-14 ENCOUNTER — MEDICAL CORRESPONDENCE (OUTPATIENT)
Dept: FAMILY MEDICINE | Facility: CLINIC | Age: 74
End: 2019-10-14

## 2019-10-16 ENCOUNTER — TRANSFERRED RECORDS (OUTPATIENT)
Dept: FAMILY MEDICINE | Facility: CLINIC | Age: 74
End: 2019-10-16

## 2019-10-22 DIAGNOSIS — R60.0 PERIPHERAL EDEMA: ICD-10-CM

## 2019-10-22 RX ORDER — POTASSIUM CHLORIDE 750 MG/1
10 CAPSULE, EXTENDED RELEASE ORAL 2 TIMES DAILY
Qty: 60 CAPSULE | Refills: 3 | Status: SHIPPED | OUTPATIENT
Start: 2019-10-22 | End: 2020-01-02

## 2019-11-05 ENCOUNTER — MEDICAL CORRESPONDENCE (OUTPATIENT)
Dept: FAMILY MEDICINE | Facility: CLINIC | Age: 74
End: 2019-11-05

## 2019-12-02 DIAGNOSIS — J44.9 COPD, SEVERE (H): ICD-10-CM

## 2019-12-02 DIAGNOSIS — R60.0 PERIPHERAL EDEMA: ICD-10-CM

## 2019-12-03 RX ORDER — FUROSEMIDE 40 MG
40 TABLET ORAL DAILY
Qty: 90 TABLET | Refills: 1 | Status: SHIPPED | OUTPATIENT
Start: 2019-12-03 | End: 2020-01-02

## 2019-12-15 ENCOUNTER — HEALTH MAINTENANCE LETTER (OUTPATIENT)
Age: 74
End: 2019-12-15

## 2019-12-16 ENCOUNTER — TRANSFERRED RECORDS (OUTPATIENT)
Dept: FAMILY MEDICINE | Facility: CLINIC | Age: 74
End: 2019-12-16

## 2020-01-02 ENCOUNTER — APPOINTMENT (OUTPATIENT)
Dept: CT IMAGING | Facility: CLINIC | Age: 75
DRG: 193 | End: 2020-01-02
Attending: INTERNAL MEDICINE
Payer: COMMERCIAL

## 2020-01-02 ENCOUNTER — HOSPITAL ENCOUNTER (INPATIENT)
Facility: CLINIC | Age: 75
LOS: 4 days | Discharge: HOME HEALTH | DRG: 193 | End: 2020-01-06
Attending: EMERGENCY MEDICINE | Admitting: INTERNAL MEDICINE
Payer: COMMERCIAL

## 2020-01-02 ENCOUNTER — APPOINTMENT (OUTPATIENT)
Dept: GENERAL RADIOLOGY | Facility: CLINIC | Age: 75
DRG: 193 | End: 2020-01-02
Attending: EMERGENCY MEDICINE
Payer: COMMERCIAL

## 2020-01-02 DIAGNOSIS — J18.9 COMMUNITY ACQUIRED PNEUMONIA OF RIGHT LUNG, UNSPECIFIED PART OF LUNG: Primary | ICD-10-CM

## 2020-01-02 DIAGNOSIS — J18.9 PNEUMONIA OF RIGHT LOWER LOBE DUE TO INFECTIOUS ORGANISM: ICD-10-CM

## 2020-01-02 DIAGNOSIS — J44.1 COPD EXACERBATION (H): ICD-10-CM

## 2020-01-02 DIAGNOSIS — J43.1 PANLOBULAR EMPHYSEMA (H): ICD-10-CM

## 2020-01-02 DIAGNOSIS — J96.01 ACUTE RESPIRATORY FAILURE WITH HYPOXIA (H): ICD-10-CM

## 2020-01-02 LAB
ANION GAP SERPL CALCULATED.3IONS-SCNC: 4 MMOL/L (ref 3–14)
BASOPHILS # BLD AUTO: 0 10E9/L (ref 0–0.2)
BASOPHILS NFR BLD AUTO: 0.1 %
BUN SERPL-MCNC: 15 MG/DL (ref 7–30)
CALCIUM SERPL-MCNC: 9.1 MG/DL (ref 8.5–10.1)
CHLORIDE SERPL-SCNC: 100 MMOL/L (ref 94–109)
CO2 BLDCOV-SCNC: 36 MMOL/L (ref 21–28)
CO2 SERPL-SCNC: 32 MMOL/L (ref 20–32)
CREAT SERPL-MCNC: 0.61 MG/DL (ref 0.52–1.04)
DIFFERENTIAL METHOD BLD: ABNORMAL
EOSINOPHIL # BLD AUTO: 0 10E9/L (ref 0–0.7)
EOSINOPHIL NFR BLD AUTO: 0 %
ERYTHROCYTE [DISTWIDTH] IN BLOOD BY AUTOMATED COUNT: 13.4 % (ref 10–15)
FLUAV+FLUBV RNA SPEC QL NAA+PROBE: NEGATIVE
FLUAV+FLUBV RNA SPEC QL NAA+PROBE: NEGATIVE
GFR SERPL CREATININE-BSD FRML MDRD: >90 ML/MIN/{1.73_M2}
GLUCOSE SERPL-MCNC: 114 MG/DL (ref 70–99)
GRAM STN SPEC: NORMAL
HCT VFR BLD AUTO: 39 % (ref 35–47)
HGB BLD-MCNC: 12 G/DL (ref 11.7–15.7)
IMM GRANULOCYTES # BLD: 0.1 10E9/L (ref 0–0.4)
IMM GRANULOCYTES NFR BLD: 0.3 %
INTERPRETATION ECG - MUSE: NORMAL
INTERPRETATION ECG - MUSE: NORMAL
LACTATE BLD-SCNC: 1.2 MMOL/L (ref 0.7–2)
LACTATE BLD-SCNC: 1.4 MMOL/L (ref 0.7–2.1)
LYMPHOCYTES # BLD AUTO: 1.1 10E9/L (ref 0.8–5.3)
LYMPHOCYTES NFR BLD AUTO: 5.7 %
Lab: NORMAL
MCH RBC QN AUTO: 28.4 PG (ref 26.5–33)
MCHC RBC AUTO-ENTMCNC: 30.8 G/DL (ref 31.5–36.5)
MCV RBC AUTO: 92 FL (ref 78–100)
MONOCYTES # BLD AUTO: 2.2 10E9/L (ref 0–1.3)
MONOCYTES NFR BLD AUTO: 11.2 %
NEUTROPHILS # BLD AUTO: 16.4 10E9/L (ref 1.6–8.3)
NEUTROPHILS NFR BLD AUTO: 82.7 %
NRBC # BLD AUTO: 0 10*3/UL
NRBC BLD AUTO-RTO: 0 /100
PCO2 BLDV: 59 MM HG (ref 40–50)
PH BLDV: 7.4 PH (ref 7.32–7.43)
PLATELET # BLD AUTO: 335 10E9/L (ref 150–450)
PO2 BLDV: 35 MM HG (ref 25–47)
POTASSIUM SERPL-SCNC: 3.9 MMOL/L (ref 3.4–5.3)
PROCALCITONIN SERPL-MCNC: <0.05 NG/ML
RBC # BLD AUTO: 4.22 10E12/L (ref 3.8–5.2)
RSV RNA SPEC NAA+PROBE: NEGATIVE
SAO2 % BLDV FROM PO2: 65 %
SODIUM SERPL-SCNC: 136 MMOL/L (ref 133–144)
SPECIMEN SOURCE: NORMAL
SPECIMEN SOURCE: NORMAL
TROPONIN I SERPL-MCNC: <0.015 UG/L (ref 0–0.04)
WBC # BLD AUTO: 19.8 10E9/L (ref 4–11)

## 2020-01-02 PROCEDURE — 94640 AIRWAY INHALATION TREATMENT: CPT | Mod: 76

## 2020-01-02 PROCEDURE — 87040 BLOOD CULTURE FOR BACTERIA: CPT | Performed by: EMERGENCY MEDICINE

## 2020-01-02 PROCEDURE — 87205 SMEAR GRAM STAIN: CPT | Performed by: INTERNAL MEDICINE

## 2020-01-02 PROCEDURE — 83605 ASSAY OF LACTIC ACID: CPT

## 2020-01-02 PROCEDURE — 25000128 H RX IP 250 OP 636: Performed by: EMERGENCY MEDICINE

## 2020-01-02 PROCEDURE — 94640 AIRWAY INHALATION TREATMENT: CPT

## 2020-01-02 PROCEDURE — 36415 COLL VENOUS BLD VENIPUNCTURE: CPT | Performed by: INTERNAL MEDICINE

## 2020-01-02 PROCEDURE — 25000125 ZZHC RX 250: Performed by: EMERGENCY MEDICINE

## 2020-01-02 PROCEDURE — 83605 ASSAY OF LACTIC ACID: CPT | Performed by: INTERNAL MEDICINE

## 2020-01-02 PROCEDURE — 25000125 ZZHC RX 250: Performed by: INTERNAL MEDICINE

## 2020-01-02 PROCEDURE — 87631 RESP VIRUS 3-5 TARGETS: CPT | Performed by: INTERNAL MEDICINE

## 2020-01-02 PROCEDURE — 96375 TX/PRO/DX INJ NEW DRUG ADDON: CPT

## 2020-01-02 PROCEDURE — 40000275 ZZH STATISTIC RCP TIME EA 10 MIN

## 2020-01-02 PROCEDURE — 25000128 H RX IP 250 OP 636: Performed by: INTERNAL MEDICINE

## 2020-01-02 PROCEDURE — 87186 SC STD MICRODIL/AGAR DIL: CPT | Performed by: INTERNAL MEDICINE

## 2020-01-02 PROCEDURE — 99285 EMERGENCY DEPT VISIT HI MDM: CPT | Mod: 25

## 2020-01-02 PROCEDURE — 84484 ASSAY OF TROPONIN QUANT: CPT | Performed by: EMERGENCY MEDICINE

## 2020-01-02 PROCEDURE — 87077 CULTURE AEROBIC IDENTIFY: CPT | Performed by: INTERNAL MEDICINE

## 2020-01-02 PROCEDURE — 80048 BASIC METABOLIC PNL TOTAL CA: CPT | Performed by: EMERGENCY MEDICINE

## 2020-01-02 PROCEDURE — 87070 CULTURE OTHR SPECIMN AEROBIC: CPT | Performed by: INTERNAL MEDICINE

## 2020-01-02 PROCEDURE — 71046 X-RAY EXAM CHEST 2 VIEWS: CPT

## 2020-01-02 PROCEDURE — 85025 COMPLETE CBC W/AUTO DIFF WBC: CPT | Performed by: EMERGENCY MEDICINE

## 2020-01-02 PROCEDURE — 82803 BLOOD GASES ANY COMBINATION: CPT

## 2020-01-02 PROCEDURE — 12000000 ZZH R&B MED SURG/OB

## 2020-01-02 PROCEDURE — 36415 COLL VENOUS BLD VENIPUNCTURE: CPT | Performed by: EMERGENCY MEDICINE

## 2020-01-02 PROCEDURE — 84145 PROCALCITONIN (PCT): CPT | Performed by: INTERNAL MEDICINE

## 2020-01-02 PROCEDURE — 99223 1ST HOSP IP/OBS HIGH 75: CPT | Mod: AI | Performed by: INTERNAL MEDICINE

## 2020-01-02 PROCEDURE — 84145 PROCALCITONIN (PCT): CPT | Performed by: EMERGENCY MEDICINE

## 2020-01-02 PROCEDURE — 93005 ELECTROCARDIOGRAM TRACING: CPT | Mod: 76

## 2020-01-02 PROCEDURE — 93005 ELECTROCARDIOGRAM TRACING: CPT

## 2020-01-02 PROCEDURE — 71250 CT THORAX DX C-: CPT

## 2020-01-02 PROCEDURE — 96365 THER/PROPH/DIAG IV INF INIT: CPT

## 2020-01-02 RX ORDER — ONDANSETRON 4 MG/1
4 TABLET, ORALLY DISINTEGRATING ORAL EVERY 6 HOURS PRN
Status: DISCONTINUED | OUTPATIENT
Start: 2020-01-02 | End: 2020-01-06 | Stop reason: HOSPADM

## 2020-01-02 RX ORDER — CEFTRIAXONE 1 G/1
1 INJECTION, POWDER, FOR SOLUTION INTRAMUSCULAR; INTRAVENOUS ONCE
Status: COMPLETED | OUTPATIENT
Start: 2020-01-02 | End: 2020-01-02

## 2020-01-02 RX ORDER — IPRATROPIUM BROMIDE AND ALBUTEROL SULFATE 2.5; .5 MG/3ML; MG/3ML
3 SOLUTION RESPIRATORY (INHALATION) ONCE
Status: COMPLETED | OUTPATIENT
Start: 2020-01-02 | End: 2020-01-02

## 2020-01-02 RX ORDER — LIDOCAINE 40 MG/G
CREAM TOPICAL
Status: DISCONTINUED | OUTPATIENT
Start: 2020-01-02 | End: 2020-01-06 | Stop reason: HOSPADM

## 2020-01-02 RX ORDER — CEFTRIAXONE 2 G/1
2 INJECTION, POWDER, FOR SOLUTION INTRAMUSCULAR; INTRAVENOUS EVERY 24 HOURS
Status: DISCONTINUED | OUTPATIENT
Start: 2020-01-03 | End: 2020-01-05

## 2020-01-02 RX ORDER — IPRATROPIUM BROMIDE AND ALBUTEROL SULFATE 2.5; .5 MG/3ML; MG/3ML
3 SOLUTION RESPIRATORY (INHALATION)
Status: DISCONTINUED | OUTPATIENT
Start: 2020-01-02 | End: 2020-01-06 | Stop reason: HOSPADM

## 2020-01-02 RX ORDER — FUROSEMIDE 40 MG
40 TABLET ORAL DAILY PRN
Status: DISCONTINUED | OUTPATIENT
Start: 2020-01-02 | End: 2020-01-06 | Stop reason: HOSPADM

## 2020-01-02 RX ORDER — POLYETHYLENE GLYCOL 3350 17 G/17G
17 POWDER, FOR SOLUTION ORAL DAILY PRN
Status: DISCONTINUED | OUTPATIENT
Start: 2020-01-02 | End: 2020-01-06 | Stop reason: HOSPADM

## 2020-01-02 RX ORDER — METHYLPREDNISOLONE SODIUM SUCCINATE 125 MG/2ML
125 INJECTION, POWDER, LYOPHILIZED, FOR SOLUTION INTRAMUSCULAR; INTRAVENOUS ONCE
Status: COMPLETED | OUTPATIENT
Start: 2020-01-02 | End: 2020-01-02

## 2020-01-02 RX ORDER — FUROSEMIDE 40 MG
40 TABLET ORAL DAILY PRN
COMMUNITY
End: 2020-06-16

## 2020-01-02 RX ORDER — PREDNISONE 10 MG/1
10 TABLET ORAL DAILY
COMMUNITY
End: 2021-04-14 | Stop reason: DRUGHIGH

## 2020-01-02 RX ORDER — NALOXONE HYDROCHLORIDE 0.4 MG/ML
.1-.4 INJECTION, SOLUTION INTRAMUSCULAR; INTRAVENOUS; SUBCUTANEOUS
Status: DISCONTINUED | OUTPATIENT
Start: 2020-01-02 | End: 2020-01-06 | Stop reason: HOSPADM

## 2020-01-02 RX ORDER — HYDRALAZINE HYDROCHLORIDE 20 MG/ML
10 INJECTION INTRAMUSCULAR; INTRAVENOUS EVERY 4 HOURS PRN
Status: DISCONTINUED | OUTPATIENT
Start: 2020-01-02 | End: 2020-01-06 | Stop reason: HOSPADM

## 2020-01-02 RX ORDER — LEVALBUTEROL 1.25 MG/.5ML
1.25 SOLUTION, CONCENTRATE RESPIRATORY (INHALATION) EVERY 6 HOURS PRN
Status: DISCONTINUED | OUTPATIENT
Start: 2020-01-02 | End: 2020-01-06 | Stop reason: HOSPADM

## 2020-01-02 RX ORDER — POTASSIUM CHLORIDE 750 MG/1
10 TABLET, EXTENDED RELEASE ORAL 2 TIMES DAILY PRN
Status: ON HOLD | COMMUNITY
End: 2020-01-06

## 2020-01-02 RX ORDER — AZITHROMYCIN 500 MG/1
500 INJECTION, POWDER, LYOPHILIZED, FOR SOLUTION INTRAVENOUS ONCE
Status: COMPLETED | OUTPATIENT
Start: 2020-01-02 | End: 2020-01-02

## 2020-01-02 RX ORDER — METHYLPREDNISOLONE SODIUM SUCCINATE 125 MG/2ML
60 INJECTION, POWDER, LYOPHILIZED, FOR SOLUTION INTRAMUSCULAR; INTRAVENOUS EVERY 6 HOURS
Status: DISCONTINUED | OUTPATIENT
Start: 2020-01-02 | End: 2020-01-03

## 2020-01-02 RX ORDER — LOSARTAN POTASSIUM 50 MG/1
50 TABLET ORAL DAILY
Status: DISCONTINUED | OUTPATIENT
Start: 2020-01-03 | End: 2020-01-06 | Stop reason: HOSPADM

## 2020-01-02 RX ORDER — AZITHROMYCIN 250 MG/1
250 TABLET, FILM COATED ORAL DAILY
Status: DISCONTINUED | OUTPATIENT
Start: 2020-01-03 | End: 2020-01-05

## 2020-01-02 RX ORDER — ACETAMINOPHEN 325 MG/1
650 TABLET ORAL EVERY 4 HOURS PRN
Status: DISCONTINUED | OUTPATIENT
Start: 2020-01-02 | End: 2020-01-06 | Stop reason: HOSPADM

## 2020-01-02 RX ORDER — ALBUTEROL SULFATE 90 UG/1
2 AEROSOL, METERED RESPIRATORY (INHALATION) EVERY 6 HOURS PRN
COMMUNITY

## 2020-01-02 RX ORDER — ONDANSETRON 2 MG/ML
4 INJECTION INTRAMUSCULAR; INTRAVENOUS EVERY 6 HOURS PRN
Status: DISCONTINUED | OUTPATIENT
Start: 2020-01-02 | End: 2020-01-06 | Stop reason: HOSPADM

## 2020-01-02 RX ADMIN — AZITHROMYCIN MONOHYDRATE 500 MG: 500 INJECTION, POWDER, LYOPHILIZED, FOR SOLUTION INTRAVENOUS at 13:01

## 2020-01-02 RX ADMIN — METHYLPREDNISOLONE SODIUM SUCCINATE 62.5 MG: 125 INJECTION, POWDER, FOR SOLUTION INTRAMUSCULAR; INTRAVENOUS at 16:44

## 2020-01-02 RX ADMIN — METHYLPREDNISOLONE SODIUM SUCCINATE 125 MG: 125 INJECTION, POWDER, FOR SOLUTION INTRAMUSCULAR; INTRAVENOUS at 09:53

## 2020-01-02 RX ADMIN — METHYLPREDNISOLONE SODIUM SUCCINATE 62.5 MG: 125 INJECTION, POWDER, FOR SOLUTION INTRAMUSCULAR; INTRAVENOUS at 22:38

## 2020-01-02 RX ADMIN — IPRATROPIUM BROMIDE AND ALBUTEROL SULFATE 3 ML: .5; 3 SOLUTION RESPIRATORY (INHALATION) at 20:19

## 2020-01-02 RX ADMIN — IPRATROPIUM BROMIDE AND ALBUTEROL SULFATE 3 ML: .5; 3 SOLUTION RESPIRATORY (INHALATION) at 09:08

## 2020-01-02 RX ADMIN — CEFTRIAXONE SODIUM 1 G: 1 INJECTION, POWDER, FOR SOLUTION INTRAMUSCULAR; INTRAVENOUS at 10:56

## 2020-01-02 RX ADMIN — IPRATROPIUM BROMIDE AND ALBUTEROL SULFATE 3 ML: .5; 3 SOLUTION RESPIRATORY (INHALATION) at 09:19

## 2020-01-02 ASSESSMENT — ACTIVITIES OF DAILY LIVING (ADL)
DRESS: 0-->INDEPENDENT
BATHING: 0-->INDEPENDENT
RETIRED_COMMUNICATION: 0-->UNDERSTANDS/COMMUNICATES WITHOUT DIFFICULTY
FALL_HISTORY_WITHIN_LAST_SIX_MONTHS: NO
ADLS_ACUITY_SCORE: 13
RETIRED_EATING: 0-->INDEPENDENT
AMBULATION: 0-->INDEPENDENT
TOILETING: 0-->INDEPENDENT
COGNITION: 0 - NO COGNITION ISSUES REPORTED
SWALLOWING: 0-->SWALLOWS FOODS/LIQUIDS WITHOUT DIFFICULTY
ADLS_ACUITY_SCORE: 14
TRANSFERRING: 0-->INDEPENDENT

## 2020-01-02 ASSESSMENT — ENCOUNTER SYMPTOMS
WHEEZING: 1
SHORTNESS OF BREATH: 1

## 2020-01-02 ASSESSMENT — MIFFLIN-ST. JEOR: SCORE: 1130.04

## 2020-01-02 NOTE — H&P
[facility]    History and Physical - Hospitalist Service       Date of Admission:  1/2/2020    Assessment & Plan   Sydni Mendoza is a 74 year old female with history of COPD on chronic prednisone and 3 L oxygen at baseline, lymphedema, pulmonary nodule who presents with acute onset shortness of breath since yesterday evening.    Community-acquired pneumonia  Sepsis with tachycardia, leukocytosis related to above  COPD with acute exacerbation  -Has oxygen dependent COPD with 3 L at home and 10 mg prednisone chronically.  Presented with acute onset shortness of breath and productive cough.  Recently treated with Levaquin for 10 days as a prophylaxis as her  was sick with upper respiratory symptoms, while patient was not symptomatic.  Chest x-ray done in the ED shows right-sided pneumonia.  -Has a history of pulmonary nodule on the right side and was supposed to have a follow-up evaluation in 6-month, however due to recurrent pneumonia will repeat the CT chest  -Patient follows up with pulmonary as an outpatient, will consult  -Started on ceftriaxone and azithromycin in the ED after cultures were drawn, continue, antibiotic titration pending culture report  -IV Solu-Medrol started on ED, will continue with IV Solu-Medrol, hold PTA prednisone  -Check flu swab, pro-Wong  -Breathing treatments with nebulizers, incentive spirometer    Essential Hypertension   -PTA on as needed Lasix, losartan  -Resume PTA losartan and Lasix with holding parameters  -PRN IV hydralazine  -Monitor and adjust medication as needed    History of lung nodule  - Noted first time in August 2019 and plan was for 6-month repeat imaging.  -Getting CT chest as above.     History of lymphedema  PTA on as needed Lasix, resumed     Diet: Regular  DVT Prophylaxis: Pneumatic Compression Devices  Vasquez Catheter: not present  Code Status: Full code    Disposition Plan   Expected discharge: 2 - 3 days, recommended to prior living  arrangement once Clinical improvement and antibiotic plan formulated.  Entered: Ora Sharma MD 01/02/2020, 11:31 AM     The patient's care was discussed with the Bedside Nurse, Patient and Patient's Family.    Ora Sharma MD  [facility]    ______________________________________________________________________    Chief Complaint   Shortness of breath, cough    History is obtained from the patient    History of Present Illness   Sydni Mendoza is a 74 year old female with history of COPD on chronic prednisone and 3 L oxygen at baseline, lymphedema, pulmonary nodule who presents with acute onset shortness of breath since yesterday evening.    The patient had recent evaluation by [place] [facility] specialist who had given her 10-day course of Levaquin which she finished about 5 days ago, for prophylaxis against infection as her  was sick with some viral URI.  She actually was at her baseline at that time.  She started suddenly having shortness of breath last evening.  She does have cough with yellow sputum production.  Denies any hemoptysis.  She denies any fever.  Denies any chest pain, dizziness, lightheadedness, syncope, nausea, vomiting, headache.  When she presented to the ED temperature was 98.3, pulse rate 80, blood pressure 177/64, O2 sat 94% on 3 L, respiratory rate 36.  Labs showed WBC 19.8 hemoglobin 12 hematocrit 39 platelet 335, sodium 136 potassium 3.9 chloride 100 bicarb 32 BUN 15 creatinine 0.61 lactate 1.4 chest x-ray showed focal airspace consolidation within the right lower lung and concerning for pneumonia.  She was started on ceftriaxone and azithromycin after sending cultures and given IV Solu-Medrol and nebulizers after which the patient started feeling better with improved respiratory rate.    Review of Systems    The 10 point Review of Systems is negative other than noted in the HPI or here.     Past Medical History    I have reviewed this patient's  medical history and updated it with pertinent information if needed.   Past Medical History:   Diagnosis Date     COPD (chronic obstructive pulmonary disease) (H)      Lung nodule 12    Biopsy; fibrosis&chronic inflamation Right     Smoker 9-15-12    quit        Past Surgical History   I have reviewed this patient's surgical history and updated it with pertinent information if needed.  Past Surgical History:   Procedure Laterality Date     CHEST TUBE INSERTION  12    Right ,pneumothorax post, lung biopsy     GYN SURGERY      Tubal Ligation       Social History   I have reviewed this patient's social history and updated it with pertinent information if needed.  Social History     Tobacco Use     Smoking status: Former Smoker     Packs/day: 1.00     Years: 50.00     Pack years: 50.00     Types: Cigarettes     Last attempt to quit: 2011     Years since quittin.3     Smokeless tobacco: Never Used   Substance Use Topics     Alcohol use: Yes     Alcohol/week: 0.0 standard drinks     Comment: occasional     Drug use: No       Family History   Family history was reviewed and is noncontributory    Prior to Admission Medications   Prior to Admission Medications   Prescriptions Last Dose Informant Patient Reported? Taking?   DULERA 200-5 MCG/ACT oral inhaler 2020 at x1 Self Yes Yes   Sig: Inhale 2 puffs into the lungs 2 times daily   Multiple Vitamin (MULTI-VITAMIN) per tablet 2020 at Unknown time Self Yes Yes   Sig: Take 1 tablet by mouth daily.     albuterol (PROAIR HFA/PROVENTIL HFA/VENTOLIN HFA) 108 (90 Base) MCG/ACT inhaler prn Self Yes Yes   Sig: Inhale 2 puffs into the lungs every 6 hours as needed for shortness of breath / dyspnea or wheezing   furosemide (LASIX) 40 MG tablet 2019 Self Yes Yes   Sig: Take 40 mg by mouth daily as needed (lymphedema in legs)   ipratropium (ATROVENT) 0.02 % nebulizer solution 2020 at x3 Self No Yes   Si.5 mg by neb BID, may increase to QID prn  cough/dyspnea   levalbuterol (XOPENEX) 1.25 MG/3ML neb solution 2020 at x3 Self No Yes   Si.25 mg by neb BID, may increase to QID as needed   losartan (COZAAR) 50 MG tablet 2020 at Unknown time Self No Yes   Sig: TAKE 1 TABLET BY MOUTH EVERY DAY   potassium chloride ER (K-DUR/KLOR-CON M) 10 MEQ CR tablet 2019 Self Yes Yes   Sig: Take 10 mEq by mouth 2 times daily as needed for potassium supplementation (when she takes furosemide)   predniSONE (DELTASONE) 10 MG tablet 2020 at Unknown time Self Yes Yes   Sig: Take 10 mg by mouth daily   umeclidinium (INCRUSE ELLIPTA) 62.5 MCG/INH oral inhaler 2020 at noon Self No Yes   Sig: Inhale 1 puff into the lungs daily      Facility-Administered Medications: None     Allergies   Allergies   Allergen Reactions     Albuterol Palpitations     Is fine taking albuterol inhaler, tachycardia goes away.       Physical Exam   Vital Signs: Temp: 98.3  F (36.8  C) Temp src: Temporal BP: (!) 140/78 Pulse: 116 Heart Rate: 114 Resp: 14 SpO2: 97 % O2 Device: Nasal cannula Oxygen Delivery: 3 LPM  Weight: 140 lbs 0 oz    Exam:  Constitutional: Awake, alert and no distress. Appears comfortable  Head: Normocephalic. No masses, lesions, tenderness or abnormalities  ENT: ENT exam normal, no neck nodes or sinus tenderness  Cardiovascular: RRR.  no murmurs, no rubs or JVD  Respiratory:Normal WOB, decreased air entry bilaterally, occasional crackles on right base  Gastrointestinal: Abdomen soft, non-tender. BS normal. No masses, organomegaly  : Deferred  Extremities : Minimal bilateral edema , no clubbing or cyanosis    Neurologic: Cranial nerves II-XII grossly intact , power symmetrical bilaterally, Reflexes normal and symmetric. Sensation grossly WNL.  Skin: Warm and dry to touch no rash    Data   Data reviewed today: I reviewed all medications, new labs and imaging results over the last 24 hours. I personally reviewed the chest x-ray image(s) showing Right lower lobe  infiltrate.    Recent Labs   Lab 01/02/20 0918   WBC 19.8*   HGB 12.0   MCV 92         POTASSIUM 3.9   CHLORIDE 100   CO2 32   BUN 15   CR 0.61   ANIONGAP 4   MARQUEZ 9.1   *   TROPI <0.015     Most Recent 3 CBC's:  Recent Labs   Lab Test 01/02/20 0918 09/03/19  0750 09/01/19 0225 08/31/19  1737   WBC 19.8*  --  10.3  --  15.3*   HGB 12.0  --  11.5*  --  12.1   MCV 92  --  84  --  85    326 308   < > 356    < > = values in this interval not displayed.     Most Recent 3 BMP's:  Recent Labs   Lab Test 01/02/20 0918 09/25/19  1424 09/06/19  1625 09/04/19  1410  09/03/19  0750    137 138 135  --  131*   POTASSIUM 3.9 4.4 4.6 5.9*   < > 3.3*   CHLORIDE 100 93* 89* 99  --  95   CO2 32  --   --  35*  --  30   BUN 15 10  16 21  25 28  --  15   CR 0.61 0.63 0.84 0.68  --  0.63   ANIONGAP 4  --   --  1*  --  6   MARQUEZ 9.1 9.5 9.0 9.8  --  9.2   * 116* 131* 123*  --  100*    < > = values in this interval not displayed.     Most Recent 2 LFT's:  Recent Labs   Lab Test 09/01/19 0225 08/31/19  1737   AST 18 18   ALT 25 22   ALKPHOS 48 51   BILITOTAL 0.7 0.6     Most Recent 3 INR's:  Recent Labs   Lab Test 09/14/12  0750   INR 0.93     Recent Results (from the past 24 hour(s))   XR Chest 2 Views    Narrative    CHEST TWO VIEWS   1/2/2020 9:44 AM     HISTORY: Cough, shortness of breath.    COMPARISON: 8/31/2019      Impression    IMPRESSION: Focal airspace consolidation seen within the right lower  lung and is concerning for pneumonia. Subsegmental atelectasis seen in  the left lower lung. Cardiomediastinal silhouette is within normal  limits. Borderline enlarged cardiomediastinal silhouette. Multilevel  degenerative changes seen in the spine.    NISSAR VAHORA, MD

## 2020-01-02 NOTE — PLAN OF CARE
DATE & TIME: 1/2/2020 7-3 pm                    Cognitive Concerns/ Orientation : A & O x4   BEHAVIOR & AGGRESSION TOOL COLOR: Green   CIWA SCORE: N/A  ABNL VS/O2: VSS on 3L nasal cannula ( baseline), except tachycardia.   MOBILITY: IND  PAIN MANAGMENT: Denies  DIET: Regular  BOWEL/BLADDER: No issues noted, hat in toilet.   ABNL LAB/BG: WBC 19.8.   DRAIN/DEVICES: PIV SL  TELEMETRY RHYTHM: TELE placed.   SKIN: Generalized edema in arms, lymphedema in BLE +3. Bruising and fragile skin.   TESTS/PROCEDURES: CT of chest completed.   D/C DAY/GOALS/PLACE: Pending  OTHER IMPORTANT INFO: Pulmonology consulted. Droplet precautions initiated, flu/RSV swab sent down. Sputum sample sent.

## 2020-01-02 NOTE — ED PROVIDER NOTES
History     Chief Complaint:  COPD Exacerbation    The history is provided by the patient and the spouse.      Sydni Mendoza is a 74 year old female with a history of COPD who presents to the emergency department with her  for evaluation of COPD exacerbation. About 2.5 weeks ago, the patient had a consultation with a pulmonologist at [facility] and was prescribed Levaquin to have on hand for her exacerbations. For the last two weeks, the patient has had a URI. She was on 10 days of Levofloxacin and Prednisone, which she finished 5 days ago. She states she feels better since she has finished the courses of those medications. Last night, the patient developed what she believes to be a COPD exacerbation. She denies any new trauma or exertional activities last night to prompt her exacerbation. She used her nebulizers and found some relief. She notes that her O2 saturations usually lie between 97-98, but last night and today, she started at 88, then got up to 92 after the nebs, which prompted her arrival today. She denies being able to lay flat.    Patient denies using her nebulizers this morning, tried her rescue inhaler, but had no relief with that. She notes getting two pneumonia shots recently and getting her flu shot this year.     Allergies:  Albuterol      Medications:    Xanax  Lasix  Atrovent nebulizer  Levalbuterol nebulizer  Cozaar  Umeclidinium oral inhaler     Past Medical History:    COPD  Pneumonia  Lung nodule      Past Surgical History:    Chest tube insertion  Tubal ligation     Family History:    No past pertinent family history.    Social History:  Former tobacco user. Quit date: 2011  Positive for alcohol allen.  Negative for drug use.  Marital Status:        Review of Systems   Respiratory: Positive for shortness of breath and wheezing.    All other systems reviewed and are negative.      Physical Exam     Patient Vitals for the past 24 hrs:   BP Temp Temp src Pulse Heart Rate Resp  "SpO2 Height Weight   01/02/20 1200 (!) 128/93 -- -- 109 106 23 96 % -- --   01/02/20 1130 126/57 -- -- 109 107 17 95 % -- --   01/02/20 1100 (!) 140/78 -- -- 116 114 14 97 % -- --   01/02/20 1030 (!) 141/67 -- -- 114 112 27 94 % -- --   01/02/20 1000 137/69 -- -- 116 113 (!) 49 94 % -- --   01/02/20 0945 (!) 153/70 -- -- 117 114 (!) 35 94 % -- --   01/02/20 0930 -- -- -- -- 112 24 97 % -- --   01/02/20 0915 -- -- -- -- 112 24 98 % -- --   01/02/20 0900 -- -- -- -- 114 (!) 36 98 % -- --   01/02/20 0839 (!) 177/64 -- -- -- -- -- -- -- --   01/02/20 0838 -- 98.3  F (36.8  C) Temporal -- 80 -- 94 % 1.626 m (5' 4\") 63.5 kg (140 lb)       Physical Exam  General: Patient is alert and dyspneic appearing sitting on the edge of the bed tripoding..  HEENT: Head atraumatic    Eyes: pupils equal and reactive. Conjunctiva clear   Nares: patent   Oropharynx: no lesions, uvula midline, no palatal draping, normal voice, no trismus  Neck: Supple without lymphadenopathy, no meningismus  Chest: Heart regular rate and rhythm.   Lungs: Coarse wet cough, tachypnea, accessory muscle use, pursed lip breathing, 1 word conversational dyspnea, rales noted at the right base.  Abdomen: Soft, non tender, nondistended, normal bowel sounds  Back: No costovertebral angle tenderness, no midline C, T or L spine tenderness  Neuro: Grossly nonfocal, normal speech, strength equal bilaterally, CN 2-12 intact  Extremities: No deformities, equal radial and DP pulses. No clubbing, cyanosis.  No edema  Skin: Warm and dry with no rash.     Emergency Department Course   ECG:  Indication: Shortness of Breath  Time: 0959  Vent. Rate 117 bpm. MS interval 142. QRS duration 68. QT/QTc 300/418. P-R-T axis 82 71 73. Sinus tachycardia with premature supraventricular complexes and with occasional premature ventricular complexes. Otherwise normal ECG. Read time: 1004    Indication: Shortness of Breath  Time: 0859  Vent. Rate 119 bpm. MS interval 144. QRS duration 66. " QT/QTc 288/405. P-R-T axis 81 76 73. Sinus tachycardia with premature supraventricular complexes and with occasional premature ventricular complexes. Otherwise normal ECG. Read time: 0910    There are no significant changes when compared to an ECG done on 08/31/19.     Imaging:  Radiology findings were communicated with the patient who voiced understanding of the findings.    XR Chest, G/E 2 views:   Focal airspace consolidation seen within the right lower  lung and is concerning for pneumonia. Subsegmental atelectasis seen in the left lower lung. Cardiomediastinal silhouette is within normal limits. Borderline enlarged cardiomediastinal silhouette. Multilevel degenerative changes seen in the spine. As per radiology.    Laboratory:  Laboratory findings were communicated with the patient who voiced understanding of the findings.    CBC: WBC: 19.8 (H), HGB: 12.0, PLT: 335  BMP: Glucose 114 (H), o/w WNL (Creatinine: 0.61)    ISTAT VBG: PCO2: 59 (H), Bicarbonate: 36 (H)    0918 Troponin: <0.015    Blood Culture x2: Pending     Interventions:  0908 Duoneb 3 mL nebulizer   0919 Duoneb 3 mL nebulizer   0953 0953 Solu-Medrol 125 mg IV  1056 Rocephin 1 g IV  Zithromax 500 mg IV Ordered      Emergency Department Course:  Past medical records, nursing notes, and vitals reviewed.    0845 I performed an exam of the patient as documented above.     EKG obtained in the ED, see results above.   IV was inserted and blood was drawn for laboratory testing, results above.  The patient was sent for a chest x-ray while in the emergency department, results above.     0950 I rechecked the patient and discussed the results of her workup thus far. The patient has seen an improvement in her symptoms and is back down to 3 L of O2.    Findings and plan explained to the Patient and spouse who consents to admission. Discussed the patient with Dr. Sharma, who will admit the patient to a [facility] bed for further monitoring, evaluation, and  treatment.    I personally reviewed the laboratory and imaging results with the Patient and spouse and answered all related questions prior to admission.     Impression & Plan   Medical Decision Making:  Sydni Mendoza is a 74 year old female who presents for evaluation of cough and shortness of breath.  History, physical exam and imaging studies are consistent with pneumonia and COPD exacerbation.  Patient has very extensive COPD on home oxygen with frequent exacerbations.  She just finished a 10-day course of Levaquin 4 days ago.  Now with x-ray revealing right lower lobe infiltrate.  Given recent outpatient antibiotic treatment, worsening hypoxia here in the emergency department with sats in the 70s on arrival, leukocytosis and tachycardia feel the patient requires inpatient treatment..  First dose of antibiotics given in ED within 2 hours of diagnosis.  There are no signs of complications of pneumonia at this point such as septic shock, bacteremia, empyema, hypoxia, respiratory failure or compromise.      I doubt PE, dissection, acute coronary syndrome, or other worrisome etiology for patients symptoms.  Would not cover for aspiration or antibiotic resistant strains of pneumonia at this point.    Diagnosis:    ICD-10-CM    1. Pneumonia of right lower lobe due to infectious organism (H) J18.1 Blood culture     Blood culture   2. COPD exacerbation (H) J44.1    3. Acute respiratory failure with hypoxia (H) J96.01        Disposition:  Admitted to Dr. Sharma.    Scribe Disclosure:  IIvory, am serving as a scribe at 9:21 AM on 1/2/2020 to document services personally performed by Jasmin Rosa MD based on my observations and the provider's statements to me.      Jasmin Rosa MD  01/02/20 0014

## 2020-01-02 NOTE — PROGRESS NOTES
RECEIVING UNIT ED HANDOFF REVIEW    ED Nurse Handoff Report was reviewed by: Dimple Romeo RN on January 2, 2020 at 11:21 AM

## 2020-01-02 NOTE — ED NOTES
"[facility]  ED Nurse Handoff Report    ED Chief complaint: Copd Exacerbation      ED Diagnosis:   Final diagnoses:   Pneumonia of right lower lobe due to infectious organism (H)   COPD exacerbation (H)   Acute respiratory failure with hypoxia (H)       Code Status: Full Code    Allergies:   Allergies   Allergen Reactions     Albuterol Palpitations     Is fine taking albuterol inhaler, tachycardia goes away.       Activity level - Baseline/Home:  Independent  Activity Level - Current:   Independent    Patient's Preferred language: English   Needed?: No    Isolation: No  Infection: Not Applicable  Bariatric?: No    Vital Signs:   Vitals:    01/02/20 0930 01/02/20 0945 01/02/20 1000 01/02/20 1030   BP:  (!) 153/70 137/69 (!) 141/67   Pulse:  117 116 114   Resp: 24 (!) 35 (!) 49 27   Temp:       TempSrc:       SpO2: 97% 94% 94% 94%   Weight:       Height:           Cardiac Rhythm: ,        Pain level:      Is this patient confused?: No   Does this patient have a guardian?  No         If yes, is there guardianship documents in the Epic \"Code/ACP\" activity?  N/A         Guardian Notified?  N/A  [place] - Suicide Severity Rating Scale Completed?  Yes  If yes, what color did the patient score?  White    Patient Report: Initial Complaint: Pt was on an abx for 10 days and was doing well until last night. Now C/o sob.  Focused Assessment: Very SOB. Pt on 3L oxygen all the time and when isn't quickly desaturates. Exertional SOB and pt states she can only walk a short distance before becoming acutely SOB. Frequent cough.  Tests Performed: blood, 2 sets blood cultures, xray  Abnormal Results: see EPIC  Treatments provided: 2 duonebs, Solu-Medrol, antibiotics    Family Comments:  present    OBS brochure/video discussed/provided to patient/family: N/A              Name of person given brochure if not patient: n/a              Relationship to patient: n/a    ED Medications:   Medications "   cefTRIAXone (ROCEPHIN) 1 g vial to attach to  mL bag for ADULTS or NS 50 mL bag for PEDS (1 g Intravenous New Bag 1/2/20 1056)   azithromycin (ZITHROMAX) 500 mg vial to attach to  mL bag (has no administration in time range)   ipratropium - albuterol 0.5 mg/2.5 mg/3 mL (DUONEB) neb solution 3 mL (3 mLs Nebulization Given 1/2/20 0919)   ipratropium - albuterol 0.5 mg/2.5 mg/3 mL (DUONEB) neb solution 3 mL (3 mLs Nebulization Given 1/2/20 0908)   methylPREDNISolone sodium succinate (solu-MEDROL) injection 125 mg (125 mg Intravenous Given 1/2/20 0953)       Drips infusing?:  Yes-1st antibiotic    For the majority of the shift this patient was Green.   Interventions performed were n/a.    Severe Sepsis OR Septic Shock Diagnosis Present: No    To be done/followed up on inpatient unit:  2nd antibiotic if not done in the ED    ED NURSE PHONE NUMBER: [phone]

## 2020-01-02 NOTE — PHARMACY-ADMISSION MEDICATION HISTORY
Pharmacy Medication History  Admission medication history interview status for the 1/2/2020  admission is complete. See EPIC admission navigator for prior to admission medications     Medication history sources: Patient  Medication history source reliability: Good  Adherence assessment: Good    Significant changes made to the medication list:  Added chronic prednisone 10mg daily, her doctor thinks she may have to stay on that.  Is not a part of a taper.      Additional medication history information:   Pt has albuterol listed as allergy. She gets tachycardia.  She said this does not bother her and it goes away quickly. She does have an albuterol MDI but uses xopenex w/ nebs.  She states this side effect does not make her unable to use it.      Additionally she does not have her inhalers with her.     Medication reconciliation completed by provider prior to medication history? No    Time spent in this activity: 15 mins      Prior to Admission medications    Medication Sig Last Dose Taking? Auth Provider   albuterol (PROAIR HFA/PROVENTIL HFA/VENTOLIN HFA) 108 (90 Base) MCG/ACT inhaler Inhale 2 puffs into the lungs every 6 hours as needed for shortness of breath / dyspnea or wheezing prn Yes Unknown, Entered By History   DULERA 200-5 MCG/ACT oral inhaler Inhale 2 puffs into the lungs 2 times daily 1/2/2020 at x1 Yes Reported, Patient   furosemide (LASIX) 40 MG tablet Take 40 mg by mouth daily as needed (lymphedema in legs) 12/30/2019 Yes Unknown, Entered By History   ipratropium (ATROVENT) 0.02 % nebulizer solution 0.5 mg by neb BID, may increase to QID prn cough/dyspnea 1/1/2020 at x3 Yes Geovanna Stokes DO   levalbuterol (XOPENEX) 1.25 MG/3ML neb solution 1.25 mg by neb BID, may increase to QID as needed 1/1/2020 at x3 Yes Geovanna Stokes DO   losartan (COZAAR) 50 MG tablet TAKE 1 TABLET BY MOUTH EVERY DAY 1/2/2020 at Unknown time Yes Lyn Lui MD   Multiple Vitamin (MULTI-VITAMIN)  per tablet Take 1 tablet by mouth daily.   1/1/2020 at Unknown time Yes Reported, Patient   potassium chloride ER (K-DUR/KLOR-CON M) 10 MEQ CR tablet Take 10 mEq by mouth 2 times daily as needed for potassium supplementation (when she takes furosemide) 12/30/2019 Yes Unknown, Entered By History   predniSONE (DELTASONE) 10 MG tablet Take 10 mg by mouth daily 1/2/2020 at Unknown time Yes Unknown, Entered By History   umeclidinium (INCRUSE ELLIPTA) 62.5 MCG/INH oral inhaler Inhale 1 puff into the lungs daily 1/1/2020 at noon Yes Lyn Lui MD Tiffany M. Reinitz, PharmD

## 2020-01-03 LAB
ALBUMIN SERPL-MCNC: 3.3 G/DL (ref 3.4–5)
ALP SERPL-CCNC: 51 U/L (ref 40–150)
ALT SERPL W P-5'-P-CCNC: 18 U/L (ref 0–50)
ANION GAP SERPL CALCULATED.3IONS-SCNC: 3 MMOL/L (ref 3–14)
AST SERPL W P-5'-P-CCNC: 10 U/L (ref 0–45)
BILIRUB SERPL-MCNC: 0.4 MG/DL (ref 0.2–1.3)
BUN SERPL-MCNC: 23 MG/DL (ref 7–30)
CALCIUM SERPL-MCNC: 9.2 MG/DL (ref 8.5–10.1)
CHLORIDE SERPL-SCNC: 102 MMOL/L (ref 94–109)
CO2 SERPL-SCNC: 33 MMOL/L (ref 20–32)
CREAT SERPL-MCNC: 0.63 MG/DL (ref 0.52–1.04)
ERYTHROCYTE [DISTWIDTH] IN BLOOD BY AUTOMATED COUNT: 13 % (ref 10–15)
GFR SERPL CREATININE-BSD FRML MDRD: 89 ML/MIN/{1.73_M2}
GLUCOSE BLDC GLUCOMTR-MCNC: 149 MG/DL (ref 70–99)
GLUCOSE BLDC GLUCOMTR-MCNC: 160 MG/DL (ref 70–99)
GLUCOSE SERPL-MCNC: 332 MG/DL (ref 70–99)
HBA1C MFR BLD: 5.7 % (ref 0–5.6)
HCT VFR BLD AUTO: 38 % (ref 35–47)
HGB BLD-MCNC: 11.9 G/DL (ref 11.7–15.7)
LACTATE BLD-SCNC: 2.7 MMOL/L (ref 0.7–2)
MCH RBC QN AUTO: 28.7 PG (ref 26.5–33)
MCHC RBC AUTO-ENTMCNC: 31.3 G/DL (ref 31.5–36.5)
MCV RBC AUTO: 92 FL (ref 78–100)
PLATELET # BLD AUTO: 343 10E9/L (ref 150–450)
POTASSIUM SERPL-SCNC: 4.4 MMOL/L (ref 3.4–5.3)
PROT SERPL-MCNC: 6.5 G/DL (ref 6.8–8.8)
RBC # BLD AUTO: 4.15 10E12/L (ref 3.8–5.2)
SODIUM SERPL-SCNC: 138 MMOL/L (ref 133–144)
WBC # BLD AUTO: 21 10E9/L (ref 4–11)

## 2020-01-03 PROCEDURE — 99232 SBSQ HOSP IP/OBS MODERATE 35: CPT | Performed by: INTERNAL MEDICINE

## 2020-01-03 PROCEDURE — 83036 HEMOGLOBIN GLYCOSYLATED A1C: CPT | Performed by: INTERNAL MEDICINE

## 2020-01-03 PROCEDURE — 25000132 ZZH RX MED GY IP 250 OP 250 PS 637: Performed by: INTERNAL MEDICINE

## 2020-01-03 PROCEDURE — 80053 COMPREHEN METABOLIC PANEL: CPT | Performed by: INTERNAL MEDICINE

## 2020-01-03 PROCEDURE — 94640 AIRWAY INHALATION TREATMENT: CPT | Mod: 76

## 2020-01-03 PROCEDURE — 40000275 ZZH STATISTIC RCP TIME EA 10 MIN

## 2020-01-03 PROCEDURE — 83605 ASSAY OF LACTIC ACID: CPT | Performed by: INTERNAL MEDICINE

## 2020-01-03 PROCEDURE — 25000131 ZZH RX MED GY IP 250 OP 636 PS 637: Performed by: INTERNAL MEDICINE

## 2020-01-03 PROCEDURE — 12000000 ZZH R&B MED SURG/OB

## 2020-01-03 PROCEDURE — 36415 COLL VENOUS BLD VENIPUNCTURE: CPT | Performed by: INTERNAL MEDICINE

## 2020-01-03 PROCEDURE — 25000125 ZZHC RX 250: Performed by: INTERNAL MEDICINE

## 2020-01-03 PROCEDURE — 85027 COMPLETE CBC AUTOMATED: CPT | Performed by: INTERNAL MEDICINE

## 2020-01-03 PROCEDURE — 94640 AIRWAY INHALATION TREATMENT: CPT

## 2020-01-03 PROCEDURE — 25000128 H RX IP 250 OP 636: Performed by: INTERNAL MEDICINE

## 2020-01-03 PROCEDURE — 00000146 ZZHCL STATISTIC GLUCOSE BY METER IP

## 2020-01-03 RX ORDER — PREDNISONE 20 MG/1
40 TABLET ORAL DAILY
Status: DISCONTINUED | OUTPATIENT
Start: 2020-01-03 | End: 2020-01-06 | Stop reason: HOSPADM

## 2020-01-03 RX ORDER — NICOTINE POLACRILEX 4 MG
15-30 LOZENGE BUCCAL
Status: DISCONTINUED | OUTPATIENT
Start: 2020-01-03 | End: 2020-01-06 | Stop reason: HOSPADM

## 2020-01-03 RX ORDER — DEXTROSE MONOHYDRATE 25 G/50ML
25-50 INJECTION, SOLUTION INTRAVENOUS
Status: DISCONTINUED | OUTPATIENT
Start: 2020-01-03 | End: 2020-01-06 | Stop reason: HOSPADM

## 2020-01-03 RX ADMIN — METHYLPREDNISOLONE SODIUM SUCCINATE 62.5 MG: 125 INJECTION, POWDER, FOR SOLUTION INTRAMUSCULAR; INTRAVENOUS at 09:11

## 2020-01-03 RX ADMIN — IPRATROPIUM BROMIDE AND ALBUTEROL SULFATE 3 ML: .5; 3 SOLUTION RESPIRATORY (INHALATION) at 11:13

## 2020-01-03 RX ADMIN — IPRATROPIUM BROMIDE AND ALBUTEROL SULFATE 3 ML: .5; 3 SOLUTION RESPIRATORY (INHALATION) at 15:05

## 2020-01-03 RX ADMIN — AZITHROMYCIN MONOHYDRATE 250 MG: 250 TABLET ORAL at 09:16

## 2020-01-03 RX ADMIN — LOSARTAN POTASSIUM 50 MG: 50 TABLET ORAL at 09:15

## 2020-01-03 RX ADMIN — INSULIN ASPART 1 UNITS: 100 INJECTION, SOLUTION INTRAVENOUS; SUBCUTANEOUS at 16:47

## 2020-01-03 RX ADMIN — IPRATROPIUM BROMIDE AND ALBUTEROL SULFATE 3 ML: .5; 3 SOLUTION RESPIRATORY (INHALATION) at 07:32

## 2020-01-03 RX ADMIN — IPRATROPIUM BROMIDE AND ALBUTEROL SULFATE 3 ML: .5; 3 SOLUTION RESPIRATORY (INHALATION) at 19:38

## 2020-01-03 RX ADMIN — Medication 1 MG: at 23:16

## 2020-01-03 RX ADMIN — PREDNISONE 40 MG: 20 TABLET ORAL at 14:13

## 2020-01-03 RX ADMIN — CEFTRIAXONE SODIUM 2 G: 2 INJECTION, POWDER, FOR SOLUTION INTRAMUSCULAR; INTRAVENOUS at 09:19

## 2020-01-03 RX ADMIN — METHYLPREDNISOLONE SODIUM SUCCINATE 62.5 MG: 125 INJECTION, POWDER, FOR SOLUTION INTRAMUSCULAR; INTRAVENOUS at 03:34

## 2020-01-03 ASSESSMENT — ACTIVITIES OF DAILY LIVING (ADL)
ADLS_ACUITY_SCORE: 13

## 2020-01-03 ASSESSMENT — MIFFLIN-ST. JEOR: SCORE: 1094.66

## 2020-01-03 NOTE — PROGRESS NOTES
Ambulated pt in hallway. Walking well w/ steady gait. Significant LORENZANA. Used pt's at home oxygen 4L nasal cannula w/ activity. Oxygen saturation stayed 94-95% w/ tachycardia -120s. Infrequent cough.

## 2020-01-03 NOTE — PROGRESS NOTES
[facility]    Medicine Progress Note - Hospitalist Service       Date of Admission:  1/2/2020  Assessment & Plan     Sydni Mendoza is a 74 year old female with history of COPD on chronic prednisone and 3 L oxygen at baseline, lymphedema, pulmonary nodule who presents with acute onset shortness of breath since yesterday evening.     Community-acquired pneumonia  Sepsis with tachycardia, leukocytosis related to above  COPD with acute exacerbation  -Has oxygen dependent COPD with 3 L at home and 10 mg prednisone chronically.  Presented with acute onset shortness of breath and productive cough.  Recently treated with Levaquin for 10 days as a prophylaxis as her  was sick with upper respiratory symptoms, while patient was not symptomatic.  Chest x-ray done in the ED shows right-sided pneumonia.  Flu swab negative, sputum culture growing normal oral dilip so far, blood culture negative to date.  Leukocytosis slightly worsened today compared to admission likely from being on high-dose steroids  -Started on ceftriaxone and azithromycin and IV Solu-Medrol  -Appreciate pulmonary input, changed to prednisone with a plan to taper  -Plan for discharging on oral doxycycline if patient continues to improve  -Breathing treatments with nebulizers, incentive spirometer     Essential Hypertension   -PTA on as needed Lasix, losartan  -Continue PTA losartan and Lasix with holding parameters  -PRN IV hydralazine, blood pressure over the last 24 hours mostly reasonable in 120s to 130s with few readings high, will continue with current regimen  -Monitor and adjust medication as needed     Hyperglycemia  -Blood sugar earlier this morning was more than 300.  Likely secondary to high-dose steroids, acute stress and patient had just eaten her breakfast before blood was drawn.  No known history of diabetes.  Hemoglobin A1c is 5.7.  We will monitor her blood sugar with Accu-Cheks and sliding scale insulin while  patient is on high-dose steroids while in-house.    History of lung nodule  - Noted first time in August 2019 and plan was for 6-month repeat imaging.  -CT chest done during this admission shows stable lung nodule     History of lymphedema  PTA on as needed Lasix, resumed    Diet: Combination Diet Regular Diet Adult    DVT Prophylaxis: Pneumatic Compression Devices  Vasquez Catheter: not present  Code Status: Full Code      Disposition Plan   Expected discharge: Tomorrow, recommended to prior living arrangement once Continued clinical improvement and cleared by pulmonary.  Entered: Ora Sharma MD 01/03/2020, 8:52 AM       The patient's care was discussed with the Bedside Nurse and Patient.    Ora Sharma MD  Hospitalist Service  [facility]    ______________________________________________________________________    Interval History   Patient reports significant improvement compared to yesterday however still not back to her baseline.  No new question.  No new nursing concerns    Data reviewed today: I reviewed all medications, new labs and imaging results over the last 24 hours. I personally reviewed no images or EKG's today.    Physical Exam   Vital Signs: Temp: 98.1  F (36.7  C) Temp src: Oral BP: (!) 176/88 Pulse: 114 Heart Rate: 91 Resp: 18 SpO2: 96 % O2 Device: Nasal cannula Oxygen Delivery: 3 LPM  Weight: 132 lbs 3.2 oz  Exam:  Constitutional: Awake, alert and no distress. Appears comfortable  Head: Normocephalic. No masses, lesions, tenderness or abnormalities  ENT: ENT exam normal, no neck nodes or sinus tenderness  Cardiovascular: RRR.  no murmurs, no rubs or JVD  Respiratory: Normal WOB,b/l  decreased air entry, right sided occasional crackles  Gastrointestinal: Abdomen soft, non-tender. BS normal. No masses, organomegaly  : Deferred  Extremities : Mild bilateral edema , no clubbing or cyanosis      Data   Recent Labs   Lab 01/03/20  0921 01/02/20  0918   WBC 21.0* 19.8*   HGB 11.9  12.0   MCV 92 92    335    136   POTASSIUM 4.4 3.9   CHLORIDE 102 100   CO2 33* 32   BUN 23 15   CR 0.63 0.61   ANIONGAP 3 4   MARQUEZ 9.2 9.1   * 114*   ALBUMIN 3.3*  --    PROTTOTAL 6.5*  --    BILITOTAL 0.4  --    ALKPHOS 51  --    ALT 18  --    AST 10  --    TROPI  --  <0.015     No results found for this or any previous visit (from the past 24 hour(s)).  Medications       azithromycin  250 mg Oral Daily     cefTRIAXone  2 g Intravenous Q24H     insulin aspart  1-7 Units Subcutaneous TID AC     insulin aspart  1-5 Units Subcutaneous At Bedtime     ipratropium - albuterol 0.5 mg/2.5 mg/3 mL  3 mL Nebulization 4x daily     losartan  50 mg Oral Daily     predniSONE  40 mg Oral Daily     sodium chloride (PF)  3 mL Intracatheter Q8H

## 2020-01-03 NOTE — PROGRESS NOTES
Patient given nebulizer treatments as ordered. BS are clear but diminished t/o pre and post neb. SpO2 is 97% on 3 lpm decreased oxygen to 2 lpm nc.

## 2020-01-03 NOTE — CONSULTS
Pulmonary Medicine Consultation      Date of Admission: 1/2/2020  Primary Attending:  Ora Sharma MD  Consulting Physician: Raji Phipps MD    History:    Sydni Mendoza is a 74 year old female with history of COPD on chronic prednisone and 3 L oxygen at baseline, lymphedema, pulmonary nodule who presents with acute onset shortness of breath since yesterday evening.     The patient had recent evaluation by [place] [facility] specialist who had given her 10-day course of Levaquin which she finished about 5 days ago, for prophylaxis against infection as her  was sick with some viral URI.  She actually was at her baseline at that time.  She started suddenly having shortness of breath last evening.  She does have cough with yellow sputum production.  Denies any hemoptysis.  She denies any fever.  Denies any chest pain, dizziness, lightheadedness, syncope, nausea, vomiting, headache.      Review of Systems - A 10-system ROS is negative except for items mentioned above and in HPI.       Prior medical history:  Past Medical History:   Diagnosis Date     COPD (chronic obstructive pulmonary disease) (H)      Lung nodule 9-14-12    Biopsy; fibrosis&chronic inflamation Right     Smoker 9-15-12    quit        Past Surgical History:   Procedure Laterality Date     CHEST TUBE INSERTION  9-14-12    Right ,pneumothorax post, lung biopsy     GYN SURGERY      Tubal Ligation       Patient Active Problem List   Diagnosis     COPD (chronic obstructive pulmonary disease) (H)     Smoking     Lung nodule     Pneumothorax, post biopsy, right     Health Care Home     Pneumonia     COPD with acute exacerbation (H)     COPD exacerbation (H)     Hyponatremia     Acute and chronic respiratory failure with hypoxia (H)     Acute exacerbation of chronic obstructive pulmonary disease (COPD) (H)     CAP (community acquired pneumonia)       Social History     Social History     Socioeconomic History     Marital status:       Spouse name: Not on file     Number of children: Not on file     Years of education: Not on file     Highest education level: Not on file   Occupational History     Not on file   Social Needs     Financial resource strain: Not on file     Food insecurity:     Worry: Not on file     Inability: Not on file     Transportation needs:     Medical: Not on file     Non-medical: Not on file   Tobacco Use     Smoking status: Former Smoker     Packs/day: 1.00     Years: 50.00     Pack years: 50.00     Types: Cigarettes     Last attempt to quit: 2011     Years since quittin.3     Smokeless tobacco: Never Used   Substance and Sexual Activity     Alcohol use: Yes     Alcohol/week: 0.0 standard drinks     Comment: occasional     Drug use: No     Sexual activity: Yes     Comment:    Lifestyle     Physical activity:     Days per week: Not on file     Minutes per session: Not on file     Stress: Not on file   Relationships     Social connections:     Talks on phone: Not on file     Gets together: Not on file     Attends Confucianist service: Not on file     Active member of club or organization: Not on file     Attends meetings of clubs or organizations: Not on file     Relationship status: Not on file     Intimate partner violence:     Fear of current or ex partner: Not on file     Emotionally abused: Not on file     Physically abused: Not on file     Forced sexual activity: Not on file   Other Topics Concern     Parent/sibling w/ CABG, MI or angioplasty before 65F 55M? Not Asked   Social History Narrative     Not on file         Family History  History reviewed. No pertinent family history.      Medications  No current outpatient medications on file.     Current Facility-Administered Medications Ordered in Epic   Medication Dose Route Frequency Last Rate Last Dose     acetaminophen (TYLENOL) tablet 650 mg  650 mg Oral Q4H PRN         azithromycin (ZITHROMAX) tablet 250 mg  250 mg Oral Daily         cefTRIAXone  (ROCEPHIN) 2 g vial to attach to  ml bag for ADULTS or NS 50 ml bag for PEDS  2 g Intravenous Q24H         furosemide (LASIX) tablet 40 mg  40 mg Oral Daily PRN         hydrALAZINE (APRESOLINE) injection 10 mg  10 mg Intravenous Q4H PRN         ipratropium - albuterol 0.5 mg/2.5 mg/3 mL (DUONEB) neb solution 3 mL  3 mL Nebulization 4x daily   3 mL at 20 0732     levalbuterol (XOPENEX CONC) neb solution 1.25 mg  1.25 mg Nebulization Q6H PRN         lidocaine (LMX4) cream   Topical Q1H PRN         lidocaine 1 % 0.1-1 mL  0.1-1 mL Other Q1H PRN         losartan (COZAAR) tablet 50 mg  50 mg Oral Daily         melatonin tablet 1 mg  1 mg Oral At Bedtime PRN         methylPREDNISolone sodium succinate (solu-MEDROL) injection 62.5 mg  62.5 mg Intravenous Q6H   62.5 mg at 20 0911     naloxone (NARCAN) injection 0.1-0.4 mg  0.1-0.4 mg Intravenous Q2 Min PRN         ondansetron (ZOFRAN-ODT) ODT tab 4 mg  4 mg Oral Q6H PRN        Or     ondansetron (ZOFRAN) injection 4 mg  4 mg Intravenous Q6H PRN         polyethylene glycol (MIRALAX/GLYCOLAX) Packet 17 g  17 g Oral Daily PRN         sodium chloride (PF) 0.9% PF flush 3 mL  3 mL Intracatheter q1 min prn         sodium chloride (PF) 0.9% PF flush 3 mL  3 mL Intracatheter Q8H   3 mL at 20 0334     No current [facility]-ordered outpatient medications on file.       Allergies   Allergen Reactions     Albuterol Palpitations     Is fine taking albuterol inhaler, tachycardia goes away.         Physical Examination:   Vitals:    20 1900 20 0331 20 0500 20 0740   BP: 139/74 (!) 138/96  (!) 176/88   BP Location: Right arm Right arm  Right arm   Pulse:    114   Resp: 18 18  18   Temp: 98.2  F (36.8  C) 98  F (36.7  C)  98.1  F (36.7  C)   TempSrc: Oral Oral  Oral   SpO2: 94% 94%  96%   Weight:   60 kg (132 lb 3.2 oz)    Height:         Body mass index is 22.69 kg/m .  Temp (24hrs), Av.1  F (36.7  C), Min:97.8  F (36.6  C), Max:98.3  F (36.8   C)        Constitutional:  Appears comfortable.  HENT:  mucous membranes moist.  Eyes: PERRLA, no icterus, no pallor.   Neck: No lymphadenopathy or thyromegaly, trachea midline, no carotid bruits.  Cardiovascular: Regular rate and rythym, no murmurs, rubs or gallops, no peripheral edema.  Respiratory/Chest: diminished throughout  Gastrointestinal: Abdomen was soft, non-tender, non-distended, no masses felt, no hepatosplenomegaly.  Musculoskeletal: No clubbing or cyanosis, full range of motion in all extremities.  Neurological: No focal motor or sensory deficits. DTR's are 2+ and symmetric.  Skin: No skin rash, hives, petechiae, or breakdown.      CMP  Recent Labs   Lab 01/02/20  0918      POTASSIUM 3.9   CHLORIDE 100   CO2 32   ANIONGAP 4   *   BUN 15   CR 0.61   GFRESTIMATED >90   GFRESTBLACK >90   MARQUEZ 9.1     CBC  Recent Labs   Lab 01/02/20  0918   WBC 19.8*   RBC 4.22   HGB 12.0   HCT 39.0   MCV 92   MCH 28.4   MCHC 30.8*   RDW 13.4        INRNo lab results found in last 7 days.  Arterial Blood GasNo lab results found in last 7 days.  Recent Labs   Lab 01/02/20  1043 01/02/20  0918   CULT No growth after 18 hours No growth after 18 hours       Diagnostic Studies:  Chest Radiology:   CT Chest 1/2  IMPRESSION: Areas of bronchial wall thickening, mucous plugging and  patchy infiltrates are seen throughout the right lower lobe compatible  with pneumonia with bronchitis. Severe emphysema.           Assessment/Plan:     AECOPD    Plan  -change to prednisone 40mg daily. Taper 10mg every 4 days until back to baseline of 10mg daily  -change to PO doxycycline tomorrow and complete 7 day course today  -back on baseline O2 levels  -ambulate, out of bed if able  -if clinically stable, okay to discharge home tomorrow  -follow up in 6-8 weeks with Dr. Cardoso.  -follow up CT chest in 12 months for pulmonary nodules.     Thank you for the consult, please call with any questions    Raji Phipps M.D.  Pulmonary,  Critical Care and Sleep Medicine  [facility]   Pager: [phone]  Office:[phone]

## 2020-01-03 NOTE — PLAN OF CARE
DATE & TIME: 1/3/2020 7-3 pm       Cognitive Concerns/ Orientation : A & O x4, anxious.    BEHAVIOR & AGGRESSION TOOL COLOR: Green   CIWA SCORE: N/A  ABNL VS/O2: VSS on 3L nasal cannula (baseline).  MOBILITY: IND/SBA in hallway.  PAIN MANAGMENT: Denies  DIET: Regular  BOWEL/BLADDER: No issues.  ABNL LAB/BG: /114. WBC 21.  DRAIN/DEVICES: PIV SL.  TELEMETRY RHYTHM: Sinus tachycardia w/ occasional PVC.   SKIN: Generalized edema in arms, lymphedema in BLE +1-2. Bruising and fragile skin. Pt has own lymphedema wraps on bi-laterally.   TESTS/PROCEDURES: None scheduled.   D/C DAY/GOALS/PLACE: Pending, possibly tomorrow.   OTHER IMPORTANT INFO: Switching to oral prednisone. Derrell of BGM x1, WDL this afternoon. Encouraging activity and IS.

## 2020-01-03 NOTE — PLAN OF CARE
DATE & TIME: 1/3/20           Cognitive Concerns/ Orientation : A&Ox4.   BEHAVIOR & AGGRESSION TOOL COLOR: Green   CIWA SCORE: N/A  ABNL VS/O2: VSS on 3L nasal cannula ( baseline).  MOBILITY: Independent  PAIN MANAGMENT: Denies pain.  DIET: Regular  BOWEL/BLADDER: continent.   ABNL LAB/BG: WBC 19.8  DRAIN/DEVICES: PIV SL.  TELEMETRY RHYTHM: Sinus Rhythm w/PVC  SKIN: Generalized edema in arms, lymphedema in BLE +3. Bruised thin, fragile skin.   TESTS/PROCEDURES: CT of chest completed.   D/C DAY/GOALS/PLACE: Pending  OTHER IMPORTANT INFO: Pulmonology consulted.

## 2020-01-03 NOTE — PLAN OF CARE
DATE & TIME: 1/2/2020 [id number]              Cognitive Concerns/ Orientation : A&Ox4, calm and cooperative.   BEHAVIOR & AGGRESSION TOOL COLOR: Green   CIWA SCORE: N/A  ABNL VS/O2: VSS on 3L nasal cannula ( baseline).  MOBILITY: Independent  PAIN MANAGMENT: Denies  DIET: Regular  BOWEL/BLADDER: No issues noted, hat in toilet.   ABNL LAB/BG: WBC 19.8  DRAIN/DEVICES: PIV SL  TELEMETRY RHYTHM: Sinus Tachycardia  SKIN: Generalized edema in arms, lymphedema in BLE +3. Bruising and fragile skin.   TESTS/PROCEDURES: CT of chest completed.   D/C DAY/GOALS/PLACE: Pending  OTHER IMPORTANT INFO: Pulmonology consulted. Droplet precautions discontinued, Influenza A/B & RSV negative.

## 2020-01-04 LAB
ANION GAP SERPL CALCULATED.3IONS-SCNC: 2 MMOL/L (ref 3–14)
BUN SERPL-MCNC: 28 MG/DL (ref 7–30)
CALCIUM SERPL-MCNC: 9.5 MG/DL (ref 8.5–10.1)
CHLORIDE SERPL-SCNC: 106 MMOL/L (ref 94–109)
CO2 SERPL-SCNC: 34 MMOL/L (ref 20–32)
CREAT SERPL-MCNC: 0.69 MG/DL (ref 0.52–1.04)
ERYTHROCYTE [DISTWIDTH] IN BLOOD BY AUTOMATED COUNT: 13.4 % (ref 10–15)
GFR SERPL CREATININE-BSD FRML MDRD: 86 ML/MIN/{1.73_M2}
GLUCOSE BLDC GLUCOMTR-MCNC: 114 MG/DL (ref 70–99)
GLUCOSE BLDC GLUCOMTR-MCNC: 118 MG/DL (ref 70–99)
GLUCOSE BLDC GLUCOMTR-MCNC: 142 MG/DL (ref 70–99)
GLUCOSE BLDC GLUCOMTR-MCNC: 153 MG/DL (ref 70–99)
GLUCOSE BLDC GLUCOMTR-MCNC: 87 MG/DL (ref 70–99)
GLUCOSE SERPL-MCNC: 126 MG/DL (ref 70–99)
HCT VFR BLD AUTO: 39 % (ref 35–47)
HGB BLD-MCNC: 12.1 G/DL (ref 11.7–15.7)
MCH RBC QN AUTO: 28.6 PG (ref 26.5–33)
MCHC RBC AUTO-ENTMCNC: 31 G/DL (ref 31.5–36.5)
MCV RBC AUTO: 92 FL (ref 78–100)
PLATELET # BLD AUTO: 372 10E9/L (ref 150–450)
POTASSIUM SERPL-SCNC: 4.5 MMOL/L (ref 3.4–5.3)
RBC # BLD AUTO: 4.23 10E12/L (ref 3.8–5.2)
SODIUM SERPL-SCNC: 142 MMOL/L (ref 133–144)
WBC # BLD AUTO: 26.5 10E9/L (ref 4–11)

## 2020-01-04 PROCEDURE — 25000125 ZZHC RX 250: Performed by: INTERNAL MEDICINE

## 2020-01-04 PROCEDURE — 94640 AIRWAY INHALATION TREATMENT: CPT | Mod: 76

## 2020-01-04 PROCEDURE — 36415 COLL VENOUS BLD VENIPUNCTURE: CPT | Performed by: INTERNAL MEDICINE

## 2020-01-04 PROCEDURE — 85027 COMPLETE CBC AUTOMATED: CPT | Performed by: INTERNAL MEDICINE

## 2020-01-04 PROCEDURE — 80048 BASIC METABOLIC PNL TOTAL CA: CPT | Performed by: INTERNAL MEDICINE

## 2020-01-04 PROCEDURE — 12000000 ZZH R&B MED SURG/OB

## 2020-01-04 PROCEDURE — 25000128 H RX IP 250 OP 636: Performed by: INTERNAL MEDICINE

## 2020-01-04 PROCEDURE — 25000132 ZZH RX MED GY IP 250 OP 250 PS 637: Performed by: INTERNAL MEDICINE

## 2020-01-04 PROCEDURE — 00000146 ZZHCL STATISTIC GLUCOSE BY METER IP

## 2020-01-04 PROCEDURE — 25000131 ZZH RX MED GY IP 250 OP 636 PS 637: Performed by: INTERNAL MEDICINE

## 2020-01-04 PROCEDURE — 40000275 ZZH STATISTIC RCP TIME EA 10 MIN

## 2020-01-04 PROCEDURE — 99232 SBSQ HOSP IP/OBS MODERATE 35: CPT | Performed by: INTERNAL MEDICINE

## 2020-01-04 PROCEDURE — 94640 AIRWAY INHALATION TREATMENT: CPT

## 2020-01-04 RX ADMIN — IPRATROPIUM BROMIDE AND ALBUTEROL SULFATE 3 ML: .5; 3 SOLUTION RESPIRATORY (INHALATION) at 15:03

## 2020-01-04 RX ADMIN — LOSARTAN POTASSIUM 50 MG: 50 TABLET ORAL at 08:19

## 2020-01-04 RX ADMIN — INSULIN ASPART 1 UNITS: 100 INJECTION, SOLUTION INTRAVENOUS; SUBCUTANEOUS at 13:45

## 2020-01-04 RX ADMIN — AZITHROMYCIN MONOHYDRATE 250 MG: 250 TABLET ORAL at 09:22

## 2020-01-04 RX ADMIN — PREDNISONE 40 MG: 20 TABLET ORAL at 09:22

## 2020-01-04 RX ADMIN — IPRATROPIUM BROMIDE AND ALBUTEROL SULFATE 3 ML: .5; 3 SOLUTION RESPIRATORY (INHALATION) at 18:42

## 2020-01-04 RX ADMIN — IPRATROPIUM BROMIDE AND ALBUTEROL SULFATE 3 ML: .5; 3 SOLUTION RESPIRATORY (INHALATION) at 08:26

## 2020-01-04 RX ADMIN — IPRATROPIUM BROMIDE AND ALBUTEROL SULFATE 3 ML: .5; 3 SOLUTION RESPIRATORY (INHALATION) at 12:26

## 2020-01-04 RX ADMIN — CEFTRIAXONE SODIUM 2 G: 2 INJECTION, POWDER, FOR SOLUTION INTRAMUSCULAR; INTRAVENOUS at 09:24

## 2020-01-04 ASSESSMENT — ACTIVITIES OF DAILY LIVING (ADL)
ADLS_ACUITY_SCORE: 13

## 2020-01-04 ASSESSMENT — MIFFLIN-ST. JEOR: SCORE: 1096.92

## 2020-01-04 NOTE — PROGRESS NOTES
[facility]    Medicine Progress Note - Hospitalist Service       Date of Admission:  1/2/2020  Assessment & Plan     Sydni Mendoza is a 74 year old female with history of COPD on chronic prednisone and 3 L oxygen at baseline, lymphedema, pulmonary nodule who presents with acute onset shortness of breath since yesterday evening.     Community-acquired pneumonia  Sepsis with tachycardia, leukocytosis related to above  COPD with acute exacerbation  -Has oxygen dependent COPD with 3 L at home and 10 mg prednisone chronically.  Presented with acute onset shortness of breath and productive cough.  Recently treated with Levaquin for 10 days as a prophylaxis as her  was sick with upper respiratory symptoms, while patient was not symptomatic.  Chest x-ray done in the ED shows right-sided pneumonia.  Flu swab negative, sputum culture growing normal oral dilip so far, blood culture negative to date.  Leukocytosis slightly worsened today compared to admission likely from being on high-dose steroids  -Started on ceftriaxone and azithromycin and IV Solu-Medrol  -Appreciate pulmonary input, changed to prednisone with a plan to taper  -Plan for discharging on oral doxycycline if patient continues to improve  -Breathing treatments with nebulizers, incentive spirometer     Leukocytosis  -Had presented with a white cell count of 19, has worsened to 26, likely demargination from being on steroids, patient has been afebrile  -Would like to see improving/stable trend prior to discharge    Essential Hypertension   -PTA on as needed Lasix, losartan  -Continue PTA losartan and Lasix with holding parameters  -PRN IV hydralazine, blood pressure over the last 24 hours mostly reasonable in 120s to 130s with few readings high, will continue with current regimen  -Monitor and adjust medication as needed     Hyperglycemia  -Blood sugar in the morning of 1/3/2020 was more than 300.  Likely secondary to high-dose  steroids, acute stress and patient had just eaten her breakfast before blood was drawn.  No known history of diabetes.  Hemoglobin A1c is 5.7.  Blood sugar since have been stable over the last 24 hours, will stop Accu-Cheks and insulin.     History of lung nodule  - Noted first time in August 2019 and plan was for 6-month repeat imaging.  -CT chest done during this admission shows stable lung nodule     History of lymphedema  PTA on as needed Lasix, resumed    Diet: Combination Diet Regular Diet Adult    DVT Prophylaxis: Pneumatic Compression Devices  Vasquez Catheter: not present  Code Status: Full Code      Disposition Plan   Expected discharge: Tomorrow, recommended to prior living arrangement once WBC count stabilizes and patient continues to improve.  Entered: Ora Sharma MD 01/04/2020, 3:30 PM       The patient's care was discussed with the Bedside Nurse and Patient.    Ora Sharma MD  Hospitalist Service  [facility]    ______________________________________________________________________    Interval History   Patient reports significant improvement compared to yesterday however still not back to her baseline.  No new question.  No new nursing concerns    Data reviewed today: I reviewed all medications, new labs and imaging results over the last 24 hours. I personally reviewed no images or EKG's today.    Physical Exam   Vital Signs: Temp: 98.1  F (36.7  C) Temp src: Oral BP: 137/65   Heart Rate: 101 Resp: 16 SpO2: 97 % O2 Device: Nasal cannula Oxygen Delivery: 3 LPM  Weight: 132 lbs 11.2 oz  Exam:  Constitutional: Awake, alert and no distress. Appears comfortable  Head: Normocephalic. No masses, lesions, tenderness or abnormalities  ENT: ENT exam normal, no neck nodes or sinus tenderness  Cardiovascular: RRR.  no murmurs, no rubs or JVD  Respiratory: Normal WOB,b/l  decreased air entry, right sided occasional crackles  Gastrointestinal: Abdomen soft, non-tender. BS normal. No masses,  organomegaly  : Deferred  Extremities : Mild bilateral edema , no clubbing or cyanosis      Data   Recent Labs   Lab 01/04/20  1201 01/03/20  0921 01/02/20  0918   WBC 26.5* 21.0* 19.8*   HGB 12.1 11.9 12.0   MCV 92 92 92    343 335    138 136   POTASSIUM 4.5 4.4 3.9   CHLORIDE 106 102 100   CO2 34* 33* 32   BUN 28 23 15   CR 0.69 0.63 0.61   ANIONGAP 2* 3 4   MARQUEZ 9.5 9.2 9.1   * 332* 114*   ALBUMIN  --  3.3*  --    PROTTOTAL  --  6.5*  --    BILITOTAL  --  0.4  --    ALKPHOS  --  51  --    ALT  --  18  --    AST  --  10  --    TROPI  --   --  <0.015     No results found for this or any previous visit (from the past 24 hour(s)).  Medications       azithromycin  250 mg Oral Daily     cefTRIAXone  2 g Intravenous Q24H     insulin aspart  1-7 Units Subcutaneous TID AC     insulin aspart  1-5 Units Subcutaneous At Bedtime     ipratropium - albuterol 0.5 mg/2.5 mg/3 mL  3 mL Nebulization 4x daily     losartan  50 mg Oral Daily     predniSONE  40 mg Oral Daily     sodium chloride (PF)  3 mL Intracatheter Q8H

## 2020-01-04 NOTE — PLAN OF CARE
DATE & TIME: 1/4/2020 7-3 pm                    Cognitive Concerns/ Orientation : A & O x4, anxious.   BEHAVIOR & AGGRESSION TOOL COLOR: Green  CIWA SCORE: N/A  ABNL VS/O2: VSS on 3L nasal cannula, pt's baseline from home. Tachycardia 100-110s, increases to 120-130s w/ activity.    MOBILITY: IND in room, SBA in hallway. Ambulated x2 in hallway this shift.   PAIN MANAGMENT: Denies.  DIET: Regular  BOWEL/BLADDER: WDL  ABNL LAB/BG: WBC up to 26.5. BGM 87/153.   DRAIN/DEVICES: PIV SL  TELEMETRY RHYTHM: Sinus tachycardia w/ PVCs.   SKIN: Generalized edema in arms, lymphedema in BLE +1-2. Bruising and fragile skin. Pt has own lymphedema wraps on bi-laterally.   TESTS/PROCEDURES: None scheduled.   D/C DAY/GOALS/PLACE: Pending possibly tomorrow.   OTHER IMPORTANT INFO: Sputum growing many different bacteria. Frequent productive cough.

## 2020-01-04 NOTE — PLAN OF CARE
DATE & TIME: 1/3/20    Cognitive Concerns/ Orientation : A&O x4   BEHAVIOR & AGGRESSION TOOL COLOR: Green  CIWA SCORE: N/A   ABNL VS/O2: VSS on 3LPM O2 via NC-pt baseline.  MOBILITY: Independent in room. SBA in halls.  PAIN MANAGMENT: Denies  DIET: Regular   BOWEL/BLADDER: Continent  ABNL LAB/BG: . Sliding scale insulin to manage. WBC 21.0  DRAIN/DEVICES: PIV SL   TELEMETRY RHYTHM: ST  SKIN: Intact. Scattered bruising. Thin skin from steroids. Flaky skin-lotion given.   TESTS/PROCEDURES: N/A  D/C DAY/GOALS/PLACE: Discharge home tomorrow pending.   OTHER IMPORTANT INFO:

## 2020-01-04 NOTE — PLAN OF CARE
DATE & TIME: 1/3/2020 4124-4348   Cognitive Concerns/ Orientation : Pt A/Ox4   BEHAVIOR & AGGRESSION TOOL COLOR: Green   ABNL VS/O2: VSS on 3L NC (baseline)  MOBILITY: Independent  PAIN MANAGMENT: Denies  DIET: Regular  BOWEL/BLADDER: Continent  ABNL LAB/BG: /WBC 21.0  DRAIN/DEVICES: IV SL  TELEMETRY RHYTHM: Sinus Tach  SKIN: Scattered bruising. Flaky/skin.   D/C DAY/GOALS/PLACE: Discharge home today pending progress  OTHER IMPORTANT INFO: New IV this evening. Pt will receive Melatonin this evening. Lactic acid 2.7, no new orders.

## 2020-01-04 NOTE — PROVIDER NOTIFICATION
MD Notification    Notified Person: MD    Notified Person Name: Dr. Kelly    Notification Date/Time: 1/3/2020 3311    Notification Interaction: Telephone    Purpose of Notification: Critical lactic acid 2.7    Orders Received: No new orders, continue to monitor

## 2020-01-04 NOTE — PROGRESS NOTES
Sepsis Evaluation Progress Note    I was called to see Sydni Mendoza due to abnormal vital signs triggering the Sepsis SIRS screening alert. She is known to have an infection.     Physical Exam   Vital Signs:  Temp: 97.7  F (36.5  C) Temp src: Oral BP: (!) 161/84 Pulse: 114 Heart Rate: 113 Resp: 18 SpO2: 96 % O2 Device: Nasal cannula Oxygen Delivery: 3 LPM    Lab:  Lactic Acid   Date Value Ref Range Status   01/02/2020 1.4 0.7 - 2.1 mmol/L Final     Lactate for Sepsis Protocol   Date Value Ref Range Status   01/03/2020 2.7 (H) 0.7 - 2.0 mmol/L Final     Comment:     Significant value called to and read back by  EMERALD FRANKEL ON 66 AT 2348 BY II         The patient is at baseline mental status.     The rest of their physical exam is significant for tachycardia.    Assessment & Plan   NO EVIDENCE OF SEPSIS at this time.  Vital sign, physical exam, and lab findings are likely due to COPD exacerbation, steroids use.    Disposition: The patient will remain on the current unit. We will continue to monitor this patient closely.  Anabel Kelly MD    Sepsis Criteria   Sepsis: 2+ SIRS criteria due to infection  Severe Sepsis: Sepsis AND 1+ new sign of acute organ dysfunction (Note: lactate >2 is organ dysfunction)  Septic Shock: Sepsis AND hypotension despite volume resuscitation with 30 ml/kg crystalloid

## 2020-01-05 LAB
BASOPHILS # BLD AUTO: 0 10E9/L (ref 0–0.2)
BASOPHILS NFR BLD AUTO: 0 %
DIFFERENTIAL METHOD BLD: ABNORMAL
EOSINOPHIL # BLD AUTO: 0 10E9/L (ref 0–0.7)
EOSINOPHIL NFR BLD AUTO: 0 %
ERYTHROCYTE [DISTWIDTH] IN BLOOD BY AUTOMATED COUNT: 13.2 % (ref 10–15)
HCT VFR BLD AUTO: 36.5 % (ref 35–47)
HGB BLD-MCNC: 11.5 G/DL (ref 11.7–15.7)
IMM GRANULOCYTES # BLD: 0.1 10E9/L (ref 0–0.4)
IMM GRANULOCYTES NFR BLD: 0.4 %
LACTATE BLD-SCNC: 2.9 MMOL/L (ref 0.7–2)
LYMPHOCYTES # BLD AUTO: 1.4 10E9/L (ref 0.8–5.3)
LYMPHOCYTES NFR BLD AUTO: 8 %
MCH RBC QN AUTO: 29 PG (ref 26.5–33)
MCHC RBC AUTO-ENTMCNC: 31.5 G/DL (ref 31.5–36.5)
MCV RBC AUTO: 92 FL (ref 78–100)
MONOCYTES # BLD AUTO: 1.6 10E9/L (ref 0–1.3)
MONOCYTES NFR BLD AUTO: 9.1 %
NEUTROPHILS # BLD AUTO: 14.5 10E9/L (ref 1.6–8.3)
NEUTROPHILS NFR BLD AUTO: 82.5 %
NRBC # BLD AUTO: 0 10*3/UL
NRBC BLD AUTO-RTO: 0 /100
PLATELET # BLD AUTO: 333 10E9/L (ref 150–450)
RBC # BLD AUTO: 3.97 10E12/L (ref 3.8–5.2)
WBC # BLD AUTO: 17.5 10E9/L (ref 4–11)

## 2020-01-05 PROCEDURE — 25000131 ZZH RX MED GY IP 250 OP 636 PS 637: Performed by: INTERNAL MEDICINE

## 2020-01-05 PROCEDURE — 25000132 ZZH RX MED GY IP 250 OP 250 PS 637: Performed by: INTERNAL MEDICINE

## 2020-01-05 PROCEDURE — 94640 AIRWAY INHALATION TREATMENT: CPT

## 2020-01-05 PROCEDURE — 12000000 ZZH R&B MED SURG/OB

## 2020-01-05 PROCEDURE — 83605 ASSAY OF LACTIC ACID: CPT | Performed by: INTERNAL MEDICINE

## 2020-01-05 PROCEDURE — 94640 AIRWAY INHALATION TREATMENT: CPT | Mod: 76

## 2020-01-05 PROCEDURE — 85025 COMPLETE CBC W/AUTO DIFF WBC: CPT | Performed by: INTERNAL MEDICINE

## 2020-01-05 PROCEDURE — 99232 SBSQ HOSP IP/OBS MODERATE 35: CPT | Performed by: INTERNAL MEDICINE

## 2020-01-05 PROCEDURE — 25000125 ZZHC RX 250: Performed by: INTERNAL MEDICINE

## 2020-01-05 PROCEDURE — 25000128 H RX IP 250 OP 636: Performed by: INTERNAL MEDICINE

## 2020-01-05 PROCEDURE — 40000275 ZZH STATISTIC RCP TIME EA 10 MIN

## 2020-01-05 PROCEDURE — 36415 COLL VENOUS BLD VENIPUNCTURE: CPT | Performed by: INTERNAL MEDICINE

## 2020-01-05 RX ORDER — LEVOFLOXACIN 5 MG/ML
500 INJECTION, SOLUTION INTRAVENOUS EVERY 24 HOURS
Status: DISCONTINUED | OUTPATIENT
Start: 2020-01-05 | End: 2020-01-06

## 2020-01-05 RX ADMIN — PREDNISONE 40 MG: 20 TABLET ORAL at 10:10

## 2020-01-05 RX ADMIN — IPRATROPIUM BROMIDE AND ALBUTEROL SULFATE 3 ML: .5; 3 SOLUTION RESPIRATORY (INHALATION) at 19:38

## 2020-01-05 RX ADMIN — LOSARTAN POTASSIUM 50 MG: 50 TABLET ORAL at 10:10

## 2020-01-05 RX ADMIN — IPRATROPIUM BROMIDE AND ALBUTEROL SULFATE 3 ML: .5; 3 SOLUTION RESPIRATORY (INHALATION) at 16:05

## 2020-01-05 RX ADMIN — LEVOFLOXACIN 500 MG: 5 INJECTION, SOLUTION INTRAVENOUS at 10:05

## 2020-01-05 RX ADMIN — IPRATROPIUM BROMIDE AND ALBUTEROL SULFATE 3 ML: .5; 3 SOLUTION RESPIRATORY (INHALATION) at 08:27

## 2020-01-05 RX ADMIN — IPRATROPIUM BROMIDE AND ALBUTEROL SULFATE 3 ML: .5; 3 SOLUTION RESPIRATORY (INHALATION) at 12:00

## 2020-01-05 ASSESSMENT — MIFFLIN-ST. JEOR: SCORE: 1110.08

## 2020-01-05 ASSESSMENT — ACTIVITIES OF DAILY LIVING (ADL)
ADLS_ACUITY_SCORE: 13

## 2020-01-05 NOTE — PLAN OF CARE
DATE & TIME: 1/4/2020 3 to 11pm                    Cognitive Concerns/ Orientation : A & O x4, calm, cooperative  BEHAVIOR & AGGRESSION TOOL COLOR: Green  CIWA SCORE: N/A  ABNL VS/O2: VSS on 3L nasal cannula, pt's baseline from home. Tachycardia 100-110s, increases to 120-130s w/ activity.    MOBILITY: IND in room, SBA in hallway. Preffered to stay in the room during the shift  PAIN MANAGMENT: Denies.  DIET: Regular  BOWEL/BLADDER: WDL  ABNL LAB/BG: WBC up to 26.5. BGM 87/153.   DRAIN/DEVICES: PIV SL  TELEMETRY RHYTHM: Sinus tachycardia w/ PVCs.   SKIN: Generalized edema in arms, lymphedema in BLE +1-2. Bruising and fragile skin. Pt has own lymphedema wraps on bi-laterally.   TESTS/PROCEDURES: None scheduled.   D/C DAY/GOALS/PLACE: Pending possibly tomorrow. , pending LAB results   OTHER IMPORTANT INFO:

## 2020-01-05 NOTE — PLAN OF CARE
DATE & TIME: 1/5/2020 0376-3845                   Cognitive Concerns/ Orientation : A & O x4, calm, cooperative  BEHAVIOR & AGGRESSION TOOL COLOR: Green  CIWA SCORE: N/A  ABNL VS/O2: VSS on 3L nasal cannula, pt's baseline from home. Tachycardia  100-110s    MOBILITY: IND in room  PAIN MANAGMENT: Denies.  DIET: Regular  BOWEL/BLADDER: WDL  ABNL LAB/BG: WBC 26.5, 17.5 this am. LA 2.9- continue with current plan  DRAIN/DEVICES: PIV SL  TELEMETRY RHYTHM: SR-ST w/ PVCs.   SKIN: Generalized edema in arms, lymphedema in BLE +1-2. Bruising and fragile skin.    TESTS/PROCEDURES: None scheduled.   D/C DAY/GOALS/PLACE: Pending possibly today , pending LAB results   OTHER IMPORTANT INFO:

## 2020-01-05 NOTE — PROGRESS NOTES
Sepsis Evaluation Progress Note    I was called to see Sydni Mendoza due to abnormal vital signs triggering the Sepsis SIRS screening alert. She is known to have an infection.     Physical Exam   Vital Signs:  Temp: 97.8  F (36.6  C) Temp src: Oral BP: (!) 156/96 Pulse: 104 Heart Rate: 96 Resp: 16 SpO2: 98 % O2 Device: Nasal cannula Oxygen Delivery: 3 LPM    Lab:  Lactic Acid   Date Value Ref Range Status   01/02/2020 1.4 0.7 - 2.1 mmol/L Final     Lactate for Sepsis Protocol   Date Value Ref Range Status   01/05/2020 2.9 (H) 0.7 - 2.0 mmol/L Final     Comment:     Significant value called to and read back by  JESSICA WORRELL ON 66 AT 0130 AMM         The patient is at baseline mental status.       Assessment & Plan   Sydni Mendoza meets SIRS criteria but does NOT have a lactate >2 or other evidence of acute organ damage.  These vital sign, lab and physical exam findings are consistent with SEPSIS.    Current antibiotic coverage is appropriate for source of infection.     Disposition: The patient will remain on the current unit. We will continue to monitor this patient closely.  Dimitri William MD    Sepsis Criteria   Sepsis: 2+ SIRS criteria due to infection  Severe Sepsis: Sepsis AND 1+ new sign of acute organ dysfunction (Note: lactate >2 is organ dysfunction)  Septic Shock: Sepsis AND hypotension despite volume resuscitation with 30 ml/kg crystalloid

## 2020-01-05 NOTE — PROVIDER NOTIFICATION
MD Notification    Notified Person: MD    Notified Person Name: Nataliia    Notification Date/Time:1/5/2020, 0132    Notification Interaction: telephone    Purpose of Notification: Critical lactic acid, 2.9    Orders Received: no new orders, continue with current treatment

## 2020-01-05 NOTE — PROGRESS NOTES
[facility]    Medicine Progress Note - Hospitalist Service       Date of Admission:  1/2/2020  Assessment & Plan     Sydni Mendoza is a 74 year old female with history of COPD on chronic prednisone and 3 L oxygen at baseline, lymphedema, pulmonary nodule who presents with acute onset shortness of breath since yesterday evening.     Community-acquired pneumonia  Sepsis with tachycardia, leukocytosis related to above  COPD with acute exacerbation  -Has oxygen dependent COPD with 3 L at home and 10 mg prednisone chronically.  Presented with acute onset shortness of breath and productive cough.  Recently treated with Levaquin for 10 days as a prophylaxis as her  was sick with upper respiratory symptoms, while patient was not symptomatic.  Chest x-ray done in the ED shows right-sided pneumonia.    -Flu swab negative, blood culture negative to date.  -  Leukocytosis went up as high is 20 6K likely from being on high-dose steroids, however improved today to 17 K  -Started on ceftriaxone and azithromycin and IV Solu-Medrol  -Appreciate pulmonary input, changed to prednisone with a plan to taper  -Sputum culture growing Achromobacter xylosoxidans/Denitrificans, stenotrophomonas maltophilia, sensitivity report pending.  Discussed with Dr. Peña, he recommended changing antibiotic to Levaquin and waiting for sensitivity prior to discharge.  -Breathing treatments with nebulizers, incentive spirometer     Leukocytosis  -Had presented with a white cell count of 19, peaked to 26, likely demargination from being on steroids along with sepsis, patient has been afebrile.  -Improved today to 17.5K.  Monitor    Essential Hypertension   -PTA on as needed Lasix, losartan  -Continue PTA losartan and Lasix with holding parameters  -PRN IV hydralazine, blood pressure over the last 24 hours mostly reasonable in 120s to 130s with few readings high, will continue with current regimen  -Monitor and adjust  medication as needed     Hyperglycemia  -Blood sugar in the morning of 1/3/2020 was more than 300.  Likely secondary to high-dose steroids, acute stress and patient had just eaten her breakfast before blood was drawn.  No known history of diabetes.  Hemoglobin A1c is 5.7.     History of lung nodule  - Noted first time in August 2019 and plan was for 6-month repeat imaging.  -CT chest done during this admission shows stable lung nodule     History of lymphedema  PTA on as needed Lasix, resumed    Diet: Combination Diet Regular Diet Adult    DVT Prophylaxis: Pneumatic Compression Devices  Vasquez Catheter: not present  Code Status: Full Code      Disposition Plan   Expected discharge: Tomorrow, recommended to prior living arrangement once sensitivity report available for her sputum culture  Entered: Ora Sharma MD 01/05/2020, 2:45 PM       The patient's care was discussed with the Bedside Nurse and Patient.    Ora Sharma MD  Hospitalist Service  [facility]    ______________________________________________________________________    Interval History   Patient reports continued clinical improvement and is back to her baseline.  Discussed with her the sputum culture report and recommendation from ID.  Patient agreeable to stay.    Data reviewed today: I reviewed all medications, new labs and imaging results over the last 24 hours. I personally reviewed no images or EKG's today.    Physical Exam   Vital Signs: Temp: 98.6  F (37  C) Temp src: Oral BP: (!) 169/87 Pulse: 104 Heart Rate: 114 Resp: 16 SpO2: 92 % O2 Device: Nasal cannula Oxygen Delivery: 3 LPM  Weight: 135 lbs 9.6 oz  Exam:  Constitutional: Awake, alert and no distress. Appears comfortable  Head: Normocephalic. No masses, lesions, tenderness or abnormalities  ENT: ENT exam normal, no neck nodes or sinus tenderness  Cardiovascular: RRR.  no murmurs, no rubs or JVD  Respiratory: Normal WOB,b/l  decreased air entry, right sided occasional  crackles  Gastrointestinal: Abdomen soft, non-tender. BS normal. No masses, organomegaly  : Deferred  Extremities : Mild bilateral edema , no clubbing or cyanosis      Data   Recent Labs   Lab 01/05/20  0556 01/04/20  1201 01/03/20  0921 01/02/20  0918   WBC 17.5* 26.5* 21.0* 19.8*   HGB 11.5* 12.1 11.9 12.0   MCV 92 92 92 92    372 343 335   NA  --  142 138 136   POTASSIUM  --  4.5 4.4 3.9   CHLORIDE  --  106 102 100   CO2  --  34* 33* 32   BUN  --  28 23 15   CR  --  0.69 0.63 0.61   ANIONGAP  --  2* 3 4   MARQUEZ  --  9.5 9.2 9.1   GLC  --  126* 332* 114*   ALBUMIN  --   --  3.3*  --    PROTTOTAL  --   --  6.5*  --    BILITOTAL  --   --  0.4  --    ALKPHOS  --   --  51  --    ALT  --   --  18  --    AST  --   --  10  --    TROPI  --   --   --  <0.015     No results found for this or any previous visit (from the past 24 hour(s)).  Medications       ipratropium - albuterol 0.5 mg/2.5 mg/3 mL  3 mL Nebulization 4x daily     levofloxacin  500 mg Intravenous Q24H     losartan  50 mg Oral Daily     predniSONE  40 mg Oral Daily     sodium chloride (PF)  3 mL Intracatheter Q8H

## 2020-01-05 NOTE — PLAN OF CARE
DATE & TIME: 1/05/2020 7-3 pm     Cognitive Concerns/ Orientation : A & O x4  BEHAVIOR & AGGRESSION TOOL COLOR: Green   CIWA SCORE: N/A  ABNL VS/O2: VSS on 3L nasal cannula   MOBILITY: IND/SBA in hallway.   PAIN MANAGMENT: Denies  DIET: Regular  BOWEL/BLADDER: No issues  ABNL LAB/BG: WBC 17.5.   DRAIN/DEVICES: PIV SL  TELEMETRY RHYTHM: Discontinued this shift, tachycardia.    SKIN: Generalized edema in arms, lymphedema in BLE +1-2. Bruising and fragile skin. Pt has own lymphedema wraps on bi-laterally.  TESTS/PROCEDURES: None scheduled.   D/C DAY/GOALS/PLACE: Pending, tomorrow if sputum sensitivities are back.   OTHER IMPORTANT INFO: Encouraging activity, ambulated in hallway x1.  supportive @ bedside.

## 2020-01-06 VITALS
TEMPERATURE: 98.3 F | OXYGEN SATURATION: 98 % | DIASTOLIC BLOOD PRESSURE: 78 MMHG | HEART RATE: 103 BPM | BODY MASS INDEX: 23.03 KG/M2 | WEIGHT: 134.9 LBS | SYSTOLIC BLOOD PRESSURE: 154 MMHG | HEIGHT: 64 IN | RESPIRATION RATE: 24 BRPM

## 2020-01-06 LAB
BACTERIA SPEC CULT: ABNORMAL
BASOPHILS # BLD AUTO: 0 10E9/L (ref 0–0.2)
BASOPHILS NFR BLD AUTO: 0 %
DIFFERENTIAL METHOD BLD: ABNORMAL
EOSINOPHIL # BLD AUTO: 0 10E9/L (ref 0–0.7)
EOSINOPHIL NFR BLD AUTO: 0 %
ERYTHROCYTE [DISTWIDTH] IN BLOOD BY AUTOMATED COUNT: 13.3 % (ref 10–15)
HCT VFR BLD AUTO: 39 % (ref 35–47)
HGB BLD-MCNC: 11.9 G/DL (ref 11.7–15.7)
IMM GRANULOCYTES # BLD: 0.1 10E9/L (ref 0–0.4)
IMM GRANULOCYTES NFR BLD: 0.4 %
LACTATE BLD-SCNC: 2.5 MMOL/L (ref 0.7–2)
LYMPHOCYTES # BLD AUTO: 1.8 10E9/L (ref 0.8–5.3)
LYMPHOCYTES NFR BLD AUTO: 11.4 %
MCH RBC QN AUTO: 28.2 PG (ref 26.5–33)
MCHC RBC AUTO-ENTMCNC: 30.5 G/DL (ref 31.5–36.5)
MCV RBC AUTO: 92 FL (ref 78–100)
MONOCYTES # BLD AUTO: 1.9 10E9/L (ref 0–1.3)
MONOCYTES NFR BLD AUTO: 12.2 %
NEUTROPHILS # BLD AUTO: 12 10E9/L (ref 1.6–8.3)
NEUTROPHILS NFR BLD AUTO: 76 %
NRBC # BLD AUTO: 0 10*3/UL
NRBC BLD AUTO-RTO: 0 /100
PLATELET # BLD AUTO: 353 10E9/L (ref 150–450)
RBC # BLD AUTO: 4.22 10E12/L (ref 3.8–5.2)
SPECIMEN SOURCE: ABNORMAL
WBC # BLD AUTO: 15.8 10E9/L (ref 4–11)

## 2020-01-06 PROCEDURE — 83605 ASSAY OF LACTIC ACID: CPT | Performed by: INTERNAL MEDICINE

## 2020-01-06 PROCEDURE — 94640 AIRWAY INHALATION TREATMENT: CPT | Mod: 76

## 2020-01-06 PROCEDURE — 25000125 ZZHC RX 250: Performed by: INTERNAL MEDICINE

## 2020-01-06 PROCEDURE — 40000275 ZZH STATISTIC RCP TIME EA 10 MIN

## 2020-01-06 PROCEDURE — 36415 COLL VENOUS BLD VENIPUNCTURE: CPT | Performed by: INTERNAL MEDICINE

## 2020-01-06 PROCEDURE — 94640 AIRWAY INHALATION TREATMENT: CPT

## 2020-01-06 PROCEDURE — 99239 HOSP IP/OBS DSCHRG MGMT >30: CPT | Performed by: INTERNAL MEDICINE

## 2020-01-06 PROCEDURE — 25000132 ZZH RX MED GY IP 250 OP 250 PS 637: Performed by: INTERNAL MEDICINE

## 2020-01-06 PROCEDURE — 85025 COMPLETE CBC W/AUTO DIFF WBC: CPT | Performed by: INTERNAL MEDICINE

## 2020-01-06 PROCEDURE — 25000131 ZZH RX MED GY IP 250 OP 636 PS 637: Performed by: INTERNAL MEDICINE

## 2020-01-06 RX ORDER — PREDNISONE 10 MG/1
TABLET ORAL
Qty: 24 TABLET | Refills: 0 | Status: SHIPPED | OUTPATIENT
Start: 2020-01-06 | End: 2020-01-23

## 2020-01-06 RX ORDER — SULFAMETHOXAZOLE/TRIMETHOPRIM 800-160 MG
1 TABLET ORAL 2 TIMES DAILY
Status: DISCONTINUED | OUTPATIENT
Start: 2020-01-06 | End: 2020-01-06 | Stop reason: HOSPADM

## 2020-01-06 RX ORDER — SULFAMETHOXAZOLE/TRIMETHOPRIM 800-160 MG
1 TABLET ORAL 2 TIMES DAILY
Qty: 14 TABLET | Refills: 0 | Status: SHIPPED | OUTPATIENT
Start: 2020-01-06 | End: 2020-01-13

## 2020-01-06 RX ADMIN — PREDNISONE 40 MG: 20 TABLET ORAL at 10:11

## 2020-01-06 RX ADMIN — SULFAMETHOXAZOLE AND TRIMETHOPRIM 1 TABLET: 800; 160 TABLET ORAL at 10:11

## 2020-01-06 RX ADMIN — IPRATROPIUM BROMIDE AND ALBUTEROL SULFATE 3 ML: .5; 3 SOLUTION RESPIRATORY (INHALATION) at 07:38

## 2020-01-06 RX ADMIN — LOSARTAN POTASSIUM 50 MG: 50 TABLET ORAL at 10:11

## 2020-01-06 RX ADMIN — IPRATROPIUM BROMIDE AND ALBUTEROL SULFATE 3 ML: .5; 3 SOLUTION RESPIRATORY (INHALATION) at 11:44

## 2020-01-06 ASSESSMENT — ACTIVITIES OF DAILY LIVING (ADL)
ADLS_ACUITY_SCORE: 13

## 2020-01-06 ASSESSMENT — MIFFLIN-ST. JEOR: SCORE: 1106.9

## 2020-01-06 NOTE — DISCHARGE INSTRUCTIONS
A referral has been sent to [facility].  They will call you for scheduling.  Their phone number is [phone].

## 2020-01-06 NOTE — PLAN OF CARE
DATE & TIME: 01/6/2020 3539-5270    Cognitive Concerns/ Orientation : A&O x4   BEHAVIOR & AGGRESSION TOOL COLOR: Green  CIWA SCORE: NA   ABNL VS/O2: VSS on 3L O2 NC- per pt baseline. Slightly tachycardic at times (90s-110).   MOBILITY: independent in room.   PAIN MANAGMENT: denies  DIET: regular  BOWEL/BLADDER: continent of B/B.   ABNL LAB/BG: WBC 17.5  DRAIN/DEVICES: PIV SL R. arm  TELEMETRY RHYTHM: NA  SKIN: lymphedema wraps bilat LE.   TESTS/PROCEDURES: NA  D/C DAY/GOALS/PLACE: Possible discharge today pending sputum sensitivities.    OTHER IMPORTANT INFO:ID following, Sched nebs

## 2020-01-06 NOTE — PLAN OF CARE
Discharge    Patient discharged to home via spouse with home O2  Care plan note   DATE & TIME: 01/6/2020 day shift                       Cognitive Concerns/ Orientation : A&O x4   BEHAVIOR & AGGRESSION TOOL COLOR: Green  CIWA SCORE: NA       ABNL VS/O2: VSS on 3L O2 NC- per pt baseline. Slightly tachycardic with AM -109HR.   MOBILITY: independent in room.   PAIN MANAGMENT: denies  DIET: regular  BOWEL/BLADDER: continent of B/B.   ABNL LAB/BG: WBC 15.8, LA 2.5  DRAIN/DEVICES: PIV SL R. Arm removed at discharge  TELEMETRY RHYTHM: NA  SKIN: bruising  TESTS/PROCEDURES: NA  D/C DAY/GOALS/PLACE: 1/6/2020  OTHER IMPORTANT INFO:scheduled nebs. Bactrim at discharge.    Listed belongings gathered and returned to patient. Yes  Care Plan and Patient education resolved: Yes  Prescriptions if needed, hard copies sent with patient  NA  Home and hospital acquired medications returned to patient: Yes  Medication Bin checked and emptied on discharge Yes  Follow up appointment made for patient: Yes

## 2020-01-06 NOTE — CONSULTS
Care Transition Initial Assessment - RN        Met with: Patient.  DATA   Active Problems:    CAP (community acquired pneumonia)       Cognitive Status: awake, alert and oriented.        Contact information and PCP information verified: Yes  Lives With: spouse   Living Arrangements: house         Insurance concerns: No Insurance issues identified  ASSESSMENT  Patient currently receives the following services:  NA        Identified issues/concerns regarding health management: Pt resides with her spouse.  At baseline she is on home O2 at 3L, her present rate.  Her home O2 is through [employer].  Discussed Pneumonia/COPD and pt was given the Pneumonia Action Plan.  Pt asked if she could have a home care nurse follow.  She has had this before when she discharged and felt it was of real benefit.  She wanted to have [facility] again.  A referral was [facility] to [facility].    Pt also has lymphedema wraps.  Pt has her transport O2 device here and her spouse will be providing transport home.      PLAN  Financial costs for the patient include:NA  Patient given options and choices for discharge: Home care agencies .  Patient/family is agreeable to the plan?  Yes: home today    Patient anticipates needs for home equipment: No     Appointments:  PCP 1/8/20  Pulmonary, Dr Cardoso 2/17/20    Care  (CTS) will continue to follow as needed.

## 2020-01-06 NOTE — DISCHARGE SUMMARY
[facility]  Hospitalist Discharge Summary       Date of Admission:  1/2/2020  Date of Discharge:  1/6/2020  Discharging Provider: Ora Sharma MD      Discharge Diagnoses   Community-acquired pneumonia  Sepsis with tachycardia leukocytosis related to above, improved  COPD with acute exacerbation  Elevated lactate likely secondary to above  Hypertension  History of lung nodule  Lymphedema    Follow-ups Needed After Discharge   Follow-up Appointments     Follow-up and recommended labs and tests       Follow up with primary care provider, Lyn Lui, within 7 days for   hospital follow- up.  The following labs/tests are recommended: CBC/BMP in   2-3 days.  Follow-up with Dr Cardoso in 4-6 weeks   CXR in 4-6 weeks to document clearance           Unresulted Labs Ordered in the Past 30 Days of this Admission     Date and Time Order Name Status Description    1/2/2020 1015 Blood culture Preliminary     1/2/2020 1015 Blood culture Preliminary       These results will be followed up by PCP    Discharge Disposition   Discharged to home  Condition at discharge: Stable    Hospital Course   Sydni Mendoza is a 74 year old female with history of COPD on chronic prednisone and 3 L oxygen at baseline, lymphedema, pulmonary nodule who presents with acute onset shortness of breath for 1 day.     Community-acquired pneumonia  Sepsis with tachycardia, leukocytosis related to above  COPD with acute exacerbation  -Has oxygen dependent COPD with 3 L at home and 10 mg prednisone chronically.  Presented with acute onset shortness of breath and productive cough.  Recently treated with Levaquin for 10 days as a prophylaxis as her  was sick with upper respiratory symptoms, while patient was not symptomatic.  Chest x-ray done in the ED showed right-sided pneumonia.    -Flu swab negative, blood culture negative to date.  -Started on ceftriaxone and azithromycin and IV Solu-Medrol  -Appreciate pulmonary input,  changed to prednisone with a plan to taper  -  Leukocytosis went up as high is 26K likely from being on high-dose steroids, however improved today to 15 K on the day of discharge.  Lactic acid kept on firing when the patient will get tachycardic with breathing treatments and also patient had leukocytosis, however as the patient was clinically improving no further work-up was done  -Clinically patient was improving and was back to her baseline.  -Sputum culture growing Achromobacter xylosoxidans/Denitrificans, stenotrophomonas maltophilia, sensitivity report pending.  Discussed with Dr. Peña(infectious disease), he recommended changing antibiotic to Levaquin and so had to wait for sensitivity prior to discharge.  Stenotrophomonas maltophilia had only intermediate sensitivity to Levaquin so patient  discharged on Bactrim with Achromobacter was also sensitive to, to complete 7-day course  -Outpatient follow-up with pulmonary     Leukocytosis  -Had presented with a white cell count of 19, peaked to 26, likely demargination from being on steroids along with sepsis, patient has been afebrile.  -Improved to 15 K prior to discharge .  Outpatient follow-up     Essential Hypertension   -PTA on as needed Lasix, losartan  -Continued PTA losartan and Lasix at the time of discharge     Hyperglycemia  -Blood sugar in the morning of 1/3/2020 was more than 300.  Likely secondary to high-dose steroids, acute stress and patient had just eaten her breakfast before blood was drawn.  No known history of diabetes.  Hemoglobin A1c is 5.7.      History of lung nodule  - Noted first time in August 2019 and plan was for 6-month repeat imaging.  -CT chest done during this admission shows stable lung nodule.  Outpatient follow-up with pulmonary     History of lymphedema  PTA on as needed Lasix, resumed    Consultations This Hospital Stay   PULMONARY IP CONSULT    Code Status   Full Code    Time Spent on this Encounter   I, Ora Sharma MD,  personally saw the patient today and spent greater than 30 minutes discharging this patient.       Ora Sharma MD  [facility]  ______________________________________________________________________    Physical Exam   Vital Signs: Temp: 98.3  F (36.8  C) Temp src: Oral BP: (!) 154/78 Pulse: 103 Heart Rate: 109 Resp: 24 SpO2: 96 % O2 Device: Nasal cannula Oxygen Delivery: 3 LPM  Weight: 134 lbs 14.4 oz  Constitutional: Awake, alert and no distress. Appears comfortable  Head: Normocephalic. No masses, lesions, tenderness or abnormalities  ENT: ENT exam normal, no neck nodes or sinus tenderness  Cardiovascular: RRR.  no murmurs, no rubs or JVD  Respiratory: Normal WOB,b/l  decreased air entry, right sided occasional crackles  Gastrointestinal: Abdomen soft, non-tender. BS normal. No masses, organomegaly  : Deferred  Extremities : Mild bilateral edema , no clubbing or cyanosis         Primary Care Physician   Lyn Lui    Discharge Orders      Reason for your hospital stay    Pneumonia   COPD exacerbation     Follow-up and recommended labs and tests     Follow up with primary care provider, Lyn Lui, within 7 days for hospital follow- up.  The following labs/tests are recommended: CBC/BMP in 2-3 days.  Follow-up with Dr Cardoso in 4-6 weeks   CXR in 4-6 weeks to document clearance     Activity    Your activity upon discharge: activity as tolerated     Full Code     Oxygen Adult    Renew Home Oxygen Order  Renew previous prescription.  Expected treatment length is indefinite (99 months).    Attending Provider: Ora Sharma MD  Physician signature: See electronic signature associated with these discharge orders  Date of Order: January 6, 2020     Diet    Follow this diet upon discharge: Orders Placed This Encounter      Combination Diet Regular Diet Adult       Significant Results and Procedures   Results for orders placed or performed during the hospital encounter of 01/02/20   XR Chest  2 Views    Narrative    CHEST TWO VIEWS   1/2/2020 9:44 AM     HISTORY: Cough, shortness of breath.    COMPARISON: 8/31/2019      Impression    IMPRESSION: Focal airspace consolidation seen within the right lower  lung and is concerning for pneumonia. Subsegmental atelectasis seen in  the left lower lung. Cardiomediastinal silhouette is within normal  limits. Borderline enlarged cardiomediastinal silhouette. Multilevel  degenerative changes seen in the spine.    ROWENA LEDBETTER MD   CT Chest w/o Contrast    Narrative    CT CHEST WITHOUT CONTRAST 1/2/2020 2:53 PM     HISTORY: Pneumonia, unresolved or complicated.    COMPARISON: August 31, 2019    TECHNIQUE: Volumetric helical acquisition of CT images of the chest  from the clavicles to the kidneys were acquired without IV contrast.  Radiation dose for this scan was reduced using automated exposure  control, adjustment of the mA and/or kV according to patient size, or  iterative reconstruction technique.    FINDINGS:  Mild left lower lobe infiltrate. Severe emphysema. Areas of  bronchial wall thickening and mucous plugging also noted in the right  lower lobe. No pleural or pericardial effusion. Minimal biapical  fibrosis. 4 mm right middle lobe nodule is stable since the comparison  study. 5 mm nodule in the anterior left lower lobe is also stable. No  acute findings in the visualized upper abdomen.      Impression    IMPRESSION: Areas of bronchial wall thickening, mucous plugging and  patchy infiltrates are seen throughout the right lower lobe compatible  with pneumonia with bronchitis. Severe emphysema.    SPENCER GAMEZ MD       Discharge Medications   Current Discharge Medication List      START taking these medications    Details   !! predniSONE (DELTASONE) 10 MG tablet Take 4 tablets (40 mg) by mouth daily for 1 day, THEN 3 tablets (30 mg) daily for 4 days, THEN 2 tablets (20 mg) daily for 4 days.  Qty: 24 tablet, Refills: 0    Comments: Start taking your PTA dose  of 10 mg once done with 20 mg pills  Associated Diagnoses: Panlobular emphysema (H)      sulfamethoxazole-trimethoprim (BACTRIM DS/SEPTRA DS) 800-160 MG tablet Take 1 tablet by mouth 2 times daily  Qty: 14 tablet, Refills: 0    Associated Diagnoses: Community acquired pneumonia of right lung, unspecified part of lung       !! - Potential duplicate medications found. Please discuss with provider.      CONTINUE these medications which have NOT CHANGED    Details   albuterol (PROAIR HFA/PROVENTIL HFA/VENTOLIN HFA) 108 (90 Base) MCG/ACT inhaler Inhale 2 puffs into the lungs every 6 hours as needed for shortness of breath / dyspnea or wheezing    Comments: Pharmacy may dispense brand covered by insurance (Proair, or proventil or ventolin or generic albuterol inhaler)      DULERA 200-5 MCG/ACT oral inhaler Inhale 2 puffs into the lungs 2 times daily      furosemide (LASIX) 40 MG tablet Take 40 mg by mouth daily as needed (lymphedema in legs)      ipratropium (ATROVENT) 0.02 % nebulizer solution 0.5 mg by neb BID, may increase to QID prn cough/dyspnea  Qty: 180 mL, Refills: 0    Associated Diagnoses: COPD, severe (H)      levalbuterol (XOPENEX) 1.25 MG/3ML neb solution 1.25 mg by neb BID, may increase to QID as needed  Qty: 220 mL, Refills: 0    Associated Diagnoses: COPD, severe (H)      losartan (COZAAR) 50 MG tablet TAKE 1 TABLET BY MOUTH EVERY DAY  Qty: 90 tablet, Refills: 1    Associated Diagnoses: Peripheral edema; Benign essential hypertension      Multiple Vitamin (MULTI-VITAMIN) per tablet Take 1 tablet by mouth daily.        !! predniSONE (DELTASONE) 10 MG tablet Take 10 mg by mouth daily      umeclidinium (INCRUSE ELLIPTA) 62.5 MCG/INH oral inhaler Inhale 1 puff into the lungs daily  Qty: 3 Inhaler, Refills: 1    Comments: To replace the Spiriva.  Associated Diagnoses: COPD exacerbation (H)       !! - Potential duplicate medications found. Please discuss with provider.      STOP taking these medications        potassium chloride ER (K-DUR/KLOR-CON M) 10 MEQ CR tablet Comments:   Reason for Stopping:             Allergies   Allergies   Allergen Reactions     Albuterol Palpitations     Is fine taking albuterol inhaler, tachycardia goes away.

## 2020-01-06 NOTE — PROGRESS NOTES
Sepsis Evaluation Progress Note    I was called to see Sydni Mendoza due to abnormal vital signs triggering the Sepsis SIRS screening alert. She is known to have an infection.     Physical Exam   Vital Signs:  Temp: 98.3  F (36.8  C) Temp src: Oral BP: (!) 154/78 Pulse: 103 Heart Rate: 109 Resp: 24 SpO2: 96 % O2 Device: Nasal cannula Oxygen Delivery: 3 LPM    Lab:  Lactic Acid   Date Value Ref Range Status   01/02/2020 1.4 0.7 - 2.1 mmol/L Final     Lactate for Sepsis Protocol   Date Value Ref Range Status   01/06/2020 2.5 (H) 0.7 - 2.0 mmol/L Final     Comment:     Significant value called to and read back by  ISADORA DIALLO ON STA 66 AT 0936 RE         The patient is at baseline mental status.     The rest of their physical exam is significant for diminished air entry b/l @ her baseline with occasional crackles on right lung     Assessment & Plan   NO EVIDENCE OF SEPSIS at this time.  Vital sign, physical exam, and lab findings are likely due to breathing treatments , COPD .    Disposition: The patient will remain on the current unit. We will continue to monitor this patient closely.  Ora Sharma MD    Sepsis Criteria   Sepsis: 2+ SIRS criteria due to infection  Severe Sepsis: Sepsis AND 1+ new sign of acute organ dysfunction (Note: lactate >2 is organ dysfunction)  Septic Shock: Sepsis AND hypotension despite volume resuscitation with 30 ml/kg crystalloid

## 2020-01-06 NOTE — PLAN OF CARE
DATE & TIME: 01/05/2020 2631-4909    Cognitive Concerns/ Orientation : A&O x4   BEHAVIOR & AGGRESSION TOOL COLOR: Green  CIWA SCORE: NA   ABNL VS/O2: VSS on 3 02 NC- per pt baseline. Slightly tachycardic at times.   MOBILITY: independent in room.   PAIN MANAGMENT: denies  DIET: regular  BOWEL/BLADDER: continent of B/B. BM today.   ABNL LAB/BG: WBC 17.5  DRAIN/DEVICES: PIV SL R. arm  TELEMETRY RHYTHM: NA  SKIN: lymphedema wraps bilat LE.   TESTS/PROCEDURES: NA  D/C DAY/GOALS/PLACE: hopeful home tomorrow pending sputum sensitivities.    OTHER IMPORTANT INFO:ID following, pt on scheduled nebs. Ambulated in halls x2 during shift.

## 2020-01-08 ENCOUNTER — OFFICE VISIT (OUTPATIENT)
Dept: FAMILY MEDICINE | Facility: CLINIC | Age: 75
End: 2020-01-08

## 2020-01-08 VITALS
HEART RATE: 101 BPM | OXYGEN SATURATION: 99 % | SYSTOLIC BLOOD PRESSURE: 144 MMHG | RESPIRATION RATE: 16 BRPM | DIASTOLIC BLOOD PRESSURE: 74 MMHG

## 2020-01-08 DIAGNOSIS — L97.312 NON-PRESSURE CHRONIC ULCER OF RIGHT ANKLE WITH FAT LAYER EXPOSED (H): ICD-10-CM

## 2020-01-08 DIAGNOSIS — F19.982 DRUG-INDUCED INSOMNIA (H): ICD-10-CM

## 2020-01-08 DIAGNOSIS — J18.9 PNEUMONIA OF RIGHT LUNG DUE TO INFECTIOUS ORGANISM, UNSPECIFIED PART OF LUNG: Primary | ICD-10-CM

## 2020-01-08 LAB
% GRANULOCYTES: 95.4 % (ref 42.2–75.2)
BACTERIA SPEC CULT: NO GROWTH
BACTERIA SPEC CULT: NO GROWTH
HCT VFR BLD AUTO: 39.4 % (ref 35–46)
HEMOGLOBIN: 12.2 G/DL (ref 11.8–15.5)
LYMPHOCYTES NFR BLD AUTO: 3.4 % (ref 20.5–51.1)
Lab: NORMAL
Lab: NORMAL
MCH RBC QN AUTO: 28.5 PG (ref 27–31)
MCHC RBC AUTO-ENTMCNC: 31.1 G/DL (ref 33–37)
MCV RBC AUTO: 91.8 FL (ref 80–100)
MONOCYTES NFR BLD AUTO: 1.2 % (ref 1.7–9.3)
PLATELET # BLD AUTO: 308 K/UL (ref 140–450)
RBC # BLD AUTO: 4.29 X10/CMM (ref 3.7–5.2)
SPECIMEN SOURCE: NORMAL
SPECIMEN SOURCE: NORMAL
WBC # BLD AUTO: 14.5 X10/CMM (ref 3.8–11)

## 2020-01-08 PROCEDURE — G0180 MD CERTIFICATION HHA PATIENT: HCPCS | Performed by: FAMILY MEDICINE

## 2020-01-08 PROCEDURE — 99213 OFFICE O/P EST LOW 20 MIN: CPT | Mod: 25 | Performed by: FAMILY MEDICINE

## 2020-01-08 PROCEDURE — 36415 COLL VENOUS BLD VENIPUNCTURE: CPT | Performed by: FAMILY MEDICINE

## 2020-01-08 PROCEDURE — 80048 BASIC METABOLIC PNL TOTAL CA: CPT | Mod: 90 | Performed by: FAMILY MEDICINE

## 2020-01-08 PROCEDURE — 85025 COMPLETE CBC W/AUTO DIFF WBC: CPT | Performed by: FAMILY MEDICINE

## 2020-01-08 NOTE — PROGRESS NOTES
Problem(s) Oriented visit        SUBJECTIVE:                                                    Sydni Mendoza is a 74 year old female who presents to clinic today for the following health issues :    Sydni is taking a seven day course of antibiotics. I asked her to call if she is not feeling better by Monday, or if her symptoms return after stopping the medication. Currently feeling better, still come weakness.     Pressure ulcer on ankle is healed and no longer requires dressing.    Drug-induced insomnia, still present intermittently, but she is able to get adequate rest.       Hospital Follow-up Visit:    Hospital/Nursing Home/IP Rehab Facility: [facility]  Date of Admission: 1/2/20  Date of Discharge: 1/6/20  Reason(s) for Admission: Pneumonia            Problems taking medications regularly:  None       Medication changes since discharge: ABX and Prednisone       Problems adhering to non-medication therapy:  None    Summary of hospitalization:  [facility] discharge summary reviewed  Diagnostic Tests/Treatments reviewed.  Follow up needed: BMP, CBC  Other Healthcare Providers Involved in Patient s Care:         None  Update since discharge: improved.     Post Discharge Medication Reconciliation: discharge medications reconciled, continue medications without change.  Plan of care communicated with patient                Problem list, Medication list, Allergies, and Medical/Social/Surgical histories reviewed in EPIC and updated as appropriate.   Additional history: as documented    ROS:  General:  NEGATIVE for fever, chills, change in weight CV:  NEGATIVE for chest pain, palpitations or peripheral edema Resp:  NEGATIVE for significant cough or SOB Neurologic: No headaches, numbness, tingling, or weakness Endocrine: No history of thyroid disease, diabetes or other endocrine disorders SKIN: No rashes, lesions     Histories:   Patient Active Problem List   Diagnosis     COPD (chronic  obstructive pulmonary disease) (H)     Smoking     Lung nodule     Pneumothorax, post biopsy, right     Health Care Home     Pneumonia     COPD with acute exacerbation (H)     COPD exacerbation (H)     Hyponatremia     Acute and chronic respiratory failure with hypoxia (H)     Acute exacerbation of chronic obstructive pulmonary disease (COPD) (H)     CAP (community acquired pneumonia)     Past Surgical History:   Procedure Laterality Date     CHEST TUBE INSERTION  12    Right ,pneumothorax post, lung biopsy     GYN SURGERY      Tubal Ligation       Social History     Tobacco Use     Smoking status: Former Smoker     Packs/day: 1.00     Years: 50.00     Pack years: 50.00     Types: Cigarettes     Last attempt to quit: 2011     Years since quittin.3     Smokeless tobacco: Never Used   Substance Use Topics     Alcohol use: Yes     Alcohol/week: 0.0 standard drinks     Comment: occasional     History reviewed. No pertinent family history.        OBJECTIVE:                                                    BP (!) 144/74   Pulse 101   Resp 16   SpO2 99%   There is no height or weight on file to calculate BMI.   GENERAL: healthy, alert and no distress  EYES: Eyes grossly normal to inspection, PERRL and conjunctivae and sclerae normal  RESP: lungs clear to auscultation - no rales, rhonchi or wheezes  CV: regular rate and rhythm, normal S1 S2, no S3 or S4, no murmur, click or rub, no peripheral edema and peripheral pulses strong  ABDOMEN: soft, nontender, no hepatosplenomegaly, no masses and bowel sounds normal  MS: no gross musculoskeletal defects noted, no edema  SKIN: no suspicious lesions or rashes  NEURO: Normal strength and tone, mentation intact and speech normal  PSYCH: mentation appears normal, affect normal/bright     ASSESSMENT/PLAN:                                                        Diagnoses and all orders for this visit:    Pneumonia of right lung due to infectious organism, unspecified  part of lung  -     CBC with Diff/Plt ([facility])  -     Basic Metabolic Panel (8) ([facility])    Please call if you are not feeling better by the end of your course of antibiotics, we can extend the antibiotics if your symptoms have not resolved or if they have returned.     ASSESSMENT/PLAN:       The following health maintenance items are reviewed in Epic and correct as of today:  Health Maintenance   Topic Date Due     DEXA  1945     COPD ACTION PLAN  1945     MAMMO SCREENING  1945     COLONOSCOPY  02/19/1957     ZOSTER IMMUNIZATION (1 of 2) 02/19/1997     MEDICARE ANNUAL WELLNESS VISIT  02/19/2012     FALL RISK ASSESSMENT  05/25/2019     PHQ-2  01/01/2020     ADVANCE CARE PLANNING  02/12/2021     LIPID  08/15/2023     DTAP/TDAP/TD IMMUNIZATION (2 - Td) 02/12/2026     SPIROMETRY  Completed     INFLUENZA VACCINE  Completed     PNEUMOCOCCAL IMMUNIZATION 65+ LOW/MEDIUM RISK  Completed     HEPATITIS C SCREENING  Addressed     IPV IMMUNIZATION  Aged Out     MENINGITIS IMMUNIZATION  Aged Out       Katia He NP  [facility]  Family Practice  [facility]  [phone]    For any issues my office # is [phone]

## 2020-01-08 NOTE — PATIENT INSTRUCTIONS
Please call if she is not feeling better by Monday, or if her symptoms return after stopping the medication.

## 2020-01-09 LAB
BUN SERPL-MCNC: 17 MG/DL (ref 8–27)
BUN/CREATININE RATIO: 23 (ref 12–28)
CALCIUM SERPL-MCNC: 9.1 MG/DL (ref 8.7–10.3)
CHLORIDE SERPLBLD-SCNC: 99 MMOL/L (ref 96–106)
CREAT SERPL-MCNC: 0.75 MG/DL (ref 0.57–1)
EGFR IF AFRICN AM: 92 ML/MIN/1.73
EGFR IF NONAFRICN AM: 80 ML/MIN/1.73
GLUCOSE SERPL-MCNC: 139 MG/DL (ref 65–99)
POTASSIUM SERPL-SCNC: 5 MMOL/L (ref 3.5–5.2)
SODIUM SERPL-SCNC: 140 MMOL/L (ref 134–144)
TOTAL CO2: 28 MMOL/L (ref 20–29)

## 2020-01-10 PROBLEM — L97.312: Status: ACTIVE | Noted: 2020-01-10

## 2020-01-10 PROBLEM — F19.982 DRUG-INDUCED INSOMNIA (H): Status: ACTIVE | Noted: 2020-01-10

## 2020-01-13 ENCOUNTER — TELEPHONE (OUTPATIENT)
Dept: FAMILY MEDICINE | Facility: CLINIC | Age: 75
End: 2020-01-13

## 2020-01-13 DIAGNOSIS — J18.9 COMMUNITY ACQUIRED PNEUMONIA OF RIGHT LUNG, UNSPECIFIED PART OF LUNG: ICD-10-CM

## 2020-01-13 RX ORDER — SULFAMETHOXAZOLE/TRIMETHOPRIM 800-160 MG
1 TABLET ORAL 2 TIMES DAILY
Qty: 10 TABLET | Refills: 0 | Status: SHIPPED | OUTPATIENT
Start: 2020-01-13 | End: 2020-01-23

## 2020-01-13 NOTE — TELEPHONE ENCOUNTER
"Patient calls with update on pneumonia/cough since saw Katia He CNP 1/8/20. Reports finished 7 days of Bactrim DS yesterday and prednisone taper today. \"Feeling better but not 100% yet.\"  Estimates 75% improved at this point, was ~50% improved at 1/8 visit. Primary complaint is continued cough, chest congestion. Denies fever, chills, wheeze, dyspnea. Worries will relapse if doesn't have antibiotics extended. In past has relapse couple days after finished antibiotics and ended up in hospital.     Plan: per Katia He CNP - extend Bactrim DS BID x 5 more days. Call if symptoms persist or worsen. Patient agrees. Rx sent to pharmacy.  Leslie Millard RN    "

## 2020-01-23 ENCOUNTER — OFFICE VISIT (OUTPATIENT)
Dept: FAMILY MEDICINE | Facility: CLINIC | Age: 75
End: 2020-01-23

## 2020-01-23 VITALS
BODY MASS INDEX: 22.83 KG/M2 | SYSTOLIC BLOOD PRESSURE: 148 MMHG | OXYGEN SATURATION: 93 % | HEART RATE: 100 BPM | RESPIRATION RATE: 16 BRPM | DIASTOLIC BLOOD PRESSURE: 84 MMHG | WEIGHT: 133 LBS

## 2020-01-23 DIAGNOSIS — J18.9 COMMUNITY ACQUIRED PNEUMONIA OF RIGHT LUNG, UNSPECIFIED PART OF LUNG: Primary | ICD-10-CM

## 2020-01-23 LAB
% GRANULOCYTES: 93 % (ref 42.2–75.2)
HCT VFR BLD AUTO: 38.2 % (ref 35–46)
HEMOGLOBIN: 11.8 G/DL (ref 11.8–15.5)
LYMPHOCYTES NFR BLD AUTO: 4.8 % (ref 20.5–51.1)
MCH RBC QN AUTO: 28.5 PG (ref 27–31)
MCHC RBC AUTO-ENTMCNC: 31.1 G/DL (ref 33–37)
MCV RBC AUTO: 91.8 FL (ref 80–100)
MONOCYTES NFR BLD AUTO: 2.2 % (ref 1.7–9.3)
PLATELET # BLD AUTO: 296 K/UL (ref 140–450)
RBC # BLD AUTO: 4.16 X10/CMM (ref 3.7–5.2)
WBC # BLD AUTO: 12.1 X10/CMM (ref 3.8–11)

## 2020-01-23 PROCEDURE — 36415 COLL VENOUS BLD VENIPUNCTURE: CPT | Performed by: FAMILY MEDICINE

## 2020-01-23 PROCEDURE — 85025 COMPLETE CBC W/AUTO DIFF WBC: CPT | Performed by: FAMILY MEDICINE

## 2020-01-23 PROCEDURE — 71046 X-RAY EXAM CHEST 2 VIEWS: CPT | Mod: FY | Performed by: FAMILY MEDICINE

## 2020-01-23 PROCEDURE — 99213 OFFICE O/P EST LOW 20 MIN: CPT | Mod: 25 | Performed by: FAMILY MEDICINE

## 2020-01-23 RX ORDER — SULFAMETHOXAZOLE/TRIMETHOPRIM 800-160 MG
1 TABLET ORAL 2 TIMES DAILY
Qty: 28 TABLET | Refills: 1 | Status: SHIPPED | OUTPATIENT
Start: 2020-01-23 | End: 2020-04-08

## 2020-01-23 RX ORDER — IPRATROPIUM BROMIDE AND ALBUTEROL SULFATE 2.5; .5 MG/3ML; MG/3ML
SOLUTION RESPIRATORY (INHALATION)
COMMUNITY
Start: 2020-01-09 | End: 2021-03-01

## 2020-01-23 NOTE — PROGRESS NOTES
Problem(s) Oriented visit        SUBJECTIVE:                                                    Sydni Mendoza is a 74 year old female who presents to clinic today for follow up on CAP. She was hospitalized 2020 through 2020. She was sent home on Bactrim which theoretically would cover both bacteria that grew from her sputum. She initially was getting better, but then stalled in her progress. She was seen here post hospital and her antibiotic was extended for 5 days. She again felt better being back on this, now is worse again since she is off.       Problem list, Medication list, Allergies, and Medical/Social/Surgical histories reviewed in EPIC and updated as appropriate.   Additional history: as documented    ROS:  5 point ROS completed and negative except noted above, including Gen, CV, Resp, GI, MS      Histories:   Patient Active Problem List   Diagnosis     COPD (chronic obstructive pulmonary disease) (H)     Smoking     Lung nodule     Pneumothorax, post biopsy, right     Health Care Home     Pneumonia     COPD with acute exacerbation (H)     COPD exacerbation (H)     Hyponatremia     Acute and chronic respiratory failure with hypoxia (H)     Acute exacerbation of chronic obstructive pulmonary disease (COPD) (H)     CAP (community acquired pneumonia)     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         OBJECTIVE:                                                    BP (!) 148/84   Pulse 100   Resp 16   Wt 60.3 kg (133 lb)   SpO2 93%   BMI 22.83 kg/m    Body mass index is 22.83 kg/m .   GENERAL APPEARANCE: Alert, no acute distress  NECK: No adenopathy,masses or thyromegaly  RESP: very distant lung sounds, no appreciable W/R/R  CV: normal rate, regular rhythm, no murmur or gallop  MS: extremities normal, no peripheral edema  NEURO: Alert, oriented, speech and mentation normal  PSYCH: mentation appears normal, affect and mood normal  CXR: no apparent infiltrate   Labs Resulted Today:   Results for orders placed or performed in visit on 01/23/20   CBC with Diff/Plt ([facility])     Status: Abnormal   Result Value Ref Range    WBC x10/cmm 12.1 (A) 3.8 - 11.0 x10/cmm    % Lymphocytes 4.8 (A) 20.5 - 51.1 %    % Monocytes 2.2 1.7 - 9.3 %    % Granulocytes 93.0 (A) 42.2 - 75.2 %    RBC x10/cmm 4.16 3.7 - 5.2 x10/cmm    Hemoglobin 11.8 11.8 - 15.5 g/dl    Hematocrit 38.2 35 - 46 %    MCV 91.8 80 - 100 fL    MCH 28.5 27.0 - 31.0 pg    MCHC 31.1 (A) 33.0 - 37.0 g/dL    Platelet Count 296 140 - 450 K/uL     ASSESSMENT/PLAN:                                                        Sydni was seen today for cough.    Diagnoses and all orders for this visit:    Community acquired pneumonia of right lung, unspecified part of lung  -     sulfamethoxazole-trimethoprim (BACTRIM DS/SEPTRA DS) 800-160 MG tablet; Take 1 tablet by mouth 2 times daily  -     X-ray Chest 2 vws*  -     CBC with Diff/Plt ([facility])  -     Gram Stain w/Sputum Cult Rflx ([facility]); Future  Chest xray appears clear, but with persistent and worsening cough and elevated WBC with left shift. She is restarted on Bactrim. She will return a sputum for GS and Culture if she is able to produce one. She was not able while here.      There are no Patient Instructions on file for this visit.    The following health maintenance items are reviewed in Epic and correct as of  today:  Health Maintenance   Topic Date Due     DEXA  1945     COPD ACTION PLAN  1945     MAMMO SCREENING  1945     COLONOSCOPY  02/19/1957     ZOSTER IMMUNIZATION (1 of 2) 02/19/1997     MEDICARE ANNUAL WELLNESS VISIT  02/19/2012     FALL RISK ASSESSMENT  05/25/2019     PHQ-2  01/01/2020     ADVANCE CARE PLANNING  02/12/2021     LIPID  08/15/2023     DTAP/TDAP/TD IMMUNIZATION (2 - Td) 02/12/2026     SPIROMETRY  Completed     INFLUENZA VACCINE  Completed     PNEUMOCOCCAL IMMUNIZATION 65+ LOW/MEDIUM RISK  Completed     HEPATITIS C SCREENING  Addressed     IPV IMMUNIZATION  Aged Out     MENINGITIS IMMUNIZATION  Aged Out       Lyn Lui MD  [facility]  Family Practice  [facility]  [phone]    For any issues my office # is [phone]

## 2020-01-30 DIAGNOSIS — J18.9 COMMUNITY ACQUIRED PNEUMONIA OF RIGHT LUNG, UNSPECIFIED PART OF LUNG: ICD-10-CM

## 2020-01-30 PROCEDURE — 87205 SMEAR GRAM STAIN: CPT | Mod: 90 | Performed by: FAMILY MEDICINE

## 2020-01-31 ENCOUNTER — MEDICAL CORRESPONDENCE (OUTPATIENT)
Dept: FAMILY MEDICINE | Facility: CLINIC | Age: 75
End: 2020-01-31

## 2020-02-03 LAB
ANTIMICROBIAL SUSCEPTIBILITY: ABNORMAL
EPITHELIAL CELLS: NORMAL
LOWER RESPIRATORY CULTURE: ABNORMAL
Lab: ABNORMAL
Lab: ABNORMAL
Lab: NORMAL
WBC # BLD AUTO: NORMAL 10*3/UL

## 2020-02-04 ENCOUNTER — TELEPHONE (OUTPATIENT)
Dept: FAMILY MEDICINE | Facility: CLINIC | Age: 75
End: 2020-02-04

## 2020-02-04 NOTE — TELEPHONE ENCOUNTER
Called patient with sputum culture result.  Informed her that the prescription she is taking will take care of the bacteria.  She will finish the Bactrim prescription.

## 2020-02-12 DIAGNOSIS — J18.9 COMMUNITY ACQUIRED PNEUMONIA OF RIGHT LUNG, UNSPECIFIED PART OF LUNG: Primary | ICD-10-CM

## 2020-02-12 PROCEDURE — 71046 X-RAY EXAM CHEST 2 VIEWS: CPT | Mod: FY | Performed by: FAMILY MEDICINE

## 2020-02-13 NOTE — PROGRESS NOTES
chest xray for Dr Janae Alas MA February 13, 2020 11:03 AM    Patient did not return to clinic with orders    Faxed request with results to Dr Cardoso at [phone]  Today  Chyna Alas MA February 13, 2020 11:09 AM

## 2020-02-19 ENCOUNTER — TRANSFERRED RECORDS (OUTPATIENT)
Dept: FAMILY MEDICINE | Facility: CLINIC | Age: 75
End: 2020-02-19

## 2020-03-23 DIAGNOSIS — R60.0 PERIPHERAL EDEMA: ICD-10-CM

## 2020-03-23 DIAGNOSIS — I10 BENIGN ESSENTIAL HYPERTENSION: ICD-10-CM

## 2020-03-23 RX ORDER — LOSARTAN POTASSIUM 50 MG/1
50 TABLET ORAL DAILY
Qty: 90 TABLET | Refills: 1 | Status: SHIPPED | OUTPATIENT
Start: 2020-03-23 | End: 2020-04-08 | Stop reason: DRUGHIGH

## 2020-03-23 NOTE — TELEPHONE ENCOUNTER
Patient calls requesting refill on losartan 50mg QD.   Last visit: 1/23/20 pneumonia; cpx 7/2019   Future visit: none    BP Readings from Last 6 Encounters:   01/23/20 (!) 148/84   01/08/20 (!) 144/74   01/06/20 (!) 154/78   09/25/19 132/66   09/06/19 118/60   09/04/19 130/61       Component      Latest Ref Rng & Units 1/8/2020   Sodium      134 - 144 mmol/L 140   Potassium      3.5 - 5.2 mmol/L 5.0   Chloride      96 - 106 mmol/L 99   Carbon Dioxide      20 - 32 mmol/L    Anion Gap      3 - 14 mmol/L    Glucose      65 - 99 mg/dL 139 (H)   Urea Nitrogen      8 - 27 mg/dL 17   Creatinine      0.57 - 1.00 mg/dL 0.75   GFR Estimate      >60 mL/min/1.73:m2    GFR Estimate If Black      >60 mL/min/1.73:m2    Calcium      8.7 - 10.3 mg/dL 9.1   Bilirubin Total      0.2 - 1.3 mg/dL    Albumin      3.4 - 5.0 g/dL    Protein Total      6.8 - 8.8 g/dL    Alkaline Phosphatase      40 - 150 U/L    ALT      0 - 50 U/L    AST      0 - 45 U/L    eGFR If NonAfricn Am      >59 mL/min/1.73 80   eGFR If Africn Am      >59 mL/min/1.73 92   BUN/Creatinine Ratio      12 - 28 23   Total CO2      20 - 29 mmol/L 28   Plan: routed to Katia He CNP.

## 2020-04-08 ENCOUNTER — VIRTUAL VISIT (OUTPATIENT)
Dept: FAMILY MEDICINE | Facility: CLINIC | Age: 75
End: 2020-04-08

## 2020-04-08 DIAGNOSIS — M54.6 ACUTE MIDLINE THORACIC BACK PAIN: Primary | ICD-10-CM

## 2020-04-08 PROCEDURE — 99213 OFFICE O/P EST LOW 20 MIN: CPT | Mod: GT | Performed by: NURSE PRACTITIONER

## 2020-04-08 RX ORDER — LOSARTAN POTASSIUM 25 MG/1
25 TABLET ORAL DAILY
COMMUNITY
Start: 2020-03-23 | End: 2020-09-22

## 2020-04-08 RX ORDER — BENZONATATE 100 MG/1
CAPSULE ORAL
COMMUNITY
Start: 2020-03-18 | End: 2021-06-01 | Stop reason: DRUGHIGH

## 2020-04-08 NOTE — PROGRESS NOTES
"Problem(s) Oriented visit        SUBJECTIVE:                                                    Sydni Mendoza is a 75 year old female who presents to clinic today for the following health issues :  The patient has been notified of following:     \"This video visit will be conducted via a call between you and your physician/provider. We have found that certain health care needs can be provided without the need for an in-person physical exam.  This service lets us provide the care you need with a video conversation.  If a prescription is necessary we can send it directly to your pharmacy.  If lab work is needed we can place an order for that and you can then stop by our lab to have the test done at a later time.    If during the course of the call the physician/provider feels a video visit is not appropriate, you will not be charged for this service.\"     Physician has received verbal consent for a Video Visit from the patient? Yes      Pain started yesterday between her shoulder blades. She is feeling a lópez pain when she moves her upper back or shoulders. No trauma, no falls. Reports she was peeling potatoes and was leaning over the sink, later that afternoon she experienced the pain. Came on quickly. Took 500 mg of tylenol which did not help the pain.     NO numbness or tingling in shoulders harms or hands, no radiating pain, no loss of muscle tone, strength or dexterity in arms/hands.       Problem list, Medication list, Allergies, and Medical/Social/Surgical histories reviewed in [facility] and updated as appropriate.   Additional history: as documented    ROS:  5 point ROS completed and negative except noted above, including Gen, CV, Resp, GI, MS    Histories:   Patient Active Problem List   Diagnosis     COPD (chronic obstructive pulmonary disease) (H)     Smoking     Lung nodule     Pneumothorax, post biopsy, right     Health Care Home     Pneumonia     COPD with acute exacerbation (H)     COPD exacerbation " (H)     Hyponatremia     Acute and chronic respiratory failure with hypoxia (H)     Acute exacerbation of chronic obstructive pulmonary disease (COPD) (H)     CAP (community acquired pneumonia)     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        OBJECTIVE:                                                    There were no vitals taken for this visit - Virtual Visit    APPEARANCE: = Relaxed and in no distress  Conj/Eyelids = noninjected and lids and lashes are without inflammation  Resp effort = Calm regular breathing  Digits and Nails = FROM in all finger joints, no nail dystrophy  Recent/Remote Memory = Alert and Oriented x 3  Mood/Affect = Cooperative and interested     ASSESSMENT/PLAN:                                                        Sydni was seen today for back pain.    Diagnoses and all orders for this visit:    Acute midline thoracic back pain    Ibuprofen 600 -800 mg every 6 to 8 hours for muscle strain. Please call if your pain has not resolved or gotten better in the next two days, call if the ibuprofen does not help the pain. Discussed typical mechanical back pain, typical causes, and atypical back pain, including red flag symptoms.  Discussed conservative tratments inclduing physical therpy, stretching and strengthening, use of heat and/or ice, NSAIDs with food, antispasmodics where  indicated.    This was a virtual video-visit conducted during COVID-19 outbreak in regulation with social distancing and quarantine recommendations of the CDC and [place] department of health and human services.         ASSESSMENT/PLAN:       The following health maintenance items are reviewed in Epic and correct as of today:  Health Maintenance   Topic Date Due     DEXA  1945     COPD ACTION PLAN  1945     MAMMO SCREENING  1945     COLORECTAL CANCER SCREENING  02/19/1957     ZOSTER IMMUNIZATION (1 of 2) 02/19/1997     MEDICARE ANNUAL WELLNESS VISIT  02/19/2012     PHQ-2  01/01/2020     FALL RISK ASSESSMENT  01/23/2021     ADVANCE CARE PLANNING  02/12/2021     LIPID  08/15/2023     DTAP/TDAP/TD IMMUNIZATION (2 - Td) 02/12/2026     SPIROMETRY  Completed     INFLUENZA VACCINE  Completed     PNEUMOCOCCAL IMMUNIZATION 65+ LOW/MEDIUM RISK  Completed     HEPATITIS C SCREENING  Addressed     IPV IMMUNIZATION  Aged Out     MENINGITIS IMMUNIZATION  Aged Out       Katia He NP  [facility]  Family Practice  [facility]  [phone]    For any issues my office # is [phone]

## 2020-04-08 NOTE — PATIENT INSTRUCTIONS
Ibuprofen 600 -800 mg every 6 to 8 hours for muscle strain.   Patient Education  Ibuprofen Oral tablet  What is this medicine?  IBUPROFEN (eye BYOO proe fen) is a non-steroidal anti-inflammatory drug (NSAID). It is used for dental pain, fever, headaches or migraines, osteoarthritis, rheumatoid arthritis, or painful monthly periods. It can also relieve minor aches and pains caused by a cold, flu, or sore throat.  This medicine may be used for other purposes; ask your health care provider or pharmacist if you have questions.  What should I tell my health care provider before I take this medicine?  They need to know if you have any of these conditions:    asthma    cigarette smoker    drink more than 3 alcohol containing drinks a day    heart disease or circulation problems such as heart failure or leg edema (fluid retention)    high blood pressure    kidney disease    liver disease    stomach bleeding or ulcers    an unusual or allergic reaction to ibuprofen, aspirin, other NSAIDS, other medicines, foods, dyes, or preservatives    pregnant or trying to get pregnant    breast-feeding  How should I use this medicine?  Take this medicine by mouth with a glass of water. Follow the directions on the prescription label. Take this medicine with food if your stomach gets upset. Try to not lie down for at least 10 minutes after you take the medicine. Take your medicine at regular intervals. Do not take your medicine more often than directed.  A special MedGuide will be given to you by the pharmacist with each prescription and refill. Be sure to read this information carefully each time.  Talk to your pediatrician regarding the use of this medicine in children. Special care may be needed.  Overdosage: If you think you have taken too much of this medicine contact a poison control center or emergency room at once.  NOTE: This medicine is only for you. Do not share this medicine with others.  What if I miss a dose?  If you miss a  dose, take it as soon as you can. If it is almost time for your next dose, take only that dose. Do not take double or extra doses.  What may interact with this medicine?  Do not take this medicine with any of the following medications:    cidofovir    ketorolac    methotrexate    pemetrexed  This medicine may also interact with the following medications:    alcohol    aspirin    diuretics    lithium    other drugs for inflammation like prednisone    warfarin  This list may not describe all possible interactions. Give your health care provider a list of all the medicines, herbs, non-prescription drugs, or dietary supplements you use. Also tell them if you smoke, drink alcohol, or use illegal drugs. Some items may interact with your medicine.  What should I watch for while using this medicine?  Tell your doctor or healthcare professional if your symptoms do not start to get better or if they get worse.  This medicine does not prevent heart attack or stroke. In fact, this medicine may increase the chance of a heart attack or stroke. The chance may increase with longer use of this medicine and in people who have heart disease. If you take aspirin to prevent heart attack or stroke, talk with your doctor or health care professional.  Do not take other medicines that contain aspirin, ibuprofen, or naproxen with this medicine. Side effects such as stomach upset, nausea, or ulcers may be more likely to occur. Many medicines available without a prescription should not be taken with this medicine.  This medicine can cause ulcers and bleeding in the stomach and intestines at any time during treatment. Ulcers and bleeding can happen without warning symptoms and can cause death. To reduce your risk, do not smoke cigarettes or drink alcohol while you are taking this medicine.  You may get drowsy or dizzy. Do not drive, use machinery, or do anything that needs mental alertness until you know how this medicine affects you. Do not  stand or sit up quickly, especially if you are an older patient. This reduces the risk of dizzy or fainting spells.  This medicine can cause you to bleed more easily. Try to avoid damage to your teeth and gums when you brush or floss your teeth.  This medicine may be used to treat migraines. If you take migraine medicines for 10 or more days a month, your migraines may get worse. Keep a diary of headache days and medicine use. Contact your healthcare professional if your migraine attacks occur more frequently.  What side effects may I notice from receiving this medicine?  Side effects that you should report to your doctor or health care professional as soon as possible:    allergic reactions like skin rash, itching or hives, swelling of the face, lips, or tongue    severe stomach pain    signs and symptoms of bleeding such as bloody or black, tarry stools; red or dark-brown urine; spitting up blood or brown material that looks like coffee grounds; red spots on the skin; unusual bruising or bleeding from the eye, gums, or nose    signs and symptoms of a blood clot such as changes in vision; chest pain; severe, sudden headache; trouble speaking; sudden numbness or weakness of the face, arm, or leg    unexplained weight gain or swelling    unusually weak or tired    yellowing of eyes or skin  Side effects that usually do not require medical attention (report to your doctor or health care professional if they continue or are bothersome):    bruising    diarrhea    dizziness, drowsiness    headache    nausea, vomiting  This list may not describe all possible side effects. Call your doctor for medical advice about side effects. You may report side effects to FDA at 8-605-FDA-8570.  Where should I keep my medicine?  Keep out of the reach of children.   Store at room temperature between 15 and 30 degrees C (59 and 86 degrees F). Keep container tightly closed. Throw away any unused medicine after the expiration  date.  NOTE:This sheet is a summary. It may not cover all possible information. If you have questions about this medicine, talk to your doctor, pharmacist, or health care provider. Copyright  2016 Gold Standard

## 2020-04-29 ENCOUNTER — VIRTUAL VISIT (OUTPATIENT)
Dept: FAMILY MEDICINE | Facility: CLINIC | Age: 75
End: 2020-04-29

## 2020-04-29 DIAGNOSIS — M62.838 MUSCLE SPASM: Primary | ICD-10-CM

## 2020-04-29 PROCEDURE — 99214 OFFICE O/P EST MOD 30 MIN: CPT | Mod: GT | Performed by: NURSE PRACTITIONER

## 2020-04-29 RX ORDER — CYCLOBENZAPRINE HCL 5 MG
5 TABLET ORAL 3 TIMES DAILY PRN
Qty: 15 TABLET | Refills: 0 | Status: SHIPPED | OUTPATIENT
Start: 2020-04-29 | End: 2020-05-01

## 2020-04-29 NOTE — PATIENT INSTRUCTIONS
I would like to treat you with a muscle relaxer. If the pain does not improve with 5 mg you may take one additional dose for a total of 10 mg TID. If this does not help please call the clinic so we can pursue imaging such as an x-ray or MRI to determine the cause of the pain. If you have any weakness, numbness or tingling in your limbs please call the clinic right away.

## 2020-04-29 NOTE — PROGRESS NOTES
Problem(s) Oriented visit        SUBJECTIVE:                                                    Sydni Mendoza is a 75 year old female who presents to clinic today for the following health issues :    Pain in her back between her shoulder blades started yesterday, came on suddenly after she moved her back. She reports the pain is sharp and occurs only when she moves, she reports it is good when she sits back or stands up but painful with movement she had a similar issue before which resolved by taking advil and resting. This time she has taken Advil and it is not helping with the pain. She is using a hot pack on her back which is helping somewhat with the pain. No weakness, tingling or pain in her arms or legs. No trauma to the area.     Problem list, Medication list, Allergies, and Medical/Social/Surgical histories reviewed in EPIC and updated as appropriate.   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      Current Outpatient Medications   Medication Sig Dispense Refill     cyclobenzaprine (FLEXERIL) 5 MG tablet Take 1 tablet (5 mg) by mouth 3 times daily as needed for muscle spasms 15 tablet 0     albuterol (PROAIR HFA/PROVENTIL HFA/VENTOLIN HFA) 108 (90 Base) MCG/ACT inhaler Inhale 2 puffs into the lungs every 6 hours as needed for shortness of breath / dyspnea or wheezing       benzonatate (TESSALON) 100 MG capsule TAKE 1 CAPSULE BY MOUTH THREE TIMES A DAY AS NEEDED       DULERA 200-5 MCG/ACT oral inhaler Inhale 2 puffs into the lungs 2 times daily       furosemide (LASIX) 40 MG tablet Take 40 mg by mouth daily as needed (lymphedema in legs)       ipratropium (ATROVENT) 0.02 % nebulizer solution 0.5 mg by neb BID, may increase to QID prn cough/dyspnea 180 mL 0     ipratropium - albuterol 0.5 mg/2.5 mg/3 mL (DUONEB) 0.5-2.5 (3) MG/3ML neb solution USE 1 VIAL VIA NEBULIZER 4 TIMES A DAY       levalbuterol (XOPENEX) 1.25 MG/3ML neb solution 1.25 mg by neb BID, may increase to QID as needed 220 mL 0     losartan (COZAAR) 25 MG tablet Take 25 mg by mouth daily       Multiple Vitamin (MULTI-VITAMIN) per tablet Take 1 tablet by mouth daily.         predniSONE (DELTASONE) 10 MG tablet Take 10 mg by mouth daily       umeclidinium (INCRUSE ELLIPTA) 62.5 MCG/INH oral inhaler Inhale 1 puff into the lungs daily 3 Inhaler 1       OBJECTIVE:                                                    No vitals taken, virtual visit.   APPEARANCE: = Relaxed and in no distress  Conj/Eyelids = noninjected and lids and lashes are without inflammation  Resp effort = Calm regular breathing  SKIN = absent significant rashes or lesions   Recent/Remote Memory = Alert and Oriented x 3  Mood/Affect = Cooperative and interested     ASSESSMENT/PLAN:                                                         Sydni was seen today for back pain.    Diagnoses and all orders for this visit:    Muscle spasm  -     cyclobenzaprine (FLEXERIL) 5 MG tablet; Take 1 tablet (5 mg) by mouth 3 times daily as needed for muscle spasms  What you are describing sound like it could a muscle spasm. I would like to treat you with a muscle relaxer. If the pain does not improve with 5 mg you may take one additional dose for a total of 10 mg TID. If this does not help please call the clinic so we can pursue imaging such as an x-ray or MRI to determine the cause of the pain. If you have any weakness, numbness or tingling in your limbs please call the clinic right away.       Patient needs assistance with ADLs: none identified today  Patient needs assistance with iADLs: none identified today    The following health maintenance items are reviewed in Epic and correct as of today:  Health Maintenance   Topic Date Due     DEXA  1945     COPD ACTION PLAN  1945     MAMMO SCREENING  1945     COLORECTAL CANCER SCREENING  02/19/1957     ZOSTER IMMUNIZATION (1 of 2) 02/19/1997     MEDICARE ANNUAL WELLNESS VISIT  02/19/2012     FALL RISK ASSESSMENT  01/23/2021     ADVANCE CARE PLANNING  02/12/2021     LIPID  08/15/2023     DTAP/TDAP/TD IMMUNIZATION (2 - Td) 02/12/2026     SPIROMETRY  Completed     PHQ-2  Completed     INFLUENZA VACCINE  Completed     PNEUMOCOCCAL IMMUNIZATION 65+ LOW/MEDIUM RISK  Completed     HEPATITIS C SCREENING  Addressed     IPV IMMUNIZATION  Aged Out     MENINGITIS IMMUNIZATION  Aged Out       See Patient Instructions    Katia He NP  [facility]  Family Practice  [facility]  [phone]    For any issues my office # is [phone]

## 2020-05-01 RX ORDER — CYCLOBENZAPRINE HCL 10 MG
10 TABLET ORAL 3 TIMES DAILY PRN
Qty: 30 TABLET | Refills: 0 | Status: SHIPPED | OUTPATIENT
Start: 2020-05-01 | End: 2020-07-14

## 2020-05-01 NOTE — PROGRESS NOTES
Patient called clinic with update, she is finding that 10mg TID is most effective. Per Katia He CNP prescription for 10mg tab sent to pharmacy. Vashti Ocasio

## 2020-06-01 ENCOUNTER — TRANSFERRED RECORDS (OUTPATIENT)
Dept: FAMILY MEDICINE | Facility: CLINIC | Age: 75
End: 2020-06-01

## 2020-06-16 DIAGNOSIS — I89.0 LYMPHEDEMA: Primary | ICD-10-CM

## 2020-06-17 RX ORDER — FUROSEMIDE 40 MG
40 TABLET ORAL DAILY PRN
Qty: 90 TABLET | Refills: 0 | Status: SHIPPED | OUTPATIENT
Start: 2020-06-17 | End: 2020-09-21

## 2020-07-14 ENCOUNTER — OFFICE VISIT (OUTPATIENT)
Dept: FAMILY MEDICINE | Facility: CLINIC | Age: 75
End: 2020-07-14

## 2020-07-14 VITALS
HEART RATE: 51 BPM | TEMPERATURE: 98.7 F | SYSTOLIC BLOOD PRESSURE: 142 MMHG | OXYGEN SATURATION: 93 % | DIASTOLIC BLOOD PRESSURE: 78 MMHG | RESPIRATION RATE: 17 BRPM

## 2020-07-14 DIAGNOSIS — R22.1 LOCALIZED SWELLING, MASS OR LUMP OF NECK: ICD-10-CM

## 2020-07-14 DIAGNOSIS — J43.9 PULMONARY EMPHYSEMA, UNSPECIFIED EMPHYSEMA TYPE (H): Primary | ICD-10-CM

## 2020-07-14 PROCEDURE — 99214 OFFICE O/P EST MOD 30 MIN: CPT | Performed by: FAMILY MEDICINE

## 2020-07-14 NOTE — PROGRESS NOTES
SUBJECTIVE:                                                    Sydni Mendoza is a 75 year old female who presents to clinic today for evaluation.  She is here today for evaluation of right neck mass.  She has a history of COPD, oxygen dependent, and on chronic prednisone 10 mg daily.  She noticed the swelling about 1 week ago.  She thinks she saw it in the mirror and it was not painful.  No difficulty swallowing.  No chest pain or worsening shortness of breath.  She has a chronic cough.  Denies unintentional weight loss, night sweats or other symptoms.      Problem list, Medication list, Allergies, and Medical/Social/Surgical histories reviewed in [facility] and updated as appropriate.   Additional history: as documented    ROS:    A 7 system review was completed and is as noted in HPI and otherwise negative.      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         OBJECTIVE:                                                    BP (!) 142/78   Pulse 51   Temp 98.7  F (37.1  C) (Skin)   Resp 17   SpO2 93%   There is no height or weight on file to calculate BMI.     General: Chronically ill, NAD  Oropharynx: Clear  Neck: there is a slightly mobile, firm, soft tissue mass in right inferior neck anterolateral to SCM.  Heart: RRR, no murmur  Chest: Distant breath sounds. No wheezing or crackles  Skin: Clear without lesions or rash  Psych: Normal mood and affect         ASSESSMENT/PLAN:                                                    Although it may be reassuring that the mass suddenly appeared as a reactive LN might, given her smoking history and the size I feel imaging is indicated.  Patient in agreement.  Further management pending CT results.    Pulmonary emphysema, unspecified emphysema type (H)  -     Referral to [facility]    Localized swelling, mass or lump of neck  -     Referral to [facility]        Chan Putnam MD  [facility]

## 2020-07-17 ENCOUNTER — TRANSFERRED RECORDS (OUTPATIENT)
Dept: FAMILY MEDICINE | Facility: CLINIC | Age: 75
End: 2020-07-17

## 2020-07-17 NOTE — PROGRESS NOTES
7/15/20 faxed this office note to [facility] imaging, Atten: kasie @ [phone]    Wesley Lozada,   [facility]  [phone]

## 2020-07-20 PROBLEM — E04.2 MULTINODULAR GOITER: Status: ACTIVE | Noted: 2020-07-20

## 2020-07-21 ENCOUNTER — TELEPHONE (OUTPATIENT)
Dept: FAMILY MEDICINE | Facility: CLINIC | Age: 75
End: 2020-07-21

## 2020-07-21 DIAGNOSIS — E04.9 GOITER, NODULAR: Primary | ICD-10-CM

## 2020-07-21 NOTE — TELEPHONE ENCOUNTER
Called patient with result  of CT  soft tissue neck. Informed patient that it showed an enlarged thyroid with multiple nodules.  Recommended is for patient to come into office for TSH blood test. Patient will come in tomorrow for this.  Also advised is consult with ENT.   Referral sent to [facility] for multinodular goiter. They will call patient to schedule.

## 2020-07-22 DIAGNOSIS — R79.89 DECREASED THYROID STIMULATING HORMONE (TSH) LEVEL: Primary | ICD-10-CM

## 2020-07-22 DIAGNOSIS — E04.9 GOITER, NODULAR: ICD-10-CM

## 2020-07-22 PROCEDURE — 36415 COLL VENOUS BLD VENIPUNCTURE: CPT | Performed by: FAMILY MEDICINE

## 2020-07-22 PROCEDURE — 84436 ASSAY OF TOTAL THYROXINE: CPT | Mod: 90 | Performed by: FAMILY MEDICINE

## 2020-07-22 PROCEDURE — 84443 ASSAY THYROID STIM HORMONE: CPT | Mod: 90 | Performed by: FAMILY MEDICINE

## 2020-07-23 LAB — TSH BLD-ACNC: 0.39 UIU/ML (ref 0.45–4.5)

## 2020-07-27 NOTE — NURSING NOTE
T4 added to labs drawn 7/22/2020 per MD. Carolina Rhodes, [facility]  July 27, 2020

## 2020-07-28 ENCOUNTER — TRANSFERRED RECORDS (OUTPATIENT)
Dept: FAMILY MEDICINE | Facility: CLINIC | Age: 75
End: 2020-07-28

## 2020-07-28 ENCOUNTER — TELEPHONE (OUTPATIENT)
Dept: FAMILY MEDICINE | Facility: CLINIC | Age: 75
End: 2020-07-28

## 2020-07-28 LAB — T4 TOTAL: 6.7 UG/DL (ref 4.5–12)

## 2020-07-28 NOTE — TELEPHONE ENCOUNTER
Called patient with lab results.  Informed patient of abnormal TSH.  Recommended was follow up with ENT.  Patient states she went today to ENT and she is now being referred to Endocrinology for biopsy.  They will be calling her to schedule this.

## 2020-08-06 ENCOUNTER — TRANSFERRED RECORDS (OUTPATIENT)
Dept: FAMILY MEDICINE | Facility: CLINIC | Age: 75
End: 2020-08-06

## 2020-08-13 NOTE — PROGRESS NOTES
8/12/20 faxed this result to Dr. Wong @ [phone]    Wesley Lozada,   [facility]  [phone]

## 2020-08-28 ENCOUNTER — OFFICE VISIT (OUTPATIENT)
Dept: FAMILY MEDICINE | Facility: CLINIC | Age: 75
End: 2020-08-28

## 2020-08-28 VITALS
SYSTOLIC BLOOD PRESSURE: 182 MMHG | WEIGHT: 137 LBS | BODY MASS INDEX: 23.52 KG/M2 | OXYGEN SATURATION: 97 % | TEMPERATURE: 98.1 F | HEART RATE: 111 BPM | DIASTOLIC BLOOD PRESSURE: 94 MMHG

## 2020-08-28 DIAGNOSIS — I10 HYPERTENSION, UNSPECIFIED TYPE: ICD-10-CM

## 2020-08-28 DIAGNOSIS — R05.9 COUGH: Primary | ICD-10-CM

## 2020-08-28 DIAGNOSIS — Z79.52 CURRENT CHRONIC USE OF SYSTEMIC STEROIDS: ICD-10-CM

## 2020-08-28 DIAGNOSIS — J43.9 PULMONARY EMPHYSEMA, UNSPECIFIED EMPHYSEMA TYPE (H): ICD-10-CM

## 2020-08-28 PROBLEM — J44.1 COPD EXACERBATION (H): Status: RESOLVED | Noted: 2019-08-31 | Resolved: 2020-08-28

## 2020-08-28 PROBLEM — J18.9 CAP (COMMUNITY ACQUIRED PNEUMONIA): Status: RESOLVED | Noted: 2020-01-02 | Resolved: 2020-08-28

## 2020-08-28 PROBLEM — J44.1 ACUTE EXACERBATION OF CHRONIC OBSTRUCTIVE PULMONARY DISEASE (COPD) (H): Status: RESOLVED | Noted: 2019-08-31 | Resolved: 2020-08-28

## 2020-08-28 PROCEDURE — 71046 X-RAY EXAM CHEST 2 VIEWS: CPT | Mod: FY | Performed by: FAMILY MEDICINE

## 2020-08-28 PROCEDURE — 99214 OFFICE O/P EST MOD 30 MIN: CPT | Mod: 25 | Performed by: FAMILY MEDICINE

## 2020-08-28 PROCEDURE — G0008 ADMIN INFLUENZA VIRUS VAC: HCPCS | Performed by: FAMILY MEDICINE

## 2020-08-28 PROCEDURE — 90674 CCIIV4 VAC NO PRSV 0.5 ML IM: CPT | Performed by: FAMILY MEDICINE

## 2020-08-28 RX ORDER — AMLODIPINE BESYLATE 5 MG/1
5 TABLET ORAL DAILY
Qty: 30 TABLET | Refills: 0 | Status: SHIPPED | OUTPATIENT
Start: 2020-08-28 | End: 2020-09-20

## 2020-08-28 NOTE — PROGRESS NOTES
SUBJECTIVE:                                                    Sydni Mendoza is a 75 year old female who presents to clinic today for evaluation.  She has severe COPD, oxygen dependent and on chronic prednisone.  She reports a fairly chronic cough that she feels is different from her COPD.  This is a dry cough.  She was diagnosed with pneumonia at the beginning of the year but those symptoms have resolved.  She was seen for MNG and biopsy was deferred due to her comorbidities.  She denies any other new respiratory symptoms.  She is on Losartan for HTN.  She was concerned this might cause her cough and stopped in several days ago.  She now states her cough has resolved.  No fever, chills, chest pain.      Problem list, Medication list, Allergies, and Medical/Social/Surgical histories reviewed in EPIC and updated as appropriate.   Additional history: as documented    ROS:    A 7 system review was completed and is as noted in HPI and otherwise negative.      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)     Drug-induced insomnia (H)     Multinodular goiter     Hypertension, unspecified type     Current chronic use of systemic steroids     Past Surgical History:   Procedure Laterality Date     CHEST TUBE INSERTION  12    Right ,pneumothorax post, lung biopsy     GYN SURGERY      Tubal Ligation       Social History     Tobacco Use     Smoking status: Former Smoker     Packs/day: 1.00     Years: 50.00     Pack years: 50.00     Types: Cigarettes     Last attempt to quit: 2011     Years since quittin.0     Smokeless tobacco: Never Used   Substance Use Topics     Alcohol use: Yes     Alcohol/week: 0.0 standard  drinks     Comment: occasional     No family history on file.        OBJECTIVE:                                                    BP (!) 182/94   Pulse 111   Temp 98.1  F (36.7  C)   Wt 62.1 kg (137 lb)   SpO2 97%   BMI 23.52 kg/m    Body mass index is 23.52 kg/m .     General: Chronically ill appearing, NAD  Oropharynx: Clear  Heart: RRR, no murmur  Chest: Distant breath sounds.  No wheezing or crackles  Skin: Clear without lesions or rash  Psych: Normal mood and affect         ASSESSMENT/PLAN:                                                        Cough: we discussed that ARBs only rarely cause a cough compared to ACE-I though it is possible.  Certainly her underlying pulmonary disease is the more likely culprit but she feels this cough is different.  Trial off losartan and will start amlodipine.  Potential s/e, including leg swelling, discussed.  Recheck 2 weeks.  If not improved would recommend pulmonary eval  -     X-ray Chest 2 vws*    Current chronic use of systemic steroids  -     DEXA - Hip/Pelvis/Spine (FUTURE/[facility]); Future    Hypertension, unspecified type: see above plan  -     amLODIPine (NORVASC) 5 MG tablet; Take 1 tablet (5 mg) by mouth daily    Pulmonary emphysema, unspecified emphysema type (H)    Other orders  -     ADMIN INFLUENZA VIRUS VAC  -     Cancel: ADMIN INFLUENZA VIRUS VAC  -     FLUAD 65+ FLU ([facility] Only)        The following health maintenance items are reviewed in Epic and correct as of today:  Health Maintenance   Topic Date Due     DEXA  1945     COPD ACTION PLAN  1945     MAMMO SCREENING  1945     COLORECTAL CANCER SCREENING  02/19/1957     ZOSTER IMMUNIZATION (1 of 2) 02/19/1997     MEDICARE ANNUAL WELLNESS VISIT  02/19/2012     INFLUENZA VACCINE (1) 09/01/2020     FALL RISK ASSESSMENT  01/23/2021     ADVANCE CARE PLANNING  02/12/2021     LIPID  08/15/2023     DTAP/TDAP/TD IMMUNIZATION (2 - Td) 02/12/2026     SPIROMETRY  Completed     PHQ-2   Completed     PNEUMOCOCCAL IMMUNIZATION 65+ LOW/MEDIUM RISK  Completed     HEPATITIS C SCREENING  Addressed     IPV IMMUNIZATION  Aged Out     MENINGITIS IMMUNIZATION  Aged Out     HEPATITIS B IMMUNIZATION  Aged Out         Chan Putnam MD  [facility]

## 2020-09-01 ENCOUNTER — TELEPHONE (OUTPATIENT)
Dept: FAMILY MEDICINE | Facility: CLINIC | Age: 75
End: 2020-09-01

## 2020-09-01 NOTE — TELEPHONE ENCOUNTER
Called patient with chest X ray result.  Informed her that no new pneumonia was seen.  Patient to continue with her medication and follow up if not improving.

## 2020-09-09 ENCOUNTER — HOSPITAL ENCOUNTER (OUTPATIENT)
Dept: BONE DENSITY | Facility: CLINIC | Age: 75
Discharge: HOME | End: 2020-09-09
Attending: FAMILY MEDICINE | Admitting: FAMILY MEDICINE
Payer: COMMERCIAL

## 2020-09-09 DIAGNOSIS — Z79.52 CURRENT CHRONIC USE OF SYSTEMIC STEROIDS: ICD-10-CM

## 2020-09-09 PROCEDURE — 77080 DXA BONE DENSITY AXIAL: CPT

## 2020-09-14 ENCOUNTER — TELEPHONE (OUTPATIENT)
Dept: FAMILY MEDICINE | Facility: CLINIC | Age: 75
End: 2020-09-14

## 2020-09-14 DIAGNOSIS — M81.0 OSTEOPOROSIS: Primary | ICD-10-CM

## 2020-09-14 RX ORDER — CHOLECALCIFEROL (VITAMIN D3) 50 MCG
1 TABLET ORAL DAILY
Qty: 90 TABLET | Refills: 3 | COMMUNITY
Start: 2020-09-14 | End: 2022-01-01

## 2020-09-14 NOTE — TELEPHONE ENCOUNTER
Called patient with result of bone density. Informed her of osteoporosis.  Because of patients chronic Prednisone use it is recommended she start Prolia injections. Patient agrees and order sent to  [facility]fusion [facility].  They will call patient to schedule. Patient says she does take Vit D 2,000 international unit(s) daily and will start getting 1,200 mg of calcium through diet or supplement.

## 2020-09-20 DIAGNOSIS — I10 HYPERTENSION, UNSPECIFIED TYPE: ICD-10-CM

## 2020-09-20 RX ORDER — AMLODIPINE BESYLATE 5 MG/1
TABLET ORAL
Qty: 30 TABLET | Refills: 0 | Status: SHIPPED | OUTPATIENT
Start: 2020-09-20 | End: 2020-10-06

## 2020-09-21 DIAGNOSIS — Z92.29 PERSONAL HISTORY OF OTHER DRUG THERAPY: ICD-10-CM

## 2020-09-21 DIAGNOSIS — I89.0 LYMPHEDEMA: ICD-10-CM

## 2020-09-21 NOTE — TELEPHONE ENCOUNTER
LOV: 08/28/2020    Upcoming appointment: 09/30/2020    Labs: 07/22/2020- thryoid (T4 and TSH)    T4 Total   Date Value Ref Range Status   07/22/2020 6.7 4.5 - 12.0 ug/dL Final

## 2020-09-22 DIAGNOSIS — I10 HYPERTENSION, UNSPECIFIED TYPE: Primary | ICD-10-CM

## 2020-09-22 RX ORDER — FUROSEMIDE 40 MG
40 TABLET ORAL DAILY PRN
Qty: 90 TABLET | Refills: 0 | Status: SHIPPED | OUTPATIENT
Start: 2020-09-22 | End: 2020-12-18

## 2020-09-22 RX ORDER — LOSARTAN POTASSIUM 25 MG/1
25 TABLET ORAL DAILY
Qty: 180 TABLET | Refills: 1 | Status: SHIPPED | OUTPATIENT
Start: 2020-09-22 | End: 2020-09-30 | Stop reason: ALTCHOICE

## 2020-09-26 ENCOUNTER — NURSE TRIAGE (OUTPATIENT)
Dept: NURSING | Facility: CLINIC | Age: 75
End: 2020-09-26

## 2020-09-26 ENCOUNTER — APPOINTMENT (OUTPATIENT)
Dept: GENERAL RADIOLOGY | Facility: CLINIC | Age: 75
End: 2020-09-26
Attending: EMERGENCY MEDICINE
Payer: COMMERCIAL

## 2020-09-26 ENCOUNTER — HOSPITAL ENCOUNTER (EMERGENCY)
Facility: CLINIC | Age: 75
Discharge: HOME | End: 2020-09-26
Attending: EMERGENCY MEDICINE | Admitting: EMERGENCY MEDICINE
Payer: COMMERCIAL

## 2020-09-26 VITALS
OXYGEN SATURATION: 96 % | HEART RATE: 109 BPM | SYSTOLIC BLOOD PRESSURE: 143 MMHG | RESPIRATION RATE: 22 BRPM | DIASTOLIC BLOOD PRESSURE: 92 MMHG | TEMPERATURE: 98.8 F

## 2020-09-26 DIAGNOSIS — E87.70 HYPERVOLEMIA, UNSPECIFIED HYPERVOLEMIA TYPE: ICD-10-CM

## 2020-09-26 DIAGNOSIS — J44.1 COPD EXACERBATION (H): ICD-10-CM

## 2020-09-26 LAB
ALBUMIN SERPL-MCNC: 3.5 G/DL (ref 3.4–5)
ALP SERPL-CCNC: 53 U/L (ref 40–150)
ALT SERPL W P-5'-P-CCNC: 18 U/L (ref 0–50)
ANION GAP SERPL CALCULATED.3IONS-SCNC: <1 MMOL/L (ref 3–14)
AST SERPL W P-5'-P-CCNC: 14 U/L (ref 0–45)
BASOPHILS # BLD AUTO: 0 10E9/L (ref 0–0.2)
BASOPHILS NFR BLD AUTO: 0.3 %
BILIRUB SERPL-MCNC: 0.5 MG/DL (ref 0.2–1.3)
BUN SERPL-MCNC: 14 MG/DL (ref 7–30)
CALCIUM SERPL-MCNC: 8.9 MG/DL (ref 8.5–10.1)
CHLORIDE SERPL-SCNC: 101 MMOL/L (ref 94–109)
CO2 SERPL-SCNC: 42 MMOL/L (ref 20–32)
CREAT SERPL-MCNC: 0.62 MG/DL (ref 0.52–1.04)
DIFFERENTIAL METHOD BLD: ABNORMAL
EOSINOPHIL # BLD AUTO: 0.5 10E9/L (ref 0–0.7)
EOSINOPHIL NFR BLD AUTO: 3.8 %
ERYTHROCYTE [DISTWIDTH] IN BLOOD BY AUTOMATED COUNT: 12.7 % (ref 10–15)
GFR SERPL CREATININE-BSD FRML MDRD: 89 ML/MIN/{1.73_M2}
GLUCOSE SERPL-MCNC: 93 MG/DL (ref 70–99)
HCT VFR BLD AUTO: 37.4 % (ref 35–47)
HGB BLD-MCNC: 11.6 G/DL (ref 11.7–15.7)
IMM GRANULOCYTES # BLD: 0 10E9/L (ref 0–0.4)
IMM GRANULOCYTES NFR BLD: 0.2 %
LABORATORY COMMENT REPORT: NORMAL
LYMPHOCYTES # BLD AUTO: 1.4 10E9/L (ref 0.8–5.3)
LYMPHOCYTES NFR BLD AUTO: 11.6 %
MCH RBC QN AUTO: 29.8 PG (ref 26.5–33)
MCHC RBC AUTO-ENTMCNC: 31 G/DL (ref 31.5–36.5)
MCV RBC AUTO: 96 FL (ref 78–100)
MONOCYTES # BLD AUTO: 1.5 10E9/L (ref 0–1.3)
MONOCYTES NFR BLD AUTO: 12.2 %
NEUTROPHILS # BLD AUTO: 8.6 10E9/L (ref 1.6–8.3)
NEUTROPHILS NFR BLD AUTO: 71.9 %
NRBC # BLD AUTO: 0 10*3/UL
NRBC BLD AUTO-RTO: 0 /100
NT-PROBNP SERPL-MCNC: 162 PG/ML (ref 0–900)
PLATELET # BLD AUTO: 289 10E9/L (ref 150–450)
POTASSIUM SERPL-SCNC: 4 MMOL/L (ref 3.4–5.3)
PROT SERPL-MCNC: 6.4 G/DL (ref 6.8–8.8)
RBC # BLD AUTO: 3.89 10E12/L (ref 3.8–5.2)
SARS-COV-2 RNA SPEC QL NAA+PROBE: NEGATIVE
SARS-COV-2 RNA SPEC QL NAA+PROBE: NORMAL
SODIUM SERPL-SCNC: 141 MMOL/L (ref 133–144)
SPECIMEN SOURCE: NORMAL
SPECIMEN SOURCE: NORMAL
TROPONIN I SERPL-MCNC: <0.015 UG/L (ref 0–0.04)
WBC # BLD AUTO: 11.9 10E9/L (ref 4–11)

## 2020-09-26 PROCEDURE — 85025 COMPLETE CBC W/AUTO DIFF WBC: CPT | Performed by: EMERGENCY MEDICINE

## 2020-09-26 PROCEDURE — 99284 EMERGENCY DEPT VISIT MOD MDM: CPT | Mod: 25

## 2020-09-26 PROCEDURE — 25000125 ZZHC RX 250: Performed by: EMERGENCY MEDICINE

## 2020-09-26 PROCEDURE — 96375 TX/PRO/DX INJ NEW DRUG ADDON: CPT | Mod: 59

## 2020-09-26 PROCEDURE — 80053 COMPREHEN METABOLIC PANEL: CPT | Performed by: EMERGENCY MEDICINE

## 2020-09-26 PROCEDURE — 84484 ASSAY OF TROPONIN QUANT: CPT | Performed by: EMERGENCY MEDICINE

## 2020-09-26 PROCEDURE — 96374 THER/PROPH/DIAG INJ IV PUSH: CPT

## 2020-09-26 PROCEDURE — 71045 X-RAY EXAM CHEST 1 VIEW: CPT

## 2020-09-26 PROCEDURE — 25000128 H RX IP 250 OP 636: Performed by: EMERGENCY MEDICINE

## 2020-09-26 PROCEDURE — 83880 ASSAY OF NATRIURETIC PEPTIDE: CPT | Performed by: EMERGENCY MEDICINE

## 2020-09-26 PROCEDURE — U0003 INFECTIOUS AGENT DETECTION BY NUCLEIC ACID (DNA OR RNA); SEVERE ACUTE RESPIRATORY SYNDROME CORONAVIRUS 2 (SARS-COV-2) (CORONAVIRUS DISEASE [COVID-19]), AMPLIFIED PROBE TECHNIQUE, MAKING USE OF HIGH THROUGHPUT TECHNOLOGIES AS DESCRIBED BY CMS-2020-01-R: HCPCS | Performed by: EMERGENCY MEDICINE

## 2020-09-26 PROCEDURE — C9803 HOPD COVID-19 SPEC COLLECT: HCPCS

## 2020-09-26 PROCEDURE — 94640 AIRWAY INHALATION TREATMENT: CPT

## 2020-09-26 RX ORDER — METHYLPREDNISOLONE SODIUM SUCCINATE 125 MG/2ML
125 INJECTION, POWDER, LYOPHILIZED, FOR SOLUTION INTRAMUSCULAR; INTRAVENOUS ONCE
Status: COMPLETED | OUTPATIENT
Start: 2020-09-26 | End: 2020-09-26

## 2020-09-26 RX ORDER — FUROSEMIDE 10 MG/ML
40 INJECTION INTRAMUSCULAR; INTRAVENOUS ONCE
Status: COMPLETED | OUTPATIENT
Start: 2020-09-26 | End: 2020-09-26

## 2020-09-26 RX ADMIN — IPRATROPIUM BROMIDE AND ALBUTEROL 6 PUFF: 20; 100 SPRAY, METERED RESPIRATORY (INHALATION) at 09:33

## 2020-09-26 RX ADMIN — FUROSEMIDE 40 MG: 10 INJECTION, SOLUTION INTRAVENOUS at 09:30

## 2020-09-26 RX ADMIN — METHYLPREDNISOLONE SODIUM SUCCINATE 125 MG: 125 INJECTION, POWDER, FOR SOLUTION INTRAMUSCULAR; INTRAVENOUS at 09:30

## 2020-09-26 ASSESSMENT — ENCOUNTER SYMPTOMS
SHORTNESS OF BREATH: 1
COUGH: 1
WHEEZING: 1

## 2020-09-26 NOTE — DISCHARGE INSTRUCTIONS
Make sure you are taking your lasix as prescribed.  Return for fever or worsened shortness of breath.    If you feel your condition has changed or worsened, please call your doctor or return to the emergency department right away.

## 2020-09-26 NOTE — ED NOTES
[facility]  ED Nurse Handoff Report    ED Chief complaint: Shortness of Breath      ED Diagnosis:   Final diagnoses:   None       Code Status: hosp to address    Allergies:   Allergies   Allergen Reactions     Albuterol Palpitations     Is fine taking albuterol inhaler, tachycardia goes away.       Patient Story: Patient called the ambulance today for worsening shortness of breath.  States she has not been taking her lasix daily.  On 4L NC at home typically.  On 4L here  Focused Assessment:  Alert, oriented, no complaints of pain. Patient is edematous generally.  Takes prednisone daily. Bruising from BP cuff.      Treatments and/or interventions provided: Steroids, inhaler  Patient's response to treatments and/or interventions:     To be done/followed up on inpatient unit:      Does this patient have any cognitive concerns?: none    Activity level - Baseline/Home:  Independent  Activity Level - Current:   Stand with Assist    Patient's Preferred language: English   Needed?: No    Isolation: None and COVID r/o and special precautions  Infection: COVID r/o and special precautions  Bariatric?: No    Vital Signs:   Vitals:    09/26/20 0906 09/26/20 0930 09/26/20 1100 09/26/20 1118   BP: (!) 177/75 (!) 160/76  (!) 143/92   Pulse: 105 109  102   Resp:    20   Temp:       TempSrc:       SpO2: 99% 99% 100% 99%       Cardiac Rhythm:     Was the PSS-3 completed:   Yes  What interventions are required if any?               Family Comments:   OBS brochure/video discussed/provided to patient/family: N/A              Name of person given brochure if not patient:               Relationship to patient:     For the majority of the shift this patient's behavior was Green.   Behavioral interventions performed were .    ED NURSE PHONE NUMBER:

## 2020-09-26 NOTE — TELEPHONE ENCOUNTER
of pt calling   Says was just at >    9/26/2020 (3 hours)   Emergency Department   COPD exacerbation (H) +1 more     says MD was suppose to call in 2 RX's - one for inhaler - one for prednisone   says they are NOT at pharmacy   Looking in chart - I do not see they were sent in     [facility] ED call - spoke to Mireya   Who said she will relay message to Dr Segura     called back and given the above information     Protocol and care advice reviewed  Caller states understanding of the recommended disposition  Advised to call back if further questions or concerns    Juan Antonio Deng , RN / [facility]    Reason for Disposition    Caller has urgent medication question about med that PCP prescribed and triager unable to answer question    Protocols used: MEDICATION QUESTION CALL-A-AH

## 2020-09-26 NOTE — ED AVS SNAPSHOT
Emergency Department  [address] [facility] [address]  Phone:  [phone]  Fax:  [phone]                                    Sydni Mendoza   MRN: [id number]    Department:   Emergency Department   Date of Visit:  9/26/2020           After Visit Summary Signature Page    I have received my discharge instructions, and my questions have been answered. I have discussed any challenges I see with this plan with the nurse or doctor.    ..........................................................................................................................................  Patient/Patient Representative Signature      ..........................................................................................................................................  Patient Representative Print Name and Relationship to Patient    ..................................................               ................................................  Date                                   Time    ..........................................................................................................................................  Reviewed by Signature/Title    ...................................................              ..............................................  Date                                               Time          22EPIC Rev 08/18

## 2020-09-26 NOTE — ED PROVIDER NOTES
History     Chief Complaint:  Shortness of breath     HPI   Sydni Mendoza is a 75 year old female with a history of severe oxygen dependent COPD on chronic steroids who presents via EMS with shortness of breath.  The patient reports gradually worsening shortness of breath over the past week which then worsened significantly at about midnight today.  She is on 3.5 lpm of oxygen at home.  Nebulizer treatment she did this morning did not improve her breathing significantly although she notes this is often the case and a rescue inhaler is usually more helpful.  She has lymphedema and does not feel her extremity swelling is worse than normal.  She is on Lasix but does not take it daily.  EMS noted diminished breath sounds although her SpO2 was 98% therefore no treatment was given en route.      Allergies:  Albuterol - palpitations      Medications:    Prednisone   Albuterol inhaler  Dulera inhaler  Atrovent nebulizer solution  Duoneb nebulizer solution  Xopenex nebulizer solution  Amlodipine   Losartan  Furosemide     Past Medical History:    Chronic obstructive pulmonary disease   Pneumothorax   Multinodular goiter  Osteoporosis   Hypertension   Lymphedema     Past Surgical History:    Bilateral tubal ligation  Chest tube placement     Family History:    History reviewed. No pertinent family history.     Social History:  Presents to the ED alone.  Tobacco Use: Former smoker, 50 pack year history, quit 2011.   Alcohol Use: Occasional alcohol use.   PCP: Katia He     Review of Systems   Respiratory: Positive for cough, shortness of breath and wheezing.    Cardiovascular: Positive for leg swelling. Negative for chest pain.   10 systems reviewed and negative except as above and in HPI.     Physical Exam   First Vitals:  BP: (!) 207/80  Pulse: 105  Temp: 98.8  F (37.1  C)  Resp: 20  SpO2: 98 %      Physical Exam  General: Resting on the gurney, appears uncomfortable. Initially obviously very short of breath.     Head:  The scalp, face, and head appear normal  Mouth/Throat: Mucus membranes are moist  CV:  Regular rate    Normal S1 and S2  No pathological murmur     Bilateral pitting edema to the mid thighs which is chronic per patient report.    Resp:  Wheezes in all fields and diminished air movement in the bases.    Increased work of breathing.    Rapid respiratory rate.  GI:  Abdomen is soft, no rigidity    No tenderness to palpation  MS:  Normal motor assessment of all extremities.    Good capillary refill noted.  Skin:  No rash or lesions noted.  Neuro: Speech is normal and fluent. No apparent deficit.  Psych:  Awake. Alert.  Normal affect.      Appropriate interactions.     After nebulizer treatment her breath sounds were clear, she was in no respiratory distress, and appeared quite comfortable on the gurney.    Emergency Department Course     Imaging:  Chest X-ray, Portable (1 view):  Portable AP view of the chest was obtained. Stable cardiomediastinal silhouette. High lung volumes, likely due to underlying COPD changes. Mild bibasilar pulmonary opacities could be atelectatic or infectious. No significant pleural effusion or pneumothorax.  Report per radiology.      Radiographic findings were communicated with the patient who voiced understanding of the findings.     Laboratory:  CBC: WBC 11.9 (H), HGB 11.6 (L), MCHC 31 (L), otherwise WNL ()   CMP: CO2 42 (H), Anion Gap <1 (L), Total Protein 6.4 (L), otherwise WNL (Creatinine 0.62)   Pro-BNP: 162  Troponin I: <0.015     COVID19 Virus PCR, nasopharyngeal: pending       Interventions:  (0930) Lasix, 40 mg, IV injection   (0930) Solu-Medrol, 125 mg, IV injection    (0933) Combivent inhaler, 6 puffs      Emergency Department Course:  The patient arrived in the emergency department via [facility].   Nursing notes and vitals reviewed.  I performed an exam of the patient as documented above.     A peripheral IV was established.  Blood was drawn and sent for  laboratory testing, results as above. The patient was placed on continuous cardiac monitoring and pulse oximetry.     The patient had a portable chest x-ray done in the emergency department.     The patient's nose was swabbed and this sample was sent for COVID testing.    (1130) Recheck: Patient feeling much improved after interventions here.  Findings and plan explained to the patient.     Patient discharged home with instructions regarding supportive care, medications, and reasons to return. The importance of close follow-up was reviewed.  The patient was prescribed prednisone.    Impression & Plan      Covid-19  Sydni Mendoza was evaluated during a global COVID-19 pandemic, which necessitated consideration that the patient might be at risk for infection with the SARS-CoV-2 virus that causes COVID-19.   Applicable protocols for evaluation were followed during the patient's care.   COVID-19 was considered as part of the patient's evaluation. The plan for testing is:  a test was obtained during this visit.    Medical Decision Making:  Sydni Mendoza is a 75 year old female with a history of COPD who presents for evaluation of shortness of breath.   Signs and symptoms are consistent with COPD exacerbation.  A broad differential was considered including foreign body, COPD, viral induced reactive airway disease, pneumothorax, cardiac equivalent, allergic phenomena, pneumonia, bronchitis, etc.  Patient feels improved after interventions here in ED.   No indication for hospitalization at this time including no hypoxia, no marked increase in respiratory rate, and good response to diuresis.   Supportive outpatient management is indicated, medications for discharge noted above.  Close followup with primary care physician.  Return if increased wheezing, progressive shortness of breath, develops fever greater than 102.       There are no signs at this point of any other serious etiologies including those mentioned  above, especially acute coronary syndrome. I doubt this is ACS given the classic story of COPD exacerbation given by the patient, the marked wheezing without rales and no chest pain.  Pulmoonay edema secondary to fluid overlaod also likely contributing considerably - she reports she will start taking her lasix as prescribed.       Additional prednisone burst was prescribed to augment her chronic daily dose.    Diagnosis:    ICD-10-CM    1. COPD exacerbation (H)  J44.1    2. Hypervolemia, unspecified hypervolemia type  E87.70        Disposition:  Discharged to home.       I, Marimar Lopez, am serving as a scribe on 9/26/2020 at 8:54 AM to personally document services performed by Raquel Segura based on my observations and the provider's statements to me.     Marimar Lopez  9/26/2020    EMERGENCY DEPARTMENT       Raquel Segura MD  09/26/20 7305

## 2020-09-28 ENCOUNTER — TELEPHONE (OUTPATIENT)
Dept: FAMILY MEDICINE | Facility: CLINIC | Age: 75
End: 2020-09-28

## 2020-09-28 ENCOUNTER — TELEPHONE (OUTPATIENT)
Dept: ONCOLOGY | Facility: CLINIC | Age: 75
End: 2020-09-28

## 2020-09-28 ENCOUNTER — PATIENT OUTREACH (OUTPATIENT)
Dept: CARE COORDINATION | Facility: CLINIC | Age: 75
End: 2020-09-28

## 2020-09-28 DIAGNOSIS — Z20.822 COVID-19 RULED OUT: Primary | ICD-10-CM

## 2020-09-28 NOTE — PROGRESS NOTES
Clinic Care Coordination Contact    Situation: Patient chart reviewed by care coordinator.    Assessment: Patient went to the ED at [facility] on 9/26/20 for shortness of breath. Pt has COPD and is on chronic steroids. She is on oxygen at home. She also has lymphedema and is on lasix. Her swelling was worse than normal. No indication for hospitalization including no hypoxia, increased respiratory rate, and good response to diuresis. Pt has not been taking her lasix every day as prescribed. It was felt that pulmonary edema and fluid overload was playing a factor. Pt agreed to take lasix as prescribed. Pt was prescribed an additional prednisone burst to augment her daily use. Pt needs to follow up with her PCP. Pt was given a covid test and it was negative in result.    PIA Care Coordinator entered get well loop per protocol for care coordination. Pt does not meet outreach criteria otherwise.    PIA Care Coordinator routed a message to RN triage at [facility] to reach out to pt and check in and discuss scheduling follow up.     Care Coordinator can be available as needed but did not outreach to patient.  CC can be available if a referral is placed.    Carolina Giles, MSW, [facility]  Clinic Care Coordinator  [email or web address]  [phone]

## 2020-09-28 NOTE — TELEPHONE ENCOUNTER
Called and spoke with patient regarding recent ER visit. Patient in ER for COPD exacerbation-Covid test negative. Patient reports that she is feeling much better. Additional prednisone burst was prescribed to augment her chronic daily dose-patient expressed unhappiness regarding increased dose. Encouraged patient to take medication as prescribed. She is scheduled to f/u in clinic with Dr Putnam 10/6/2020-sooner appointment offered but patient declined due to conflicts with other appointments. Vashti Ocasio

## 2020-09-30 ENCOUNTER — INFUSION THERAPY VISIT (OUTPATIENT)
Dept: INFUSION THERAPY | Facility: CLINIC | Age: 75
End: 2020-09-30
Attending: FAMILY MEDICINE
Payer: COMMERCIAL

## 2020-09-30 VITALS
HEART RATE: 99 BPM | OXYGEN SATURATION: 97 % | DIASTOLIC BLOOD PRESSURE: 76 MMHG | TEMPERATURE: 98.2 F | RESPIRATION RATE: 24 BRPM | SYSTOLIC BLOOD PRESSURE: 150 MMHG

## 2020-09-30 DIAGNOSIS — Z92.29 PERSONAL HISTORY OF OTHER DRUG THERAPY: Primary | ICD-10-CM

## 2020-09-30 DIAGNOSIS — M81.0 AGE-RELATED OSTEOPOROSIS WITHOUT CURRENT PATHOLOGICAL FRACTURE: ICD-10-CM

## 2020-09-30 PROCEDURE — 25000128 H RX IP 250 OP 636: Performed by: FAMILY MEDICINE

## 2020-09-30 PROCEDURE — 96372 THER/PROPH/DIAG INJ SC/IM: CPT

## 2020-09-30 RX ADMIN — DENOSUMAB 60 MG: 60 INJECTION SUBCUTANEOUS at 13:57

## 2020-09-30 ASSESSMENT — PAIN SCALES - GENERAL: PAINLEVEL: NO PAIN (0)

## 2020-09-30 NOTE — PROGRESS NOTES
Infusion Nursing Note:  Sydni Mendoza presents today for prolia  Patient seen by provider today: No   present during visit today: Not Applicable.    Note: First prolia injection - info printed and reviewed.    Intravenous Access:  No Intravenous access/labs at this visit.    Treatment Conditions:  Lab Results   Component Value Date     09/26/2020                   Lab Results   Component Value Date    POTASSIUM 4.0 09/26/2020           Lab Results   Component Value Date    MAG 1.7 09/01/2019            Lab Results   Component Value Date    CR 0.62 09/26/2020                   Lab Results   Component Value Date    MARQUEZ 8.9 09/26/2020                Lab Results   Component Value Date    BILITOTAL 0.5 09/26/2020           Lab Results   Component Value Date    ALBUMIN 3.5 09/26/2020                    Lab Results   Component Value Date    ALT 18 09/26/2020           Lab Results   Component Value Date    AST 14 09/26/2020       Results reviewed, labs MET treatment parameters, ok to proceed with treatment.      Post Infusion Assessment:  Patient tolerated injection without incident.  Site patent and intact, free from redness, edema or discomfort.       Discharge Plan:   Copy of AVS reviewed with patient and/or family.  Patient will return in 6 months as scheduled - will call for next appointment.  Patient discharged in stable condition accompanied by: self.  Departure Mode: Ambulatory.    Hugo Dodson RN

## 2020-10-06 ENCOUNTER — OFFICE VISIT (OUTPATIENT)
Dept: FAMILY MEDICINE | Facility: CLINIC | Age: 75
End: 2020-10-06

## 2020-10-06 VITALS — DIASTOLIC BLOOD PRESSURE: 68 MMHG | OXYGEN SATURATION: 98 % | HEART RATE: 100 BPM | SYSTOLIC BLOOD PRESSURE: 150 MMHG

## 2020-10-06 DIAGNOSIS — Z99.81 OXYGEN DEPENDENT: ICD-10-CM

## 2020-10-06 DIAGNOSIS — M81.0 AGE-RELATED OSTEOPOROSIS WITHOUT CURRENT PATHOLOGICAL FRACTURE: ICD-10-CM

## 2020-10-06 DIAGNOSIS — J43.9 PULMONARY EMPHYSEMA, UNSPECIFIED EMPHYSEMA TYPE (H): Primary | ICD-10-CM

## 2020-10-06 DIAGNOSIS — I10 HYPERTENSION, UNSPECIFIED TYPE: ICD-10-CM

## 2020-10-06 DIAGNOSIS — Z79.52 CURRENT CHRONIC USE OF SYSTEMIC STEROIDS: ICD-10-CM

## 2020-10-06 PROCEDURE — 36415 COLL VENOUS BLD VENIPUNCTURE: CPT | Performed by: FAMILY MEDICINE

## 2020-10-06 PROCEDURE — 99214 OFFICE O/P EST MOD 30 MIN: CPT | Performed by: FAMILY MEDICINE

## 2020-10-06 PROCEDURE — 93050 ART PRESSURE WAVEFORM ANALYS: CPT | Performed by: FAMILY MEDICINE

## 2020-10-06 RX ORDER — AMLODIPINE BESYLATE 10 MG/1
10 TABLET ORAL DAILY
Qty: 90 TABLET | Refills: 0 | Status: SHIPPED | OUTPATIENT
Start: 2020-10-06 | End: 2020-10-22

## 2020-10-06 NOTE — PROGRESS NOTES
SUBJECTIVE:                                                    Sydni Mendoza is a 75 year old female who presents to clinic today for ER follow up.  Became acutely short of breath.  CXR reviewed.  Was treated with prednisone burst, nebs and lasix.  Back to baseline.  She has severe COPD on chronic daily prednisone 10 mg.  She is oxygen dependent.  She sees Dr Cardoso for pulmonology care.     HTN: switched from losartan to amlodipine to see if it helped her cough.  She thinks it did but not entirely sure.  No new symptoms.      Problem list, Medication list, Allergies, and Medical/Social/Surgical histories reviewed in EPIC and updated as appropriate.   Additional history: as documented    ROS:    A 10 system review was completed and is as noted in HPI and otherwise negative.      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)     Drug-induced insomnia (H)     Multinodular goiter     Hypertension, unspecified type     Current chronic use of systemic steroids     Age-related osteoporosis without current pathological fracture     Personal history of other drug therapy     Past Surgical History:   Procedure Laterality Date     CHEST TUBE INSERTION  12    Right ,pneumothorax post, lung biopsy     GYN SURGERY      Tubal Ligation       Social History     Tobacco Use     Smoking status: Former Smoker     Packs/day: 1.00     Years: 50.00     Pack years: 50.00     Types: Cigarettes     Quit date: 2011     Years since quittin.1     Smokeless tobacco: Never Used   Substance Use Topics     Alcohol use: Yes     Alcohol/week: 0.0 standard drinks     Comment: occasional     No family history on file.        OBJECTIVE:                                                     BP (!) 150/68   Pulse 100   SpO2 98%   There is no height or weight on file to calculate BMI.     General: Chronically ill, NAD, on oxygen  Oropharynx: Clear  Heart: RRR, no murmur  Chest: Distant breath sounds, no crackles  Skin: Clear without lesions or rash  Psych: Normal mood and affect         ASSESSMENT/PLAN:                                                        Sydni was seen today for copd.    Diagnoses and all orders for this visit:    Pulmonary emphysema, unspecified emphysema type (H): severe. Recommend follow up with Dr Cardoso to discuss if any additional treatments such as Daliresp or Azithromycin may be worthwhile.  Cont current meds.    Hypertension, unspecified type: above goal.  Increase amlodipine.  If LE swelling increases consider thiazide.  Alternatively could restart losartan and lower amlodipine dose  -     CA ART PRESS WAVEFORM ANALYS CENTRAL ART PRESSURE  -     amLODIPine (NORVASC) 10 MG tablet; Take 1 tablet (10 mg) by mouth daily  -     Basic Metabolic Panel (8) ([facility])  -     VENOUS COLLECTION    Current chronic use of systemic steroids    Age-related osteoporosis without current pathological fracture: cont Prolia        The following health maintenance items are reviewed in Epic and correct as of today:  Health Maintenance   Topic Date Due     COPD ACTION PLAN  1945     MAMMO SCREENING  1945     COLORECTAL CANCER SCREENING  02/19/1957     ZOSTER IMMUNIZATION (1 of 2) 02/19/1997     MEDICARE ANNUAL WELLNESS VISIT  02/19/2012     FALL RISK ASSESSMENT  01/23/2021     ADVANCE CARE PLANNING  02/12/2021     LIPID  08/15/2023     DTAP/TDAP/TD IMMUNIZATION (2 - Td) 02/12/2026     DEXA  Completed     SPIROMETRY  Completed     PHQ-2  Completed     INFLUENZA VACCINE  Completed     Pneumococcal Vaccine: 65+ Years  Completed     HEPATITIS C SCREENING  Addressed     Pneumococcal Vaccine: Pediatrics (0 to 5 Years) and At-Risk Patients (6  to 64 Years)  Aged Out     IPV IMMUNIZATION  Aged Out     MENINGITIS IMMUNIZATION  Aged Out     HEPATITIS B IMMUNIZATION  Aged Out         Chan Putnam MD  [facility]

## 2020-10-06 NOTE — PATIENT INSTRUCTIONS
Please ask Dr Cardoso his opinion about Daliresp or Azithromycin for your COPD    Let me know if higher dose of amlodipine worsens leg swelling

## 2020-10-07 LAB
BUN SERPL-MCNC: 19 MG/DL (ref 8–27)
BUN/CREATININE RATIO: 26 (ref 12–28)
CALCIUM SERPL-MCNC: 10.1 MG/DL (ref 8.7–10.3)
CHLORIDE SERPLBLD-SCNC: 95 MMOL/L (ref 96–106)
CREAT SERPL-MCNC: 0.73 MG/DL (ref 0.57–1)
EGFR IF AFRICN AM: 94 ML/MIN/1.73
EGFR IF NONAFRICN AM: 82 ML/MIN/1.73
GLUCOSE SERPL-MCNC: 116 MG/DL (ref 65–99)
POTASSIUM SERPL-SCNC: 4.8 MMOL/L (ref 3.5–5.2)
SODIUM SERPL-SCNC: 139 MMOL/L (ref 134–144)
TOTAL CO2: 29 MMOL/L (ref 20–29)

## 2020-10-22 DIAGNOSIS — I10 HYPERTENSION, UNSPECIFIED TYPE: ICD-10-CM

## 2020-10-22 RX ORDER — AMLODIPINE BESYLATE 10 MG/1
TABLET ORAL
Qty: 90 TABLET | Refills: 0 | Status: SHIPPED | OUTPATIENT
Start: 2020-10-22 | End: 2021-02-23

## 2020-10-22 NOTE — TELEPHONE ENCOUNTER
Amlodipine refilled. Needs recheck in November for high blood pressure.    DARRICK Garcia CNP on 10/22/2020 at 1:29 PM

## 2020-10-23 NOTE — TELEPHONE ENCOUNTER
Patient informed refill sent and reminded due for follow up visit next month. Patient agrees and plans to schedule.  Leslie Millard RN

## 2020-12-10 NOTE — PLAN OF CARE
Date/Time: 9/4/19 6084-2184  Cognitive Concerns/ Orientation : Alert and oriented x 4    BEHAVIOR & AGGRESSION TOOL COLOR: Green   CIWA SCORE: n/a     ABNL VS/O2: BP elevated 130/61, O2 98% 2L NC. (2-3L baseline). LORENZANA with minimal exertion.  MOBILITY: SBA   PAIN MANAGMENT: Denies   DIET: Regular   BOWEL/BLADDER: Continent   ABNL LAB/BG: Na 131 yesterday 9/3, K 3.9 after replacement  DRAIN/DEVICES: PIV SL  TELEMETRY RHYTHM: n/a   SKIN: Navarro lower extremities. Mepilex on lower back skin tear.  Multiple bruising all over. +2-3 lower extremity edema, declined compression stocking.  TESTS/PROCEDURES: n/a   D/C DAY/GOALS/PLACE: Possible discharge today 9/4   OTHER IMPORTANT INFO: Lung sounds diminished. On Prednisone 40 mg daily and oral Levaquin. Therapies recommended discharge home with assist from spouse. Pulmonology signed off.           no

## 2020-12-18 DIAGNOSIS — I89.0 LYMPHEDEMA: ICD-10-CM

## 2020-12-18 RX ORDER — FUROSEMIDE 40 MG
40 TABLET ORAL DAILY PRN
Qty: 90 TABLET | Refills: 1 | Status: SHIPPED | OUTPATIENT
Start: 2020-12-18 | End: 2021-06-21

## 2020-12-18 NOTE — TELEPHONE ENCOUNTER
furosemide (LASIX) 40 MG    Last OV _ 10/6/20  Last labs 9/26/20- ER visit    BP Readings from Last 3 Encounters:   10/06/20 (!) 150/68   09/30/20 (!) 150/76   09/26/20 (!) 143/92     Last Comprehensive Metabolic Panel:  Sodium   Date Value Ref Range Status   10/06/2020 139 134 - 144 mmol/L Final     Potassium   Date Value Ref Range Status   10/06/2020 4.8 3.5 - 5.2 mmol/L Final     Chloride   Date Value Ref Range Status   10/06/2020 95 (L) 96 - 106 mmol/L Final     Carbon Dioxide   Date Value Ref Range Status   09/26/2020 42 (H) 20 - 32 mmol/L Final     Anion Gap   Date Value Ref Range Status   09/26/2020 <1 (L) 3 - 14 mmol/L Final     Glucose   Date Value Ref Range Status   10/06/2020 116 (H) 65 - 99 mg/dL Final     Urea Nitrogen   Date Value Ref Range Status   10/06/2020 19 8 - 27 mg/dL Final     BUN/Creatinine Ratio   Date Value Ref Range Status   10/06/2020 26 12 - 28 Final     Creatinine   Date Value Ref Range Status   10/06/2020 0.73 0.57 - 1.00 mg/dL Final     GFR Estimate   Date Value Ref Range Status   09/26/2020 89 >60 mL/min/[1.73_m2] Final     Comment:     Non  GFR Calc  Starting 12/18/2018, serum creatinine based estimated GFR (eGFR) will be   calculated using the Chronic Kidney Disease Epidemiology Collaboration   (CKD-EPI) equation.       Calcium   Date Value Ref Range Status   10/06/2020 10.1 8.7 - 10.3 mg/dL Final

## 2021-01-15 ENCOUNTER — HEALTH MAINTENANCE LETTER (OUTPATIENT)
Age: 76
End: 2021-01-15

## 2021-02-10 ENCOUNTER — TRANSFERRED RECORDS (OUTPATIENT)
Dept: FAMILY MEDICINE | Facility: CLINIC | Age: 76
End: 2021-02-10

## 2021-02-23 ENCOUNTER — OFFICE VISIT (OUTPATIENT)
Dept: FAMILY MEDICINE | Facility: CLINIC | Age: 76
End: 2021-02-23

## 2021-02-23 VITALS — DIASTOLIC BLOOD PRESSURE: 64 MMHG | HEART RATE: 91 BPM | SYSTOLIC BLOOD PRESSURE: 134 MMHG | OXYGEN SATURATION: 98 %

## 2021-02-23 DIAGNOSIS — L60.3 DYSTROPHIC NAIL: ICD-10-CM

## 2021-02-23 DIAGNOSIS — J43.9 PULMONARY EMPHYSEMA, UNSPECIFIED EMPHYSEMA TYPE (H): Primary | ICD-10-CM

## 2021-02-23 DIAGNOSIS — I10 ESSENTIAL HYPERTENSION: ICD-10-CM

## 2021-02-23 DIAGNOSIS — Z71.89 COUNSELING REGARDING END OF LIFE DECISION MAKING: ICD-10-CM

## 2021-02-23 DIAGNOSIS — J44.9 COPD, SEVERE (H): ICD-10-CM

## 2021-02-23 DIAGNOSIS — I10 HYPERTENSION, UNSPECIFIED TYPE: ICD-10-CM

## 2021-02-23 DIAGNOSIS — Z79.52 CURRENT CHRONIC USE OF SYSTEMIC STEROIDS: ICD-10-CM

## 2021-02-23 DIAGNOSIS — I89.0 LYMPHEDEMA OF BOTH LOWER EXTREMITIES: ICD-10-CM

## 2021-02-23 DIAGNOSIS — Z99.81 OXYGEN DEPENDENT: ICD-10-CM

## 2021-02-23 DIAGNOSIS — B35.1 ONYCHOMYCOSIS: ICD-10-CM

## 2021-02-23 PROBLEM — F19.982 DRUG-INDUCED INSOMNIA (H): Status: RESOLVED | Noted: 2020-01-10 | Resolved: 2021-02-23

## 2021-02-23 PROBLEM — E87.1 HYPONATREMIA: Status: RESOLVED | Noted: 2019-08-31 | Resolved: 2021-02-23

## 2021-02-23 PROBLEM — L97.312: Status: RESOLVED | Noted: 2020-01-10 | Resolved: 2021-02-23

## 2021-02-23 PROBLEM — J96.21 ACUTE AND CHRONIC RESPIRATORY FAILURE WITH HYPOXIA (H): Status: RESOLVED | Noted: 2019-08-31 | Resolved: 2021-02-23

## 2021-02-23 PROCEDURE — 99215 OFFICE O/P EST HI 40 MIN: CPT | Performed by: FAMILY MEDICINE

## 2021-02-23 RX ORDER — AMLODIPINE BESYLATE 5 MG/1
5 TABLET ORAL DAILY
Qty: 90 TABLET | Refills: 1
Start: 2021-02-23 | End: 2021-07-13

## 2021-02-23 RX ORDER — ROFLUMILAST 250 UG/1
250 TABLET ORAL DAILY
COMMUNITY
Start: 2021-02-08 | End: 2021-03-22 | Stop reason: DRUGHIGH

## 2021-02-23 RX ORDER — IPRATROPIUM BROMIDE AND ALBUTEROL 20; 100 UG/1; UG/1
SPRAY, METERED RESPIRATORY (INHALATION)
COMMUNITY
Start: 2021-02-22 | End: 2021-03-01

## 2021-02-23 NOTE — PATIENT INSTRUCTIONS
My COPD Action Plan     Name: Sydni Mendoza    YOB: 1945   Date: 2/23/2021    My doctor: Chan Putnam MD   My clinic: [facility] [address] [place] [address] [phone]  My Controller Medicine: Trelegy  Daliresp    Dose: see med list     My Rescue Medicine: Albuterol nebulizer solution    Dose:      My Flare Up Medicine: Prednisone   Dose:      My COPD Severity: Severe = FeV1 < 30%-49%      Use of Oxygen: As Previously Prescribed     Make sure you've had your pneumonia   vaccines.          GREEN ZONE       Doing well today      Usual level of activity and exercise    Usual amount of cough and mucus    No shortness of breath    Usual level of health (thinking clearly, sleeping well, feel like eating) Actions:      Take daily medicines    Use oxygen as prescribed    Follow regular exercise and diet plan    Avoid cigarette smoke and other irritants that harm the lungs           YELLOW ZONE          Having a bad day or flare up      Short of breath more than usual    A lot more sputum (mucus) than usual    Sputum looks yellow, green, tan, brown or bloody    More coughing or wheezing    Fever or chills    Less energy; trouble completing activities    Trouble thinking or focusing    Using quick relief inhaler or nebulizer more often    Poor sleep; symptoms wake me up    Do not feel like eating Actions:      Get plenty of rest    Take daily medicines    Use quick relief inhaler every 4 hours    If you use oxygen, call you doctor to see if you should adjust your oxygen    Do breathing exercises or other things to help you relax    Let a loved one, friend or neighbor know you are feeling worse    Call your care team if you have 2 or more symptoms.  Start taking steroids or antibiotics if directed by your care team           RED ZONE       Need medical care now      Severe shortness of breath (feel you can't breathe)    Fever, chills    Not enough breath to do  any activity    Trouble coughing up mucus, walking or talking    Blood in mucus    Frequent coughing   Rescue medicines are not working    Not able to sleep because of breathing    Feel confused or drowsy    Chest pain    Actions:      Call your health care team.  If you cannot reach your care team, call 911 or go to the emergency room.        Annual Reminders:  Meet with Care Team, Flu Shot every Fall  Pharmacy: [id number] [place] [address] [place]

## 2021-02-23 NOTE — PROGRESS NOTES
Jens Troy is a 76 year old patient who presents to clinic for follow up with her , Gordy.  They live together and have no children.  She has severe COPD and follows with Dr Cardoso.  I last saw her in October.  She is currently on Trelegy and Daliresp daily.  She has not found nebs to be beneficial in the past.  She is on chronic daily prednisone 10 mg.  She is also oxygen dependent on 3-4L continuously.  She did pulmonary rehab in the past and did not find it helpful.  Dr Carodso's most recent note reviewed.  PFTs show severe COPD, though is stable.  She does not have recent advanced directives completed.    She also reports bilateral leg swelling with increased pedal edema.  Wears compression stockings.  Denies worsening SOB, orthopnea, PND.      Also toenails yellow, long and difficult to trim.    Review of Systems   Constitutional, HEENT, cardiovascular, pulmonary, GI, , musculoskeletal, neuro, skin, endocrine and psych systems are negative, except as otherwise noted.      Objective    /64   Pulse 91   SpO2 98%     General: Chronically ill appearing but sitting comfortably and in no acute distress  Psych: normal mood and affect        Assessment & Plan       COPD, severe (H)  Severe, oxygen-dependent.  - cont Trelegy, Daliresp, and oxygen  - MTM consultation to consider  mg BID  - consider Azithromycin prophylaxis in future if exacerbations increase  - Palliative referral to discuss goals of care and other interventions that may be beneficial  - approximately 20 minutes spent today in discussing advanced directives and goals of care.  She will fill out POLST.  She appears to not want mechanical ventilation but uncertain whether she would want hospitalization without intubation/vent if she acutely worsens.  Will need to continue to discuss this  - COPD ACTION PLAN  - PALLIATIVE CARE REFERRAL    Oxygen dependent  Stable  - COPD ACTION PLAN  - PALLIATIVE CARE REFERRAL    Current  chronic use of systemic steroids    Essential hypertension  At goal, reduce amlodipine to 5 mg and monitor closely    LE edema: cont wraps and see if improves on lower dose amlodipine, recheck 3 months    Dystrophic nail  Consider podiatry referral, declines today    Onychomycosis    Review of external notes as documented elsewhere in note  45 minutes spent on the date of the encounter doing chart review, history and exam, documentation and further activities as noted above       See Patient Instructions    Return in about 3 months (around 5/23/2021) for Follow Up Virtual.    Chan Putnam MD  [facility]

## 2021-03-01 ENCOUNTER — VIRTUAL VISIT (OUTPATIENT)
Dept: PHARMACY | Facility: PHYSICIAN GROUP | Age: 76
End: 2021-03-01
Attending: FAMILY MEDICINE
Payer: COMMERCIAL

## 2021-03-01 DIAGNOSIS — I10 HYPERTENSION, UNSPECIFIED TYPE: ICD-10-CM

## 2021-03-01 DIAGNOSIS — J44.9 COPD, SEVERE (H): Primary | ICD-10-CM

## 2021-03-01 DIAGNOSIS — M81.0 AGE-RELATED OSTEOPOROSIS WITHOUT CURRENT PATHOLOGICAL FRACTURE: ICD-10-CM

## 2021-03-01 DIAGNOSIS — I89.0 LYMPHEDEMA OF BOTH LOWER EXTREMITIES: ICD-10-CM

## 2021-03-01 PROCEDURE — 99605 MTMS BY PHARM NP 15 MIN: CPT | Mod: TEL | Performed by: PHARMACIST

## 2021-03-01 PROCEDURE — 99607 MTMS BY PHARM ADDL 15 MIN: CPT | Mod: TEL | Performed by: PHARMACIST

## 2021-03-01 NOTE — PROGRESS NOTES
Medication Therapy Management (MTM) Encounter    ASSESSMENT:                            Medication Adherence/Access: No issues identified    COPD: based on high oxygen needs, chronic oral steroids and ongoing symptoms, patient may benefit from modification of regimen. Due to severity of disease, starting with optimal administration of maintenance medication by using short acting bronchodilator prior to DPI. Also given the phlegm amounts suggest starting with mucinex twice daily again if this relieves some of the challenges with expelling the mucus. Limited data on the efficacy of this, however limited risks compared to alternative mucolytic agents.  Per 2021 GOLD guidelines, next options for medication management would include increase in roflumilast to 500mg daily or addition of azithromycin 250mg daily. Goal would be to work down on oral prednisone intake over time while preventing exacerbations.     Lymphedema: Ongoing challenges with controlling symptoms, continue wraps and could possibly consider switching blood pressure agent at future follow up if needed.     Hypertension: Could consider using hydrochlorothiazide in place of amlodipine with close monitoring of electrolytes and renal function when used in conjunction with furosemide.     Osteoporosis: Due for second Prolia injection, discussed  from COVID vaccine by 2 weeks to be able to discern side effects if they occur from either medication.     PLAN:                            1. Start Mucinex twice daily     2. Add ProAir 1 puff before Trelegy daily.     3. I will talk to Dr. Cardoso/Shannan about Daliresp dosing and azithromycin, would like to try and reduce prednisone to 7.5mg daily.     4. To schedule an Outpatient Palliative Care Referral appointment, please call: [facility]: [facility] [phone]      5. Set up Prolia shot.     Follow-up: 3 weeks.     SUBJECTIVE/OBJECTIVE:                           Sydni Mendoza is a 76 year old female called for an initial visit. She was referred to me from Dr. Putnam.      Reason for visit: COPD options- wondering about NAC use?    Allergies/ADRs: Reviewed in chart  Tobacco: She reports that she quit smoking about 9 years ago. Her smoking use included cigarettes. She has a 50.00 pack-year smoking history. She has never used smokeless tobacco.  Alcohol: not currently using  Past Medical History: Reviewed in chart      Medication Adherence/Access: no issues reported    COPD: Current medications: Trelegy 1 puff daily, and then ProAir as needed. Taking Daliresp 250mg once daily, started on this about 4 months. Does report her tremor is worse if using the albuterol more often. Has baseline tremor, but worse when using DIMPLE.    Ventolin was not effective.   Combivent has at home, not using.   Has duonebs but not using these either.   3.5L of oxygen 24 hours per day.   On prednisone 10mg daily.   Benzonatate 100mg up to 4 per.   Patient is not experiencing side effects.   Patient reports the following symptoms: increased oxygen use and coughing  Referral placed for palliative care, however she has not connected with them to set up an appointment yet.     Lymphedema: Currently taking furosemide 40mg daily. Using wraps daily. This continues to be a challenge to control, worse with prednisone use. Did not feel the amlodipine worsened or impacted the swelling, but in attempts to lessen potential impact the amlodipine dose was recently dropped to 5mg from 10mg.     Hypertension: Current medications include amlodipine 5mg daily and furosemide 40mg daily. Patient does not self-monitor blood pressure.  Patient reports the following medication side effects: edema- has been present before use and doesn't feel things have improved with dose reduction from 10mg.  BP Readings from Last 3 Encounters:   02/23/21 134/64   10/06/20 (!) 150/68   09/30/20 (!) 150/76     Osteoporosis: Current  therapy includes: Prolia every 6 months (dosed in Sept 2020) and Vitamin D supplements: 2000 IU. Pt is not experiencing side effects. September   DEXA History: 9/9/2020  Risk factors: chronic corticosteroid use     Today's Vitals: There were no vitals taken for this visit.  ----------------    I spent 54 minutes with this patient today (an extra 15 minutes was spent creating the Medication Action Plan). All changes were made via collaborative practice agreement with . A copy of the visit note was provided to the patient's primary care provider.    The patient was mailed a summary of these recommendations.     Maddy Burks, Pharm.D, [facility]  Medication Therapy Management Pharmacist  [phone]      Telemedicine Visit Details  Type of service:  Telephone visit  Start Time: 2:39 PM  End Time: 3:35 PM  Originating Location (patient location): Home  Distant Location (provider location):  [facility]      Medication Therapy Recommendations  Age-related osteoporosis without current pathological fracture    Rationale: Patient forgets to take - Adherence - Adherence   Recommendation: Provide Education - Prolia 60 MG/ML Sosy - due for next prolia shot   Status: Patient Agreed - Adherence/Education         COPD, severe (H)    Current Medication: DALIRESP 250 MCG TABS tablet   Rationale: Dose too low - Dosage too low - Effectiveness   Recommendation: Increase Dose - Daliresp 500 MCG Tabs - increase to 500mg   Status: Contact Provider - Awaiting Response          Current Medication: guaiFENesin (MUCINEX) 600 MG 12 hr tablet   Rationale: Synergistic therapy - Needs additional medication therapy - Indication   Recommendation: Start Medication - Mucinex 600 MG Tb12 - take twice daily   Status: Accepted - no CPA Needed          Current Medication: TRELEGY ELLIPTA 100-62.5-25 MCG/INH oral inhaler   Rationale: Incorrect administration - Dosage too low - Effectiveness   Recommendation: Change Medication -  ALBUTEROL - take albuterol prior to Trelegy   Status: Patient Agreed - Adherence/Education

## 2021-03-01 NOTE — LETTER
"        Date: 3/4/2021    Sydni Mendoza  [address]  [facility] [address]    Dear Ms. Mendoza,    Thank you for talking with me on 3/1 about your health and medications. Medicare s MTM (Medication Therapy Management) program helps you understand your medications and use them safely.      This letter includes an action plan (Medication Action Plan) and medication list (Personal Medication List). The action plan has steps you should take to help you get the best results from your medications. The medication list will help you keep track of your medications and how to use them the right way.       Have your action plan and medication list with you when you talk with your doctors, pharmacists, and other healthcare providers in your care team.     Ask your doctors, pharmacists, and other healthcare providers to update the action plan and medication list at every visit.     Take your medication list with you if you go to the hospital or emergency room.     Give a copy of the action plan and medication list to your family or caregivers.     If you want to talk about this letter or any of the papers with it, please call   [phone].We look forward to working with you, your doctors, and other healthcare providers to help you stay healthy through the [employer] [place] MTM program.    Sincerely,  Maddy Burks [facility]    Enclosed: Medication Action Plan and Personal Medication List    MEDICATION ACTION PLAN FOR Sydni Mendoza,  1945     This action plan will help you get the best results from your medications if you:   1. Read \"What we talked about.\"   2. Take the steps listed in the \"What I need to do\" boxes.   3. Fill in \"What I did and when I did it.\"   4. Fill in \"My follow-up plan\" and \"Questions I want to ask.\"     Have this action plan with you when you talk with your doctors, pharmacists, and other healthcare providers in your care team. Share this with your family " or caregivers too.  DATE PREPARED: 3/4/2021  What we talked about: Mucinex                                          What I need to do: Start taking Mucinex twice daily again, continue to drink lots of water.        What I did and when I did it:                                              What we talked about: Inhalers                                                  What I need to do: Use the ProAir inhaler - 1 puff 5-10 mins before you take your Trelegy inhaler.        What I did and when I did it:                                               What we talked about: Additional medication for breathing                                                  What I need to do: I will talk to your lung doctors about other medication options.       What I did and when I did it:                                               What we talked about: Palliative Care Referral                                                  What I need to do: Call [phone] to set up an appointment       What I did and when I did it:                                               What we talked about: Osteoporosis                                                  What I need to do: Set up another Prolia shot.       What I did and when I did it:                                                       My follow-up plan:                 Questions I want to ask:              If you have any questions about your action plan, call Maddy Burks [facility], Phone: [phone] , Monday-Friday 8-4:30pm.           PERSONAL MEDICATION LIST FOR Sydni Mendoza,  1945     This medication list was made for you after we talked. We also used information from your doctor's chart.      Use blank rows to add new medications. Then fill in the dates you started using them.    Cross out medications when you no longer use them. Then write the date and why you stopped using them.    Ask your doctors, pharmacists, and other healthcare providers to update this list  at every visit. Keep this list up-to-date with:       Prescription medications    Over the counter drugs     Herbals    Vitamins    Minerals      If you go to the hospital or emergency room, take this list with you. Share this with your family or caregivers too.     DATE PREPARED: 3/4/2021  Allergies or side effects: Albuterol     Medication:  ALBUTEROL SULFATE  (90 BASE) MCG/ACT IN AERS      How I use it:  Inhale 1 puff every morning before your Trelegy inhaler, also can use 2 puffs into the lungs every 6 hours as needed for shortness of breath / dyspnea or wheezing      Why I use it:  COPD    Prescriber:  Chan Putnam MD      Date I started using it:       Date I stopped using it:         Why I stopped using it:            Medication:  AMLODIPINE BESYLATE 5 MG PO TABS      How I use it:  Take 1 tablet (5 mg) by mouth daily      Why I use it: Hypertension, unspecified type    Prescriber:  Chan Putnam MD      Date I started using it:       Date I stopped using it:         Why I stopped using it:            Medication:  BENZONATATE 100 MG PO CAPS      How I use it:  TAKE 1 CAPSULE BY MOUTH THREE TIMES A DAY AS NEEDED      Why I use it:  Cough    Prescriber:  Chan Putnam MD      Date I started using it:       Date I stopped using it:         Why I stopped using it:            Medication:  DALIRESP 250 MCG PO TABS      How I use it:  Take 250 mcg by mouth daily      Why I use it:  COPD    Prescriber:  Chan Putnam MD      Date I started using it:       Date I stopped using it:         Why I stopped using it:            Medication:  FUROSEMIDE 40 MG PO TABS      How I use it:  TAKE 1 TABLET (40 MG) BY MOUTH DAILY AS NEEDED (LYMPHEDEMA IN LEGS)      Why I use it: Lymphedema    Prescriber:  Chan Putnam MD      Date I started using it:       Date I stopped using it:         Why I stopped using it:            Medication:  MUCINEX 600 MG PO TB12      How I use it:  Take  600 mg by mouth 2 times daily      Why I use it:  COPD    Prescriber:  Patient Reported      Date I started using it:       Date I stopped using it:         Why I stopped using it:            Medication:  MULTI-VITAMIN PO TABS      How I use it:  Take 1 tablet by mouth daily.        Why I use it:  General Health    Prescriber:  Patient Reported      Date I started using it:       Date I stopped using it:         Why I stopped using it:            Medication:  PREDNISONE 10 MG PO TABS      How I use it:  Take 10 mg by mouth daily      Why I use it:  COPD    Prescriber:  Chan Putnam MD      Date I started using it:       Date I stopped using it:         Why I stopped using it:            Medication:  TRELEGY ELLIPTA 100-62.5-25 MCG/INH IN AEPB      How I use it:  INHALE 1 PUFF BY INHALATION ROUTE EVERY DAY AT THE SAME TIME EACH DAY      Why I use it: COPD    Prescriber:  Chan Putnam MD      Date I started using it:       Date I stopped using it:         Why I stopped using it:            Medication:  VITAMIN D 50 MCG (2000 UT) PO TABS      How I use it:  Take 1 tablet (50 mcg) by mouth daily      Why I use it: Osteoporosis    Prescriber:  Chan Putnam MD      Date I started using it:       Date I stopped using it:         Why I stopped using it:            Medication:         How I use it:         Why I use it:      Prescriber:         Date I started using it:       Date I stopped using it:         Why I stopped using it:            Medication:         How I use it:         Why I use it:      Prescriber:         Date I started using it:       Date I stopped using it:         Why I stopped using it:            Medication:         How I use it:         Why I use it:      Prescriber:         Date I started using it:       Date I stopped using it:         Why I stopped using it:              Other Information:     If you have any questions about your medication list, call Maddy Bukrs  [facility], Phone: [phone] , Monday-Friday 8-4:30pm.    According to the Paperwork Reduction Act of 1995, no persons are required to respond to a collection of information unless it displays a valid OMB control number. The valid OMB number for this information collection is 1487-4435. The time required to complete this information collection is estimated to average 40 minutes per response, including the time to review instructions, searching existing data resources, gather the data needed, and complete and review the information collection. If you have any comments concerning the accuracy of the time estimate(s) or suggestions for improving this form, please write to: CMS, Attn: GUERA Reports Clearance Officer, [address] [place] [id number].

## 2021-03-03 DIAGNOSIS — Z23 HIGH PRIORITY FOR COVID-19 VIRUS VACCINATION: Primary | ICD-10-CM

## 2021-03-03 PROCEDURE — 91301 PR COVID VAC MODERNA 100 MCG/0.5 ML IM: CPT | Performed by: FAMILY MEDICINE

## 2021-03-03 PROCEDURE — 0011A PR COVID VAC MODERNA 100 MCG/0.5 ML IM: CPT | Performed by: FAMILY MEDICINE

## 2021-03-04 NOTE — PATIENT INSTRUCTIONS
Recommendations from today's MTM visit:                                                        1. Start Mucinex twice daily     2. Add ProAir 1 puff before Trelegy daily.     3. I will talk to Dr. Cardoso/Shannan about Daliresp dosing and azithromycin, would like to try and reduce prednisone to 7.5mg daily.     4. To schedule an Outpatient Palliative Care Referral appointment, please call: [facility]: [facility] [phone]      5. Set up Prolia shot.     Follow-up: 3 weeks.     To schedule another MTM appointment, please call the clinic directly or you may call the MTM scheduling line at [phone] or toll-free at [phone].     My Clinical Pharmacist's contact information:                                                      It was a pleasure talking with you today!  Please feel free to contact me with any questions or concerns you have.      Maddy Burks, Pharm.D, [facility]  Medication Therapy Management Pharmacist  [phone]

## 2021-03-22 ENCOUNTER — VIRTUAL VISIT (OUTPATIENT)
Dept: PHARMACY | Facility: PHYSICIAN GROUP | Age: 76
End: 2021-03-22
Payer: COMMERCIAL

## 2021-03-22 DIAGNOSIS — J44.9 COPD, SEVERE (H): Primary | ICD-10-CM

## 2021-03-22 PROCEDURE — 99607 MTMS BY PHARM ADDL 15 MIN: CPT | Mod: TEL | Performed by: PHARMACIST

## 2021-03-22 PROCEDURE — 99606 MTMS BY PHARM EST 15 MIN: CPT | Mod: TEL | Performed by: PHARMACIST

## 2021-03-22 RX ORDER — ROFLUMILAST 500 UG/1
500 TABLET ORAL DAILY
Qty: 30 TABLET | Refills: 1 | Status: SHIPPED | OUTPATIENT
Start: 2021-03-22 | End: 2021-04-12 | Stop reason: DRUGHIGH

## 2021-03-22 NOTE — PROGRESS NOTES
Medication Therapy Management (MTM) Encounter    ASSESSMENT:                            Medication Adherence/Access: No issues identified    COPD: Given her tolerability with Daliresp and initial response with the medication, will trial dose increase. Alternative option to consider next would be adding azithromycin given smoking hx and ongoing symptoms.       PLAN:                            1. Increase daliresp to 500mg daily.     Follow-up: 3 weeks.     SUBJECTIVE/OBJECTIVE:                          Sydni Mendoza is a 76 year old female called for a follow-up visit. She was referred to me from .  Today's visit is a follow-up MTM visit from 3/1     Reason for visit: COPD follow up.    Allergies/ADRs: Reviewed in chart  Tobacco: She reports that she quit smoking about 9 years ago. Her smoking use included cigarettes. She has a 50.00 pack-year smoking history. She has never used smokeless tobacco.  Alcohol: not currently using  Past Medical History: Reviewed in chart    Medication Adherence/Access: no issues reported    COPD: Current medications: Trelegy 1 puff daily, and then ProAir as needed. Taking Daliresp 250mg once daily, started on this about 4 months ago. Tried using albuterol prior to Trelegy however was getting much more coughing so stopped this. She is also taking mucinex twice daily.    Ventolin was not effective.   Combivent has at home, not using.   Has duonebs but not using these either.   3.5L of oxygen 24 hours per day.   On prednisone 10mg daily.   Benzonatate 100mg up to 4 per.   Patient is not experiencing side effects.   Patient reports the following symptoms: increased oxygen use and coughing  Referral placed for palliative care, seeing 3/24.         Today's Vitals: There were no vitals taken for this visit.  ----------------      I spent 12 minutes with this patient today. All changes were made via collaborative practice agreement with Dr. Putnam. A copy of the visit note was  provided to the patient's primary care provider.    The patient was given a summary of these recommendations.     Maddy Burks, Pharm.D, [facility]  Medication Therapy Management Pharmacist  [phone]      Telemedicine Visit Details  Type of service:  Telephone visit  Start Time: 1:00 PM  End Time: 1:12 PM  Originating Location (patient location): [place]  Distant Location (provider location):  [facility]      Medication Therapy Recommendations  COPD, severe (H)    Current Medication: DALIRESP 250 MCG TABS tablet (Discontinued)   Rationale: Dose too low - Dosage too low - Effectiveness   Recommendation: Increase Dose - Daliresp 500 MCG Tabs - increase to 500mg   Status: Accepted per CPA

## 2021-03-22 NOTE — PATIENT INSTRUCTIONS
Recommendations from today's MTM visit:                                                      1. Increase daliresp to 500mg daily.     Follow-up: 3 weeks, I will call on 4/12 at 1pm    To schedule another MTM appointment, please call the clinic directly or you may call the MTM scheduling line at [phone] or toll-free at [phone].     My Clinical Pharmacist's contact information:                                                      It was a pleasure talking with you today!  Please feel free to contact me with any questions or concerns you have.      Maddy Burks, Pharm.D, [facility]  Medication Therapy Management Pharmacist  [phone]

## 2021-03-23 NOTE — PROGRESS NOTES
"Sydni is a 76 year old who is being evaluated via a billable video visit.      How would you like to obtain your AVS? MyChart  If the video visit is dropped, the invitation should be resent by: Send to e-mail at: [email or web address]  Will anyone else be joining your video visit? No       I have reviewed and updated the patient's allergies and medication list.     Concerns: none  Refills: none     Vitals - Patient Reported  Weight (Patient Reported): 56.7 kg (125 lb)  Height (Patient Reported): 162.6 cm (5' 4\")  BMI (Based on Pt Reported Ht/Wt): 21.46  Pain Score: No Pain (0)    Samia Velez CMA    Video Start Time: 10:45  Video-Visit Details    Type of service:  Video Visit    Video End Time:10:50  - stopped because glitching, continued 45 minutes more by phone    Originating Location (pt. Location): Home    Distant Location (provider location):  [facility]     Platform used for Video Visit: [facility]      Palliative Care Outpatient Clinic Consultation Note    Patient:  Sydni Mendoza    Chief Complaint:   Sydni Mendoza 76 year old female who is presenting to the palliative medicine clinic today at the request of Dr. Putnam for a palliative care consultation secondary to COPD.   The patient's primary care provider is:  Chan Putnam.     History of Present Illness:  Sydni Mendoza is a 76 year old with a history of severe COPD on chronic oral steroids and 4 L NC O2, lymphedema, hypertension, osteoporosis on Prolia seen to establish palliative care.  Video visit performed due to coronavirus pandemic.    Has had COPD for about 10 years.  Feels limited as to what she can do by shortness of breath -   Can't cook anymore, has to stop and rest going from one room to another. No stairs at home.  Mostly in the house, will not walk outside much.  Walks independently.  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  Appetite good, does think she's " lost 8-9 lbs over the past few months, sometimes feels it's too hard to eat because of her breathing.  Feels short of breath with the conversation.  Cough has also been bothersome, taking Tessalon 100 mg 3 times daily - feels helps.  No falls.  Notes she's seen a decline in her overall health in the past year.      Doesn't feel the ventolin or respimat help, nebulizers don't seem to help, has an albuterol inhaler that does seem to work now.  Sees pulmonologist Dr. Rausch at [facility], on Trelegy and Daliresp, and 10 mg prednisone daily, and felt they had exhausted all disease modifying treatments.  From previous PFTs, FEV1 0.46 L, 21% predicted.     Patient's Disease Understanding: Says she is seeing both her father and then her sister  with emphysema, and knows that her disease is getting worse.  She feels she knows what to expect, but is not sure her  does.    Coping:  Denies depressed mood or anxiety.  Feels she accepts the new limitations.     Social History  Living Situation: Lives with her   Children: None  Support System: Family, says she and her  are pretty isolated because of losses in the family  Social History     Tobacco Use     Smoking status: Former Smoker     Packs/day: 1.00     Years: 50.00     Pack years: 50.00     Types: Cigarettes     Quit date: 2011     Years since quittin.5     Smokeless tobacco: Never Used   Substance Use Topics     Alcohol use: Yes     Alcohol/week: 0.0 standard drinks     Comment: occasional     Drug use: No     Family History  No family history on file.  Patient's Involvement with Prior History of Serious Illness in Family: Saw her father die of emphysema in his 60s in the 1960s, no oxygen then, he saw him suffer. Her sister  at 59.      Advance Care Planning:  Advance Directive:    Has completed but has not had signed.  Says she has discussed with her  but she did not want it at the end of life.  Knows that she would  not want CPR or intubation.    Allergies   Allergen Reactions     Albuterol Palpitations     Is fine taking albuterol inhaler, tachycardia goes away.     Current Outpatient Medications   Medication Sig Dispense Refill     albuterol (PROAIR HFA/PROVENTIL HFA/VENTOLIN HFA) 108 (90 Base) MCG/ACT inhaler Inhale 2 puffs into the lungs every 6 hours as needed for shortness of breath / dyspnea or wheezing       amLODIPine (NORVASC) 5 MG tablet Take 1 tablet (5 mg) by mouth daily 90 tablet 1     benzonatate (TESSALON) 100 MG capsule TAKE 1 CAPSULE BY MOUTH THREE TIMES A DAY AS NEEDED       furosemide (LASIX) 40 MG tablet TAKE 1 TABLET (40 MG) BY MOUTH DAILY AS NEEDED (LYMPHEDEMA IN LEGS) 90 tablet 1     guaiFENesin (MUCINEX) 600 MG 12 hr tablet Take 600 mg by mouth 2 times daily       Multiple Vitamin (MULTI-VITAMIN) per tablet Take 1 tablet by mouth daily.         predniSONE (DELTASONE) 10 MG tablet Take 10 mg by mouth daily       roflumilast (DALIRESP) 500 MCG TABS tablet Take 1 tablet (500 mcg) by mouth daily 30 tablet 1     TRELEGY ELLIPTA 100-62.5-25 MCG/INH oral inhaler INHALE 1 PUFF BY INHALATION ROUTE EVERY DAY AT THE SAME TIME EACH DAY       vitamin D3 (CHOLECALCIFEROL) 50 mcg (2000 units) tablet Take 1 tablet (50 mcg) by mouth daily 90 tablet 3     Past Medical History:   Diagnosis Date     Acute and chronic respiratory failure with hypoxia (H) 8/31/2019     Acute exacerbation of chronic obstructive pulmonary disease (COPD) (H) 8/31/2019     COPD (chronic obstructive pulmonary disease) (H)      COPD with acute exacerbation (H) 7/18/2016     Hyponatremia 8/31/2019     Lung nodule 9-14-12    Biopsy; fibrosis&chronic inflamation Right     Smoker 9-15-12    quit      Past Surgical History:   Procedure Laterality Date     CHEST TUBE INSERTION  9-14-12    Right ,pneumothorax post, lung biopsy     GYN SURGERY      Tubal Ligation       Palliative Symptom Review (0=no symptom/no concern, 1=mild, 2=moderate, 3=severe):       Pain: 0      Fatigue: 3      Nausea: 0      Constipation: 0      Diarrhea: 0      Depressive Symptoms: 0      Anxiety: 0      Drowsiness: 0      Poor Appetite: 1      Shortness of Breath: 3      Insomnia: 0      Other: 0      Overall (0 good/no concerns, 3 very poor):  1    Briefly able to see via video:    GENERAL: Comfortable-appearing, alert and no distress  EYES: Eyes grossly normal to inspection, conjunctivae and sclerae normal  Hearing intact.   RESP: no audible wheeze, cough, or visible cyanosis.  Mildly increased WOB on NC.  Able to speak in complete sentences.  SKIN: no suspicious lesions or rashes on exposed skin  NEURO: Cranial nerves grossly intact, mentation intact and speech normal.  No tremor.   PSYCH: mentation appears normal, affect normal/bright and mood-congruent, judgement and insight intact, normal speech and appearance   The rest of a comprehensive physical examination is deferred due to PHE (public health emergency) video visit restrictions    Wt Readings from Last 10 Encounters:   08/28/20 62.1 kg (137 lb)   01/23/20 60.3 kg (133 lb)   01/06/20 61.2 kg (134 lb 14.4 oz)   09/04/19 62.5 kg (137 lb 12.8 oz)   08/12/19 62.1 kg (137 lb)   07/30/19 63.7 kg (140 lb 6.4 oz)   12/14/18 62.1 kg (137 lb)   08/15/18 65 kg (143 lb 3.2 oz)   05/25/18 65.8 kg (145 lb)   03/19/18 66.2 kg (146 lb)       Data Reviewed:  LABS:   Recent Labs   Lab Test 10/06/20  1145 09/26/20  0855 01/04/20  1201 01/04/20  1201    141   < > 142   POTASSIUM 4.8 4.0   < > 4.5   CHLORIDE 95* 101   < > 106   CO2  --  42*  --  34*   ANIONGAP  --  <1*  --  2*   * 93   < > 126*   BUN 19  26 14   < > 28   CR 0.73 0.62   < > 0.69   MARQUEZ 10.1 8.9   < > 9.5    < > = values in this interval not displayed.     GFR 82  Alb 3.5  Hb 11.6    IMAGING:   Images personally reviewed: CXR w large lung volume, flattened diaphragm c/w COPD    Impressions, Recommendations & Counseling:  Palliative Performance Score:  60-70  Decision Making  Capacity:  Intact    Sydni Mendoza is a 76 year old w severe COPD and dyspnea at rest, now with weight loss and functional decline, seen to establish palliative care.  Majority of visit by phone because of difficulties with video.   Introduced palliative care, and the IDT's role in managing symptoms, emotional support, and preparing for the future.  Most of visit spent discussing disease understanding, prognosis, advanced care planning, and goals of care.      She understands her disease is worsening, and we talked about how the difficulties she's having with walking, ADLs, and eating are related to the worsening COPD and will continue to progress with time.  She hasn't had many hospitalizations recently.  She has been thinking about end-of-life, and had questions about palliative care vs hospice.  At this point, I think she has a prognosis of months to a year, and are nearing the time that she would be hospice-eligible were her goals to be aligned with that.  We discussed the services hospice provides.  She is still undecided if at this point she would consider further hospitalizations, but knows if she were at the end of life she would prefer her care to be at home.   She has decided on DNR/DNI, and we discussed completing a POLST to reflect this, which I will complete and mail to her.  At this point would want to continue current care, would accept hospitalization or Bipap if needed.     I discussed the option of morphine for her conversational dyspnea and dyspnea at rest - she has had mild CO2 retention, so would recommend starting at a dose of 2.5 mg BID-TID and titrating to effect.  Discussed risks and benefits.  I do think she may feel better symptomatically with this.  She'd like to consider and we can discuss in the future.     Gave her my contact information if she feels her symptoms are worsening.      Will plan to follow-up in 1 month.     Karen Zamora MD  Palliative Medicine  Pager [phone]      76 minutes spent including reviewing record, review of above studies, above visit with patient, and documentation.     (This note was transcribed using voice recognition software. While I review and edit the transcription, I may miss errors, and the software sometimes does unexpected capitalizations and formatting that I miss. Please let me know of any serious mistranscriptions and I will addend this note.)

## 2021-03-24 ENCOUNTER — VIRTUAL VISIT (OUTPATIENT)
Dept: PALLIATIVE CARE | Facility: CLINIC | Age: 76
End: 2021-03-24
Attending: INTERNAL MEDICINE
Payer: COMMERCIAL

## 2021-03-24 DIAGNOSIS — Z71.89 COUNSELING REGARDING ADVANCED CARE PLANNING AND GOALS OF CARE: Primary | ICD-10-CM

## 2021-03-24 DIAGNOSIS — J44.9 COPD, SEVERE (H): ICD-10-CM

## 2021-03-24 DIAGNOSIS — R06.09 DYSPNEA ON EXERTION: ICD-10-CM

## 2021-03-24 PROCEDURE — 999N001193 HC VIDEO/TELEPHONE VISIT; NO CHARGE

## 2021-03-24 PROCEDURE — 99205 OFFICE O/P NEW HI 60 MIN: CPT | Mod: 95 | Performed by: INTERNAL MEDICINE

## 2021-03-24 NOTE — PATIENT INSTRUCTIONS
Thank you for seeing the Palliative Care Clinic today.      I will mail a copy of the POLST to you.      We discussed a trial of morphine for shortness of breath, if we decided to do this, would start a low dose a couple times/day, then adjust as needed until helps breathing feel easier.     Return to clinic in 1 month for a follow-up.      You can reach the Palliative Care Team during business hours at the following numbers:   -For the [facility], call [phone]  -To reach the palliative RN for questions or refills, call [phone]       To reach the Palliative Care Provider on-call After-hours or on holidays and weekends, call: [phone].  Please note that we are not able to provide pain medication refills on evenings or weekends.

## 2021-03-24 NOTE — LETTER
"3/24/2021       RE: Sydni Mendoza  [address]  [facility] [address]     Dear Colleague,    Thank you for referring your patient, Sydni Mendoza, to the [facility] at [facility]. Please see a copy of my visit note below.    Sydni is a 76 year old who is being evaluated via a billable video visit.      How would you like to obtain your AVS? MyChart  If the video visit is dropped, the invitation should be resent by: Send to e-mail at: [email or web address]  Will anyone else be joining your video visit? No       I have reviewed and updated the patient's allergies and medication list.     Concerns: none  Refills: none     Vitals - Patient Reported  Weight (Patient Reported): 56.7 kg (125 lb)  Height (Patient Reported): 162.6 cm (5' 4\")  BMI (Based on Pt Reported Ht/Wt): 21.46  Pain Score: No Pain (0)    Samia Velez CMA    Video-Visit Details    Type of service:  Video Visit    Video Start Time: 10:45  Video End Time:10:50  - stopped because glitching, continued 45 minutes more by phone    Originating Location (pt. Location): Home    Distant Location (provider location):  [facility]     Platform used for Video Visit: [facility]      Palliative Care Outpatient Clinic Consultation Note    Patient:  Sydni Mendoza    Chief Complaint:   Sydni Mendoza 76 year old female who is presenting to the palliative medicine clinic today at the request of Dr. Putnam for a palliative care consultation secondary to COPD.   The patient's primary care provider is:  Chan Putnam.     History of Present Illness:  Sydni Mendoza is a 76 year old with a history of severe COPD on chronic oral steroids and 4 L NC O2, lymphedema, hypertension, osteoporosis on Prolia seen to establish palliative care.  Video visit performed due to coronavirus pandemic.    Has had COPD for about 10 years.  Feels limited " "as to what she can do by shortness of breath -   Can't cook anymore, has to stop and rest going from one room to another. No stairs at home.  Mostly in the house, will not walk outside much.  Walks independently.  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  Appetite good, does think she's lost 8-9 lbs over the past few months, sometimes feels it's too hard to eat because of her breathing.  Feels short of breath with the conversation.  Cough has also been bothersome, taking Tessalon 100 mg 3 times daily - feels helps.  No falls.  Notes she's seen a decline in her overall health in the past year.      Doesn't feel the ventolin or respimat help, nebulizers don't seem to help, has an albuterol inhaler that does seem to work now.  Sees pulmonologist Dr. Rausch at [facility], on Trelegy and Daliresp, and 10 mg prednisone daily, and felt they had exhausted all disease modifying treatments.  From previous PFTs, FEV1 0.46 L, 21% predicted.     Patient's Disease Understanding: Says she is seeing both her father and then her sister  with emphysema, and knows that her disease is getting worse.  She feels she knows what to expect, but is not sure her  does.    Coping:  Denies depressed mood or anxiety.  Feels she accepts the new limitations.     Social History  Living Situation: Lives with her   Children: None  Support System: Family, says she and her  are pretty isolated because of losses in the family  Social History     Tobacco Use     Smoking status: Former Smoker     Packs/day: 1.00     Years: 50.00     Pack years: 50.00     Types: Cigarettes     Quit date: 2011     Years since quittin.5     Smokeless tobacco: Never Used   Substance Use Topics     Alcohol use: Yes     Alcohol/week: 0.0 standard drinks     Comment: occasional     Drug use: No     Family History  No family history on file.  Patient's Involvement with Prior History of Serious Illness in " Family: Saw her father die of emphysema in his 60s in the 1960s, no oxygen then, he saw him suffer. Her sister  at 59.      Advance Care Planning:  Advance Directive:    Has completed but has not had signed.  Says she has discussed with her  but she did not want it at the end of life.  Knows that she would not want CPR or intubation.    Allergies   Allergen Reactions     Albuterol Palpitations     Is fine taking albuterol inhaler, tachycardia goes away.     Current Outpatient Medications   Medication Sig Dispense Refill     albuterol (PROAIR HFA/PROVENTIL HFA/VENTOLIN HFA) 108 (90 Base) MCG/ACT inhaler Inhale 2 puffs into the lungs every 6 hours as needed for shortness of breath / dyspnea or wheezing       amLODIPine (NORVASC) 5 MG tablet Take 1 tablet (5 mg) by mouth daily 90 tablet 1     benzonatate (TESSALON) 100 MG capsule TAKE 1 CAPSULE BY MOUTH THREE TIMES A DAY AS NEEDED       furosemide (LASIX) 40 MG tablet TAKE 1 TABLET (40 MG) BY MOUTH DAILY AS NEEDED (LYMPHEDEMA IN LEGS) 90 tablet 1     guaiFENesin (MUCINEX) 600 MG 12 hr tablet Take 600 mg by mouth 2 times daily       Multiple Vitamin (MULTI-VITAMIN) per tablet Take 1 tablet by mouth daily.         predniSONE (DELTASONE) 10 MG tablet Take 10 mg by mouth daily       roflumilast (DALIRESP) 500 MCG TABS tablet Take 1 tablet (500 mcg) by mouth daily 30 tablet 1     TRELEGY ELLIPTA 100-62.5-25 MCG/INH oral inhaler INHALE 1 PUFF BY INHALATION ROUTE EVERY DAY AT THE SAME TIME EACH DAY       vitamin D3 (CHOLECALCIFEROL) 50 mcg (2000 units) tablet Take 1 tablet (50 mcg) by mouth daily 90 tablet 3     Past Medical History:   Diagnosis Date     Acute and chronic respiratory failure with hypoxia (H) 2019     Acute exacerbation of chronic obstructive pulmonary disease (COPD) (H) 2019     COPD (chronic obstructive pulmonary disease) (H)      COPD with acute exacerbation (H) 2016     Hyponatremia 2019     Lung nodule 12    Biopsy;  fibrosis&chronic inflamation Right     Smoker 9-15-12    quit      Past Surgical History:   Procedure Laterality Date     CHEST TUBE INSERTION  9-14-12    Right ,pneumothorax post, lung biopsy     GYN SURGERY      Tubal Ligation       Palliative Symptom Review (0=no symptom/no concern, 1=mild, 2=moderate, 3=severe):      Pain: 0      Fatigue: 3      Nausea: 0      Constipation: 0      Diarrhea: 0      Depressive Symptoms: 0      Anxiety: 0      Drowsiness: 0      Poor Appetite: 1      Shortness of Breath: 3      Insomnia: 0      Other: 0      Overall (0 good/no concerns, 3 very poor):  1    Briefly able to see via video:    GENERAL: Comfortable-appearing, alert and no distress  EYES: Eyes grossly normal to inspection, conjunctivae and sclerae normal  Hearing intact.   RESP: no audible wheeze, cough, or visible cyanosis.  Mildly increased WOB on NC.  Able to speak in complete sentences.  SKIN: no suspicious lesions or rashes on exposed skin  NEURO: Cranial nerves grossly intact, mentation intact and speech normal.  No tremor.   PSYCH: mentation appears normal, affect normal/bright and mood-congruent, judgement and insight intact, normal speech and appearance   The rest of a comprehensive physical examination is deferred due to PHE (public health emergency) video visit restrictions    Wt Readings from Last 10 Encounters:   08/28/20 62.1 kg (137 lb)   01/23/20 60.3 kg (133 lb)   01/06/20 61.2 kg (134 lb 14.4 oz)   09/04/19 62.5 kg (137 lb 12.8 oz)   08/12/19 62.1 kg (137 lb)   07/30/19 63.7 kg (140 lb 6.4 oz)   12/14/18 62.1 kg (137 lb)   08/15/18 65 kg (143 lb 3.2 oz)   05/25/18 65.8 kg (145 lb)   03/19/18 66.2 kg (146 lb)       Data Reviewed:  LABS:   Recent Labs   Lab Test 10/06/20  1145 09/26/20  0855 01/04/20  1201 01/04/20  1201    141   < > 142   POTASSIUM 4.8 4.0   < > 4.5   CHLORIDE 95* 101   < > 106   CO2  --  42*  --  34*   ANIONGAP  --  <1*  --  2*   * 93   < > 126*   BUN 19 26 14   < > 28    CR 0.73 0.62   < > 0.69   MARQUEZ 10.1 8.9   < > 9.5    < > = values in this interval not displayed.     GFR 82  Alb 3.5  Hb 11.6    IMAGING:   Images personally reviewed: CXR w large lung volume, flattened diaphragm c/w COPD    Impressions, Recommendations & Counseling:  Palliative Performance Score:  60-70  Decision Making Capacity:  Intact    Sydni Mendoza is a 76 year old w severe COPD and dyspnea at rest, now with weight loss and functional decline, seen to establish palliative care.  Majority of visit by phone because of difficulties with video.   Introduced palliative care, and the IDT's role in managing symptoms, emotional support, and preparing for the future.  Most of visit spent discussing disease understanding, prognosis, advanced care planning, and goals of care.      She understands her disease is worsening, and we talked about how the difficulties she's having with walking, ADLs, and eating are related to the worsening COPD and will continue to progress with time.  She hasn't had many hospitalizations recently.  She has been thinking about end-of-life, and had questions about palliative care vs hospice.  At this point, I think she has a prognosis of months to a year, and are nearing the time that she would be hospice-eligible were her goals to be aligned with that.  We discussed the services hospice provides.  She is still undecided if at this point she would consider further hospitalizations, but knows if she were at the end of life she would prefer her care to be at home.   She has decided on DNR/DNI, and we discussed completing a POLST to reflect this, which I will complete and mail to her.  At this point would want to continue current care, would accept hospitalization or Bipap if needed.     I discussed the option of morphine for her conversational dyspnea and dyspnea at rest - she has had mild CO2 retention, so would recommend starting at a dose of 2.5 mg BID-TID and titrating to effect.   Discussed risks and benefits.  I do think she may feel better symptomatically with this.  She'd like to consider and we can discuss in the future.     Gave her my contact information if she feels her symptoms are worsening.      Will plan to follow-up in 1 month.     Karen Zamora MD  Palliative Medicine  Pager [phone]     76 minutes spent including reviewing record, review of above studies, above visit with patient, and documentation.     (This note was transcribed using voice recognition software. While I review and edit the transcription, I may miss errors, and the software sometimes does unexpected capitalizations and formatting that I miss. Please let me know of any serious mistranscriptions and I will addend this note.)

## 2021-03-26 ENCOUNTER — DOCUMENTATION ONLY (OUTPATIENT)
Dept: OTHER | Facility: CLINIC | Age: 76
End: 2021-03-26

## 2021-03-31 DIAGNOSIS — Z23 HIGH PRIORITY FOR COVID-19 VIRUS VACCINATION: Primary | ICD-10-CM

## 2021-03-31 PROCEDURE — 91301 PR COVID VAC MODERNA 100 MCG/0.5 ML IM: CPT | Performed by: FAMILY MEDICINE

## 2021-03-31 PROCEDURE — 0012A PR COVID VAC MODERNA 100 MCG/0.5 ML IM: CPT | Performed by: FAMILY MEDICINE

## 2021-04-12 ENCOUNTER — VIRTUAL VISIT (OUTPATIENT)
Dept: PHARMACY | Facility: PHYSICIAN GROUP | Age: 76
End: 2021-04-12
Payer: COMMERCIAL

## 2021-04-12 DIAGNOSIS — J44.9 COPD, SEVERE (H): Primary | ICD-10-CM

## 2021-04-12 PROCEDURE — 99606 MTMS BY PHARM EST 15 MIN: CPT | Mod: TEL | Performed by: PHARMACIST

## 2021-04-12 PROCEDURE — 99607 MTMS BY PHARM ADDL 15 MIN: CPT | Mod: TEL | Performed by: PHARMACIST

## 2021-04-12 NOTE — PROGRESS NOTES
Medication Therapy Management (MTM) Encounter    ASSESSMENT:                            Medication Adherence/Access: No issues identified    COPD: Patient would benefit from staying at the lower dose of daliresp due to severe GI side effects. High dose prednisone not recommended for chronic management of COPD and would benefit from slow taper down on oral steroid if able. In efforts to minimize exacerbations and hospitalization risks, recommend trial of azithromycin 250mg daily.  Reviewed available data for acetylcysteine as mucolytic and symptom management, however given lack of benefit seen in trials and potential issue of bronchospasm with nebulization, holding on initiating this type of therapy at this time. Reviewed case with PCP to determine plan at this time. Will have close follow up to ensure patient's goals of care are being managed as she is end stage copd.     PLAN:                            1.  Decrease prednisone to 7.5 mg once daily-take 1-1/2 tablets of 5 mg prednisone tablets daily    2.  Add azithromycin 250 mg tablet once daily    3.  Recommend continuing lower dose of Daliresp to avoid side effects.  Will need an updated prescription for 250 mcg tablets in the future, but for now will cut 500 mcg tablets in half given large supply on hand.      Follow-up: Return in about 4 weeks (around 5/10/2021) for Phone call with MTM.      SUBJECTIVE/OBJECTIVE:                          Sydni Mendoza is a 76 year old female called for a follow-up visit. She was referred to me from Dr. Putnam.  Today's visit is a follow-up MTM visit from 3/22.     Reason for visit: Med follow up- Daliresp increase caused no appetite, weight loss and GI distress so back to 250mg daily and feeling much better.     Allergies/ADRs: Reviewed in chart  Tobacco: She reports that she quit smoking about 9 years ago. Her smoking use included cigarettes. She has a 50.00 pack-year smoking history. She has never used smokeless  tobacco.  Alcohol: not currently using  Past Medical History: Reviewed in chart    Medication Adherence/Access: no issues reported    COPD: Current medications: Trelegy 1 puff daily, and then ProAir as needed.  Also taking Daliresp 250mcg once daily.last visit was increased to 500 mcg daily however had significant nausea, weight loss and more trouble breathing with the dose increase was encouraged to go back to 250 mcg daily until her follow-up call today. Tried using albuterol prior to Trelegy however was getting much more coughing so stopped this. She is also taking mucinex twice daily, not sure how helpful this is.  Still has a lot of congestion -would like evaluation if a nebulized mucolytic would be helpful.  Ventolin was not effective.   Combivent has at home, not using.   Has duonebs but not using these either.   3.5L of oxygen 24 hours per day.   On prednisone 10mg daily, has been on this dose for some time and would be interested in reducing her dose.     Benzonatate 100mg up to 4 per daily, feels this is effective for her cough.  Patient is not experiencing side effects.   Patient reports the following symptoms: increased oxygen use and coughing  Referral placed for palliative care, seeing 3/24.     Today's Vitals: There were no vitals taken for this visit.  ----------------    I spent 10 minutes with this patient today. All changes were made via collaborative practice agreement with . A copy of the visit note was provided to the patient's primary care provider.    The patient was given a summary of these recommendations.     Maddy Burks, Pharm.D, [facility] Center[facility]  Medication Therapy Management Pharmacist  [phone]      Telemedicine Visit Details  Type of service:  Telephone visit  Start Time: 1:00 PM  End Time: 1:10 PM  Originating Location (patient location): Home  Distant Location (provider location):  [facility]        Medication Therapy Recommendations  COPD, severe (H)     Current Medication: azithromycin (ZITHROMAX) 250 MG tablet   Rationale: Synergistic therapy - Needs additional medication therapy - Indication   Recommendation: Start Medication - azithromycin 250 MG tablet - Take 1 by mouth daily   Status: Accepted per Provider          Current Medication: predniSONE (DELTASONE) 5 MG tablet   Rationale: Dose too high - Dosage too high - Safety   Recommendation: Decrease Dose - predniSONE 5 MG tablet - Take 1-1/2 tablets (7.5 mg) daily   Status: Accepted per CPA          Current Medication: roflumilast (DALIRESP) 250 MCG TABS tablet   Rationale: Undesirable effect - Adverse medication event - Safety   Recommendation: Change Medication - Daliresp 250 MCG Tabs - Keep dose at 250 mcg daily   Status: Patient Agreed - Adherence/Education

## 2021-04-14 RX ORDER — ROFLUMILAST 250 UG/1
250 TABLET ORAL DAILY
Qty: 30 TABLET | Refills: 1 | COMMUNITY
Start: 2021-04-12 | End: 2021-05-03

## 2021-04-14 RX ORDER — PREDNISONE 5 MG/1
7.5 TABLET ORAL DAILY
Qty: 45 TABLET | Refills: 1 | Status: SHIPPED | OUTPATIENT
Start: 2021-04-14 | End: 2021-05-03

## 2021-04-14 RX ORDER — AZITHROMYCIN 250 MG/1
250 TABLET, FILM COATED ORAL DAILY
Qty: 30 TABLET | Refills: 1 | Status: SHIPPED | OUTPATIENT
Start: 2021-04-14 | End: 2021-06-11

## 2021-04-15 NOTE — PATIENT INSTRUCTIONS
Recommendations from today's MTM visit:                                                    1.  Decrease prednisone to 7.5 mg once daily-take 1-1/2 tablets of 5 mg prednisone tablets daily    2.  Add azithromycin 250 mg tablet once daily    3.  Recommend continuing lower dose of Daliresp to avoid side effects.  Will need an updated prescription for 250 mcg tablets in the future, but for now will cut 500 mcg tablets in half given large supply on hand.      Follow-up: Return in about 4 weeks (around 5/10/2021) for Phone call with MTM.    It was great to speak with you today.  I value your experience and would be very thankful for your time with providing feedback on our clinic survey. You may receive a survey via email or text message in the next few days.     To schedule another MTM appointment, please call the clinic directly or you may call the MTM scheduling line at [phone] or toll-free at [phone].     My Clinical Pharmacist's contact information:                                                      Please feel free to contact me with any questions or concerns you have.      Maddy Burks, Pharm.D, BCACP  Medication Therapy Management Pharmacist  [phone]

## 2021-04-26 ENCOUNTER — VIRTUAL VISIT (OUTPATIENT)
Dept: PALLIATIVE CARE | Facility: CLINIC | Age: 76
End: 2021-04-26
Attending: INTERNAL MEDICINE
Payer: COMMERCIAL

## 2021-04-26 DIAGNOSIS — R06.09 DOE (DYSPNEA ON EXERTION): Primary | ICD-10-CM

## 2021-04-26 DIAGNOSIS — J44.9 COPD, SEVERE (H): ICD-10-CM

## 2021-04-26 DIAGNOSIS — Z71.89 COUNSELING REGARDING ADVANCED CARE PLANNING AND GOALS OF CARE: ICD-10-CM

## 2021-04-26 PROCEDURE — 999N001193 HC VIDEO/TELEPHONE VISIT; NO CHARGE

## 2021-04-26 PROCEDURE — 99214 OFFICE O/P EST MOD 30 MIN: CPT | Mod: 95 | Performed by: INTERNAL MEDICINE

## 2021-04-26 NOTE — PROGRESS NOTES
"Sydni is a 76 year old who is being evaluated via a billable video visit.      How would you like to obtain your AVS? MyChart  If the video visit is dropped, the invitation should be resent by: Send to e-mail at: [email or web address]  Will anyone else be joining your video visit? No       I have reviewed and updated the patient's allergies and medication list.    Concerns: none  Refills: none     Vitals - Patient Reported  Weight (Patient Reported): 54.4 kg (120 lb)  Height (Patient Reported): 162.6 cm (5' 4\")  BMI (Based on Pt Reported Ht/Wt): 20.6  Pain Score: No Pain (0)    Samia Velez CMA    Palliative Care Outpatient Clinic Progress Note    Patient Name:  Sydni Mendoza  Primary Provider:  Chan Putnam    Chief Complaint/Patient ID: follow-up  77yo woman with severe COPD on chronic oral steroids and 4L NC O2    Last Palliative Care Appointment:  3/24/21     Interim History:  Sydni Mendoza 76 year old female returns to be seen by palliative care today.  Telephone visit done because of coronavirus pandemic, unable to connect by video with [employer] or [employer]    Since last visit, are doing a trial of decreasing prednisone to 7.5 mg daily, adding azithromycin for COPD. Had improvement in GI distress, appetite, and weight loss with decreasing dose of daliresp. Hasn't noticed any change in her breathing.      Feels her breathing is \"absolutely horrible\".  Tries to walk in the house, can't do much of anything.  Doesn't feel short of breath at rest, but with any movement.  No falls.  Lost about 20-25 lbs because she couldn't eat while on the higher amount of daliresp.  Has been too short of breath to leave the house aside from getting her covid vaccine.      Coping:  Feels she's handling it better than she expected, but has had the hardest time with the fact it is taking her longer to recover.  At times feels anxious while short of breath, but not very much so between times.     Social " History:  Pertinent changes to social history/social situation since last visit: None  Living Situation: Lives with her   Support System: Family, says she and her  are pretty isolated because of losses in the family  Advance Directive Status:   Has completed but has not had signed.  Says she has discussed with her  but she did not want it at the end of life.  POLST on file for DNR/DNI.       Allergies   Allergen Reactions     Albuterol Palpitations     Is fine taking albuterol inhaler, tachycardia goes away.     Current Outpatient Medications   Medication Sig Dispense Refill     albuterol (PROAIR HFA/PROVENTIL HFA/VENTOLIN HFA) 108 (90 Base) MCG/ACT inhaler Inhale 2 puffs into the lungs every 6 hours as needed for shortness of breath / dyspnea or wheezing       amLODIPine (NORVASC) 5 MG tablet Take 1 tablet (5 mg) by mouth daily 90 tablet 1     azithromycin (ZITHROMAX) 250 MG tablet Take 1 tablet (250 mg) by mouth daily 30 tablet 1     benzonatate (TESSALON) 100 MG capsule TAKE 1 CAPSULE BY MOUTH THREE TIMES A DAY AS NEEDED       furosemide (LASIX) 40 MG tablet TAKE 1 TABLET (40 MG) BY MOUTH DAILY AS NEEDED (LYMPHEDEMA IN LEGS) 90 tablet 1     guaiFENesin (MUCINEX) 600 MG 12 hr tablet Take 600 mg by mouth 2 times daily       Multiple Vitamin (MULTI-VITAMIN) per tablet Take 1 tablet by mouth daily.         omeprazole (PRILOSEC) 20 MG DR capsule        predniSONE (DELTASONE) 5 MG tablet Take 1.5 tablets (7.5 mg) by mouth daily 45 tablet 1     roflumilast (DALIRESP) 250 MCG TABS tablet Take 1 tablet (250 mcg) by mouth daily 30 tablet 1     TRELEGY ELLIPTA 100-62.5-25 MCG/INH oral inhaler INHALE 1 PUFF BY INHALATION ROUTE EVERY DAY AT THE SAME TIME EACH DAY       vitamin D3 (CHOLECALCIFEROL) 50 mcg (2000 units) tablet Take 1 tablet (50 mcg) by mouth daily 90 tablet 3     Palliative Symptom Review (0=no symptom/no concern, 1=mild, 2=moderate, 3=severe):     Pain: 0      Fatigue: 3      Nausea: 0       Constipation: 0      Diarrhea: 0      Depressive Symptoms: 0      Anxiety: 0      Drowsiness: 0      Poor Appetite: 1      Shortness of Breath: 3      Insomnia: 1 - due to napping in the afternoon      Other: 0      Overall (0 good/no concerns, 3 very poor):  1    No physical exam due to video visit.  Voice strong, Speaks in 4-5 word sentences with mild labored breathing.  Voice calm, speech fluent.     Wt Readings from Last 10 Encounters:   08/28/20 62.1 kg (137 lb)   01/23/20 60.3 kg (133 lb)   01/06/20 61.2 kg (134 lb 14.4 oz)   09/04/19 62.5 kg (137 lb 12.8 oz)   08/12/19 62.1 kg (137 lb)   07/30/19 63.7 kg (140 lb 6.4 oz)   12/14/18 62.1 kg (137 lb)   08/15/18 65 kg (143 lb 3.2 oz)   05/25/18 65.8 kg (145 lb)   03/19/18 66.2 kg (146 lb)       Key Data Reviewed:  LABS:   Recent Labs   Lab Test 10/06/20  1145 09/26/20  0855 01/04/20  1201 01/04/20  1201    141   < > 142   POTASSIUM 4.8 4.0   < > 4.5   CHLORIDE 95* 101   < > 106   CO2  --  42*  --  34*   ANIONGAP  --  <1*  --  2*   * 93   < > 126*   BUN 19  26 14   < > 28   CR 0.73 0.62   < > 0.69   MARQUEZ 10.1 8.9   < > 9.5    < > = values in this interval not displayed.     Impression & Recommendations & Counseling:  Sydni Mendoza is a 76 year old w severe COPD and dyspnea with mild exertion, with weight loss and functional decline, seen for follow-up.     LORENZANA - discussed symptom management.    - Encouraged use of fan to help with recovery.    - Also discussed option of morphine for her conversational dyspnea and dyspnea at rest - she has had CO2 retention (HCO3>40 at last BMP), so would recommend starting at a dose of 2.5 mg BID-TID and titrating to effect.  Discussed risks of constipation, sedation, and small risk of respiratory depression and symptomatic benefits.  She'd like to consider  - Discussed the role of anxiety as contributing to total dyspnea - notes when she feels very short of breath can feel anxious, but doesn't feel anxious  between times     COPD -  She seems to have good disease understanding, and about the slowly progressive nature of COPD.  Weight loss stabilized with decreased dose of daliresp.  No hospitalizations in the past year  - Discussed further about the role of hospice, services provided, and timing of enrolling.  I recommended an information at this point, she doesn't feel ready for this but will contact me or Dr. Putnam if she'd like to meet with them.      Phone visit duration: 29 minutes  Follow-up in 2-3 months    45 minutes spent including reviewing record, review of above studies, above visit with patient, and documentation.     Karen Zamora MD  Palliative Medicine  Pager [phone]     (This note was transcribed using voice recognition software. While I review and edit the transcription, I may miss errors, and the software sometimes does unexpected capitalizations and formatting that I miss. Please let me know of any serious mistranscriptions and I will addend this note.)

## 2021-04-26 NOTE — LETTER
"4/26/2021       RE: Sydni Mendoza  [address]  [facility] [address]     Dear Colleague,    Thank you for referring your patient, Sydni Mendoza, to the [facility] at [facility]. Please see a copy of my visit note below.    Sydni is a 76 year old who is being evaluated via a billable video visit.      How would you like to obtain your AVS? MyChart  If the video visit is dropped, the invitation should be resent by: Send to e-mail at: [email or web address]  Will anyone else be joining your video visit? No       I have reviewed and updated the patient's allergies and medication list.    Concerns: none  Refills: none     Vitals - Patient Reported  Weight (Patient Reported): 54.4 kg (120 lb)  Height (Patient Reported): 162.6 cm (5' 4\")  BMI (Based on Pt Reported Ht/Wt): 20.6  Pain Score: No Pain (0)    Samia Velez CMA    Palliative Care Outpatient Clinic Progress Note    Patient Name:  Sydni Mendoza  Primary Provider:  Chan Putnam    Chief Complaint/Patient ID: follow-up  75yo woman with severe COPD on chronic oral steroids and 4L NC O2    Last Palliative Care Appointment:  3/24/21     Interim History:  Sydni Mendoza 76 year old female returns to be seen by palliative care today.  Telephone visit done because of coronavirus pandemic, unable to connect by video with [employer] or [employer]    Since last visit, are doing a trial of decreasing prednisone to 7.5 mg daily, adding azithromycin for COPD. Had improvement in GI distress, appetite, and weight loss with decreasing dose of daliresp. Hasn't noticed any change in her breathing.      Feels her breathing is \"absolutely horrible\".  Tries to walk in the house, can't do much of anything.  Doesn't feel short of breath at rest, but with any movement.  No falls.  Lost about 20-25 lbs because she couldn't eat while on the higher amount of daliresp.  Has been too " short of breath to leave the house aside from getting her covid vaccine.      Coping:  Feels she's handling it better than she expected, but has had the hardest time with the fact it is taking her longer to recover.  At times feels anxious while short of breath, but not very much so between times.     Social History:  Pertinent changes to social history/social situation since last visit: None  Living Situation: Lives with her   Support System: Family, says she and her  are pretty isolated because of losses in the family  Advance Directive Status:   Has completed but has not had signed.  Says she has discussed with her  but she did not want it at the end of life.  POLST on file for DNR/DNI.       Allergies   Allergen Reactions     Albuterol Palpitations     Is fine taking albuterol inhaler, tachycardia goes away.     Current Outpatient Medications   Medication Sig Dispense Refill     albuterol (PROAIR HFA/PROVENTIL HFA/VENTOLIN HFA) 108 (90 Base) MCG/ACT inhaler Inhale 2 puffs into the lungs every 6 hours as needed for shortness of breath / dyspnea or wheezing       amLODIPine (NORVASC) 5 MG tablet Take 1 tablet (5 mg) by mouth daily 90 tablet 1     azithromycin (ZITHROMAX) 250 MG tablet Take 1 tablet (250 mg) by mouth daily 30 tablet 1     benzonatate (TESSALON) 100 MG capsule TAKE 1 CAPSULE BY MOUTH THREE TIMES A DAY AS NEEDED       furosemide (LASIX) 40 MG tablet TAKE 1 TABLET (40 MG) BY MOUTH DAILY AS NEEDED (LYMPHEDEMA IN LEGS) 90 tablet 1     guaiFENesin (MUCINEX) 600 MG 12 hr tablet Take 600 mg by mouth 2 times daily       Multiple Vitamin (MULTI-VITAMIN) per tablet Take 1 tablet by mouth daily.         omeprazole (PRILOSEC) 20 MG DR capsule        predniSONE (DELTASONE) 5 MG tablet Take 1.5 tablets (7.5 mg) by mouth daily 45 tablet 1     roflumilast (DALIRESP) 250 MCG TABS tablet Take 1 tablet (250 mcg) by mouth daily 30 tablet 1     TRELEGY ELLIPTA 100-62.5-25 MCG/INH oral inhaler INHALE  1 PUFF BY INHALATION ROUTE EVERY DAY AT THE SAME TIME EACH DAY       vitamin D3 (CHOLECALCIFEROL) 50 mcg (2000 units) tablet Take 1 tablet (50 mcg) by mouth daily 90 tablet 3     Palliative Symptom Review (0=no symptom/no concern, 1=mild, 2=moderate, 3=severe):     Pain: 0      Fatigue: 3      Nausea: 0      Constipation: 0      Diarrhea: 0      Depressive Symptoms: 0      Anxiety: 0      Drowsiness: 0      Poor Appetite: 1      Shortness of Breath: 3      Insomnia: 1 - due to napping in the afternoon      Other: 0      Overall (0 good/no concerns, 3 very poor):  1    No physical exam due to video visit.  Voice strong, Speaks in 4-5 word sentences with mild labored breathing.  Voice calm, speech fluent.     Wt Readings from Last 10 Encounters:   08/28/20 62.1 kg (137 lb)   01/23/20 60.3 kg (133 lb)   01/06/20 61.2 kg (134 lb 14.4 oz)   09/04/19 62.5 kg (137 lb 12.8 oz)   08/12/19 62.1 kg (137 lb)   07/30/19 63.7 kg (140 lb 6.4 oz)   12/14/18 62.1 kg (137 lb)   08/15/18 65 kg (143 lb 3.2 oz)   05/25/18 65.8 kg (145 lb)   03/19/18 66.2 kg (146 lb)       Key Data Reviewed:  LABS:   Recent Labs   Lab Test 10/06/20  1145 09/26/20  0855 01/04/20  1201 01/04/20  1201    141   < > 142   POTASSIUM 4.8 4.0   < > 4.5   CHLORIDE 95* 101   < > 106   CO2  --  42*  --  34*   ANIONGAP  --  <1*  --  2*   * 93   < > 126*   BUN 19  26 14   < > 28   CR 0.73 0.62   < > 0.69   MARQUEZ 10.1 8.9   < > 9.5    < > = values in this interval not displayed.     Impression & Recommendations & Counseling:  Sydni Mendoza is a 76 year old w severe COPD and dyspnea with mild exertion, with weight loss and functional decline, seen for follow-up.     LORENZANA - discussed symptom management.    - Encouraged use of fan to help with recovery.    - Also discussed option of morphine for her conversational dyspnea and dyspnea at rest - she has had CO2 retention (HCO3>40 at last BMP), so would recommend starting at a dose of 2.5 mg BID-TID and  titrating to effect.  Discussed risks of constipation, sedation, and small risk of respiratory depression and symptomatic benefits.  She'd like to consider  - Discussed the role of anxiety as contributing to total dyspnea - notes when she feels very short of breath can feel anxious, but doesn't feel anxious between times     COPD -  She seems to have good disease understanding, and about the slowly progressive nature of COPD.  Weight loss stabilized with decreased dose of daliresp.  No hospitalizations in the past year  - Discussed further about the role of hospice, services provided, and timing of enrolling.  I recommended an information at this point, she doesn't feel ready for this but will contact me or Dr. Putnam if she'd like to meet with them.      Phone visit duration: 29 minutes  Follow-up in 2-3 months    45 minutes spent including reviewing record, review of above studies, above visit with patient, and documentation.     Karen Zamora MD  Palliative Medicine  Pager [phone]     (This note was transcribed using voice recognition software. While I review and edit the transcription, I may miss errors, and the software sometimes does unexpected capitalizations and formatting that I miss. Please let me know of any serious mistranscriptions and I will addend this note.)

## 2021-05-03 ENCOUNTER — VIRTUAL VISIT (OUTPATIENT)
Dept: PHARMACY | Facility: PHYSICIAN GROUP | Age: 76
End: 2021-05-03
Payer: COMMERCIAL

## 2021-05-03 DIAGNOSIS — J44.9 COPD, SEVERE (H): ICD-10-CM

## 2021-05-03 PROCEDURE — 99606 MTMS BY PHARM EST 15 MIN: CPT | Performed by: PHARMACIST

## 2021-05-03 PROCEDURE — 99607 MTMS BY PHARM ADDL 15 MIN: CPT | Performed by: PHARMACIST

## 2021-05-03 RX ORDER — ROFLUMILAST 250 UG/1
250 TABLET ORAL DAILY
Qty: 90 TABLET | Refills: 1 | Status: SHIPPED | OUTPATIENT
Start: 2021-05-03 | End: 2021-06-01

## 2021-05-03 RX ORDER — IPRATROPIUM BROMIDE 0.2 MG/ML
0.5 SOLUTION, NON-ORAL INHALATION 2 TIMES DAILY
Qty: 75 ML | Refills: 0 | Status: SHIPPED | OUTPATIENT
Start: 2021-05-03 | End: 2021-05-06

## 2021-05-03 RX ORDER — PREDNISONE 5 MG/1
5 TABLET ORAL DAILY
Qty: 30 TABLET | Refills: 1 | Status: SHIPPED | OUTPATIENT
Start: 2021-05-03 | End: 2021-06-11

## 2021-05-03 NOTE — PROGRESS NOTES
Medication Therapy Management (MTM) Encounter    ASSESSMENT:                            Medication Adherence/Access: No issues identified    COPD: Ongoing congestion, but is seeing less frequent coughing spells in a week. Has not tolerated albuterol neb solution and has not tried LAMA only solution. Recommend trial of ipratropium neb solution for better lung delivery and ongoing slow taper of prednisone to 5mg daily.     PLAN:                            1. Ipratropium neb solution twice daily for breathing and mucus, will try to increase to 4 times daily if needed.     2. Continue Azithromycin 250mg daily    3. Decrease prednisone to 5mg daily    4. Continue Daliresp at 250mg daily.     Follow-up: Return in 29 days (on 6/1/2021) for Phone call with MTM.    SUBJECTIVE/OBJECTIVE:                          Sydni Mendoza is a 76 year old female called for a follow-up visit. She was referred to me from Dr. Putnam.  Today's visit is a follow-up MTM visit from 4/12     Reason for visit: COPD follow up.    Allergies/ADRs: Reviewed in chart  Tobacco: She reports that she quit smoking about 9 years ago. Her smoking use included cigarettes. She has a 50.00 pack-year smoking history. She has never used smokeless tobacco.  Alcohol: not currently using  Past Medical History: Reviewed in chart    Medication Adherence/Access: no issues reported    COPD: Current medications: Trelegy 1 puff daily, and then ProAir as needed. Also taking Daliresp 250mcg once daily. Coughing fits down to 1-2 times per week, previously these were several times per day. Ongoing shortness of breath daily. Referral placed for palliative care, considering morphine for air hunger, however she is not sure she wants to do this yet.   No nebulizer medicationat this time - feels much more short of breath after using it, this was the duoneb solution at home. Reports tremor, anxiety and rapid HR after use so stopped using it.   Previously tried increased  daliresp to 500 mcg daily however had significant nausea, weight loss and more trouble breathing with the dose increase was encouraged to go back to 250 mcg daily- has been doing much better going back to lower dose.   Tried using albuterol prior to Trelegy however was getting much more coughing so stopped this. She is also taking mucinex twice daily, not sure how helpful this is.  Still has a lot of congestion -would like evaluation if a nebulized mucolytic would be helpful. Have reviewed conflicting data and mixed information on NAC via nebulization, but felt there were other options needed to be evaluated first given the lack of data surrounding nebulized mucolytic use in COPD.   Ventolin was not effective.   Combivent has at home, not using.   3.5L of oxygen 24 hours per day.   On prednisone 7.5mg daily (lowered from 10mg), no issues since reducing dose 4 weeks ago. Has been interested in lowering this as much as possible.   Benzonatate 100mg up to 4 per daily, feels this is effective for her cough.  Patient is not experiencing side effects.   Patient reports the following symptoms: increased oxygen use and coughing      Today's Vitals: There were no vitals taken for this visit.  ----------------    I spent 13 minutes with this patient today. All changes were made via collaborative practice agreement with Dr. Putnam. A copy of the visit note was provided to the patient's primary care provider.    The patient was sent via [employer] a summary of these recommendations.     Maddy Burks, Pharm.D, [facility]  Medication Therapy Management Pharmacist  [phone]      Telemedicine Visit Details  Type of service:  Telephone visit  Start Time: 9:30 AM  End Time: 9:43 AM  Originating Location (patient location): Home  Distant Location (provider location):  [facility]        Medication Therapy Recommendations  COPD, severe (H)    Current Medication: ipratropium (ATROVENT) 0.02 % nebulizer solution    Rationale: Synergistic therapy - Needs additional medication therapy - Indication   Recommendation: Start Medication - ATROVENT NA - start nebulizer twice daily   Status: Accepted per CPA

## 2021-05-03 NOTE — PATIENT INSTRUCTIONS
Recommendations from today's MTM visit:                                                       1. Ipratropium neb solution twice daily for breathing and mucus, will try to increase to 4 times daily if needed.     2. Continue Azithromycin 250mg daily    3. Decrease prednisone to 5mg daily    4. Continue Daliresp at 250mg daily.     Follow-up: Return in 29 days (on 6/1/2021) for Phone call with MTM.    It was great to speak with you today.  I value your experience and would be very thankful for your time with providing feedback on our clinic survey. You may receive a survey via email or text message in the next few days.     To schedule another MTM appointment, please call the clinic directly or you may call the MTM scheduling line at [phone] or toll-free at [phone].     My Clinical Pharmacist's contact information:                                                      Please feel free to contact me with any questions or concerns you have.      Maddy Burks, Pharm.D, [facility]  Medication Therapy Management Pharmacist  [phone]

## 2021-05-06 RX ORDER — IPRATROPIUM BROMIDE 0.2 MG/ML
0.5 SOLUTION, NON-ORAL INHALATION 2 TIMES DAILY
Qty: 75 ML | Refills: 0 | Status: SHIPPED | OUTPATIENT
Start: 2021-05-06 | End: 2021-05-06 | Stop reason: ALTCHOICE

## 2021-05-25 ENCOUNTER — VIRTUAL VISIT (OUTPATIENT)
Dept: FAMILY MEDICINE | Facility: CLINIC | Age: 76
End: 2021-05-25

## 2021-05-25 DIAGNOSIS — Z99.81 OXYGEN DEPENDENT: ICD-10-CM

## 2021-05-25 DIAGNOSIS — J44.9 COPD, SEVERE (H): Primary | ICD-10-CM

## 2021-05-25 DIAGNOSIS — Z79.52 CURRENT CHRONIC USE OF SYSTEMIC STEROIDS: ICD-10-CM

## 2021-05-25 DIAGNOSIS — I89.0 LYMPHEDEMA OF BOTH LOWER EXTREMITIES: ICD-10-CM

## 2021-05-25 DIAGNOSIS — I10 HYPERTENSION, UNSPECIFIED TYPE: ICD-10-CM

## 2021-05-25 PROCEDURE — 99214 OFFICE O/P EST MOD 30 MIN: CPT | Mod: 95 | Performed by: FAMILY MEDICINE

## 2021-05-25 NOTE — PROGRESS NOTES
"  Problem(s) Oriented visit      Video-Visit Details    Type of service:  Video Visit    Video Start Time (time video started): 1100    Video End Time (time video stopped): 1111    Originating Location (pt. Location): Home    Distant Location (provider location):  [facility]     Mode of Communication:  Telephone    Physician has received verbal consent for a Video Visit from the patient? Yes      Chan Putnam MD        Sydni Mendoza is being evaluated via a billable video visit.      The patient has been notified of following:     \"This video visit will be conducted via a call between you and your physician/provider. We have found that certain health care needs can be provided without the need for an in-person physical exam.  This service lets us provide the care you need with a video conversation.  If a prescription is necessary we can send it directly to your pharmacy.  If lab work is needed we can place an order for that and you can then stop by our lab to have the test done at a later time.    If during the course of the call the physician/provider feels a video visit is not appropriate, you will not be charged for this service.\"     Physician has received verbal consent for a Video Visit from the patient? Yes    SUBJECTIVE:                                                      Sydni Mendoza is a 76 year old female who is being seen through telephone consult for evaluation.  MTM notes reviewed.  She reports ongoing weight loss and decreased appetite.  She thinks it is from the Daliresp.  She has reduced back to 250 mg but symptoms persist.  She has not reduced prednisone to 5 mg yet.  She is maintained on Ipratropium nebs, albuterol inhaler PRN, Trelegy and Azithromycin, in addition to above mentioned Daliresp.  Her breathing is otherwise stable.      Histories:   Patient Active Problem List   Diagnosis     COPD, severe (H)     Smoking     Lung nodule     Pneumothorax, post biopsy, " "right     Health Care Home     Multinodular goiter     Hypertension, unspecified type     Current chronic use of systemic steroids     Age-related osteoporosis without current pathological fracture     Personal history of other drug therapy     Oxygen dependent     Dystrophic nail     Onychomycosis     Lymphedema of both lower extremities     Past Surgical History:   Procedure Laterality Date     CHEST TUBE INSERTION  14    Right ,pneumothorax post, lung biopsy     GYN SURGERY      Tubal Ligation       Social History     Tobacco Use     Smoking status: Former Smoker     Packs/day: 1.00     Years: 50.00     Pack years: 50.00     Types: Cigarettes     Quit date: 2011     Years since quittin.7     Smokeless tobacco: Never Used   Substance Use Topics     Alcohol use: Yes     Alcohol/week: 0.0 standard drinks     Comment: occasional     No family history on file.        Reviewed and updated as needed this visit by Provider                 ROS:    A 10 system review was completed and is as noted in HPI and otherwise negative.          OBJECTIVE:                                                      Objective    There were no vitals taken for this visit.  Estimated body mass index is 23.52 kg/m  as calculated from the following:    Height as of 20: 1.626 m (5' 4\").    Weight as of 20: 62.1 kg (137 lb).      Gen: Well appearing, NAD  Eyes: Clear  Resp: Breathing comfortably, NAD  Skin: Clear  Psych: normal mood and affect  Neuro: grossly normal              ASSESSMENT/PLAN:                                                          COPD, severe (H): severe, stable, oxygen dependent.  Due to weight loss and lack of appetite will trial off Daliresp for 1-2 weeks to see if improved.  If not can restart and pursue other causes.  Cont all other treatments as prescribed for now.      Oxygen dependent    Lymphedema of both lower extremities: improved on lower dose of amlodipine, recheck at follow " up    Hypertension, unspecified type: stable    Current chronic use of systemic steroids: reduce dose to 5 mg

## 2021-05-26 ENCOUNTER — TRANSFERRED RECORDS (OUTPATIENT)
Dept: FAMILY MEDICINE | Facility: CLINIC | Age: 76
End: 2021-05-26

## 2021-06-01 ENCOUNTER — VIRTUAL VISIT (OUTPATIENT)
Dept: PHARMACY | Facility: PHYSICIAN GROUP | Age: 76
End: 2021-06-01
Payer: COMMERCIAL

## 2021-06-01 DIAGNOSIS — J44.9 COPD, SEVERE (H): Primary | ICD-10-CM

## 2021-06-01 PROCEDURE — 99607 MTMS BY PHARM ADDL 15 MIN: CPT | Performed by: PHARMACIST

## 2021-06-01 PROCEDURE — 99606 MTMS BY PHARM EST 15 MIN: CPT | Performed by: PHARMACIST

## 2021-06-01 RX ORDER — BENZONATATE 100 MG/1
CAPSULE ORAL
Qty: 360 CAPSULE | Refills: 1 | Status: SHIPPED | OUTPATIENT
Start: 2021-06-01 | End: 2021-12-13

## 2021-06-01 NOTE — PROGRESS NOTES
Medication Therapy Management (MTM) Encounter    ASSESSMENT:                            Medication Adherence/Access: See below for considerations    COPD: Patient would benefit from staying at prednisone 5mg daily and continue to stay off Daliresp for now. Updating prescriptions for benzonatate and ipratropium nebs and has planned follow up with Dr. Putnam for follow up assessment off the Daliresp.     PLAN:                            1. Updated prescription for benzonatate 4 daily     2. Updated ipratropium nebulizer prescription to 3 month supply     3. Keep scheduled follow up with Dr. Putnam on June 8th    Follow-up: Return in about 4 weeks (around 6/29/2021) for Phone call with MTM.    SUBJECTIVE/OBJECTIVE:                          Sydni Mendoza is a 76 year old female called for a follow-up visit. She was referred to me from Dr. Putnam.  Today's visit is a follow-up MTM visit from 5/3     Reason for visit: med follow up.     Allergies/ADRs: Reviewed in chart  Tobacco: She reports that she quit smoking about 9 years ago. Her smoking use included cigarettes. She has a 50.00 pack-year smoking history. She has never used smokeless tobacco.  Alcohol: not currently using  Past Medical History: Reviewed in chart    Medication Adherence/Access: no issues reported    COPD: Current medications she is taking at this time include Trelegy 1 puff daily, prednisone 5mg daily, azithromycin 250mg daily and ipratropium nebs twice daily to help with mucus, benzonatate capsules 4 times daily and Mucinex twice daily. She also has ProAir as needed but does not like how it makes her feel (tachycardia). This is also why she is not on neb albuterol, but only LAMA neb.     Saw Dr. Putnam on 5/25, due to the ongoing nausea and weight loss concerns, Daliresp was stopped on 5/25 for trial off medication and she reports feeling less nausea concerns now. Does have slightly more coughing fits, getting them them about 1 daily. Met with  Dr. Rausch at [facility] on 5/26, however notes received show conflicting documentation of patient's medication regimen and what she is currently taking for her daily routine.     Has met with palliative care - last seen on 4/26 and discussed option for morphine to help with air hunger, however patient has not wanted to initiated this yet.     History of her pulmonary medications:   Tried using albuterol prior to Trelegy however was getting much more coughing so stopped this.  Ventolin was not effective.   Combivent has at home, not using.   As of 6/1 she is using 4.5L of oxygen 24 hours per day.   On prednisone 5mg daily (lowered from 10mg, then 7.5mg). Has been interested in lowering this as much as possible.   Benzonatate 100mg up to 4 per daily, feels this is effective for her cough, but having trouble getting this filled due to older rx showing 3 per day.   Daliresp has been held as of 5/25 - had been on 250mg daily for sometime with progressing weight loss, when trial of 500mg dose occurred severe nausea and further weight loss, so dose reduced back to 250mg daily. Most recently continued to have low appetite and weight loss, so trial off medication was recommended.       Today's Vitals: There were no vitals taken for this visit.  ----------------    I spent 10 minutes with this patient today. All changes were made via collaborative practice agreement with Dr. Putnam. A copy of the visit note was provided to the patient's primary care provider.    The patient was sent via [employer] a summary of these recommendations.     Maddy Burks, Pharm.D, [facility]  Medication Therapy Management Pharmacist  [phone]      Telemedicine Visit Details  Type of service:  Telephone visit  Start Time: 10:03AM  End Time: 10:13Am  Originating Location (patient location): Home  Distant Location (provider location):  [facility]      Medication Therapy Recommendations  No medication therapy recommendations to  display

## 2021-06-01 NOTE — Clinical Note
CARSON Villasenor on my last phone visit with this patient. She will be seeing Dr. Putnam 6/8 and you again in July. Reach out if you have any questions for me.     Maddy Burks, Pharm.D, [facility]  Medication Therapy Management Pharmacist  [phone]

## 2021-06-01 NOTE — Clinical Note
My notes from my call with her. Sending updates to palliative as well.     No action needed, but let me know if something doesn't look right to you!    Maddy Burks, Pharm.D, [facility]  Medication Therapy Management Pharmacist  [phone]

## 2021-06-03 NOTE — PATIENT INSTRUCTIONS
Recommendations from today's MTM visit:                                                      1. Updated prescription for benzonatate 4 daily     2. Updated ipratropium nebulizer prescription to 3 month supply     3. Keep scheduled follow up with Dr. Putnam on June 8th    Follow-up: No follow-ups on file.    It was great to speak with you today.  I value your experience and would be very thankful for your time with providing feedback on our clinic survey. You may receive a survey via email or text message in the next few days.     To schedule another MTM appointment, please call the clinic directly or you may call the MTM scheduling line at [phone] or toll-free at [phone].     My Clinical Pharmacist's contact information:                                                      Please feel free to contact me with any questions or concerns you have.      Maddy Burks, Pharm.D, [facility]  Medication Therapy Management Pharmacist  [phone]

## 2021-06-08 ENCOUNTER — VIRTUAL VISIT (OUTPATIENT)
Dept: FAMILY MEDICINE | Facility: CLINIC | Age: 76
End: 2021-06-08

## 2021-06-08 DIAGNOSIS — Z99.81 OXYGEN DEPENDENT: ICD-10-CM

## 2021-06-08 DIAGNOSIS — Z79.52 CURRENT CHRONIC USE OF SYSTEMIC STEROIDS: ICD-10-CM

## 2021-06-08 DIAGNOSIS — J44.9 COPD, SEVERE (H): Primary | ICD-10-CM

## 2021-06-08 PROCEDURE — 99214 OFFICE O/P EST MOD 30 MIN: CPT | Mod: GT | Performed by: FAMILY MEDICINE

## 2021-06-08 RX ORDER — IPRATROPIUM BROMIDE AND ALBUTEROL 20; 100 UG/1; UG/1
1 SPRAY, METERED RESPIRATORY (INHALATION) 4 TIMES DAILY PRN
COMMUNITY
Start: 2021-05-27

## 2021-06-08 RX ORDER — ROFLUMILAST 250 UG/1
TABLET ORAL
COMMUNITY
Start: 2021-05-04 | End: 2021-07-21

## 2021-06-08 NOTE — PROGRESS NOTES
"  Problem(s) Oriented visit      Video-Visit Details    Type of service:  Video Visit    Video Start Time (time video started): 1231    Video End Time (time video stopped): 1243    Originating Location (pt. Location): Home    Distant Location (provider location):  [facility]     Mode of Communication:  Telephone    Physician has received verbal consent for a Video Visit from the patient? Yes      Chan Putnam MD        Sydni Mendoza is being evaluated via a billable video visit.      The patient has been notified of following:     \"This video visit will be conducted via a call between you and your physician/provider. We have found that certain health care needs can be provided without the need for an in-person physical exam.  This service lets us provide the care you need with a video conversation.  If a prescription is necessary we can send it directly to your pharmacy.  If lab work is needed we can place an order for that and you can then stop by our lab to have the test done at a later time.    If during the course of the call the physician/provider feels a video visit is not appropriate, you will not be charged for this service.\"     Physician has received verbal consent for a Video Visit from the patient? Yes    SUBJECTIVE:                                                      Sydni Mendoza is a 76 year old female who is being seen through video consult for evaluation.   MTM notes reviewed.  Her appetite has improved off of the Daliresp.  She is being maintained on prednisone 5 mg.  She is on Ipratropium nebs, albuterol inhaler PRN, Trelegy and Azithromycin. Her breathing is otherwise stable.  She did meet with palliative care and I have reviewed that note.  She is considering morphine.  No other new concerns.  Her mood is stable and she denies depression.      Histories:   Patient Active Problem List   Diagnosis     COPD, severe (H)     Smoking     Lung nodule     Pneumothorax, " "post biopsy, right     Health Care Home     Multinodular goiter     Hypertension, unspecified type     Current chronic use of systemic steroids     Age-related osteoporosis without current pathological fracture     Personal history of other drug therapy     Oxygen dependent     Dystrophic nail     Onychomycosis     Lymphedema of both lower extremities     Past Surgical History:   Procedure Laterality Date     CHEST TUBE INSERTION  14    Right ,pneumothorax post, lung biopsy     GYN SURGERY      Tubal Ligation       Social History     Tobacco Use     Smoking status: Former Smoker     Packs/day: 1.00     Years: 50.00     Pack years: 50.00     Types: Cigarettes     Quit date: 2011     Years since quittin.8     Smokeless tobacco: Never Used   Substance Use Topics     Alcohol use: Yes     Alcohol/week: 0.0 standard drinks     Comment: occasional     No family history on file.        Reviewed and updated as needed this visit by Provider                 ROS:    A 10 system review was completed and is as noted in HPI and otherwise negative.            OBJECTIVE:                                                      Objective    There were no vitals taken for this visit.  Estimated body mass index is 23.52 kg/m  as calculated from the following:    Height as of 20: 1.626 m (5' 4\").    Weight as of 20: 62.1 kg (137 lb).      Gen: Somewhat frail sounding, no acute distress, speaking in full sentences              ASSESSMENT/PLAN:                                                        Sydni was seen today for copd, lymphedema, hypertension and recheck medication.    Diagnoses and all orders for this visit:    COPD, severe (H): severe, stable  --cont inhalers/nebs  --cont oxygen  --cont prednisone 5 mg  --cont azithromycin  --encouraged trial of morphine through palliative care, appreciate recs    Oxygen dependent    Current chronic use of systemic steroids          "

## 2021-06-11 DIAGNOSIS — J44.9 COPD, SEVERE (H): ICD-10-CM

## 2021-06-11 RX ORDER — PREDNISONE 5 MG/1
TABLET ORAL
Qty: 45 TABLET | Refills: 1 | Status: SHIPPED | OUTPATIENT
Start: 2021-06-11 | End: 2021-08-09

## 2021-06-11 RX ORDER — AZITHROMYCIN 250 MG/1
TABLET, FILM COATED ORAL
Qty: 30 TABLET | Refills: 1 | Status: SHIPPED | OUTPATIENT
Start: 2021-06-11 | End: 2021-08-06

## 2021-06-11 NOTE — TELEPHONE ENCOUNTER
Prednisone, Azithromycin. Last addressed 6/8/21.       Sydni Mendoza is a 76 year old female who is being seen through video consult for evaluation.   MTM notes reviewed.  Her appetite has improved off of the Daliresp.  She is being maintained on prednisone 5 mg.  She is on Ipratropium nebs, albuterol inhaler PRN, Trelegy and Azithromycin. Her breathing is otherwise stable.  She did meet with palliative care and I have reviewed that note.  She is considering morphine.  No other new concerns.  Her mood is stable and she denies depression.     COPD, severe (H): severe, stable  --cont inhalers/nebs  --cont oxygen  --cont prednisone 5 mg  --cont azithromycin  --encouraged trial of morphine through palliative care, appreciate recs

## 2021-06-20 DIAGNOSIS — I89.0 LYMPHEDEMA: ICD-10-CM

## 2021-06-22 RX ORDER — FUROSEMIDE 40 MG
40 TABLET ORAL DAILY PRN
Qty: 90 TABLET | Refills: 1 | Status: SHIPPED | OUTPATIENT
Start: 2021-06-22 | End: 2021-12-13

## 2021-07-09 NOTE — PROGRESS NOTES
"Sydni is a 76 year old who is being evaluated via a billable telephone visit.      What phone number would you like to be contacted at? [phone]  How would you like to obtain your AVS? John     I have reviewed and updated the patient's allergies and medication list.    Concerns: none  Refills: none     Vitals - Patient Reported  Weight (Patient Reported): 49.9 kg (110 lb)  Height (Patient Reported): 162.6 cm (5' 4\")  BMI (Based on Pt Reported Ht/Wt): 18.88  Pain Score: No Pain (0)    Samia Velez CMA    Phone call duration: 22 minutes    Palliative Care Outpatient Clinic Progress Note    Patient Name:  Sydni Mendoza  Primary Provider:  Chan Putnam    Chief Complaint/Patient ID: follow-up  75yo woman with severe COPD on chronic oral steroids and 4L NC O2    Last Palliative Care Appointment:  4/26/21     Interim History:  Sydni Mendoza 76 year old female returns to be seen by palliative care today.  Telephone visit done because of coronavirus pandemic, unable to connect by video with [employer] or [employer]    Reviewed notes from visits in the last 2 months with Dr. Cardoso at [facility] and PCP Dr. Putnam - stopped daliresp due to decreased appetite and weight loss without significant change in breathing.     Says overall has been feeling good.  Breathing is not any better but doesn't feel worse, having less coughing episodes, taking tessalon and mucinex PRN.  Appetite has been good, feels like weight has about stabilized.  Feels very tired, needs to nap most days, but can affect sleep in the evenings.  No walking assistance, no falls, mostly in house.  Has swelling in legs and feet from lymphedema, using velcro wraps. Has been able to wean prednisone to 5 mg/day without change in her breathing.      She asks about home health or a service to check her BP, because has not been able to go into the clinic.  No lightheadedness or orthostatic symptoms.     Coping:  Feels mood has been doing " venita.  Has been able to discuss her health with her , and feels he has a better understanding at this point.      Social History:  Pertinent changes to social history/social situation since last visit: None  Living Situation: Lives with her   Support System: Family, says she and her  are pretty isolated because of losses in the family  Advance Directive Status:   Has completed but has not had signed.  Says she has discussed with her  but she did not want it at the end of life.  POLST on file for DNR/DNI.     Allergies   Allergen Reactions     Albuterol Palpitations     Is fine taking albuterol inhaler, tachycardia goes away.     Current Outpatient Medications   Medication Sig Dispense Refill     albuterol (PROAIR HFA/PROVENTIL HFA/VENTOLIN HFA) 108 (90 Base) MCG/ACT inhaler Inhale 2 puffs into the lungs every 6 hours as needed for shortness of breath / dyspnea or wheezing       amLODIPine (NORVASC) 5 MG tablet Take 1 tablet (5 mg) by mouth daily 90 tablet 1     azithromycin (ZITHROMAX) 250 MG tablet TAKE 1 TABLET BY MOUTH EVERY DAY 30 tablet 1     benzonatate (TESSALON) 100 MG capsule Take 1 capsule by mouth up to 4 times per day as needed for coughing. 360 capsule 1     COMBIVENT RESPIMAT  MCG/ACT inhaler INHALE 2 PUFFS 4 TIMES EVERY DAY MAY TAKE ADDITIONAL PUFFS AS NEEDED NOT TO EXCEED 6 PUFFS IN 24HRS       DALIRESP 250 MCG TABS tablet        furosemide (LASIX) 40 MG tablet TAKE 1 TABLET (40 MG) BY MOUTH DAILY AS NEEDED (LYMPHEDEMA IN LEGS) 90 tablet 1     guaiFENesin (MUCINEX) 600 MG 12 hr tablet Take 600 mg by mouth 2 times daily       ipratropium (ATROVENT) 0.02 % neb solution Take 2.5 mLs (0.5 mg) by nebulization 2 times daily 450 mL 1     Multiple Vitamin (MULTI-VITAMIN) per tablet Take 1 tablet by mouth daily.         omeprazole (PRILOSEC) 20 MG DR capsule        predniSONE (DELTASONE) 5 MG tablet TAKE 1 AND 1/2 TABLETS (7.5 MG) BY MOUTH DAILY 45 tablet 1     TRELEGY  ELLIPTA 100-62.5-25 MCG/INH oral inhaler INHALE 1 PUFF BY INHALATION ROUTE EVERY DAY AT THE SAME TIME EACH DAY       vitamin D3 (CHOLECALCIFEROL) 50 mcg (2000 units) tablet Take 1 tablet (50 mcg) by mouth daily 90 tablet 3     Palliative Symptom Review (0=no symptom/no concern, 1=mild, 2=moderate, 3=severe):      Pain: 0      Fatigue: 3      Nausea: 0      Constipation: 0      Diarrhea: 0      Depressive Symptoms: 0      Anxiety: 0      Drowsiness: 0      Poor Appetite: 0      Shortness of Breath: 3      Insomnia: 1 - due to napping in the afternoon      Other: 0      Overall (0 good/no concerns, 3 very poor):  1    No physical exam due to video visit.  Voice strong, Speaks in 4-5 word sentences with mild labored breathing.  Voice calm, speech fluent.     Wt Readings from Last 10 Encounters:   08/28/20 62.1 kg (137 lb)   01/23/20 60.3 kg (133 lb)   01/06/20 61.2 kg (134 lb 14.4 oz)   09/04/19 62.5 kg (137 lb 12.8 oz)   08/12/19 62.1 kg (137 lb)   07/30/19 63.7 kg (140 lb 6.4 oz)   12/14/18 62.1 kg (137 lb)   08/15/18 65 kg (143 lb 3.2 oz)   05/25/18 65.8 kg (145 lb)   03/19/18 66.2 kg (146 lb)     Key Data Reviewed:  LABS  Last GFR 82 on 10/6/2020    Impression & Recommendations & Counseling:  Sydni Mendoza is a 76 year old w severe COPD and dyspnea with mild exertion, with weight loss and functional decline, seen for follow-up.   Feels her symptoms have been ~stable the last few months.     LORENZANA, cough - discussed symptom management.    - Encouraged use of fan to help with recovery.    - Have discussed starting morphine 2.5 mg BID-TID for dyspnea or cough and titrating to effect, doesn't feel she needs right now  - Asks about using Theravest - from what I know, most helpful for mobilizing mucous, which she doesn't have much of right now.  I don't see a role for helping her dyspnea or prevention of progression.   - Discussed balancing energy conservation with getting some gentle exercise (walking, doing home  chores) to prevent debilitation    ?Home Health - I don't think she has a skilled need to qualify.  Recommended checking her BP at the pharmacy, or considering getting a home BP cuff, as it's possible her BP has decreased with weight loss    COPD -  She seems to have good disease understanding, and about the slowly progressive nature of COPD.  Weight loss stabilized off daliresp.   No hospitalizations in the past year  - Have discussed the role of hospice, does not feel she needs right now, feels symptoms have stabilized.  I do think she'd likely be eligible if she elects this  - DNR/DNI, would accept hospitalization if needed right now, but overall would want end-of-life at home.      Phone visit duration: 22 minutes  Follow-up in 3 months    35 minutes spent including reviewing record, review of above studies, above visit with patient, and documentation.     Karen Zamora MD  Palliative Medicine  Pager [phone]     (This note was transcribed using voice recognition software. While I review and edit the transcription, I may miss errors, and the software sometimes does unexpected capitalizations and formatting that I miss. Please let me know of any serious mistranscriptions and I will addend this note.)

## 2021-07-13 ENCOUNTER — VIRTUAL VISIT (OUTPATIENT)
Dept: FAMILY MEDICINE | Facility: CLINIC | Age: 76
End: 2021-07-13

## 2021-07-13 DIAGNOSIS — Z79.52 CURRENT CHRONIC USE OF SYSTEMIC STEROIDS: ICD-10-CM

## 2021-07-13 DIAGNOSIS — Z99.81 OXYGEN DEPENDENT: ICD-10-CM

## 2021-07-13 DIAGNOSIS — J44.9 COPD, SEVERE (H): Primary | ICD-10-CM

## 2021-07-13 DIAGNOSIS — I10 HYPERTENSION, UNSPECIFIED TYPE: ICD-10-CM

## 2021-07-13 PROCEDURE — 99214 OFFICE O/P EST MOD 30 MIN: CPT | Mod: GT | Performed by: FAMILY MEDICINE

## 2021-07-13 RX ORDER — AMLODIPINE BESYLATE 5 MG/1
5 TABLET ORAL DAILY
Qty: 90 TABLET | Refills: 1 | Status: SHIPPED | OUTPATIENT
Start: 2021-07-13 | End: 2021-12-13

## 2021-07-13 NOTE — PROGRESS NOTES
"  Problem(s) Oriented visit      Video-Visit Details    Type of service:  Video Visit    Video Start Time (time video started): 1232    Video End Time (time video stopped): 1240    Originating Location (pt. Location): Home    Distant Location (provider location):  [facility]     Mode of Communication:  Phone    Physician has received verbal consent for a Video Visit from the patient? Yes      Chan Putnam MD        Sydni Mendoza is being evaluated via a billable video visit.      The patient has been notified of following:     \"This video visit will be conducted via a call between you and your physician/provider. We have found that certain health care needs can be provided without the need for an in-person physical exam.  This service lets us provide the care you need with a video conversation.  If a prescription is necessary we can send it directly to your pharmacy.  If lab work is needed we can place an order for that and you can then stop by our lab to have the test done at a later time.    If during the course of the call the physician/provider feels a video visit is not appropriate, you will not be charged for this service.\"     Physician has received verbal consent for a Video Visit from the patient? Yes    SUBJECTIVE:                                                      Sydni Mendoza is a 76 year old female who is being seen through telephone consult.    She has severe COPD and is oxygen dependent currently on 3.5L.  She feels weight and appetite have stabilized and improved off Daliresp. She has reduced Prednisone to 5 mg without issue.  She is maintained on Ipratropium nebs, albuterol inhaler PRN, Trelegy and Azithromycin.  She feels her breathing is stable.  Elected not to start morphine at this time.  She was evaluated by palliative care.      Histories:   Patient Active Problem List   Diagnosis     COPD, severe (H)     Smoking     Lung nodule     Pneumothorax, post biopsy, " "right     Health Care Home     Multinodular goiter     Hypertension, unspecified type     Current chronic use of systemic steroids     Age-related osteoporosis without current pathological fracture     Personal history of other drug therapy     Oxygen dependent     Dystrophic nail     Onychomycosis     Lymphedema of both lower extremities     Past Surgical History:   Procedure Laterality Date     CHEST TUBE INSERTION  14    Right ,pneumothorax post, lung biopsy     GYN SURGERY      Tubal Ligation       Social History     Tobacco Use     Smoking status: Former Smoker     Packs/day: 1.00     Years: 50.00     Pack years: 50.00     Types: Cigarettes     Quit date: 2011     Years since quittin.9     Smokeless tobacco: Never Used   Substance Use Topics     Alcohol use: Yes     Alcohol/week: 0.0 standard drinks     Comment: occasional     No family history on file.        Reviewed and updated as needed this visit by Provider                 ROS:    A 10 system review was completed and is as noted in HPI and otherwise negative.          OBJECTIVE:                                                      Objective    There were no vitals taken for this visit.  Estimated body mass index is 23.52 kg/m  as calculated from the following:    Height as of 20: 1.626 m (5' 4\").    Weight as of 20: 62.1 kg (137 lb).      Gen: Well sounding, speaking in full sentences.              ASSESSMENT/PLAN:                                                        COPD, severe (H): severe but stable. Cont all treatments.  Will stay off Daliresp.    Hypertension, unspecified type: cont current meds  -     amLODIPine (NORVASC) 5 MG tablet; Take 1 tablet (5 mg) by mouth daily    Oxygen dependent: stable    Current chronic use of systemic steroids          "

## 2021-07-21 ENCOUNTER — VIRTUAL VISIT (OUTPATIENT)
Dept: PALLIATIVE CARE | Facility: CLINIC | Age: 76
End: 2021-07-21
Attending: INTERNAL MEDICINE
Payer: COMMERCIAL

## 2021-07-21 DIAGNOSIS — R05.9 COUGH: ICD-10-CM

## 2021-07-21 DIAGNOSIS — R06.09 DOE (DYSPNEA ON EXERTION): Primary | ICD-10-CM

## 2021-07-21 DIAGNOSIS — J44.9 COPD, SEVERE (H): ICD-10-CM

## 2021-07-21 PROCEDURE — 99214 OFFICE O/P EST MOD 30 MIN: CPT | Mod: 95 | Performed by: INTERNAL MEDICINE

## 2021-07-21 PROCEDURE — 999N000109 HC STATISTIC OP CR VISIT

## 2021-07-21 NOTE — PATIENT INSTRUCTIONS
Thank you for seeing the Palliative Care Clinic today.     Return to clinic in 3 months for a follow-up.      You can reach the Palliative Care Team during business hours at the following numbers:   -For the [facility] [place] and [facility] Center, call [phone]  -To reach the palliative RN for questions or refills, call [phone]       To reach the Palliative Care Provider on-call After-hours or on holidays and weekends, call: [phone].  Please note that we are not able to provide pain medication refills on evenings or weekends.

## 2021-07-21 NOTE — LETTER
"7/21/2021       RE: Sydni Mendoza  [address]  [facility] [address]     Dear Colleague,    Thank you for referring your patient, Sydni Mendoza, to the [facility] at [facility]. Please see a copy of my visit note below.    I have reviewed and updated the patient's allergies and medication list.    Concerns: none  Refills: none     Vitals - Patient Reported  Weight (Patient Reported): 49.9 kg (110 lb)  Height (Patient Reported): 162.6 cm (5' 4\")  BMI (Based on Pt Reported Ht/Wt): 18.88  Pain Score: No Pain (0)    Samia Velez CMA    Phone call duration: 22 minutes    Palliative Care Outpatient Clinic Progress Note    Patient Name:  Sydni Mendoza  Primary Provider:  Chan Putnam    Chief Complaint/Patient ID: follow-up  75yo woman with severe COPD on chronic oral steroids and 4L NC O2    Last Palliative Care Appointment:  4/26/21     Interim History:  Sydni Mendoza 76 year old female returns to be seen by palliative care today.  Telephone visit done because of coronavirus pandemic, unable to connect by video with [employer] or [facility]    Reviewed notes from visits in the last 2 months with Dr. Cardoso at [facility] and PCP Dr. Putnam - stopped daliresp due to decreased appetite and weight loss without significant change in breathing.     Says overall has been feeling good.  Breathing is not any better but doesn't feel worse, having less coughing episodes, taking tessalon and mucinex PRN.  Appetite has been good, feels like weight has about stabilized.  Feels very tired, needs to nap most days, but can affect sleep in the evenings.  No walking assistance, no falls, mostly in house.  Has swelling in legs and feet from lymphedema, using velcro wraps. Has been able to wean prednisone to 5 mg/day without change in her breathing.      She asks about home health or a service to check her BP, because has not " been able to go into the clinic.  No lightheadedness or orthostatic symptoms.     Coping:  Feels mood has been doing alright.  Has been able to discuss her health with her , and feels he has a better understanding at this point.      Social History:  Pertinent changes to social history/social situation since last visit: None  Living Situation: Lives with her   Support System: Family, says she and her  are pretty isolated because of losses in the family  Advance Directive Status:   Has completed but has not had signed.  Says she has discussed with her  but she did not want it at the end of life.  POLST on file for DNR/DNI.     Allergies   Allergen Reactions     Albuterol Palpitations     Is fine taking albuterol inhaler, tachycardia goes away.     Current Outpatient Medications   Medication Sig Dispense Refill     albuterol (PROAIR HFA/PROVENTIL HFA/VENTOLIN HFA) 108 (90 Base) MCG/ACT inhaler Inhale 2 puffs into the lungs every 6 hours as needed for shortness of breath / dyspnea or wheezing       amLODIPine (NORVASC) 5 MG tablet Take 1 tablet (5 mg) by mouth daily 90 tablet 1     azithromycin (ZITHROMAX) 250 MG tablet TAKE 1 TABLET BY MOUTH EVERY DAY 30 tablet 1     benzonatate (TESSALON) 100 MG capsule Take 1 capsule by mouth up to 4 times per day as needed for coughing. 360 capsule 1     COMBIVENT RESPIMAT  MCG/ACT inhaler INHALE 2 PUFFS 4 TIMES EVERY DAY MAY TAKE ADDITIONAL PUFFS AS NEEDED NOT TO EXCEED 6 PUFFS IN 24HRS       DALIRESP 250 MCG TABS tablet        furosemide (LASIX) 40 MG tablet TAKE 1 TABLET (40 MG) BY MOUTH DAILY AS NEEDED (LYMPHEDEMA IN LEGS) 90 tablet 1     guaiFENesin (MUCINEX) 600 MG 12 hr tablet Take 600 mg by mouth 2 times daily       ipratropium (ATROVENT) 0.02 % neb solution Take 2.5 mLs (0.5 mg) by nebulization 2 times daily 450 mL 1     Multiple Vitamin (MULTI-VITAMIN) per tablet Take 1 tablet by mouth daily.         omeprazole (PRILOSEC) 20 MG DR capsule         predniSONE (DELTASONE) 5 MG tablet TAKE 1 AND 1/2 TABLETS (7.5 MG) BY MOUTH DAILY 45 tablet 1     TRELEGY ELLIPTA 100-62.5-25 MCG/INH oral inhaler INHALE 1 PUFF BY INHALATION ROUTE EVERY DAY AT THE SAME TIME EACH DAY       vitamin D3 (CHOLECALCIFEROL) 50 mcg (2000 units) tablet Take 1 tablet (50 mcg) by mouth daily 90 tablet 3     Palliative Symptom Review (0=no symptom/no concern, 1=mild, 2=moderate, 3=severe):      Pain: 0      Fatigue: 3      Nausea: 0      Constipation: 0      Diarrhea: 0      Depressive Symptoms: 0      Anxiety: 0      Drowsiness: 0      Poor Appetite: 0      Shortness of Breath: 3      Insomnia: 1 - due to napping in the afternoon      Other: 0      Overall (0 good/no concerns, 3 very poor):  1    No physical exam due to video visit.  Voice strong, Speaks in 4-5 word sentences with mild labored breathing.  Voice calm, speech fluent.     Wt Readings from Last 10 Encounters:   08/28/20 62.1 kg (137 lb)   01/23/20 60.3 kg (133 lb)   01/06/20 61.2 kg (134 lb 14.4 oz)   09/04/19 62.5 kg (137 lb 12.8 oz)   08/12/19 62.1 kg (137 lb)   07/30/19 63.7 kg (140 lb 6.4 oz)   12/14/18 62.1 kg (137 lb)   08/15/18 65 kg (143 lb 3.2 oz)   05/25/18 65.8 kg (145 lb)   03/19/18 66.2 kg (146 lb)     Key Data Reviewed:  LABS  Last GFR 82 on 10/6/2020    Impression & Recommendations & Counseling:  Sydni Mendoza is a 76 year old w severe COPD and dyspnea with mild exertion, with weight loss and functional decline, seen for follow-up.   Feels her symptoms have been ~stable the last few months.     LORENZANA, cough - discussed symptom management.    - Encouraged use of fan to help with recovery.    - Have discussed starting morphine 2.5 mg BID-TID for dyspnea or cough and titrating to effect, doesn't feel she needs right now  - Asks about using Theravest - from what I know, most helpful for mobilizing mucous, which she doesn't have much of right now.  I don't see a role for helping her dyspnea or prevention of  progression.   - Discussed balancing energy conservation with getting some gentle exercise (walking, doing home chores) to prevent debilitation    ?Home Health - I don't think she has a skilled need to qualify.  Recommended checking her BP at the pharmacy, or considering getting a home BP cuff, as it's possible her BP has decreased with weight loss    COPD -  She seems to have good disease understanding, and about the slowly progressive nature of COPD.  Weight loss stabilized off daliresp.   No hospitalizations in the past year  - Have discussed the role of hospice, does not feel she needs right now, feels symptoms have stabilized.  I do think she'd likely be eligible if she elects this  - DNR/DNI, would accept hospitalization if needed right now, but overall would want end-of-life at home.      Phone visit duration: 22 minutes  Follow-up in 3 months    35 minutes spent including reviewing record, review of above studies, above visit with patient, and documentation.     Karen Zamora MD  Palliative Medicine  Pager [phone]     (This note was transcribed using voice recognition software. While I review and edit the transcription, I may miss errors, and the software sometimes does unexpected capitalizations and formatting that I miss. Please let me know of any serious mistranscriptions and I will addend this note.)      Again, thank you for allowing me to participate in the care of your patient.      Sincerely,    Karen Zamora MD

## 2021-08-06 DIAGNOSIS — J44.9 COPD, SEVERE (H): ICD-10-CM

## 2021-08-09 DIAGNOSIS — J44.9 COPD, SEVERE (H): ICD-10-CM

## 2021-08-09 RX ORDER — AZITHROMYCIN 250 MG/1
TABLET, FILM COATED ORAL
Qty: 30 TABLET | Refills: 1 | Status: SHIPPED | OUTPATIENT
Start: 2021-08-09 | End: 2021-10-04

## 2021-08-09 RX ORDER — PREDNISONE 5 MG/1
TABLET ORAL
Qty: 45 TABLET | Refills: 1 | Status: SHIPPED | OUTPATIENT
Start: 2021-08-09 | End: 2021-10-13

## 2021-09-04 ENCOUNTER — HEALTH MAINTENANCE LETTER (OUTPATIENT)
Age: 76
End: 2021-09-04

## 2021-10-04 DIAGNOSIS — J44.9 COPD, SEVERE (H): ICD-10-CM

## 2021-10-04 RX ORDER — AZITHROMYCIN 250 MG/1
TABLET, FILM COATED ORAL
Qty: 30 TABLET | Refills: 1 | Status: SHIPPED | OUTPATIENT
Start: 2021-10-04 | End: 2021-12-06

## 2021-10-13 ENCOUNTER — VIRTUAL VISIT (OUTPATIENT)
Dept: FAMILY MEDICINE | Facility: CLINIC | Age: 76
End: 2021-10-13

## 2021-10-13 DIAGNOSIS — I10 HYPERTENSION, UNSPECIFIED TYPE: ICD-10-CM

## 2021-10-13 DIAGNOSIS — J44.9 COPD, SEVERE (H): Primary | ICD-10-CM

## 2021-10-13 DIAGNOSIS — Z99.81 OXYGEN DEPENDENT: ICD-10-CM

## 2021-10-13 DIAGNOSIS — Z79.52 CURRENT CHRONIC USE OF SYSTEMIC STEROIDS: ICD-10-CM

## 2021-10-13 PROCEDURE — 99214 OFFICE O/P EST MOD 30 MIN: CPT | Mod: GT | Performed by: FAMILY MEDICINE

## 2021-10-13 RX ORDER — PREDNISONE 5 MG/1
5 TABLET ORAL DAILY
Qty: 90 TABLET | Refills: 0 | COMMUNITY
Start: 2021-10-13 | End: 2021-12-21

## 2021-10-13 NOTE — PROGRESS NOTES
"  Problem(s) Oriented visit      Video-Visit Details    Type of service:  Video Visit    Video Start Time (time video started): 1235    Video End Time (time video stopped): 1246    Originating Location (pt. Location): Home    Distant Location (provider location):  [facility]     Mode of Communication:  Telephone    Physician has received verbal consent for a Video Visit from the patient? Yes      Chan Putnam MD        Sydni Mendoza is being evaluated via a billable video visit.      The patient has been notified of following:     \"This video visit will be conducted via a call between you and your physician/provider. We have found that certain health care needs can be provided without the need for an in-person physical exam.  This service lets us provide the care you need with a video conversation.  If a prescription is necessary we can send it directly to your pharmacy.  If lab work is needed we can place an order for that and you can then stop by our lab to have the test done at a later time.    If during the course of the call the physician/provider feels a video visit is not appropriate, you will not be charged for this service.\"     Physician has received verbal consent for a Video Visit from the patient? Yes    SUBJECTIVE:                                                      Sydni Mendoza is a 76 year old female who is being seen through video consult for evaluation.      She has severe COPD and is oxygen dependent currently on 3.5L.  She feels weight and appetite have stabilized and improved off Daliresp. She continues on prednisone 5 mg.  She is on Ipratropium nebs, albuterol inhaler PRN, Trelegy and Azithromycin.  She feels her breathing is stable.  She is being followed now by palliative care but has elected not to start morphine yet.    No new concerns.      Histories:   Patient Active Problem List   Diagnosis     COPD, severe (H)     Smoking     Lung nodule     Pneumothorax, " "post biopsy, right     Health Care Home     Multinodular goiter     Hypertension, unspecified type     Current chronic use of systemic steroids     Age-related osteoporosis without current pathological fracture     Personal history of other drug therapy     Oxygen dependent     Dystrophic nail     Onychomycosis     Lymphedema of both lower extremities     Past Surgical History:   Procedure Laterality Date     CHEST TUBE INSERTION  9-14-12    Right ,pneumothorax post, lung biopsy     GYN SURGERY      Tubal Ligation       Social History     Tobacco Use     Smoking status: Former Smoker     Packs/day: 1.00     Years: 50.00     Pack years: 50.00     Types: Cigarettes     Quit date: 8/20/2011     Years since quitting: 10.1     Smokeless tobacco: Never Used   Substance Use Topics     Alcohol use: Yes     Alcohol/week: 0.0 standard drinks     Comment: occasional     No family history on file.        Reviewed and updated as needed this visit by Provider                 ROS:    A 10 system review was completed and is as noted in HPI and otherwise negative.            OBJECTIVE:                                                      Objective    There were no vitals taken for this visit.  Estimated body mass index is 23.52 kg/m  as calculated from the following:    Height as of 1/2/20: 1.626 m (5' 4\").    Weight as of 8/28/20: 62.1 kg (137 lb).      Gen: Well appearing, NAD  Eyes: Clear  Resp: Breathing comfortably, NAD  Skin: Clear  Psych: normal mood and affect  Neuro: grossly normal              ASSESSMENT/PLAN:                                                        COPD, severe (H): stable, cont all current treatments.  Appreciate palliative care recommendations.    --follow up 3 months, sooner if any changes or concerns    HTN: reports stable.  Advise home monitoring          "

## 2021-10-19 ENCOUNTER — VIRTUAL VISIT (OUTPATIENT)
Dept: PALLIATIVE CARE | Facility: CLINIC | Age: 76
End: 2021-10-19
Attending: INTERNAL MEDICINE
Payer: COMMERCIAL

## 2021-10-19 DIAGNOSIS — Z51.5 ENCOUNTER FOR PALLIATIVE CARE: ICD-10-CM

## 2021-10-19 DIAGNOSIS — G47.9 DIFFICULTY SLEEPING: ICD-10-CM

## 2021-10-19 DIAGNOSIS — J44.9 COPD, SEVERE (H): ICD-10-CM

## 2021-10-19 DIAGNOSIS — R63.4 WEIGHT LOSS: ICD-10-CM

## 2021-10-19 DIAGNOSIS — R06.09 DOE (DYSPNEA ON EXERTION): Primary | ICD-10-CM

## 2021-10-19 DIAGNOSIS — R05.9 COUGH: ICD-10-CM

## 2021-10-19 PROCEDURE — 999N001193 HC VIDEO/TELEPHONE VISIT; NO CHARGE

## 2021-10-19 PROCEDURE — 99442 PR PHYSICIAN TELEPHONE EVALUATION 11-20 MIN: CPT | Mod: 95 | Performed by: STUDENT IN AN ORGANIZED HEALTH CARE EDUCATION/TRAINING PROGRAM

## 2021-10-19 NOTE — PATIENT INSTRUCTIONS
Recommendations:  - Try a 2 week course of nasal steroid such as flonase to help with post nasal drip. I suggest 1 spray in each nostril twice daily. You can also try an over the counter antihistamine such as allegra or zyrtec.  - Encourage 2-3 Ensure shakes during the day.  - Try limiting naps during the day, using alarms to help.    Follow up: 3 months      Reasons to Call    If you are having worsening/uncontrolled symptoms we want you to call!    You or your other physicians make any changes to medications we have prescribed.  -Please call for refills 4-5 days before you will run out of medication.    Important Phone Numbers    [facility] - Philomena James. Palliative Care RN - [phone]    [facility] - Kim Morocho. Palliative Care RN - [phone]  *For Refills, scheduling, and general questions - [phone]  *After hours or on weekends. Will connect you with on call MD. [phone]

## 2021-10-19 NOTE — LETTER
10/19/2021       RE: Sydni Mendoza  [address]  [facility] [address]     Dear Colleague,    Thank you for referring your patient, Sydni Mendoza, to the [facility] at [facility]. Please see a copy of my visit note below.    Palliative Care Clinic Progress Note    Patient Name: Sydni Mendoza  Primary Provider: Chan Putnam    Chief Complaint/Patient ID: 75yo woman with severe COPD on chronic oral steroids and 4L NC O2.    Last Palliative care appointment: 7/21/21 with Dr. Zamora. Previously discussed low dose morphine in the future for SOB. Encouraged use of a fan for recovery.     Reviewed: Yes, no record.     Interim History:  Sydni Mendoza 76 year old female returns to Palliative Care today for follow up via billable telephone encounter.      Things have been about the same. Still struggling with gaining weight back. Drinking one ensure a day. Has good appetite just can't gain weight.    Still having coughing fits, more often then previous. States it's exhausting. Sipping on warm water does help. Has never treated nasal symptoms but thinks could be contributing.    Does not sleep well at night but also takes on average 2 naps/day sleeping up to 1.5 hours at each nap.    Using fan to help with recovery, very helpful. Nebulizer in the past never helpful, wondering if she should try again. Not ready for morphine.    Social History:  Lives with her .   Social History     Tobacco Use     Smoking status: Former Smoker     Packs/day: 1.00     Years: 50.00     Pack years: 50.00     Types: Cigarettes     Quit date: 8/20/2011     Years since quitting: 10.1     Smokeless tobacco: Never Used   Substance Use Topics     Alcohol use: Yes     Alcohol/week: 0.0 standard drinks     Comment: occasional     Drug use: No       Family History- Reviewed in Epic.    Allergies   Allergen Reactions     Albuterol  Palpitations     Is fine taking albuterol inhaler, tachycardia goes away.       Advanced Care Planning: Has completed but has not had signed.  Says she has discussed with her  but she did not want it at the end of life.  POLST on file for DNR/DNI-Selective treatment.     Medications- Reviewed in Epic.    Past Medical History- Reviewed in [facility].    Past Surgical History- Reviewed in Epic.    Review of Systems:   ROS: 10 point ROS neg other than the symptoms noted above in the HPI.    Key Data Reviewed:  LABS: Last GFR 82 on 10/6/2020    Impression & Recommendations & Counseling:  Sydni Mendoza is a 76 year old female with history of severe COPD and dyspnea with mild exertion, with weight loss and functional decline, seen for follow-up.   Feels her symptoms have been ~stable the last few months.     Cough - Could be multifactorial, possible PND contributing.  - Recommended 2 weeks trial of nasal steroid 1 spray in each nostril BID, +/- and OTC antihistamine.    SOB - Stable, knows that PRN opioid is available if needed. Continuing to use fan with good benefit.    Weight loss - Relatively stable but struggling to gain weight. Only drinking one supplement daily.  - Encouraged to take in 2-3 Ensure shakes daily.    Difficulty sleeping - Not interested in medication. Has long naps during the day.  - Recommended decreasing length of time of naps, particularly afternoon nap. Suggested setting alarms so she doesn't sleep past a certain point.    Follow up: 3 months    Telephone Visit Details  Total length of phone call: 19 mins, start time 11:27AM    Total time spent on day of encounter is 24 mins, including reviewing record, review of above studies, above visit with patient, and documentation.     Linsey Loya,   Palliative Medicine   Pager [phone], [facility] ID [id number]    Some chart documentation performed using Dragon Voice recognition Software. Although reviewed after completion, some words and  grammatical errors may remain.        Again, thank you for allowing me to participate in the care of your patient.      Sincerely,    Linsey Loya, DO

## 2021-10-22 ENCOUNTER — PATIENT OUTREACH (OUTPATIENT)
Dept: CARE COORDINATION | Facility: CLINIC | Age: 76
End: 2021-10-22

## 2021-10-22 NOTE — PROGRESS NOTES
Oncology Distress Screening Follow-up  Clinical Social Work  [facility]    Identified Concern and Score From Distress Screening:     3. How concerned are you about feeling depressed or very sad?   9Abnormal            4. How concerned are you about feeling anxious or very scared?   9Abnormal                         6. How concerned are you about work and home life issues that may be affected by your cancer?   9Abnormal            7. How concerned are you about knowing what resources are available to help you?   9Abnormal              Date of Distress Screening: 10/19/21    Data:   Sydni is a 76 year old patient with a diagnosis of dyspnea on exertion who was seen by Hospice and Palliative Care. In a recent appointment patient indicated concerns about feeling depressed/sad, anxious/scared, concerns about work/home life issues, and not knowing what resources are available to them.     Intervention:   SW called patient, introduced self and explained the reason for the call. Patient stated that they are doing well and denied needing any support resources. Patient spoke about  being a support system for them. Patient did not have any concerns or questions for SW and is aware on how to contact their care team if they have any questions.    Follow-up Required:   SW will not continue to follow patient as they do not have a cancer diagnosis. Patient is aware of how to contact her care team with any questions or concerns.    LORY Fuchs,LGSW  Hematology/Oncology Social Worker  Phone:[phone] Pager: [phone]

## 2021-12-05 DIAGNOSIS — J44.9 COPD, SEVERE (H): ICD-10-CM

## 2021-12-06 RX ORDER — AZITHROMYCIN 250 MG/1
TABLET, FILM COATED ORAL
Qty: 30 TABLET | Refills: 1 | Status: SHIPPED | OUTPATIENT
Start: 2021-12-06 | End: 2022-02-07

## 2021-12-06 NOTE — TELEPHONE ENCOUNTER
Azithromycin  LVV 10/13/21  COPD, severe (H): stable, cont all current treatments.  Appreciate palliative care recommendations.    --follow up 3 months, sooner if any changes or concerns   next appt 1/14/22

## 2021-12-12 DIAGNOSIS — I10 HYPERTENSION, UNSPECIFIED TYPE: ICD-10-CM

## 2021-12-12 DIAGNOSIS — J44.9 COPD, SEVERE (H): ICD-10-CM

## 2021-12-12 DIAGNOSIS — I89.0 LYMPHEDEMA: ICD-10-CM

## 2021-12-13 RX ORDER — FUROSEMIDE 40 MG
40 TABLET ORAL DAILY PRN
Qty: 90 TABLET | Refills: 1 | Status: SHIPPED | OUTPATIENT
Start: 2021-12-13

## 2021-12-13 RX ORDER — AMLODIPINE BESYLATE 5 MG/1
TABLET ORAL
Qty: 90 TABLET | Refills: 1 | Status: SHIPPED | OUTPATIENT
Start: 2021-12-13 | End: 2022-02-09

## 2021-12-13 RX ORDER — BENZONATATE 100 MG/1
CAPSULE ORAL
Qty: 360 CAPSULE | Refills: 1 | Status: SHIPPED | OUTPATIENT
Start: 2021-12-13 | End: 2022-01-01

## 2021-12-13 NOTE — TELEPHONE ENCOUNTER
Furosemide  Amlodipine  Benzonatate  LVV 10/13/21  Next appt 1/14/22  BP Readings from Last 3 Encounters:   02/23/21 134/64   10/06/20 (!) 150/68   09/30/20 (!) 150/76        COPD, severe (H): stable, cont all current treatments.  Appreciate palliative care recommendations.    --follow up 3 months, sooner if any changes or concerns     HTN: reports stable.  Advise home monitoring

## 2021-12-21 DIAGNOSIS — J44.9 COPD, SEVERE (H): ICD-10-CM

## 2021-12-21 RX ORDER — PREDNISONE 5 MG/1
5 TABLET ORAL DAILY
Qty: 90 TABLET | Refills: 0 | Status: SHIPPED | OUTPATIENT
Start: 2021-12-21 | End: 2022-03-21

## 2021-12-21 NOTE — TELEPHONE ENCOUNTER
Received refill request from pharmacy for prednisone. Takes this for her severe COPD.   Last related visit was 10/13/21 virtual visit with Dr. Putnam.  Current dose noted for prednisone at 10/13/21 was 5mg QD.  Also takes ipratropium nebs, albuterol inhaler prn, Trelegy and azithromycin. She is being followed by palliative care.   Future appt: 1/14/22 phone visit with Dr. Putnam (3 month follow up visit)  Plan: routed request to Dr. Putnam for review.  Leslie Millard RN

## 2022-01-01 ENCOUNTER — APPOINTMENT (OUTPATIENT)
Dept: PHYSICAL THERAPY | Facility: CLINIC | Age: 77
End: 2022-01-01
Attending: PHYSICIAN ASSISTANT
Payer: COMMERCIAL

## 2022-01-01 ENCOUNTER — MEDICAL CORRESPONDENCE (OUTPATIENT)
Dept: FAMILY MEDICINE | Facility: CLINIC | Age: 77
End: 2022-01-01

## 2022-01-01 ENCOUNTER — HEALTH MAINTENANCE LETTER (OUTPATIENT)
Age: 77
End: 2022-01-01

## 2022-01-01 ENCOUNTER — VIRTUAL VISIT (OUTPATIENT)
Dept: FAMILY MEDICINE | Facility: CLINIC | Age: 77
End: 2022-01-01

## 2022-01-01 ENCOUNTER — PATIENT OUTREACH (OUTPATIENT)
Dept: CARE COORDINATION | Facility: CLINIC | Age: 77
End: 2022-01-01

## 2022-01-01 ENCOUNTER — TRANSFERRED RECORDS (OUTPATIENT)
Dept: FAMILY MEDICINE | Facility: CLINIC | Age: 77
End: 2022-01-01

## 2022-01-01 ENCOUNTER — APPOINTMENT (OUTPATIENT)
Dept: GENERAL RADIOLOGY | Facility: CLINIC | Age: 77
End: 2022-01-01
Attending: EMERGENCY MEDICINE
Payer: COMMERCIAL

## 2022-01-01 ENCOUNTER — HOSPITAL ENCOUNTER (OUTPATIENT)
Facility: CLINIC | Age: 77
Setting detail: OBSERVATION
Discharge: NURSING, REHAB OR LONG-TERM CARE | End: 2022-04-25
Attending: EMERGENCY MEDICINE | Admitting: HOSPITALIST
Payer: COMMERCIAL

## 2022-01-01 VITALS
HEART RATE: 79 BPM | SYSTOLIC BLOOD PRESSURE: 111 MMHG | OXYGEN SATURATION: 98 % | TEMPERATURE: 98.2 F | WEIGHT: 99.8 LBS | BODY MASS INDEX: 17.04 KG/M2 | HEIGHT: 64 IN | DIASTOLIC BLOOD PRESSURE: 61 MMHG | RESPIRATION RATE: 20 BRPM

## 2022-01-01 DIAGNOSIS — R63.6 UNDERWEIGHT: ICD-10-CM

## 2022-01-01 DIAGNOSIS — J44.9 COPD, SEVERE (H): Primary | ICD-10-CM

## 2022-01-01 DIAGNOSIS — R06.02 SHORTNESS OF BREATH: ICD-10-CM

## 2022-01-01 DIAGNOSIS — Z99.81 OXYGEN DEPENDENT: ICD-10-CM

## 2022-01-01 DIAGNOSIS — J44.9 COPD, SEVERE (H): ICD-10-CM

## 2022-01-01 DIAGNOSIS — Z51.5 END OF LIFE CARE: ICD-10-CM

## 2022-01-01 DIAGNOSIS — J43.9 PULMONARY EMPHYSEMA, UNSPECIFIED EMPHYSEMA TYPE (H): ICD-10-CM

## 2022-01-01 LAB
ALBUMIN SERPL-MCNC: 3.4 G/DL (ref 3.4–5)
ALP SERPL-CCNC: 49 U/L (ref 40–150)
ALT SERPL W P-5'-P-CCNC: 22 U/L (ref 0–50)
ANION GAP SERPL CALCULATED.3IONS-SCNC: <1 MMOL/L (ref 3–14)
ANION GAP SERPL CALCULATED.3IONS-SCNC: <1 MMOL/L (ref 3–14)
AST SERPL W P-5'-P-CCNC: 12 U/L (ref 0–45)
ATRIAL RATE - MUSE: 93 BPM
BASOPHILS # BLD AUTO: 0 10E3/UL (ref 0–0.2)
BASOPHILS NFR BLD AUTO: 1 %
BILIRUB SERPL-MCNC: 0.6 MG/DL (ref 0.2–1.3)
BUN SERPL-MCNC: 19 MG/DL (ref 7–30)
BUN SERPL-MCNC: 25 MG/DL (ref 7–30)
CALCIUM SERPL-MCNC: 9 MG/DL (ref 8.5–10.1)
CALCIUM SERPL-MCNC: 9.4 MG/DL (ref 8.5–10.1)
CHLORIDE BLD-SCNC: 100 MMOL/L (ref 94–109)
CHLORIDE BLD-SCNC: 99 MMOL/L (ref 94–109)
CO2 SERPL-SCNC: 39 MMOL/L (ref 20–32)
CO2 SERPL-SCNC: 40 MMOL/L (ref 20–32)
CREAT SERPL-MCNC: 0.54 MG/DL (ref 0.52–1.04)
CREAT SERPL-MCNC: 0.55 MG/DL (ref 0.52–1.04)
D DIMER PPP FEU-MCNC: 0.41 UG/ML FEU (ref 0–0.5)
DIASTOLIC BLOOD PRESSURE - MUSE: NORMAL MMHG
EOSINOPHIL # BLD AUTO: 0 10E3/UL (ref 0–0.7)
EOSINOPHIL NFR BLD AUTO: 0 %
ERYTHROCYTE [DISTWIDTH] IN BLOOD BY AUTOMATED COUNT: 12 % (ref 10–15)
ERYTHROCYTE [DISTWIDTH] IN BLOOD BY AUTOMATED COUNT: 12.2 % (ref 10–15)
GFR SERPL CREATININE-BSD FRML MDRD: >90 ML/MIN/1.73M2
GFR SERPL CREATININE-BSD FRML MDRD: >90 ML/MIN/1.73M2
GLUCOSE BLD-MCNC: 108 MG/DL (ref 70–99)
GLUCOSE BLD-MCNC: 166 MG/DL (ref 70–99)
HCT VFR BLD AUTO: 34.9 % (ref 35–47)
HCT VFR BLD AUTO: 37.6 % (ref 35–47)
HGB BLD-MCNC: 10.7 G/DL (ref 11.7–15.7)
HGB BLD-MCNC: 11.6 G/DL (ref 11.7–15.7)
IMM GRANULOCYTES # BLD: 0 10E3/UL
IMM GRANULOCYTES NFR BLD: 0 %
INTERPRETATION ECG - MUSE: NORMAL
LYMPHOCYTES # BLD AUTO: 1.1 10E3/UL (ref 0.8–5.3)
LYMPHOCYTES NFR BLD AUTO: 12 %
MCH RBC QN AUTO: 29.2 PG (ref 26.5–33)
MCH RBC QN AUTO: 29.6 PG (ref 26.5–33)
MCHC RBC AUTO-ENTMCNC: 30.7 G/DL (ref 31.5–36.5)
MCHC RBC AUTO-ENTMCNC: 30.9 G/DL (ref 31.5–36.5)
MCV RBC AUTO: 95 FL (ref 78–100)
MCV RBC AUTO: 97 FL (ref 78–100)
MONOCYTES # BLD AUTO: 0.7 10E3/UL (ref 0–1.3)
MONOCYTES NFR BLD AUTO: 8 %
NEUTROPHILS # BLD AUTO: 6.7 10E3/UL (ref 1.6–8.3)
NEUTROPHILS NFR BLD AUTO: 79 %
NRBC # BLD AUTO: 0 10E3/UL
NRBC BLD AUTO-RTO: 0 /100
P AXIS - MUSE: 71 DEGREES
PLATELET # BLD AUTO: 191 10E3/UL (ref 150–450)
PLATELET # BLD AUTO: 239 10E3/UL (ref 150–450)
POTASSIUM BLD-SCNC: 3.6 MMOL/L (ref 3.4–5.3)
POTASSIUM BLD-SCNC: 4.3 MMOL/L (ref 3.4–5.3)
PR INTERVAL - MUSE: 152 MS
PROT SERPL-MCNC: 5.7 G/DL (ref 6.8–8.8)
QRS DURATION - MUSE: 68 MS
QT - MUSE: 348 MS
QTC - MUSE: 432 MS
R AXIS - MUSE: 71 DEGREES
RBC # BLD AUTO: 3.61 10E6/UL (ref 3.8–5.2)
RBC # BLD AUTO: 3.97 10E6/UL (ref 3.8–5.2)
SARS-COV-2 RNA RESP QL NAA+PROBE: NEGATIVE
SODIUM SERPL-SCNC: 138 MMOL/L (ref 133–144)
SODIUM SERPL-SCNC: 138 MMOL/L (ref 133–144)
SYSTOLIC BLOOD PRESSURE - MUSE: NORMAL MMHG
T AXIS - MUSE: 69 DEGREES
TROPONIN I SERPL HS-MCNC: 12 NG/L
VENTRICULAR RATE- MUSE: 93 BPM
WBC # BLD AUTO: 7.2 10E3/UL (ref 4–11)
WBC # BLD AUTO: 8.5 10E3/UL (ref 4–11)

## 2022-01-01 PROCEDURE — 250N000012 HC RX MED GY IP 250 OP 636 PS 637: Performed by: INTERNAL MEDICINE

## 2022-01-01 PROCEDURE — 250N000009 HC RX 250: Performed by: INTERNAL MEDICINE

## 2022-01-01 PROCEDURE — 94640 AIRWAY INHALATION TREATMENT: CPT | Mod: 76

## 2022-01-01 PROCEDURE — G0378 HOSPITAL OBSERVATION PER HR: HCPCS

## 2022-01-01 PROCEDURE — 99214 OFFICE O/P EST MOD 30 MIN: CPT | Mod: GT | Performed by: FAMILY MEDICINE

## 2022-01-01 PROCEDURE — 99220 PR INITIAL OBSERVATION CARE,LEVEL III: CPT | Performed by: PHYSICIAN ASSISTANT

## 2022-01-01 PROCEDURE — 93005 ELECTROCARDIOGRAM TRACING: CPT

## 2022-01-01 PROCEDURE — 96376 TX/PRO/DX INJ SAME DRUG ADON: CPT

## 2022-01-01 PROCEDURE — 99225 PR SUBSEQUENT OBSERVATION CARE,LEVEL II: CPT | Performed by: INTERNAL MEDICINE

## 2022-01-01 PROCEDURE — 250N000009 HC RX 250: Performed by: PHYSICIAN ASSISTANT

## 2022-01-01 PROCEDURE — 999N000157 HC STATISTIC RCP TIME EA 10 MIN

## 2022-01-01 PROCEDURE — 250N000013 HC RX MED GY IP 250 OP 250 PS 637: Performed by: INTERNAL MEDICINE

## 2022-01-01 PROCEDURE — 94640 AIRWAY INHALATION TREATMENT: CPT

## 2022-01-01 PROCEDURE — U0005 INFEC AGEN DETEC AMPLI PROBE: HCPCS | Performed by: EMERGENCY MEDICINE

## 2022-01-01 PROCEDURE — 85379 FIBRIN DEGRADATION QUANT: CPT | Performed by: EMERGENCY MEDICINE

## 2022-01-01 PROCEDURE — 999N000157 HC STATISTIC RCP TIME EA 10 MIN: Mod: 76

## 2022-01-01 PROCEDURE — 80048 BASIC METABOLIC PNL TOTAL CA: CPT | Performed by: EMERGENCY MEDICINE

## 2022-01-01 PROCEDURE — 99285 EMERGENCY DEPT VISIT HI MDM: CPT | Mod: 25

## 2022-01-01 PROCEDURE — 99217 PR OBSERVATION CARE DISCHARGE: CPT | Performed by: INTERNAL MEDICINE

## 2022-01-01 PROCEDURE — 36415 COLL VENOUS BLD VENIPUNCTURE: CPT | Performed by: EMERGENCY MEDICINE

## 2022-01-01 PROCEDURE — 250N000013 HC RX MED GY IP 250 OP 250 PS 637: Performed by: PHYSICIAN ASSISTANT

## 2022-01-01 PROCEDURE — 99226 PR SUBSEQUENT OBSERVATION CARE,LEVEL III: CPT | Performed by: INTERNAL MEDICINE

## 2022-01-01 PROCEDURE — 99214 OFFICE O/P EST MOD 30 MIN: CPT | Performed by: INTERNAL MEDICINE

## 2022-01-01 PROCEDURE — 99215 OFFICE O/P EST HI 40 MIN: CPT | Performed by: INTERNAL MEDICINE

## 2022-01-01 PROCEDURE — 80053 COMPREHEN METABOLIC PANEL: CPT | Performed by: PHYSICIAN ASSISTANT

## 2022-01-01 PROCEDURE — 36415 COLL VENOUS BLD VENIPUNCTURE: CPT | Performed by: PHYSICIAN ASSISTANT

## 2022-01-01 PROCEDURE — 250N000011 HC RX IP 250 OP 636: Performed by: EMERGENCY MEDICINE

## 2022-01-01 PROCEDURE — 97530 THERAPEUTIC ACTIVITIES: CPT | Mod: GP | Performed by: PHYSICAL THERAPIST

## 2022-01-01 PROCEDURE — 85027 COMPLETE CBC AUTOMATED: CPT | Performed by: PHYSICIAN ASSISTANT

## 2022-01-01 PROCEDURE — 84484 ASSAY OF TROPONIN QUANT: CPT | Performed by: EMERGENCY MEDICINE

## 2022-01-01 PROCEDURE — 96374 THER/PROPH/DIAG INJ IV PUSH: CPT

## 2022-01-01 PROCEDURE — 97116 GAIT TRAINING THERAPY: CPT | Mod: GP | Performed by: PHYSICAL THERAPIST

## 2022-01-01 PROCEDURE — 999N000111 HC STATISTIC OT IP EVAL DEFER: Performed by: OCCUPATIONAL THERAPIST

## 2022-01-01 PROCEDURE — 97161 PT EVAL LOW COMPLEX 20 MIN: CPT | Mod: GP | Performed by: PHYSICAL THERAPIST

## 2022-01-01 PROCEDURE — C9803 HOPD COVID-19 SPEC COLLECT: HCPCS

## 2022-01-01 PROCEDURE — 250N000013 HC RX MED GY IP 250 OP 250 PS 637: Performed by: EMERGENCY MEDICINE

## 2022-01-01 PROCEDURE — 99417 PROLNG OP E/M EACH 15 MIN: CPT | Performed by: INTERNAL MEDICINE

## 2022-01-01 PROCEDURE — 250N000012 HC RX MED GY IP 250 OP 636 PS 637: Performed by: PHYSICIAN ASSISTANT

## 2022-01-01 PROCEDURE — 85025 COMPLETE CBC W/AUTO DIFF WBC: CPT | Performed by: EMERGENCY MEDICINE

## 2022-01-01 PROCEDURE — 250N000009 HC RX 250: Performed by: EMERGENCY MEDICINE

## 2022-01-01 PROCEDURE — 250N000011 HC RX IP 250 OP 636: Performed by: PHYSICIAN ASSISTANT

## 2022-01-01 PROCEDURE — 71046 X-RAY EXAM CHEST 2 VIEWS: CPT

## 2022-01-01 RX ORDER — IPRATROPIUM BROMIDE AND ALBUTEROL SULFATE 2.5; .5 MG/3ML; MG/3ML
1 SOLUTION RESPIRATORY (INHALATION) EVERY 6 HOURS PRN
Status: DISCONTINUED | OUTPATIENT
Start: 2022-01-01 | End: 2022-01-01 | Stop reason: HOSPADM

## 2022-01-01 RX ORDER — PREDNISONE 20 MG/1
20 TABLET ORAL DAILY
Qty: 30 TABLET | Refills: 0 | Status: SHIPPED | OUTPATIENT
Start: 2022-01-01

## 2022-01-01 RX ORDER — ONDANSETRON 4 MG/1
4 TABLET, ORALLY DISINTEGRATING ORAL EVERY 6 HOURS PRN
Status: DISCONTINUED | OUTPATIENT
Start: 2022-01-01 | End: 2022-01-01 | Stop reason: HOSPADM

## 2022-01-01 RX ORDER — GUAIFENESIN 1200 MG/1
1200 TABLET, EXTENDED RELEASE ORAL DAILY
COMMUNITY

## 2022-01-01 RX ORDER — BENZONATATE 100 MG/1
CAPSULE ORAL
Qty: 360 CAPSULE | Refills: 1 | Status: SHIPPED | OUTPATIENT
Start: 2022-01-01

## 2022-01-01 RX ORDER — ACETAMINOPHEN 650 MG/1
650 SUPPOSITORY RECTAL EVERY 6 HOURS PRN
Qty: 15 SUPPOSITORY | Refills: 0 | Status: SHIPPED | OUTPATIENT
Start: 2022-01-01

## 2022-01-01 RX ORDER — LEVALBUTEROL 1.25 MG/.5ML
1.25 SOLUTION, CONCENTRATE RESPIRATORY (INHALATION) EVERY 6 HOURS PRN
Status: DISCONTINUED | OUTPATIENT
Start: 2022-01-01 | End: 2022-01-01 | Stop reason: HOSPADM

## 2022-01-01 RX ORDER — GUAIFENESIN 600 MG/1
1200 TABLET, EXTENDED RELEASE ORAL 2 TIMES DAILY
Status: DISCONTINUED | OUTPATIENT
Start: 2022-01-01 | End: 2022-01-01 | Stop reason: HOSPADM

## 2022-01-01 RX ORDER — ACETAMINOPHEN 325 MG/1
650 TABLET ORAL EVERY 6 HOURS PRN
Status: DISCONTINUED | OUTPATIENT
Start: 2022-01-01 | End: 2022-01-01 | Stop reason: HOSPADM

## 2022-01-01 RX ORDER — IPRATROPIUM BROMIDE AND ALBUTEROL SULFATE 2.5; .5 MG/3ML; MG/3ML
3 SOLUTION RESPIRATORY (INHALATION) ONCE
Status: COMPLETED | OUTPATIENT
Start: 2022-01-01 | End: 2022-01-01

## 2022-01-01 RX ORDER — AZITHROMYCIN 250 MG/1
250 TABLET, FILM COATED ORAL DAILY
Status: DISCONTINUED | OUTPATIENT
Start: 2022-01-01 | End: 2022-01-01 | Stop reason: HOSPADM

## 2022-01-01 RX ORDER — HYDROMORPHONE HYDROCHLORIDE 2 MG/1
2-4 TABLET ORAL EVERY 4 HOURS PRN
Qty: 20 TABLET | Refills: 0 | Status: SHIPPED | OUTPATIENT
Start: 2022-01-01

## 2022-01-01 RX ORDER — MORPHINE SULFATE 10 MG/5ML
5-10 SOLUTION ORAL
Status: DISCONTINUED | OUTPATIENT
Start: 2022-01-01 | End: 2022-01-01

## 2022-01-01 RX ORDER — PREDNISONE 20 MG/1
20 TABLET ORAL DAILY
Status: DISCONTINUED | OUTPATIENT
Start: 2022-01-01 | End: 2022-01-01

## 2022-01-01 RX ORDER — BISACODYL 10 MG
10 SUPPOSITORY, RECTAL RECTAL DAILY PRN
Status: DISCONTINUED | OUTPATIENT
Start: 2022-01-01 | End: 2022-01-01 | Stop reason: HOSPADM

## 2022-01-01 RX ORDER — AMOXICILLIN 250 MG
1 CAPSULE ORAL 2 TIMES DAILY PRN
Status: DISCONTINUED | OUTPATIENT
Start: 2022-01-01 | End: 2022-01-01 | Stop reason: HOSPADM

## 2022-01-01 RX ORDER — IPRATROPIUM BROMIDE AND ALBUTEROL SULFATE 2.5; .5 MG/3ML; MG/3ML
1 SOLUTION RESPIRATORY (INHALATION) EVERY 6 HOURS PRN
Status: ON HOLD | COMMUNITY
End: 2022-01-01

## 2022-01-01 RX ORDER — BISACODYL 10 MG
10 SUPPOSITORY, RECTAL RECTAL DAILY PRN
Qty: 10 SUPPOSITORY | Refills: 0 | Status: SHIPPED | OUTPATIENT
Start: 2022-01-01

## 2022-01-01 RX ORDER — ASPIRIN 81 MG/1
324 TABLET, CHEWABLE ORAL ONCE
Status: COMPLETED | OUTPATIENT
Start: 2022-01-01 | End: 2022-01-01

## 2022-01-01 RX ORDER — HYDRALAZINE HYDROCHLORIDE 20 MG/ML
10 INJECTION INTRAMUSCULAR; INTRAVENOUS EVERY 4 HOURS PRN
Status: DISCONTINUED | OUTPATIENT
Start: 2022-01-01 | End: 2022-01-01 | Stop reason: HOSPADM

## 2022-01-01 RX ORDER — PREDNISONE 5 MG/1
10 TABLET ORAL DAILY
Status: DISCONTINUED | OUTPATIENT
Start: 2022-01-01 | End: 2022-01-01 | Stop reason: HOSPADM

## 2022-01-01 RX ORDER — NALOXONE HYDROCHLORIDE 0.4 MG/ML
0.2 INJECTION, SOLUTION INTRAMUSCULAR; INTRAVENOUS; SUBCUTANEOUS
Status: DISCONTINUED | OUTPATIENT
Start: 2022-01-01 | End: 2022-01-01 | Stop reason: HOSPADM

## 2022-01-01 RX ORDER — LEVALBUTEROL 1.25 MG/.5ML
1.25 SOLUTION, CONCENTRATE RESPIRATORY (INHALATION) 4 TIMES DAILY
Status: DISCONTINUED | OUTPATIENT
Start: 2022-01-01 | End: 2022-01-01 | Stop reason: HOSPADM

## 2022-01-01 RX ORDER — PROCHLORPERAZINE 25 MG
12.5 SUPPOSITORY, RECTAL RECTAL EVERY 12 HOURS PRN
Status: DISCONTINUED | OUTPATIENT
Start: 2022-01-01 | End: 2022-01-01 | Stop reason: HOSPADM

## 2022-01-01 RX ORDER — LIDOCAINE 40 MG/G
CREAM TOPICAL
Status: DISCONTINUED | OUTPATIENT
Start: 2022-01-01 | End: 2022-01-01 | Stop reason: HOSPADM

## 2022-01-01 RX ORDER — HYDROMORPHONE HYDROCHLORIDE 2 MG/1
2 TABLET ORAL
Status: DISCONTINUED | OUTPATIENT
Start: 2022-01-01 | End: 2022-01-01 | Stop reason: HOSPADM

## 2022-01-01 RX ORDER — METHYLPREDNISOLONE SODIUM SUCCINATE 40 MG/ML
40 INJECTION, POWDER, LYOPHILIZED, FOR SOLUTION INTRAMUSCULAR; INTRAVENOUS ONCE
Status: COMPLETED | OUTPATIENT
Start: 2022-01-01 | End: 2022-01-01

## 2022-01-01 RX ORDER — IPRATROPIUM BROMIDE AND ALBUTEROL SULFATE 2.5; .5 MG/3ML; MG/3ML
1 SOLUTION RESPIRATORY (INHALATION) 4 TIMES DAILY
Qty: 90 ML | Refills: 0 | Status: SHIPPED | OUTPATIENT
Start: 2022-01-01

## 2022-01-01 RX ORDER — BENZONATATE 100 MG/1
100 CAPSULE ORAL EVERY 4 HOURS PRN
Status: DISCONTINUED | OUTPATIENT
Start: 2022-01-01 | End: 2022-01-01 | Stop reason: HOSPADM

## 2022-01-01 RX ORDER — LEVALBUTEROL 1.25 MG/.5ML
1.25 SOLUTION, CONCENTRATE RESPIRATORY (INHALATION) 4 TIMES DAILY
Status: DISCONTINUED | OUTPATIENT
Start: 2022-01-01 | End: 2022-01-01

## 2022-01-01 RX ORDER — PREDNISONE 20 MG/1
40 TABLET ORAL DAILY
Status: COMPLETED | OUTPATIENT
Start: 2022-01-01 | End: 2022-01-01

## 2022-01-01 RX ORDER — IPRATROPIUM BROMIDE AND ALBUTEROL SULFATE 2.5; .5 MG/3ML; MG/3ML
1 SOLUTION RESPIRATORY (INHALATION) 4 TIMES DAILY
Qty: 90 ML | Refills: 0 | Status: SHIPPED | OUTPATIENT
Start: 2022-01-01 | End: 2022-01-01

## 2022-01-01 RX ORDER — AMOXICILLIN 250 MG
2 CAPSULE ORAL 2 TIMES DAILY PRN
Status: DISCONTINUED | OUTPATIENT
Start: 2022-01-01 | End: 2022-01-01 | Stop reason: HOSPADM

## 2022-01-01 RX ORDER — AMLODIPINE BESYLATE 5 MG/1
5 TABLET ORAL DAILY
Status: DISCONTINUED | OUTPATIENT
Start: 2022-01-01 | End: 2022-01-01 | Stop reason: HOSPADM

## 2022-01-01 RX ORDER — HALOPERIDOL 1 MG/1
1 TABLET ORAL EVERY 6 HOURS PRN
Qty: 30 TABLET | Refills: 0 | Status: SHIPPED | OUTPATIENT
Start: 2022-01-01

## 2022-01-01 RX ORDER — LORAZEPAM 2 MG/ML
.5-1 CONCENTRATE ORAL EVERY 4 HOURS PRN
Qty: 15 ML | Refills: 0 | Status: SHIPPED | OUTPATIENT
Start: 2022-01-01

## 2022-01-01 RX ORDER — ATROPINE SULFATE 10 MG/ML
2 SOLUTION/ DROPS OPHTHALMIC EVERY 4 HOURS PRN
Qty: 1 ML | Refills: 0 | Status: SHIPPED | OUTPATIENT
Start: 2022-01-01

## 2022-01-01 RX ORDER — PREDNISONE 5 MG/1
10 TABLET ORAL DAILY
Status: DISCONTINUED | OUTPATIENT
Start: 2022-01-01 | End: 2022-01-01

## 2022-01-01 RX ORDER — MORPHINE SULFATE 10 MG/5ML
2.5-5 SOLUTION ORAL
Status: DISCONTINUED | OUTPATIENT
Start: 2022-01-01 | End: 2022-01-01

## 2022-01-01 RX ORDER — PREDNISONE 20 MG/1
20 TABLET ORAL DAILY
Status: DISCONTINUED | OUTPATIENT
Start: 2022-01-01 | End: 2022-01-01 | Stop reason: HOSPADM

## 2022-01-01 RX ORDER — AMOXICILLIN 250 MG
1 CAPSULE ORAL 2 TIMES DAILY PRN
Qty: 20 TABLET | Refills: 0 | Status: SHIPPED | OUTPATIENT
Start: 2022-01-01

## 2022-01-01 RX ORDER — NALOXONE HYDROCHLORIDE 0.4 MG/ML
0.4 INJECTION, SOLUTION INTRAMUSCULAR; INTRAVENOUS; SUBCUTANEOUS
Status: DISCONTINUED | OUTPATIENT
Start: 2022-01-01 | End: 2022-01-01 | Stop reason: HOSPADM

## 2022-01-01 RX ORDER — METHYLPREDNISOLONE SODIUM SUCCINATE 125 MG/2ML
125 INJECTION, POWDER, LYOPHILIZED, FOR SOLUTION INTRAMUSCULAR; INTRAVENOUS ONCE
Status: COMPLETED | OUTPATIENT
Start: 2022-01-01 | End: 2022-01-01

## 2022-01-01 RX ORDER — PREDNISONE 20 MG/1
20 TABLET ORAL DAILY
Qty: 30 TABLET | Refills: 0 | DISCHARGE
Start: 2022-01-01 | End: 2022-01-01

## 2022-01-01 RX ORDER — IPRATROPIUM BROMIDE AND ALBUTEROL SULFATE 2.5; .5 MG/3ML; MG/3ML
1 SOLUTION RESPIRATORY (INHALATION) 4 TIMES DAILY
Qty: 90 ML | Refills: 0 | DISCHARGE
Start: 2022-01-01 | End: 2022-01-01

## 2022-01-01 RX ORDER — ONDANSETRON 2 MG/ML
4 INJECTION INTRAMUSCULAR; INTRAVENOUS EVERY 6 HOURS PRN
Status: DISCONTINUED | OUTPATIENT
Start: 2022-01-01 | End: 2022-01-01 | Stop reason: HOSPADM

## 2022-01-01 RX ORDER — PROCHLORPERAZINE MALEATE 5 MG
5 TABLET ORAL EVERY 6 HOURS PRN
Status: DISCONTINUED | OUTPATIENT
Start: 2022-01-01 | End: 2022-01-01 | Stop reason: HOSPADM

## 2022-01-01 RX ORDER — ACETAMINOPHEN 650 MG/1
650 SUPPOSITORY RECTAL EVERY 6 HOURS PRN
Status: DISCONTINUED | OUTPATIENT
Start: 2022-01-01 | End: 2022-01-01 | Stop reason: HOSPADM

## 2022-01-01 RX ADMIN — AZITHROMYCIN MONOHYDRATE 250 MG: 250 TABLET ORAL at 08:41

## 2022-01-01 RX ADMIN — ASPIRIN 81 MG CHEWABLE TABLET 324 MG: 81 TABLET CHEWABLE at 08:53

## 2022-01-01 RX ADMIN — LEVALBUTEROL HYDROCHLORIDE 1.25 MG: 1.25 SOLUTION, CONCENTRATE RESPIRATORY (INHALATION) at 19:19

## 2022-01-01 RX ADMIN — METHYLPREDNISOLONE SODIUM SUCCINATE 40 MG: 40 INJECTION, POWDER, FOR SOLUTION INTRAMUSCULAR; INTRAVENOUS at 20:03

## 2022-01-01 RX ADMIN — GUAIFENESIN 1200 MG: 600 TABLET ORAL at 08:21

## 2022-01-01 RX ADMIN — PREDNISONE 40 MG: 20 TABLET ORAL at 08:21

## 2022-01-01 RX ADMIN — GUAIFENESIN 1200 MG: 600 TABLET ORAL at 08:49

## 2022-01-01 RX ADMIN — MORPHINE SULFATE 10 MG: 10 SOLUTION ORAL at 16:43

## 2022-01-01 RX ADMIN — AZITHROMYCIN MONOHYDRATE 250 MG: 250 TABLET ORAL at 13:15

## 2022-01-01 RX ADMIN — BENZONATATE 100 MG: 100 CAPSULE ORAL at 08:38

## 2022-01-01 RX ADMIN — LEVALBUTEROL HYDROCHLORIDE 1.25 MG: 1.25 SOLUTION, CONCENTRATE RESPIRATORY (INHALATION) at 15:38

## 2022-01-01 RX ADMIN — PREDNISONE 40 MG: 20 TABLET ORAL at 11:06

## 2022-01-01 RX ADMIN — GUAIFENESIN 1200 MG: 600 TABLET ORAL at 20:26

## 2022-01-01 RX ADMIN — LEVALBUTEROL HYDROCHLORIDE 1.25 MG: 1.25 SOLUTION, CONCENTRATE RESPIRATORY (INHALATION) at 09:57

## 2022-01-01 RX ADMIN — LEVALBUTEROL HYDROCHLORIDE 1.25 MG: 1.25 SOLUTION, CONCENTRATE RESPIRATORY (INHALATION) at 07:13

## 2022-01-01 RX ADMIN — BENZONATATE 100 MG: 100 CAPSULE ORAL at 15:52

## 2022-01-01 RX ADMIN — LEVALBUTEROL HYDROCHLORIDE 1.25 MG: 1.25 SOLUTION, CONCENTRATE RESPIRATORY (INHALATION) at 21:23

## 2022-01-01 RX ADMIN — GUAIFENESIN 1200 MG: 600 TABLET ORAL at 08:50

## 2022-01-01 RX ADMIN — GUAIFENESIN 1200 MG: 600 TABLET ORAL at 20:21

## 2022-01-01 RX ADMIN — GUAIFENESIN 1200 MG: 600 TABLET ORAL at 08:41

## 2022-01-01 RX ADMIN — AZITHROMYCIN MONOHYDRATE 250 MG: 250 TABLET ORAL at 08:38

## 2022-01-01 RX ADMIN — MORPHINE SULFATE 2.5 MG: 10 SOLUTION ORAL at 15:01

## 2022-01-01 RX ADMIN — LEVALBUTEROL HYDROCHLORIDE 1.25 MG: 1.25 SOLUTION, CONCENTRATE RESPIRATORY (INHALATION) at 15:15

## 2022-01-01 RX ADMIN — METHYLPREDNISOLONE SODIUM SUCCINATE 125 MG: 125 INJECTION, POWDER, FOR SOLUTION INTRAMUSCULAR; INTRAVENOUS at 09:24

## 2022-01-01 RX ADMIN — LEVALBUTEROL HYDROCHLORIDE 1.25 MG: 1.25 SOLUTION, CONCENTRATE RESPIRATORY (INHALATION) at 19:42

## 2022-01-01 RX ADMIN — HYDROMORPHONE HYDROCHLORIDE 2 MG: 2 TABLET ORAL at 17:35

## 2022-01-01 RX ADMIN — BENZONATATE 100 MG: 100 CAPSULE ORAL at 08:21

## 2022-01-01 RX ADMIN — GUAIFENESIN 1200 MG: 600 TABLET ORAL at 20:37

## 2022-01-01 RX ADMIN — LEVALBUTEROL HYDROCHLORIDE 1.25 MG: 1.25 SOLUTION, CONCENTRATE RESPIRATORY (INHALATION) at 20:16

## 2022-01-01 RX ADMIN — GUAIFENESIN 1200 MG: 600 TABLET ORAL at 20:04

## 2022-01-01 RX ADMIN — HYDROMORPHONE HYDROCHLORIDE 2 MG: 2 TABLET ORAL at 08:39

## 2022-01-01 RX ADMIN — AZITHROMYCIN MONOHYDRATE 250 MG: 250 TABLET ORAL at 08:31

## 2022-01-01 RX ADMIN — LEVALBUTEROL HYDROCHLORIDE 1.25 MG: 1.25 SOLUTION, CONCENTRATE RESPIRATORY (INHALATION) at 14:11

## 2022-01-01 RX ADMIN — HYDROMORPHONE HYDROCHLORIDE 2 MG: 2 TABLET ORAL at 15:52

## 2022-01-01 RX ADMIN — AZITHROMYCIN MONOHYDRATE 250 MG: 250 TABLET ORAL at 08:50

## 2022-01-01 RX ADMIN — MORPHINE SULFATE 5 MG: 10 SOLUTION ORAL at 19:44

## 2022-01-01 RX ADMIN — AMLODIPINE BESYLATE 5 MG: 5 TABLET ORAL at 08:42

## 2022-01-01 RX ADMIN — AZITHROMYCIN MONOHYDRATE 250 MG: 250 TABLET ORAL at 08:36

## 2022-01-01 RX ADMIN — LEVALBUTEROL HYDROCHLORIDE 1.25 MG: 1.25 SOLUTION, CONCENTRATE RESPIRATORY (INHALATION) at 11:45

## 2022-01-01 RX ADMIN — ONDANSETRON 4 MG: 4 TABLET, ORALLY DISINTEGRATING ORAL at 10:56

## 2022-01-01 RX ADMIN — GUAIFENESIN 1200 MG: 600 TABLET ORAL at 08:36

## 2022-01-01 RX ADMIN — GUAIFENESIN 1200 MG: 600 TABLET ORAL at 20:35

## 2022-01-01 RX ADMIN — LEVALBUTEROL HYDROCHLORIDE 1.25 MG: 1.25 SOLUTION, CONCENTRATE RESPIRATORY (INHALATION) at 07:41

## 2022-01-01 RX ADMIN — HYDROMORPHONE HYDROCHLORIDE 2 MG: 2 TABLET ORAL at 20:04

## 2022-01-01 RX ADMIN — GUAIFENESIN 1200 MG: 600 TABLET ORAL at 20:03

## 2022-01-01 RX ADMIN — AMLODIPINE BESYLATE 5 MG: 5 TABLET ORAL at 13:14

## 2022-01-01 RX ADMIN — AZITHROMYCIN MONOHYDRATE 250 MG: 250 TABLET ORAL at 08:48

## 2022-01-01 RX ADMIN — LEVALBUTEROL HYDROCHLORIDE 1.25 MG: 1.25 SOLUTION, CONCENTRATE RESPIRATORY (INHALATION) at 11:39

## 2022-01-01 RX ADMIN — GUAIFENESIN 1200 MG: 600 TABLET ORAL at 08:38

## 2022-01-01 RX ADMIN — LEVALBUTEROL HYDROCHLORIDE 1.25 MG: 1.25 SOLUTION, CONCENTRATE RESPIRATORY (INHALATION) at 15:30

## 2022-01-01 RX ADMIN — PREDNISONE 30 MG: 5 TABLET ORAL at 08:31

## 2022-01-01 RX ADMIN — PREDNISONE 20 MG: 20 TABLET ORAL at 08:41

## 2022-01-01 RX ADMIN — AZITHROMYCIN MONOHYDRATE 250 MG: 250 TABLET ORAL at 08:21

## 2022-01-01 RX ADMIN — LEVALBUTEROL HYDROCHLORIDE 1.25 MG: 1.25 SOLUTION, CONCENTRATE RESPIRATORY (INHALATION) at 16:41

## 2022-01-01 RX ADMIN — BENZONATATE 100 MG: 100 CAPSULE ORAL at 22:56

## 2022-01-01 RX ADMIN — PREDNISONE 20 MG: 20 TABLET ORAL at 08:49

## 2022-01-01 RX ADMIN — GUAIFENESIN 1200 MG: 600 TABLET ORAL at 21:14

## 2022-01-01 RX ADMIN — LEVALBUTEROL HYDROCHLORIDE 1.25 MG: 1.25 SOLUTION, CONCENTRATE RESPIRATORY (INHALATION) at 07:16

## 2022-01-01 RX ADMIN — PREDNISONE 40 MG: 20 TABLET ORAL at 08:38

## 2022-01-01 RX ADMIN — LEVALBUTEROL HYDROCHLORIDE 1.25 MG: 1.25 SOLUTION, CONCENTRATE RESPIRATORY (INHALATION) at 11:23

## 2022-01-01 RX ADMIN — BENZONATATE 100 MG: 100 CAPSULE ORAL at 08:41

## 2022-01-01 RX ADMIN — SENNOSIDES AND DOCUSATE SODIUM 1 TABLET: 50; 8.6 TABLET ORAL at 08:39

## 2022-01-01 RX ADMIN — BENZONATATE 100 MG: 100 CAPSULE ORAL at 20:04

## 2022-01-01 RX ADMIN — AMLODIPINE BESYLATE 5 MG: 5 TABLET ORAL at 08:31

## 2022-01-01 RX ADMIN — IPRATROPIUM BROMIDE AND ALBUTEROL SULFATE 3 ML: .5; 3 SOLUTION RESPIRATORY (INHALATION) at 09:17

## 2022-01-01 RX ADMIN — BENZONATATE 100 MG: 100 CAPSULE ORAL at 13:21

## 2022-01-01 RX ADMIN — LEVALBUTEROL HYDROCHLORIDE 1.25 MG: 1.25 SOLUTION, CONCENTRATE RESPIRATORY (INHALATION) at 08:30

## 2022-01-01 RX ADMIN — LEVALBUTEROL HYDROCHLORIDE 1.25 MG: 1.25 SOLUTION, CONCENTRATE RESPIRATORY (INHALATION) at 08:19

## 2022-01-01 RX ADMIN — PREDNISONE 40 MG: 20 TABLET ORAL at 08:50

## 2022-01-01 RX ADMIN — LEVALBUTEROL HYDROCHLORIDE 1.25 MG: 1.25 SOLUTION, CONCENTRATE RESPIRATORY (INHALATION) at 20:24

## 2022-01-01 RX ADMIN — BENZONATATE 100 MG: 100 CAPSULE ORAL at 16:12

## 2022-01-01 ASSESSMENT — ENCOUNTER SYMPTOMS
UNEXPECTED WEIGHT CHANGE: 1
APPETITE CHANGE: 1
SHORTNESS OF BREATH: 1
COUGH: 1

## 2022-01-11 ENCOUNTER — VIRTUAL VISIT (OUTPATIENT)
Dept: FAMILY MEDICINE | Facility: CLINIC | Age: 77
End: 2022-01-11

## 2022-01-11 DIAGNOSIS — J44.9 COPD, SEVERE (H): Primary | ICD-10-CM

## 2022-01-11 DIAGNOSIS — Z99.81 OXYGEN DEPENDENT: ICD-10-CM

## 2022-01-11 DIAGNOSIS — Z79.52 CURRENT CHRONIC USE OF SYSTEMIC STEROIDS: ICD-10-CM

## 2022-01-11 PROCEDURE — 99214 OFFICE O/P EST MOD 30 MIN: CPT | Mod: GT | Performed by: FAMILY MEDICINE

## 2022-01-11 NOTE — PROGRESS NOTES
"  Problem(s) Oriented visit      Video-Visit Details    Type of service:  Video Visit    Video Start Time (time video started): 1114    Video End Time (time video stopped): 1125    Originating Location (pt. Location): Home    Distant Location (provider location):  [facility]     Mode of Communication:  Telephone    Physician has received verbal consent for a Video Visit from the patient? Yes      Chan Putnam MD        Sydni Mendoza is being evaluated via a billable video visit.      The patient has been notified of following:     \"This video visit will be conducted via a call between you and your physician/provider. We have found that certain health care needs can be provided without the need for an in-person physical exam.  This service lets us provide the care you need with a video conversation.  If a prescription is necessary we can send it directly to your pharmacy.  If lab work is needed we can place an order for that and you can then stop by our lab to have the test done at a later time.    If during the course of the call the physician/provider feels a video visit is not appropriate, you will not be charged for this service.\"     Physician has received verbal consent for a Video Visit from the patient? Yes    SUBJECTIVE:                                                      Sydni Mendoza is a 76 year old female who is being seen through video consult for evaluation.      She has severe COPD and is oxygen dependent currently on 3.5L.  She was unable to tolerate Daliresp.  She continues on prednisone 5 mg.  She is on Ipratropium nebs, albuterol inhaler PRN, Trelegy and Azithromycin.  She feels her breathing is stable.  She is being followed now by palliative care but has elected not to start morphine yet.  She has a visit with Dr Cardoso soon.       No new concerns.      Histories:   Patient Active Problem List   Diagnosis     COPD, severe (H)     Smoking     Lung nodule     " "Pneumothorax, post biopsy, right     Health Care Home     Multinodular goiter     Hypertension, unspecified type     Current chronic use of systemic steroids     Age-related osteoporosis without current pathological fracture     Personal history of other drug therapy     Oxygen dependent     Dystrophic nail     Onychomycosis     Lymphedema of both lower extremities     Past Surgical History:   Procedure Laterality Date     CHEST TUBE INSERTION  9-14-12    Right ,pneumothorax post, lung biopsy     GYN SURGERY      Tubal Ligation       Social History     Tobacco Use     Smoking status: Former Smoker     Packs/day: 1.00     Years: 50.00     Pack years: 50.00     Types: Cigarettes     Quit date: 8/20/2011     Years since quitting: 10.4     Smokeless tobacco: Never Used   Substance Use Topics     Alcohol use: Yes     Alcohol/week: 0.0 standard drinks     Comment: occasional     No family history on file.        Reviewed and updated as needed this visit by Provider                ROS:    A 10 system review was completed and is as noted in HPI and otherwise negative.            OBJECTIVE:                                                      Objective    There were no vitals taken for this visit.  Estimated body mass index is 23.52 kg/m  as calculated from the following:    Height as of 1/2/20: 1.626 m (5' 4\").    Weight as of 8/28/20: 62.1 kg (137 lb).      Gen: Well sounding, NAD              ASSESSMENT/PLAN:                                                          COPD, severe (H)  Stable, cont all current meds, pulm and palliative follow up    Oxygen dependent    Current chronic use of systemic steroids  Necessary at this time.  Aware of potential harms.        "

## 2022-01-17 NOTE — PROGRESS NOTES
"Sydni is a 76 year old who is being evaluated via a billable telephone visit.      What phone number would you like to be contacted at? [phone]  How would you like to obtain your AVS? John Palmer        Palliative Care Clinic Progress Note    Patient Name: Sydni Mendoza  Primary Provider: Chan Putnam    Chief Complaint/Patient ID: 75yo woman with severe COPD on chronic oral steroids and 4L NC O2.    Last Palliative care appointment: 10/19/21 with me. Trial of treating PND to see if it helped cough.      Reviewed: Yes, no record for last year.    Interim History:  Sydni Mendoza 76 year old female returns to Palliative Care today for follow up via billable telephone encounter.      Says things are about the same. Feels things are getting harder and harder with her breathing. Says she has follow up with Pulm next week and is expecting tests to be worse.    Coughing fits are still significant. Says unsure if tessalon is helping but afraid to stop due to risk of cough worsening.     Doesn't really use rescue inhalers and doesn't use nebulizer because she feels that she coughs more.    Does have some weeks where she coughs very little then other weeks all she does is cough. Seems to improve with getting up and moving around. Does think treating PND is helping with cough. Using mucinex nasal spray as well as PO mucinex once daily.    Feels Trelegy is \"terrible\", feels more short of breath after she takes it.    Having more difficulty with things such as washing her hair. Wondering about help with things like this at home.    Again discussed morphine, feels she's not ready yet.       Social History:  Lives with her .  Social History     Tobacco Use     Smoking status: Former Smoker     Packs/day: 1.00     Years: 50.00     Pack years: 50.00     Types: Cigarettes     Quit date: 8/20/2011     Years since quitting: 10.4     Smokeless tobacco: Never Used   Substance Use " Topics     Alcohol use: Yes     Alcohol/week: 0.0 standard drinks     Comment: occasional     Drug use: No       Family History- Reviewed in Epic.    Allergies   Allergen Reactions     Albuterol Palpitations     Is fine taking albuterol inhaler, tachycardia goes away.       Advanced Care Planning: Has completed but has not had signed.  Says she has discussed with her .  POLST on file for DNR/DNI-Selective treatment.     Medications- Reviewed in Epic.    Past [facility] History- [facility].    Past Surgical History- Reviewed in Epic.    Review of Systems:   ROS: 10 point ROS neg other than the symptoms noted above in the HPI.    Key Data Reviewed.    Impression & Recommendations & Counseling:  Sydni Mendoza is a 76 year old female with history of severe COPD and dyspnea with mild exertion, with weight loss and functional decline, seen for follow-up.   Feels her symptoms have been ~stable the last few months.     Cough - Could be multifactorial, seems PND contributing as treating as been helpful.  - Continue current regimen. OK to increase PO mucinex BID.  - She feels her current COPD medication Trelegy makes things worse.  She is planning to talk to her pulmonologist next week at follow-up.    - She is currently not using any additional albuterol or ipratropium during the day.  Discussed she might get some symptomatic benefit from incorporating 1 of these, such as regular use of her nebulizer, into her routine.  - Discussed low-dose opioid again today.  She does not feel she is ready for this yet.     SOB  Increased weakness -having more difficulty doing day-to-day hygiene tasks.  - I will place a referral to home care to see about getting some additional help for her with HHA.  I think she would also benefit initially from RN to help with some medication management as well as blood pressure checks at home, as leading her home is very difficult for her.  - Low dose opioid discussed as  above.       Follow up: About 3 months, however she knows she can reach out sooner.    Telephone Visit Details  Total length of phone call: 22 mins, start time 11:12 AM.    Total time spent on day of encounter is 31 mins, including reviewing record, review of above studies, above visit with patient, symptomatic discussion of shortness of breath, weakness, and cough, including medication adjustments/prescription management, and documentation.     Linsey Loya,   Palliative Medicine   Pager [phone], [facility] ID [id number]    Some chart documentation performed using Dragon Voice recognition Software. Although reviewed after completion, some words and grammatical errors may remain.

## 2022-01-18 ENCOUNTER — VIRTUAL VISIT (OUTPATIENT)
Dept: PALLIATIVE CARE | Facility: CLINIC | Age: 77
End: 2022-01-18
Attending: STUDENT IN AN ORGANIZED HEALTH CARE EDUCATION/TRAINING PROGRAM
Payer: COMMERCIAL

## 2022-01-18 DIAGNOSIS — J44.9 COPD, SEVERE (H): ICD-10-CM

## 2022-01-18 DIAGNOSIS — R53.1 INCREASED WEAKNESS WHEN AMBULATING: ICD-10-CM

## 2022-01-18 DIAGNOSIS — R06.09 DOE (DYSPNEA ON EXERTION): Primary | ICD-10-CM

## 2022-01-18 DIAGNOSIS — R05.9 COUGH: ICD-10-CM

## 2022-01-18 DIAGNOSIS — Z51.5 ENCOUNTER FOR PALLIATIVE CARE: ICD-10-CM

## 2022-01-18 PROCEDURE — 99214 OFFICE O/P EST MOD 30 MIN: CPT | Mod: 95 | Performed by: STUDENT IN AN ORGANIZED HEALTH CARE EDUCATION/TRAINING PROGRAM

## 2022-01-18 NOTE — LETTER
"1/18/2022       RE: Sydni Mendoza  [address]  [facility] [address]     Dear Colleague,    Thank you for referring your patient, Sydni Mendoza, to the [facility] at [facility]. Please see a copy of my visit note below.          Palliative Care Clinic Progress Note    Patient Name: Sydni Mendoza  Primary Provider: Chan Putnam    Chief Complaint/Patient ID: 77yo woman with severe COPD on chronic oral steroids and 4L NC O2.    Last Palliative care appointment: 10/19/21 with me. Trial of treating PND to see if it helped cough.      Reviewed: Yes, no record for last year.    Interim History:  Sydni Mendoza 76 year old female returns to Palliative Care today for follow up via billable telephone encounter.      Says things are about the same. Feels things are getting harder and harder with her breathing. Says she has follow up with Pulm next week and is expecting tests to be worse.    Coughing fits are still significant. Says unsure if tessalon is helping but afraid to stop due to risk of cough worsening.     Doesn't really use rescue inhalers and doesn't use nebulizer because she feels that she coughs more.    Does have some weeks where she coughs very little then other weeks all she does is cough. Seems to improve with getting up and moving around. Does think treating PND is helping with cough. Using mucinex nasal spray as well as PO mucinex once daily.    Feels Trelegy is \"terrible\", feels more short of breath after she takes it.    Having more difficulty with things such as washing her hair. Wondering about help with things like this at home.    Again discussed morphine, feels she's not ready yet.       Social History:  Lives with her .  Social History     Tobacco Use     Smoking status: Former Smoker     Packs/day: 1.00     Years: 50.00     Pack years: 50.00     Types: Cigarettes     Quit " date: 8/20/2011     Years since quitting: 10.4     Smokeless tobacco: Never Used   Substance Use Topics     Alcohol use: Yes     Alcohol/week: 0.0 standard drinks     Comment: occasional     Drug use: No       Family History- Reviewed in Epic.    Allergies   Allergen Reactions     Albuterol Palpitations     Is fine taking albuterol inhaler, tachycardia goes away.       Advanced Care Planning: Has completed but has not had signed.  Says she has discussed with her .  POLST on file for DNR/DNI-Selective treatment.     Medications- Reviewed in Epic.    Past Medical History- Reviewed in [facility].    Past Surgical History- Reviewed in Epic.    Review of Systems:   ROS: 10 point ROS neg other than the symptoms noted above in the HPI.    Key Data Reviewed.    Impression & Recommendations & Counseling:  Sydni Mendoza is a 76 year old female with history of severe COPD and dyspnea with mild exertion, with weight loss and functional decline, seen for follow-up.   Feels her symptoms have been ~stable the last few months.     Cough - Could be multifactorial, seems PND contributing as treating as been helpful.  - Continue current regimen. OK to increase PO mucinex BID.  - She feels her current COPD medication Trelegy makes things worse.  She is planning to talk to her pulmonologist next week at follow-up.    - She is currently not using any additional albuterol or ipratropium during the day.  Discussed she might get some symptomatic benefit from incorporating 1 of these, such as regular use of her nebulizer, into her routine.  - Discussed low-dose opioid again today.  She does not feel she is ready for this yet.     SOB  Increased weakness -having more difficulty doing day-to-day hygiene tasks.  - I will place a referral to home care to see about getting some additional help for her with HHA.  I think she would also benefit initially from RN to help with some medication management as well as blood pressure checks at  home, as leading her home is very difficult for her.  - Low dose opioid discussed as above.       Follow up: About 3 months, however she knows she can reach out sooner.    Telephone Visit Details  Total length of phone call: 22 mins, start time 11:12 AM.    Total time spent on day of encounter is 31 mins, including reviewing record, review of above studies, above visit with patient, symptomatic discussion of shortness of breath, weakness, and cough, including medication adjustments/prescription management, and documentation.     Linsey Loya,   Palliative Medicine   Pager [phone], [facility] ID [id number]    Some chart documentation performed using Dragon Voice recognition Software. Although reviewed after completion, some words and grammatical errors may remain.

## 2022-01-18 NOTE — PATIENT INSTRUCTIONS
Recommendations:  -Would be okay to add a dose of Mucinex in the evening if you feel it is helpful.  -Recommend thinking about adding in either nebulizer or albuterol inhaler a couple times during the day to see if he notices an improvement in her symptoms.  -We again talked about a low-dose opioid medication in the future to try to help with feelings of shortness of breath and your cough.  Please let me know if you would like to start this.  Benefits a referral to home health care to see about getting needs some assistance with nursing as well as  -A home health aide to help with basic hygiene tasks at home.    Follow up: 3 months      Reasons to Call    If you are having worsening/uncontrolled symptoms we want you to call!    You or your other physicians make any changes to medications we have prescribed.  -Please call for refills 4-5 days before you will run out of medication.    Important Phone Numbers    [facility] - Philomena James. Palliative Care RN - [phone]    [facility] - Kim Morocho. Palliative Care RN - [phone]  *For Refills, scheduling, and general questions - [phone]  *After hours or on weekends. Will connect you with on call MD. [phone]

## 2022-01-21 ENCOUNTER — PATIENT OUTREACH (OUTPATIENT)
Dept: CARE COORDINATION | Facility: CLINIC | Age: 77
End: 2022-01-21
Payer: COMMERCIAL

## 2022-01-21 NOTE — PROGRESS NOTES
"Oncology Distress Screening Follow-up  Clinical Social Work  [facility]    Identified Concern and Score From Distress Screening:   Oncology Distress Screening    Row Name [id number]   Please tell me how concerned you feel regarding the following common issues people with cancer face. Please answer with a number from zero to ten where 0=not concerned and 10=very concerned.    3. How concerned are you about feeling depressed or very sad?  9 Abnormal    4. How concerned are you about feeling anxious or very scared?  9 Abnormal    6. How concerned are you about work and home life issues that may be affected by your cancer?  9 Abnormal    7. How concerned are you about knowing what resources are available to help you?  8 Abnormal      Date of Distress Screenin22    Data: ySdni recently had an appointment with her palliative care doctor in regards to her dx: severe COPD. At time of last visit, Patient scored positive on distress screen.  called Patient today with intention of introducing them to psychosocial services and support, and following up on elevated distress.      Intervention/Education Provided: Spoke with Sydni over the phone, she stated that she felt better after talking with doctor the other day, she said that they are putting in a referral for home health care for a RN and HHA and are trying a new medication so she is hopeful that this will help. Sydni denies feeling depressed and anxious right now and for the most part does well from a mood perspective. We did talk about the normalcy of feeling depressed and anxious when she is not feeling well physically. She does not take medication for mental health and does not feel that is needed. She was provided with education on mental health resources including the counselors within the palliative care team. She reports that her  is supportive and is \"a big help\". She sounded to be in good spirits and denied having add'l " questions/concerns for SW at this time.    SW encouraged her to reach out to her palliative care team if she has not yet heard from home health in the next few days as she has not yet heard from them.    Follow-up Required:   will remain available as needed.    Philomena June, DAVID on 1/26/2022 at 10:13 AM

## 2022-01-24 ENCOUNTER — TRANSFERRED RECORDS (OUTPATIENT)
Dept: FAMILY MEDICINE | Facility: CLINIC | Age: 77
End: 2022-01-24

## 2022-02-06 DIAGNOSIS — J44.9 COPD, SEVERE (H): ICD-10-CM

## 2022-02-07 ENCOUNTER — MEDICAL CORRESPONDENCE (OUTPATIENT)
Dept: FAMILY MEDICINE | Facility: CLINIC | Age: 77
End: 2022-02-07

## 2022-02-07 RX ORDER — AZITHROMYCIN 250 MG/1
TABLET, FILM COATED ORAL
Qty: 30 TABLET | Refills: 1 | Status: SHIPPED | OUTPATIENT
Start: 2022-02-07 | End: 2022-03-21

## 2022-02-07 NOTE — TELEPHONE ENCOUNTER
Azithromycin  LVV 1/11/22  COPD, severe (H)  Stable, cont all current meds, pulm and palliative follow up

## 2022-02-09 ENCOUNTER — MEDICAL CORRESPONDENCE (OUTPATIENT)
Dept: FAMILY MEDICINE | Facility: CLINIC | Age: 77
End: 2022-02-09

## 2022-02-09 DIAGNOSIS — I10 HYPERTENSION, UNSPECIFIED TYPE: ICD-10-CM

## 2022-02-09 NOTE — TELEPHONE ENCOUNTER
Amlodipine  LVV 10/13/21  Next appt 4/11/22  HTN: reports stable.  Advise home monitoring  BP Readings from Last 3 Encounters:   02/23/21 134/64   10/06/20 (!) 150/68   09/30/20 (!) 150/76     Last Comprehensive Metabolic Panel:  Sodium   Date Value Ref Range Status   10/06/2020 139 134 - 144 mmol/L Final     Potassium   Date Value Ref Range Status   10/06/2020 4.8 3.5 - 5.2 mmol/L Final     Chloride   Date Value Ref Range Status   10/06/2020 95 (L) 96 - 106 mmol/L Final     Carbon Dioxide   Date Value Ref Range Status   09/26/2020 42 (H) 20 - 32 mmol/L Final     Anion Gap   Date Value Ref Range Status   09/26/2020 <1 (L) 3 - 14 mmol/L Final     Glucose   Date Value Ref Range Status   10/06/2020 116 (H) 65 - 99 mg/dL Final     Urea Nitrogen   Date Value Ref Range Status   10/06/2020 19 8 - 27 mg/dL Final     BUN/Creatinine Ratio   Date Value Ref Range Status   10/06/2020 26 12 - 28 Final     Creatinine   Date Value Ref Range Status   10/06/2020 0.73 0.57 - 1.00 mg/dL Final     GFR Estimate   Date Value Ref Range Status   09/26/2020 89 >60 mL/min/[1.73_m2] Final     Comment:     Non  GFR Calc  Starting 12/18/2018, serum creatinine based estimated GFR (eGFR) will be   calculated using the Chronic Kidney Disease Epidemiology Collaboration   (CKD-EPI) equation.       Calcium   Date Value Ref Range Status   10/06/2020 10.1 8.7 - 10.3 mg/dL Final

## 2022-02-11 RX ORDER — AMLODIPINE BESYLATE 5 MG/1
5 TABLET ORAL DAILY
Qty: 90 TABLET | Refills: 1 | Status: SHIPPED | OUTPATIENT
Start: 2022-02-11

## 2022-02-16 ENCOUNTER — MEDICAL CORRESPONDENCE (OUTPATIENT)
Dept: FAMILY MEDICINE | Facility: CLINIC | Age: 77
End: 2022-02-16

## 2022-02-19 ENCOUNTER — HEALTH MAINTENANCE LETTER (OUTPATIENT)
Age: 77
End: 2022-02-19

## 2022-02-23 ENCOUNTER — MEDICAL CORRESPONDENCE (OUTPATIENT)
Dept: FAMILY MEDICINE | Facility: CLINIC | Age: 77
End: 2022-02-23

## 2022-03-01 ENCOUNTER — MEDICAL CORRESPONDENCE (OUTPATIENT)
Dept: FAMILY MEDICINE | Facility: CLINIC | Age: 77
End: 2022-03-01

## 2022-03-09 ENCOUNTER — MEDICAL CORRESPONDENCE (OUTPATIENT)
Dept: FAMILY MEDICINE | Facility: CLINIC | Age: 77
End: 2022-03-09

## 2022-03-20 DIAGNOSIS — J44.9 COPD, SEVERE (H): ICD-10-CM

## 2022-03-21 RX ORDER — PREDNISONE 5 MG/1
TABLET ORAL
Qty: 90 TABLET | Refills: 0 | Status: ON HOLD | OUTPATIENT
Start: 2022-03-21 | End: 2022-01-01

## 2022-03-21 RX ORDER — AZITHROMYCIN 250 MG/1
TABLET, FILM COATED ORAL
Qty: 30 TABLET | Refills: 1 | Status: SHIPPED | OUTPATIENT
Start: 2022-03-21

## 2022-03-21 NOTE — TELEPHONE ENCOUNTER
Prednisone  Azithromycin   LVV 1/11/22  Next appointment 4/11/22  COPD, severe (H)  Stable, cont all current meds, pulm and palliative follow up

## 2022-03-25 ENCOUNTER — MEDICAL CORRESPONDENCE (OUTPATIENT)
Dept: FAMILY MEDICINE | Facility: CLINIC | Age: 77
End: 2022-03-25

## 2022-04-04 NOTE — TELEPHONE ENCOUNTER
Benzonatate  LVV 1/11/22  Next appointment 4/19/22  COPD, severe (H)  Stable, cont all current meds, pulm and palliative follow up

## 2022-04-11 NOTE — PROGRESS NOTES
"  Problem(s) Oriented visit      Video-Visit Details    Type of service:  Video Visit    Video Start Time (time video started): 1116    Video End Time (time video stopped): 1128    Originating Location (pt. Location): Home    Distant Location (provider location):  [facility]     Mode of Communication: Telephone    Physician has received verbal consent for a Video Visit from the patient? Yes      Chan Putnam MD        Sydni Mendoza is being evaluated via a billable/telephone video visit.      The patient has been notified of following:     \"This video visit will be conducted via a call between you and your physician/provider. We have found that certain health care needs can be provided without the need for an in-person physical exam.  This service lets us provide the care you need with a video conversation.  If a prescription is necessary we can send it directly to your pharmacy.  If lab work is needed we can place an order for that and you can then stop by our lab to have the test done at a later time.    If during the course of the call the physician/provider feels a video visit is not appropriate, you will not be charged for this service.\"     Physician has received verbal consent for a Video Visit from the patient? Yes    SUBJECTIVE:                                                      Sydni Mendoza is a 77 year old female who is being evaluated via telephone consultation.    She has severe COPD and is oxygen dependent currently on 3.5L.  She was unable to tolerate Daliresp.  She continues on prednisone 5 mg.  She is on Ipratropium nebs, albuterol inhaler PRN, Trelegy and Azithromycin.  She feels her breathing is stable.  She is being followed now by palliative care (Dr Loya) but has elected not to start morphine yet.  Her pulmonologist is Dr Cardoso.    She had one episode recently where oxygen was in high 70s to low 80s for about 1.5 days.  She spoke to palliative nurse " who recommended using her albuterol.  This resolved the issue and now in the 90s.  She does not use the albuterol as much as needed because it causes her heart to race.  She denies any associated SOB, cough, congestion, fever or other symptoms when oxygen had decreased.    In addition, her weight remains low.  She reports 100 pounds.  She is drinking Boost but appetite is quite low.     No other concerns.      ROS:    A 10 system review was completed and is as noted in HPI and otherwise negative.          OBJECTIVE:                                                      VS not able to be assessed      Gen: Well sounding, speaking in full sentences.              ASSESSMENT/PLAN:                                                          COPD, severe (H): no changes as this time.  Recommend increased albuterol use when needed.  Recommend discussing morphine with Dr Loya at upcoming appointment as she is more open to it at this point.    Oxygen dependent    Underweight: recommend discussing potential interventions with Dr Loya, including medication, to improve weight and nutritional status.

## 2022-04-18 PROBLEM — R06.02 SHORTNESS OF BREATH: Status: ACTIVE | Noted: 2022-01-01

## 2022-04-18 PROBLEM — J43.9 PULMONARY EMPHYSEMA, UNSPECIFIED EMPHYSEMA TYPE (H): Status: ACTIVE | Noted: 2022-01-01

## 2022-04-18 NOTE — ED TRIAGE NOTES
Pt with hx of COPD and usually on 3L O2 via NC. Pt has had to titrate up to 5L and using her inhalers frequently.

## 2022-04-18 NOTE — ED NOTES
Patient states she would feel better staying in the hospital for a day. Patient go up and is sitting on the edge of the bed. Was asked by RN multiple times to walk but patient remains sitting on the edge of the bed.  requesting to have patient stay in the hospital as well for a day or two. MD updated.

## 2022-04-18 NOTE — PHARMACY-ADMISSION MEDICATION HISTORY
Pharmacy Medication History  Admission medication history interview status for the 4/18/2022  admission is complete. See EPIC admission navigator for prior to admission medications     Location of Interview: Patient room  Medication history sources: Alvaro    Significant changes made to the medication list:  1) removed Atrovent, Vitamin D and MV  2) Omeprazole updated from daily to PRN  3) Mucinex dose updated from 600mg BID to 1200mg daily    In the past week, patient estimated taking medication this percent of the time: greater than 90%    Additional medication history information:   1) Patient has been filling prednisone 10mg daily. She states she takes 5mg daily and has old 5mg tablets at home. She may then split her 10mg tablets in half when she runs out.     Medication reconciliation completed by provider prior to medication history? No    Time spent in this activity: 20 minutes    Prior to Admission medications    Medication Sig Last Dose Taking? Auth Provider   amLODIPine (NORVASC) 5 MG tablet Take 1 tablet (5 mg) by mouth daily 4/17/2022 at am Yes Chan Putnam MD   azithromycin (ZITHROMAX) 250 MG tablet TAKE 1 TABLET BY MOUTH EVERY DAY 4/17/2022 at am Yes Chan Putnam MD   benzonatate (TESSALON) 100 MG capsule TAKE 1 CAPSULE BY MOUTH UP TO 4 TIMES PER DAY AS NEEDED FOR COUGHING. prn med Yes Chan Putnam MD   COMBIVENT RESPIMAT  MCG/ACT inhaler Inhale 1 puff into the lungs 4 times daily as needed prn med Yes Reported, Patient   furosemide (LASIX) 40 MG tablet TAKE 1 TABLET (40 MG) BY MOUTH DAILY AS NEEDED (LYMPHEDEMA IN LEGS) Past Week at Unknown time Yes Chan Putnam MD   guaiFENesin (MUCINEX MAXIMUM STRENGTH) 1200 MG TB12 Take 1,200 mg by mouth daily 4/17/2022 at Unknown time Yes Unknown, Entered By History   ipratropium - albuterol 0.5 mg/2.5 mg/3 mL (DUONEB) 0.5-2.5 (3) MG/3ML neb solution Take 1 vial by nebulization every 6 hours as needed for shortness  of breath / dyspnea or wheezing prn med Yes Unknown, Entered By History   omeprazole (PRILOSEC) 20 MG DR capsule Take 20 mg by mouth daily as needed prn med Yes Reported, Patient   predniSONE (DELTASONE) 5 MG tablet TAKE 1 TABLET BY MOUTH EVERY DAY 4/17/2022 at am Yes Chan Putnam MD   TRELEGY ELLIPTA 100-62.5-25 MCG/INH oral inhaler Inhale 1 puff into the lungs daily 4/17/2022 at Unknown time Yes Reported, Patient   albuterol (PROAIR HFA/PROVENTIL HFA/VENTOLIN HFA) 108 (90 Base) MCG/ACT inhaler Inhale 2 puffs into the lungs every 6 hours as needed for shortness of breath / dyspnea or wheezing prn med  Unknown, Entered By History       The information provided in this note is only as accurate as the sources available at the time of update(s)

## 2022-04-18 NOTE — PROGRESS NOTES
RECEIVING UNIT ED HANDOFF REVIEW    ED Nurse Handoff Report was reviewed by: Lora Zuleta RN on April 18, 2022 at 11:51 AM

## 2022-04-18 NOTE — ED NOTES
Bed: ED01  Expected date:   Expected time:   Means of arrival:   Comments:  HEMS 447 77F COPD ETA 07:07

## 2022-04-18 NOTE — CONSULTS
CLINICAL NUTRITION SERVICES - BRIEF NOTE    Consult received for Provider Order - malnutrition, weight loss in the setting of end stage COPD    Chart reviewed. Noted patient presented with worsening SOB and hypoxia on home pulse ox. Has been meeting with palliative care outpatient in setting of severe end stage COPD with chronic hypoxic respiratory failure with chronic oxygen dependence.     Please note a full nutrition assessment will be deferred at this time due to OBSERVATION status.     Diet: Regular, Ensure supplements between meals     Spoke with patient and spouse at bedside this afternoon. Patient states she has lost 37# and she was weighing ~100# on her home scale prior to admission. This seems consistent based on bedside observations this afternoon. Patient notes she has been drinking about 2 Ensure bottles daily and her spouse will make her some smoothies to drink during the day as well. Has been adding butter to foods and using half and half in her hot cereal in the morning.     Interventions/Recommendations:  - Discussed ways to increase calories and protein in diet. Provided handouts on (1) Tips for Increasing Calories, (2) Tips for Increasing Protein and (3) High-Calorie, High-Protein Recipe Booklet.   - Continue Regular Diet + Ensure supplements while remains in hospital. Will alter flavor to vanilla per patient preference. Additionally will send with an ice cream cup to increase calories and protein.     Nutrition will not continue to follow due to observation status. Please page with any needs prior to discharge.     Dora Sheth RD, LD  TPN/IM[facility]/Short Stay RD Pager: [phone]

## 2022-04-18 NOTE — H&P
"[facility]    History and Physical - Hospitalist Service       Date of Admission:  4/18/2022    Assessment & Plan   Sydni Mendoza is a 77 year old female with severe end-stage COPD with chronic hypoxic respiratory failure, oxygen dependent with baseline 3 to 4 L, and heavy smoking history who was registered to observation on 4/18/22 with worsening shortness of breath and reported transient hypoxia at home.      Acute worsening of SOB with transient acutely worsening hypoxia at home  Severe, end stage COPD with chronic hypoxic respiratory failure with chronic oxygen dependence  Presented with worsening SOB and hypoxia down to 70s-90%s on home pulseox. She does not take her albuterol inhaler/nebs at home as she does not feel these help but cause more coughing which worsens SOB. Given solumedrol IV 125mg and duoneb in the ED with mild improvement to dyspnea. 3L O2 on admission which is baseline home O2 needs 3-4L. No evidence of pneumonia. Could be mild COPD exacerbation. Ddimer and troponin neg.   * Follows closely with PCP, Pulmonology see note as \"transferred records\" from 1/2022 - suggested palliative care for GOC as she has ehausted all measures for treatment of COPD, and Palliative Care. She has appt set up with [facility] care on 4/20 and is considering starting morphine which they have recommended.  * Admitting provider d/w patient regarding GOC, DNR/DNI, and consideration of comfort care approach and morphine which patient is contemplating.  - Observation status  - Steroids: Solumedrol IV 125mg followed by 40mg IV and start prednisone taper on 4/19 with 30mg x5 days, 20mg x5 days, 10mg x5 days  - Continue PTA azithromycin, Trelegy Ellipta triple therapy, tessalon, guaifenesin, nebs scheduled and PRN  - Trial Xopenex as patient c/o tachycardia, coughing with nebs  - Titrate oxygen PRN, baseline 3-4L at home  - Flutter valve  - PT/OT/SW/CC consultations, patient lives at home with her " spouse at baseline  - Keep palliative care follow up appt on 4/20, if remains hospitalized, consider consulting palliative care as patient is considering starting morphine/comfort measures    Benign essential hypertension  PTA regimen: amlodipine 5mg/d  - Continue PTA antihypertensives with hold parameters  - PRN IV hydralazine available for SBP >180  - Will avoid beta blockade due to severe COPD  - Monitor BP trend and need for medication adjustments    Severe malnutrition and weight loss due to end stage COPD  Patient reports usual weight 137 pounds however the past 4 months she has declined and now weighs approximately 100 pounds.  She appears severely cachectic with bony processes protruding, loss of subcutaneous fat and muscle noted. Has had very poor oral intake related to her severe COPD and shortness of breath along with a another medication she was recently taken off of.  Reports trying to increase her caloric intake however finds this difficult.  - Regular diet  - Encourage p.o. intake  - Nutrition supplements between meals, Ensure Enlive  - RD consultation for malnutrition    Wt Readings from Last 4 Encounters:   08/28/20 62.1 kg (137 lb)   01/23/20 60.3 kg (133 lb)   01/06/20 61.2 kg (134 lb 14.4 oz)   09/04/19 62.5 kg (137 lb 12.8 oz)            Diet: Regular Diet Adult  Snacks/Supplements Adult: Ensure Enlive; Between Meals  DVT Prophylaxis: Ambulate every shift  Vasquez Catheter: Not present  Central Lines: None  Cardiac Monitoring: None  Code Status: No CPR- Do NOT Intubate - confirmed with patient on admission    Clinically Significant Risk Factors Present on Admission            # Anion Gap Metabolic Acidosis: AG = <1 mmol/L (Ref range: 3 - 14 mmol/L) on admission, will monitor and treat as appropriate           Disposition Plan   Expected Discharge: 04/19/2022  Anticipated discharge location:  Awaiting care coordination huddle  Delays:     DC Pharmacy Meds not ready  Lab Result Pending (enter  specific test & time in comments)  MD CHAKRABORTY/MARIA LUISA Med Rec  MD CHAKRABORTY/MARIA LUISA Order   OT New Consult needed  Oxygen Needs - Arrange Home O2  PT New Consult needed         The patient's care was discussed with the Attending Physician, Dr. Suarez and Patient.    Latricia Sim PA-C  Hospitalist Service  [facility]  Securely message with the Vocera Web Console (learn more here)  Text page via [employer] Paging/Directory         ______________________________________________________________________    Chief Complaint   SOB    History is obtained from the patient, ED provider, and electronic health record    History of Present Illness   Sydni Mendoza is a 77 year old female with severe end-stage COPD with chronic hypoxic respiratory failure, oxygen dependent with baseline 3 to 4 L, and heavy smoking history who presented with worsening shortness of breath and reported transient hypoxia at home.  Patient follows closely with PCP, pulmonology, and palliative care due to her end-stage COPD.  She has been declining at home and had severe weight loss over the past several months.  She reports at home her oxygen dipped down to 70s to 80s on her home pulse ox meter therefore she increased her home oxygen from 3 to 5 L however she is still been short of breath.  She does not routinely use her duo nebs or albuterol as she feels these cause her to cough and did not improve her shortness of breath.  She reports compliance with the remainder of her COPD regimen and home medications.  She is considering starting morphine at the direction of her palliative care team however has not yet made a final decision on this.  She has an appointment with palliative care for follow-up on 4/20 for further discussion and is leaning towards starting the morphine.    In the emergency department she is hemodynamically stable but initially tachypneic.  She is on her baseline oxygen of 3 L nasal cannula.  Afebrile.  She was given 125 mg  Solu-Medrol and DuoNeb which improved her shortness of breath. Basic labs significant for CO2 40, glucose 166. Ddimer WNL. SARS-CoV2 negative. Hgb at baseline. Chest x-ray without acute pathology, findings of emphysema noted.  EKG NSR, 93 bpm, PACs.  Initial plan was discharged home however patient and spouse felt strongly about needing at least observation due to concern of exacerbation.    Review of Systems    The 10 point Review of Systems is negative other than noted in the HPI or here.    Past Medical History    I have reviewed this patient's medical history and updated it with pertinent information if needed.   Past Medical History:   Diagnosis Date     Acute and chronic respiratory failure with hypoxia (H) 8/31/2019     Acute exacerbation of chronic obstructive pulmonary disease (COPD) (H) 8/31/2019     COPD (chronic obstructive pulmonary disease) (H)      COPD with acute exacerbation (H) 7/18/2016     Hyponatremia 8/31/2019     Lung nodule 9-14-12    Biopsy; fibrosis&chronic inflamation Right     Smoker 9-15-12    quit        Past Surgical History   I have reviewed this patient's surgical history and updated it with pertinent information if needed.  Past Surgical History:   Procedure Laterality Date     CHEST TUBE INSERTION  9-14-12    Right ,pneumothorax post, lung biopsy     GYN SURGERY      Tubal Ligation       Social History   I have reviewed this patient's social history and updated it with pertinent information if needed.  Social History     Tobacco Use     Smoking status: Former Smoker     Packs/day: 1.00     Years: 50.00     Pack years: 50.00     Types: Cigarettes     Quit date: 8/20/2011     Years since quitting: 10.6     Smokeless tobacco: Never Used   Substance Use Topics     Alcohol use: Yes     Alcohol/week: 0.0 standard drinks     Comment: occasional     Drug use: No       Family History   I have reviewed this patient's family history and updated it with pertinent information if needed.  Family  History   Problem Relation Age of Onset     Chronic Obstructive Pulmonary Disease Father      Chronic Obstructive Pulmonary Disease Sister        Prior to Admission Medications   Prior to Admission Medications   Prescriptions Last Dose Informant Patient Reported? Taking?   COMBIVENT RESPIMAT  MCG/ACT inhaler prn med Self Yes Yes   Sig: Inhale 1 puff into the lungs 4 times daily as needed   TRELEGY ELLIPTA 100-62.5-25 MCG/INH oral inhaler 4/17/2022 at Unknown time Nursing Home Yes Yes   Sig: Inhale 1 puff into the lungs daily   albuterol (PROAIR HFA/PROVENTIL HFA/VENTOLIN HFA) 108 (90 Base) MCG/ACT inhaler prn med Self Yes No   Sig: Inhale 2 puffs into the lungs every 6 hours as needed for shortness of breath / dyspnea or wheezing   amLODIPine (NORVASC) 5 MG tablet 4/17/2022 at am Pharmacy No Yes   Sig: Take 1 tablet (5 mg) by mouth daily   azithromycin (ZITHROMAX) 250 MG tablet 4/17/2022 at am Pharmacy No Yes   Sig: TAKE 1 TABLET BY MOUTH EVERY DAY   benzonatate (TESSALON) 100 MG capsule prn med Self No Yes   Sig: TAKE 1 CAPSULE BY MOUTH UP TO 4 TIMES PER DAY AS NEEDED FOR COUGHING.   furosemide (LASIX) 40 MG tablet Past Week at Unknown time group home No Yes   Sig: TAKE 1 TABLET (40 MG) BY MOUTH DAILY AS NEEDED (LYMPHEDEMA IN LEGS)   guaiFENesin (MUCINEX MAXIMUM STRENGTH) 1200 MG TB12 4/17/2022 at Unknown time Self Yes Yes   Sig: Take 1,200 mg by mouth daily   ipratropium - albuterol 0.5 mg/2.5 mg/3 mL (DUONEB) 0.5-2.5 (3) MG/3ML neb solution prn med Self Yes Yes   Sig: Take 1 vial by nebulization every 6 hours as needed for shortness of breath / dyspnea or wheezing   omeprazole (PRILOSEC) 20 MG DR capsule prn med Nursing Home Yes Yes   Sig: Take 20 mg by mouth daily as needed   predniSONE (DELTASONE) 5 MG tablet 4/17/2022 at am Nursing Home No Yes   Sig: TAKE 1 TABLET BY MOUTH EVERY DAY      Facility-Administered Medications: None     Allergies   Allergies   Allergen Reactions     Albuterol Palpitations      Is fine taking albuterol inhaler, tachycardia goes away.       Physical Exam   Vital Signs: Temp: 97.9  F (36.6  C) Temp src: Oral BP: (!) 141/72 Pulse: 106   Resp: 20 SpO2: 99 % O2 Device: Nasal cannula Oxygen Delivery: 3 LPM  Weight: 0 lbs 0 oz    Physical Exam    General: Awake, alert, very pleasant cachectic appearing woman who appears older than stated age. No acute distress.  HEENT: Normocephalic, atraumatic. Extraocular movements intact.   Respiratory: Diffusely dim with scattered anterior wheezing, poor aeration,, no accessory muscle use.  Speaking in full sentences. 3 L nasal cannula.  Cardiovascular: Regular rate and rhythm, +S1 and S2, no murmur auscultated. 1-2+ peripheral edema.   Gastrointestinal: Soft, non-tender, non-distended. Bowel sounds present.  Skin: Warm, dry. No obvious rashes or lesions on exposed skin. Dorsalis pedis pulses palpable bilaterally.  Musculoskeletal: No joint swelling, erythema or tenderness. Moves all extremities equally. +Cachexia with bony processes protruding.  Neurologic: AAO x3. Cranial nerves 2-12 grossly intact, normal strength and sensation.  Psychiatric: Appropriate mood and affect. No obvious anxiety or depression.    Data   Data reviewed today: I reviewed all medications, new labs and imaging results over the last 24 hours. I personally reviewed the EKG tracing showing see HPI.    Recent Labs   Lab 04/18/22  0810   WBC 8.5   HGB 11.6*   MCV 95         POTASSIUM 3.6   CHLORIDE 99   CO2 40*   BUN 19   CR 0.54   ANIONGAP <1*   MARQUEZ 9.4   *     Recent Results (from the past 24 hour(s))   XR Chest 2 Views    Narrative    XR CHEST 2 VW  4/18/2022 8:48 AM       INDICATION: shortness of breath  COMPARISON: 9/26/2020       Impression    IMPRESSION: The lungs are hyperinflated due to emphysema. There is  scarring in both medial apices, similar to previous. No acute  infiltrate or significant change. Heart size normal.    EASTON HARMAN MD         SYSTEM  ID:  [id number]

## 2022-04-18 NOTE — ED NOTES
[facility]  ED Nurse Handoff Report    ED Chief complaint: Shortness of Breath (Pt with hx of COPD and usually on 3L O2 via NC. Pt has had to titrate up to 5L and using her inhalers frequently. )      ED Diagnosis:   Final diagnoses:   Shortness of breath   Pulmonary emphysema, unspecified emphysema type (H)       Code Status: Full Code    Allergies:   Allergies   Allergen Reactions     Albuterol Palpitations     Is fine taking albuterol inhaler, tachycardia goes away.       Patient Story: Patient has a hx of COPD oxygen dependant at 3.5L at home. Patient notes increase SOB. Patient arrived via ambulance requesting to be admitted to the hospital.  Focused Assessment:  Patient has pursed lip breathing and is anxious.    Treatments and/or interventions provided: Patient had blood work, EKG, Chest xray. Received a neb and IV steriods.  Patient's response to treatments and/or interventions: Less SOB back to baseline. Patient anxious about going home and is requesting admission. Failed the walking in room.    To be done/followed up on inpatient unit:  Meds and monitoring    Does this patient have any cognitive concerns?: none    Activity level - Baseline/Home:  Stand with Assist  Activity Level - Current:   Stand with Assist    Patient's Preferred language: English   Needed?: No    Isolation: None  Infection: Not Applicable  Patient tested for COVID 19 prior to admission: YES  Bariatric?: No    Vital Signs:   Vitals:    04/18/22 0730 04/18/22 0800 04/18/22 0830 04/18/22 0930   BP: 126/61 116/59 132/62 136/61   Pulse: 96 95 89 89   Resp: 22 29 16 27   Temp:       SpO2: 100% 96% 97% 98%       Cardiac Rhythm:     Was the PSS-3 completed:   Yes  What interventions are required if any?               Family Comments: Patient ilana es with  and  requests admission  OBS brochure/video discussed/provided to patient/family: No              Name of person given brochure if not patient:                Relationship to patient:     For the majority of the shift this patient's behavior was Green.   Behavioral interventions performed were none  ED NURSE PHONE NUMBER: *[phone]

## 2022-04-18 NOTE — ED PROVIDER NOTES
History   Chief Complaint:  Shortness of Breath       The history is provided by the patient and the spouse.      Sydni Mendoza is a 77 year old female with history of COPD, pneumothorax, hypertension, and chronic respiratory failure who presents with shortness of breath. The patient states the shortness of breath started yesterday and notes her O2 SATs were in the 70s-80s. She reports taking her inhaler without relief. She also reports a baseline cough and associated runny nose. She states she has a clear productive cough. The patient also reports a decreased appetite and states she has recently lost about 35 lbs. Per triage note, the patient is usually on 3L O2 and uses inhalers. Per 's report, the patient had a similar episode about a week ago that resolved. He notes the patient was coughing badly this morning. He notes her weight loss was attributed to the medication, Daliresp. The patient reports no fever or chest pain.     Review of Systems   Constitutional: Positive for appetite change and unexpected weight change.   Respiratory: Positive for cough and shortness of breath.    All other systems reviewed and are negative.      Allergies:  Albuterol    Medications:  albuterol (PROAIR HFA/PROVENTIL HFA/VENTOLIN HFA) 108 (90 Base) MCG/ACT inhaler  amLODIPine (NORVASC) 5 MG tablet  azithromycin (ZITHROMAX) 250 MG tablet  benzonatate (TESSALON) 100 MG capsule  COMBIVENT RESPIMAT  MCG/ACT inhaler  furosemide (LASIX) 40 MG tablet  guaiFENesin (MUCINEX) 600 MG 12 hr tablet  ipratropium (ATROVENT) 0.02 % neb solution  omeprazole (PRILOSEC) 20 MG DR capsule  predniSONE (DELTASONE) 5 MG tablet  TRELEGY ELLIPTA 100-62.5-25 MCG/INH oral inhaler  vitamin D3 (CHOLECALCIFEROL) 50 mcg (2000 units) tablet    Past Medical History:     Acute and chronic respiratory failure with hypoxia    COPD with acute exacerbation   Hyponatremia   Lung nodule   Pneumothorax, post biopsy, right  Multinodular  goiter  Hypertension  Current chronic use of systemic steroids  Osteoporosis  Oxygen dependent  Dystrophic nail  Onychomycosis  Lymphedema of both lower extremities    Past Surgical History:    Chest tube insertion (right, pneumothorax post, lung biopsy)  Tubal ligation     Social History:  The patient has a history of tobacco use and other drug therapy.   The patient arrived to the emergency department via ambulance.   The patient presents to the emergency department with her .    Physical Exam     Patient Vitals for the past 24 hrs:   BP Temp Pulse Resp SpO2   04/18/22 0930 136/61 -- 89 27 98 %   04/18/22 0830 132/62 -- 89 16 97 %   04/18/22 0800 116/59 -- 95 29 96 %   04/18/22 0730 126/61 -- 96 22 100 %   04/18/22 0719 -- 97.9  F (36.6  C) -- 28 (!) 81 %   04/18/22 0715 (!) 157/69 -- -- (!) 32 100 %       Physical Exam  Eye:  Pupils are equal, round, and reactive.  Extraocular movements intact.    ENT:  No rhinorrhea.  Moist mucus membranes.  Normal tongue and tonsil.    Cardiac:  Regular rate and rhythm.  No murmurs, gallops, or rubs.    Pulmonary:  Very diminished breath sounds through entire lung fields. 5-7 word dysthmia on baseline 3L oxygen with SATs in the high 90s.     Abdomen:  Positive bowel sounds.  Abdomen is soft and non-distended, without focal tenderness.    Musculoskeletal:  Normal movement of all extremities without evidence for deficit. 1+ edema bilaterally and symmetrically.     Skin:  Warm and dry without rashes.    Neurologic:  Non-focal exam without asymmetric weakness or numbness.     Psychiatric:  Normal affect with appropriate interaction with examiner.        Emergency Department Course   ECG  ECG obtained at 0828, ECG read at 0837  Sinus rhythm with premature supraventricular complexes and premature ventricular complexes or fusion complexes   Low voltage QRS  Nonspecific ST abnormality    Rate 93 bpm. NC interval 152 ms. QRS duration 68 ms. QT/QTc 348/432 ms. P-R-T axes 71 71 69.      Imaging:  XR Chest 2 Views   Final Result   IMPRESSION: The lungs are hyperinflated due to emphysema. There is   scarring in both medial apices, similar to previous. No acute   infiltrate or significant change. Heart size normal.      EASTON HARMAN MD            SYSTEM ID:  [id number]        The above imaging workup was performed.   Report per radiology    Laboratory:  Labs Ordered and Resulted from Time of ED Arrival to Time of ED Departure   BASIC METABOLIC PANEL - Abnormal       Result Value    Sodium 138      Potassium 3.6      Chloride 99      Carbon Dioxide (CO2) 40 (*)     Anion Gap <1 (*)     Urea Nitrogen 19      Creatinine 0.54      Calcium 9.4      Glucose 166 (*)     GFR Estimate >90     CBC WITH PLATELETS AND DIFFERENTIAL - Abnormal    WBC Count 8.5      RBC Count 3.97      Hemoglobin 11.6 (*)     Hematocrit 37.6      MCV 95      MCH 29.2      MCHC 30.9 (*)     RDW 12.2      Platelet Count 239      % Neutrophils 79      % Lymphocytes 12      % Monocytes 8      % Eosinophils 0      % Basophils 1      % Immature Granulocytes 0      NRBCs per 100 WBC 0      Absolute Neutrophils 6.7      Absolute Lymphocytes 1.1      Absolute Monocytes 0.7      Absolute Eosinophils 0.0      Absolute Basophils 0.0      Absolute Immature Granulocytes 0.0      Absolute NRBCs 0.0     TROPONIN I - Normal    Troponin I High Sensitivity 12     D DIMER QUANTITATIVE - Normal    D-Dimer Quantitative 0.41     COVID-19 VIRUS (CORONAVIRUS) BY PCR        Emergency Department Course:             Reviewed:  I reviewed nursing notes, vitals and past medical history    Assessments:  0758 I obtained history and examined the patient as noted above.   0915 I rechecked the patient and explained findings.   1028 I rechecked the patient again.     Consults:  1044 I spoke with Dr. Suarez who agreed to admit the patient.     Interventions:  0853: aspirin (ASA) chewable tablet 324 mg, PO   0917: ipratropium - albuterol 0.5 mg/2.5 mg/3 mL  (DUONEB) neb solution 3 mL, Neb  0924: methylPREDNISolone sodium succinate (solu-MEDROL) injection 125 mg, IV     Disposition:  The patient was admitted to the hospital under the care of Dr. Suarez.     Impression & Plan     Medical Decision Making:  This 77-year-old woman with advanced COPD, requiring 3 L of oxygen at baseline, presents to us with increasing shortness of breath over the past day.  She describes having low oxygen levels based on her home pulse oximeter last night and this morning in the 80s.  She denies a new cough, fever, or other concerns.  She was feeling improved increasing her oxygen levels to 5 L.    On my exam, she appears chronically ill.  She is thin and has very diminished breath sounds with some dyspnea after 5 words while using her oxygen.  However, her oxygen levels are in the high 90s.  Chest x-ray is unchanged.  Laboratory investigation is negative for this representing an acute cardiac event, pulmonary embolism, or significant for infection.  She was given steroids and a nebulizer treatment.  However, on reassessment, the patient continues to state that she is fearful of going home and feels too short of breath.  Therefore, I will admit her to the hospital service for palliative care consultation and to give her time for the steroids to work.  The patient's questions were answered and she is pleased with the plan for admission.    Diagnosis:    ICD-10-CM    1. Shortness of breath  R06.02    2. Pulmonary emphysema, unspecified emphysema type (H)  J43.9        Scribe Disclosure:  Genie JIMENEZ, am serving as a scribe at 7:52 AM on 4/18/2022 to document services personally performed by Trierweiler, Chad A, MD based on my observations and the provider's statements to me.            Trierweiler, Chad A, MD  04/18/22 0589

## 2022-04-19 NOTE — PROGRESS NOTES
Orientation/Cognitive: A/O  Observation Goals (Met/ Not Met): Not met  Mobility Level/Assist Equipment: SBA with walker  Fall Risk (Y/N): No  Behavior Concerns: No  Pain Management: Denies pain  Tele/VS/O2: 3L NC  ABNL Lab/BG: NA  Diet: Regular (40 lb weight loss in recent months)  Bowel/Bladder: NA  Skin Concerns: NA  Drains/Devices: Oxygen  Tests/Procedures for next shift: NA  Anticipated DC date & active delays: 1-2 days      Started on Morphine today by Palliative team for LORENZANA/SOB. Patient discussing with  comfort care/hospice options.

## 2022-04-19 NOTE — PROGRESS NOTES
OBS GOALS    -diagnostic tests and consults completed and resulted: NOT MET; nutrition has seen. Respiratory following. CC/SW and PT/OT to see.    -vital signs normal or at patient baseline: MET; although slightly tachy at times (low 100s), unclear if this is baseline    -tolerating oral intake to maintain hydration: MET    -dyspnea improved and O2 sats greater than 88% on room air or prior home oxygen levels: MET    -safe disposition plan has been identified: NOT MET

## 2022-04-19 NOTE — CONSULTS
[facility]  Palliative Care Consultation Note    Patient: Sydni Mendoza  Date of Admission:  4/18/2022    Requesting Clinician / Team: Dr Keller  Reason for consult: Symptom management  Goals of care  Decisional support    Recommendations:    Goals of care: Sydni and her  understand and agree that time is very short (I gave prognosis of likely weeks, maybe little longer) and they know that there are no more treatment that can give her more time and they both agree that they would like to focus her care on comfort and help her feel less short of breath and feel as good as she can until she dies. They would like to meet with  to plan a discharge with hospice. Sydni does not feel that she can be at home anymore as she is extremely short of breath just talking and can't get to the bathroom anymore without severe dyspnea and can't eat and worried that her  would not be able to care for her at home.      to meet with Sydni and Gordy to discuss hospice discharge plan    Sydni wants to consider a transition to a comfort care approach while in the hospital. We discussed what this would look like and she wants to consider it and talk more with her  before making the transition.     DNR / DNI - POLST completed previously    Severe dyspnea - trial morphine 2.5-5mg PO q3hrs PRN dyspnea. She will likely need 5-10mg PO for relief of her dyspnea but will start with lower doses today as she is opioid naive and wants to start very low dose to prevent possible side effects. Will taper up as tolerates.     I do not believe that Sydni is safe to leave the hospital until hospice has been arranged as she has dyspnea at rest and severe dyspnea with minimal exertion (talking) and can no longer care for herself.     These recommendations have been discussed with Dr Keller and  and bedside RN.      Thank you for the opportunity to participate in the  care of this patient and family. Our team: will continue to follow.     During regular M-F work hours -- if you are not sure who specifically to contact -- please contact us [phone]    After regular work hours and on weekends/holidays, you can call our answering service at [phone]. Also, who's on call for us is available in [employer].     Attestation:    Total time on the floor involved in the patient's care: 80 minutes with >50% spent counseling and/or coordination of care regarding symptom management, plan of care, goals of care.   Genie Umana MD / Palliative Medicine Physician    Assessments:  Sydni Mendoza is a 77 year old female with a past medical history significant for severe end-stage COPD with chronic hypoxic respiratory failure, oxygen dependent with baseline 3 to 4 L, and heavy smoking history who  presents to the emergency room on 4/18/22 with worsening shortness of breath and reported transient hypoxia at home.     Today, the patient was seen for:  end-stage COPD  Acute on chronic respiratory failure  Failure to thrive  Goals of care    Prognosis, Goals, & Planning:      Functional Status just prior to hospitalization: 3 (Capable of only limited self-care; needs help with ADLs; in bed/chair >50% of waking hours)      Prognosis, Goals, and/or Advance Care Planning were addressed today: Yes        Summary/Comments: I met with Sydni, in person, and her , Gordy, by phone.  Sydni tells me that for the past several months she has had increasing shortness of breath and now cannot even talk without severe shortness of breath.  She is lost a significant amount of weight as she gets very short of breath when she tries to eat.  She now is scared to be home as she feels severely short of breath all the time.  I explained to Sydni and her  that unfortunately there were no more treatments available to improve her lung function and explained that she is now in a very  different place than she was weeks ago and now unfortunately she likely only has weeks left to live.  Sydni tells me she is not surprised by this and expected this and was told 2 years ago by a physician that she only had 1 to 2 years left to live.  She also told me that she worried if she came to the hospital she would never be able to go home again and might even die in the hospital.  She understands that there are no more treatments for her to give her more time and what she wants is to be able to feel less short of breath and more comfortable.  I explained that this type of care that is focused on her comfort and helping her feel as good as she can for what ever time she has left his hospice care.  Sydni would appreciate the support of hospice when it is time to discharge from the hospital.      Patient's decision making preferences: shared with support from loved ones          Patient has decision-making capacity today for complex decisions: Yes            I have concerns about the patient/family's health literacy today: No           Patient has a completed Health Care Directive: Yes, and on file.      Code status: No CPR / No Intubation    Coping, Meaning, & Spirituality:   Mood, coping, and/or meaning in the context of serious illness were addressed today: Yes  Summary/Comments: Sydni has outlived all of her sisters and family.  One of her sisters  several years ago from emphysema and her dad also  from emphysema and Sydni feels that she understood that that was going to happen to her as well.    Social:     Living situation: Lives with her     [facility] family / caregivers: .  She has 1 niece who calls occasionally to check in on her but does not have any other family    History of Present Illness:  History gathered today from: patient, family/loved ones, medical chart    Sydni Mendoza is a 77 year old female with a past medical history significant for severe end-stage COPD with  chronic hypoxic respiratory failure, oxygen dependent with baseline 3 to 4 L, and heavy smoking history who  presents to the emergency room on 4/18/22 with worsening shortness of breath and reported transient hypoxia at home.     For the past several months Sydni has had increasing shortness of breath and now cannot even eat because she becomes so short of breath when she tries to eat.  She is barely able to make it to the bathroom because she becomes so short of breath.  She also has had increasing shortness of breath even when just trying to talk.  She cannot leave her house until she becomes way too short of breath.    Key Palliative Symptom Data:  # Pain severity the last 12 hours: none  # Dyspnea severity the last 12 hours: severe  # Nausea severity the last 12 hours: none      ROS:  Comprehensive ROS is reviewed and is negative except as here & per HPI:      Past Medical History:  Past Medical History:   Diagnosis Date     Acute and chronic respiratory failure with hypoxia (H) 8/31/2019     Acute exacerbation of chronic obstructive pulmonary disease (COPD) (H) 8/31/2019     COPD (chronic obstructive pulmonary disease) (H)      COPD with acute exacerbation (H) 7/18/2016     Hyponatremia 8/31/2019     Lung nodule 9-14-12    Biopsy; fibrosis&chronic inflamation Right     Smoker 9-15-12    quit         Past Surgical History:  Past Surgical History:   Procedure Laterality Date     CHEST TUBE INSERTION  9-14-12    Right ,pneumothorax post, lung biopsy     GYN SURGERY      Tubal Ligation         Family History:  Family History   Problem Relation Age of Onset     Chronic Obstructive Pulmonary Disease Father      Chronic Obstructive Pulmonary Disease Sister          Allergies:  Allergies   Allergen Reactions     Albuterol Palpitations     Is fine taking albuterol inhaler, tachycardia goes away.        Medications:  I have reviewed this patient's medication profile and medications from this hospitalization.      Physical Exam:  Vital Signs: Temp: 98.5  F (36.9  C) Temp src: Oral BP: 115/45 Pulse: 92   Resp: 20 SpO2: 99 % O2 Device: Nasal cannula Oxygen Delivery: 3 LPM  Weight: 0 lbs 0 oz  Gen: Sitting on the side of the bed, appears frail, older than stated age, cachectic, in no acute distress  Eyes: Conjunctiva clear. Sclera anicteric .  HENT: NCAT; + temporal wasting, mucous membranes moist  Resp: Moderate increased work of breathing, difficulty talking due to shortness of breath  Msk: no gross deformity, + sarcopenia  Skin: No jaundice  Neuro: A&O x 3; CN II-XII grossly intact;   Mental status/Psych: Alert, engaged, appropriate; sensorium intact    Data reviewed:  Reviewed recent labs and pertinent imaging  GFR>90;

## 2022-04-19 NOTE — PLAN OF CARE
Orientation: Aox4  Observation Goals (met & not met): Not Met  Activity Level: Assist x1  Fall Risk: Yes  Behavior & Aggression Tool Color: Green  Pain Management: Denies  ABNL VS/O2: 02@3l/NC  ABNL Lab/BG: Anion gap <1, Carbon dioxide 40  Diet: Regular  Bowel/Bladder: Continent  Drains/Devices: None  Tests/Procedures for next shift: CBC, CMP.  Anticipated DC date: TBD  Other Important Info:   Pt aox4, continues on 02@3L/min NC, denies pain,dyspnea on exertion noted,diminished lung sounds noted bilaterally, patient denies chest pain, no distress noted. Assistance provided as needed, routine medications  given as ordered, no difficulties  noted.

## 2022-04-19 NOTE — PLAN OF CARE
Arrived to unit from ED at 1200.     Orientation/Cognitive: A&Ox4   Observation Goals (Met/ Not Met): NOT MET  Mobility Level/Assist Equipment: SBA, GB, walker at times  Fall Risk (Y/N): YES  Behavior Concerns: none  Pain Management: Denies pain. Tessalon lexie given for chronic cough.   Tele/VS/O2: VSS on 3 LPM O2 NC (baseline) ex Slightly tachy at times (low 100s). Continuous pulse ox.  ABNL Lab/BG: CO2 40. Hgb 11.6.  Diet: reg  Bowel/Bladder: Continent  Skin Concerns: Large patches of scaly/dry/raw skin on back, especially L shoulder blade. 2+ jeffery ankle edema. Dry/scaly skin all extremities. Sween 24 applied  Drains/Devices: N/A  Tests/Procedures for next shift: PT/OT and SW/CC consults. Nutrition and Resp following. IS at bedside, continue to encourage.   Anticipated DC date & active delays: 1-2 days  Patient Stated Goal for Today: rest.

## 2022-04-19 NOTE — PROGRESS NOTES
"   04/19/22 1000   Quick Adds   Quick Adds Certification   Type of Visit Initial PT Evaluation       Present no   Living Environment   People in Home spouse   Current Living Arrangements house   Home Accessibility stairs to enter home   Number of Stairs, Main Entrance 1   Stair Railings, Main Entrance none   Transportation Anticipated family or friend will provide   Living Environment Comments Pt lives in a house with her spouse. Pt reports needing to negotiate one stair to enter the home but once inside, all needs met on the main floor. Pt reports her spouse will pick her up upon discharge and provide 24/7 assist as needed.   Self-Care   Usual Activity Tolerance moderate   Current Activity Tolerance fair   Regular Exercise No   Equipment Currently Used at Home walker, rolling   Fall history within last six months no   Activity/Exercise/Self-Care Comment Pt reports being dependent at baseline with all ADLs. Pt reports ambulating at baseline without an AD but does have a FWW if needed. Pt reports being able to ambulate ~25' at baseline before needing a rest break due to SOB. Pt does not drive.   General Information   Onset of Illness/Injury or Date of Surgery 04/19/22   Referring Physician Latricia Sim PA-C   Patient/Family Therapy Goals Statement (PT) \"To go home\"   Pertinent History of Current Problem (include personal factors and/or comorbidities that impact the POC) Per Chart: Sydni Mendoza is a 77 year old female with severe end-stage COPD with chronic hypoxic respiratory failure, oxygen dependent with baseline 3 to 4 L, and heavy smoking history who was registered to observation on 4/18/22 with worsening shortness of breath and reported transient hypoxia at home.   Existing Precautions/Restrictions fall   Weight-Bearing Status - LLE full weight-bearing   Weight-Bearing Status - RLE full weight-bearing   General Observations Pt reports using 3.5 L O2 via NC at home with " all functional mobility.   Cognition   Orientation Status (Cognition) oriented x 4   Pain Assessment   Patient Currently in Pain No   Integumentary/Edema   Integumentary/Edema no deficits were identifed   Posture    Posture Forward head position;Protracted shoulders   Range of Motion (ROM)   Range of Motion ROM is WFL   Strength (Manual Muscle Testing)   Strength (Manual Muscle Testing) Deficits observed during functional mobility;Able to perform L SLR;Able to perform R SLR   Bed Mobility   Comment, (Bed Mobility) Supine>sit w/ SBA   Transfers   Comment, (Transfers) Sit>stand w/ FWW and CGA   Gait/Stairs (Locomotion)   Harcourt Level (Gait) contact guard   Assistive Device (Gait) walker, front-wheeled   Distance in Feet (Required for LE Total Joints) 15' x 2  (10' eval)   Comment, (Gait/Stairs) Pt ambulated ~10' w/ FWW and CGA for eval. Pt was steady throughout and had no LOB.   Balance   Balance Comments Pt able to sit at EOB unsupported without LOB. Pt ambulates using a FWW for aded stability and support but had no overt LOB.   Sensory Examination   Sensory Perception patient reports no sensory changes   Clinical Impression   Criteria for Skilled Therapeutic Intervention Yes, treatment indicated   PT Diagnosis (PT) Impaired gait   Influenced by the following impairments Decreased activity tolerance; decreased balance; decreased strength   Functional limitations due to impairments Impaired functional mobility   Clinical Presentation (PT Evaluation Complexity) Stable/Uncomplicated   Clinical Presentation Rationale Clinical Judgement   Clinical Decision Making (Complexity) low complexity   Planned Therapy Interventions (PT) balance training;bed mobility training;gait training;patient/family education;stair training;strengthening;transfer training   Risk & Benefits of therapy have been explained evaluation/treatment results reviewed;care plan/treatment goals reviewed;risks/benefits reviewed;current/potential  barriers reviewed;participants voiced agreement with care plan;participants included;patient   PT Discharge Planning   PT Discharge Recommendation (DC Rec) home with home care physical therapy;home with assist   PT Rationale for DC Rec Pt is below baseline. Pt requiring assist with all functional mobility. Pt presents with deficits in activity tolerance, balance, and strength. Due to these deficits, pt would benefit from continued skilled PT services via HHPT to address deficits and improve IND with safety and functional mobility. Pt does not drive and would take a taxing effort to leave the home. Pt reports her spouse is available to provide 24/7 assist as needed.   PT Brief overview of current status Supine>sit w/ SBA; sit>stand w/ FWW and CGA; gait w/ FWW and CGA   Therapy Certification   Start of care date 04/19/22   Certification date from 04/19/22   Certification date to 04/26/22   Medical Diagnosis COPD Exacerbation   Total Evaluation Time   Total Evaluation Time (Minutes) 10   Physical Therapy Goals   PT Frequency Daily   PT Predicted Duration/Target Date for Goal Attainment 04/24/22   PT Goals Bed Mobility;Transfers;Gait;Stairs   PT: Bed Mobility Supervision/stand-by assist;Supine to/from sit   PT: Transfers Supervision/stand-by assist;Sit to/from stand;Assistive device   PT: Gait Supervision/stand-by assist;Assistive device;50 feet   PT: Stairs Minimal assist;1 stair

## 2022-04-19 NOTE — PROGRESS NOTES
"[facility]    Hospitalist Progress Note    Date of Service (when I saw the patient): 04/19/2022    Assessment & Plan   Sydni Mendoza is a 77 year old female who was admitted on 4/18/2022.  Sydni Mendoza is a 77 year old female with severe end-stage COPD with chronic hypoxic respiratory failure, oxygen dependent with baseline 3 to 4 L, and heavy smoking history who was registered to observation on 4/18/22 with worsening shortness of breath and reported transient hypoxia at home.       Acute worsening of SOB with transient acutely worsening hypoxia at home  Severe, end stage COPD with chronic hypoxic respiratory failure with chronic oxygen dependence  Presented with worsening SOB and hypoxia down to 70s-90%s on home pulseox. She does not take her albuterol inhaler/nebs at home as she does not feel these help but cause more coughing which worsens SOB. Given solumedrol IV 125mg and duoneb in the ED with mild improvement to dyspnea. 3L O2 on admission which is baseline home O2 needs 3-4L. No evidence of pneumonia. Could be mild COPD exacerbation. Ddimer and troponin neg.   * Follows closely with PCP, Pulmonology see note as \"transferred records\" from 1/2022 - suggested palliative care for GOC as she has ehausted all measures for treatment of COPD, and Palliative Care. She has appt set up with [facility] care on 4/20 and is considering starting morphine which they have recommended.  * Admitting provider d/w patient regarding GOC, DNR/DNI, and consideration of comfort care approach and morphine which patient is contemplating.  - Observation status  - Steroids: Solumedrol IV 125mg followed by 40mg IV and start prednisone taper on 4/19 with 30mg x5 days, 20mg x5 days, 10mg x5 days  - Continue PTA azithromycin, Trelegy Ellipta triple therapy, tessalon, guaifenesin, nebs scheduled and PRN  - Trial Xopenex as patient c/o tachycardia, coughing with nebs  - Titrate oxygen PRN, baseline 3-4L at " home  - Flutter valve  - PT/OT/SW/CC consultations, patient lives at home with her spouse at baseline    Pallaitive care consult placed today to see her Re goals of care and symptom management with advanced COPD     Pt desats to 80s with any activity and even with talking  and at times even at rest     Benign essential hypertension  PTA regimen: amlodipine 5mg/d  - Continue PTA antihypertensives with hold parameters  - PRN IV hydralazine available for SBP >180  - Will avoid beta blockade due to severe COPD  - Monitor BP trend and need for medication adjustments     Severe malnutrition and weight loss due to end stage COPD  Patient reports usual weight 137 pounds however the past 4 months she has declined and now weighs approximately 100 pounds.  She appears severely cachectic with bony processes protruding, loss of subcutaneous fat and muscle noted. Has had very poor oral intake related to her severe COPD and shortness of breath along with a another medication she was recently taken off of.  Reports trying to increase her caloric intake however finds this difficult.  - Regular diet  - Encourage p.o. intake  - Nutrition supplements between meals, Ensure Enlive  - RD consultation for malnutrition         Wt Readings from Last 4 Encounters:   08/28/20 62.1 kg (137 lb)   01/23/20 60.3 kg (133 lb)   01/06/20 61.2 kg (134 lb 14.4 oz)   09/04/19 62.5 kg (137 lb 12.8 oz)                  Diet: Regular Diet Adult  Snacks/Supplements Adult: Ensure Enlive; Between Meals  DVT Prophylaxis: Ambulate every shift  Vasquez Catheter: Not present  Central Lines: None  Cardiac Monitoring: None  Code Status: No CPR- Do NOT Intubate - confirmed with patient on admission        Clinically Significant Risk Factors Present on Admission               # Anion Gap Metabolic Acidosis: AG = <1 mmol/L (Ref range: 3 - 14 mmol/L) on admission, will monitor and treat as appropriate                 Disposition Plan         DVT Prophylaxis: Ambulate  every shift  Code Status: No CPR- Do NOT Intubate    Disposition:  In the next 1-2days after seen by palliative medicine     Discussed with bedside RN, patient and palliative medicine today     Leesa Keller MD  [phone] (P)      Interval History   Pt is sitting comfortably at the edge of bed. Feels like SOB slightly better today. Still intermittently gets SOB and hypoxic with her underlying advanced COPD. No acute events  since yesterday     -Data reviewed today: I reviewed all new labs and imaging results over the last 24 hours. I personally reviewed all the labs and imaging since admission       Physical Exam   Temp: 98.5  F (36.9  C) Temp src: Oral BP: 115/45 Pulse: 92   Resp: 20 SpO2: 99 % O2 Device: Nasal cannula Oxygen Delivery: 3 LPM  There were no vitals filed for this visit.  Vital Signs with Ranges  Temp:  [97.3  F (36.3  C)-98.5  F (36.9  C)] 98.5  F (36.9  C)  Pulse:  [] 92  Resp:  [20-24] 20  BP: (115-141)/(45-72) 115/45  SpO2:  [97 %-100 %] 99 %  No intake/output data recorded.    Constitutional: Awake, alert, cooperative, no apparent distress  Respiratory: Di,imished air entry bilaterally and no wheezing today   Cardiovascular: Regular rate and rhythm, normal S1 and S2, and no murmur noted  GI: Normal bowel sounds, soft, non-distended, non-tender  Skin/Integumen: No rashes, no cyanosis, no edema  Other:     Medications       amLODIPine  5 mg Oral Daily     azithromycin  250 mg Oral Daily     Fluticasone-Umeclidin-Vilanterol  1 puff Inhalation Daily     guaiFENesin  1,200 mg Oral BID     levalbuterol  1.25 mg Nebulization 4x Daily     predniSONE  30 mg Oral Daily    Followed by     [START ON 4/24/2022] predniSONE  20 mg Oral Daily    Followed by     [START ON 4/29/2022] predniSONE  10 mg Oral Daily     sodium chloride (PF)  3 mL Intracatheter Q8H       Data   Recent Labs   Lab 04/19/22  0637 04/18/22  0810   WBC 7.2 8.5   HGB 10.7* 11.6*   MCV 97 95    239    138   POTASSIUM 4.3  3.6   CHLORIDE 100 99   CO2 39* 40*   BUN 25 19   CR 0.55 0.54   ANIONGAP <1* <1*   MARQUEZ 9.0 9.4   * 166*   ALBUMIN 3.4  --    PROTTOTAL 5.7*  --    BILITOTAL 0.6  --    ALKPHOS 49  --    ALT 22  --    AST 12  --        No results found for this or any previous visit (from the past 24 hour(s)).

## 2022-04-19 NOTE — PLAN OF CARE
PRIMARY DIAGNOSIS: COPD EXACERBATION  OUTPATIENT/OBSERVATION GOALS TO BE MET BEFORE DISCHARGE:  1. Vital signs stable: Yes    2. Improvement of peak flow to greater than 70% sustained off nebulizer for 4 hours: No    3. Dyspnea improved and O2 sats >88% at RA or at prior home O2 therapy level: No      SpO2: 99 %, O2 Device: Nasal cannula    4. Short term supplemental O2 needed for use with activity at home:  Chronic O2 baseline 3-4L    5. Tolerating adequate PO diet and medications: Yes    6. Return to near baseline physical activity: No    Discharge Planner Nurse   Safe discharge environment identified: No  Barriers to discharge: Yes       Entered by: Peggy Reyes 04/19/2022 8:52 AM     Please review provider order for any additional goals.   Nurse to notify provider when observation goals have been met and patient is ready for discharge.

## 2022-04-19 NOTE — UTILIZATION REVIEW
Concurrent stay review; Secondary Review Determination     [facility]          Under the authority of the Utilization Management Committee, the utilization review process indicated a secondary review on the above patient.  The review outcome is based on review of the medical records, discussions with staff, and applying clinical experience noted on the date of the review.          (x) Observation Status Appropriate - Concurrent stay review    RATIONALE FOR DETERMINATION   77 year old female with severe end-stage COPD with chronic hypoxic respiratory failure, oxygen dependent with baseline 3 to 4 L, and heavy smoking history who was registered to observation on 4/18/22 with worsening shortness of breath and reported transient hypoxia at home.  Patient at home is on 3 to 4 L of oxygen and currently she is on 3 L oxygen saturation of 97%, and taper of oral prednisone, scheduled to see palliative care today discharge pending palliative care evaluation per attending physician.  Patient is clinically improving and there is no clear indication to change patient's status to inpatient. The severity of illness, intensity of service provided, expected LOS and risk for adverse outcome make the care appropriate for observation.      This document was produced using voice recognition software       The information on this document is developed by the utilization review team in order for the business office to ensure compliance.  This only denotes the appropriateness of proper admission status and does not reflect the quality of care rendered.         The definitions of Inpatient Status and Observation Status used in making the determination above are those provided in the CMS Coverage Manual, Chapter 1 and Chapter 6, section 70.4.      Sincerely,     BONY REYES MD    System Medical Director  Utilization Management  [facility].

## 2022-04-19 NOTE — PLAN OF CARE
OT orders received and chart reviewed. Plan is to discharge with hospice. Will complete OT orders as not appropriate at this time as she is unable to tolerate

## 2022-04-19 NOTE — PLAN OF CARE
PRIMARY DIAGNOSIS: COPD EXACERBATION  OUTPATIENT/OBSERVATION GOALS TO BE MET BEFORE DISCHARGE:  1. Vital signs stable: Yes    2. Improvement of peak flow to greater than 70% sustained off nebulizer for 4 hours: No    3. Dyspnea improved and O2 sats >88% at RA or at prior home O2 therapy level: No      SpO2: 99 %, O2 Device: Nasal cannula    4. Short term supplemental O2 needed for use with activity at home:  Chronic O2 baseline 3-4L    5. Tolerating adequate PO diet and medications: Yes    6. Return to near baseline physical activity: No    Discharge Planner Nurse   Safe discharge environment identified: No  Barriers to discharge: Yes       Entered by: Peggy Reyes 04/19/2022 12:35 PM     Please review provider order for any additional goals.   Nurse to notify provider when observation goals have been met and patient is ready for discharge.

## 2022-04-19 NOTE — PROGRESS NOTES
Observation goals    -diagnostic tests and consults completed and resulted: NOT MET; nutrition has seen. Respiratory following. CC/SW and PT/OT to see.    -vital signs normal or at patient baseline: MET; although slightly tachy at times (low 100s), unclear if this is baseline    -tolerating oral intake to maintain hydration: MET    -dyspnea improved and O2 sats greater than 88% on room air or prior home oxygen levels: MET    -safe disposition plan has been identified: NOT MET

## 2022-04-19 NOTE — PLAN OF CARE
PRIMARY DIAGNOSIS: COPD EXACERBATION  OUTPATIENT/OBSERVATION GOALS TO BE MET BEFORE DISCHARGE:  1. Vital signs stable: Yes    2. Improvement of peak flow to greater than 70% sustained off nebulizer for 4 hours: No    3. Dyspnea improved and O2 sats >88% at RA or at prior home O2 therapy level: No      SpO2: 99 %, O2 Device: Nasal cannula    4. Short term supplemental O2 needed for use with activity at home:  Chronic O2 baseline 3-4L    5. Tolerating adequate PO diet and medications: Yes    6. Return to near baseline physical activity: No    Discharge Planner Nurse   Safe discharge environment identified: No  Barriers to discharge: Yes       Entered by: Peggy Reyes 04/19/2022 4:05 PM     Please review provider order for any additional goals.   Nurse to notify provider when observation goals have been met and patient is ready for discharge.

## 2022-04-19 NOTE — PLAN OF CARE
[facility]      OUTPATIENT PHYSICAL THERAPY EVALUATION  PLAN OF TREATMENT FOR OUTPATIENT REHABILITATION  (COMPLETE FOR INITIAL CLAIMS ONLY)  Patient's Last Name, First Name, M.I.  YOB: 1945  Sydni Mendoza                        Provider's Name  [facility] Medical Record No.  [id number]                               Onset Date:  04/19/22   Start of Care Date:  04/19/22      Type:     _X_PT   ___OT   ___SLP Medical Diagnosis:  COPD Exacerbation                        PT Diagnosis:  Impaired gait   Visits from SOC:  1   _________________________________________________________________________________  Plan of Treatment/Functional Goals    Planned Interventions: balance training, bed mobility training, gait training, patient/family education, stair training, strengthening, transfer training     Goals: See Physical Therapy Goals on Care Plan in [facility] electronic health record.    Therapy Frequency: Daily  Predicted Duration of Therapy Intervention: 04/24/22  _________________________________________________________________________________    I CERTIFY THE NEED FOR THESE SERVICES FURNISHED UNDER        THIS PLAN OF TREATMENT AND WHILE UNDER MY CARE     (Physician co-signature of this document indicates review and certification of the therapy plan).              Certification date from: 04/19/22, Certification date to: 04/26/22    Referring Physician: Latricia Sim PA-C            Initial Assessment        See Physical Therapy evaluation dated 04/19/22 in Epic electronic health record.

## 2022-04-20 NOTE — PLAN OF CARE
PRIMARY DIAGNOSIS: COPD EXACERBATION  OUTPATIENT/OBSERVATION GOALS TO BE MET BEFORE DISCHARGE:  1. Vital signs stable: Yes    2. Improvement of peak flow to greater than 70% sustained off nebulizer for 4 hours: No    3. Dyspnea improved and O2 sats >88% at RA or at prior home O2 therapy level: No      SpO2: 99 %, O2 Device: Nasal cannula    4. Short term supplemental O2 needed for use with activity at home:  Chronic O2 baseline 3-4L    5. Tolerating adequate PO diet and medications: Yes    6. Return to near baseline physical activity: No    Discharge Planner Nurse   Safe discharge environment identified: No  Barriers to discharge: Yes       Entered by: Fausto Huff 04/19/2022 10:35 PM     Please review provider order for any additional goals.   Nurse to notify provider when observation goals have been met and patient is ready for discharge.

## 2022-04-20 NOTE — PROGRESS NOTES
Observation goals  PRIOR TO DISCHARGE        Comments: -diagnostic tests and consults completed and resulted - not met  -vital signs normal or at patient baseline - met  -tolerating oral intake to maintain hydration - met  -dyspnea improved and O2 sats greater than 88% on room air or prior home oxygen levels - not met. 3 L NC.  -safe disposition plan has been identified - not met. SW following Hospice placement.  Nurse to notify provider when observation goals have been met and patient is ready for discharge.

## 2022-04-20 NOTE — PLAN OF CARE
Goal Outcome Evaluation:        A&O. VSS, 3L NC.  LORENZANA, refused morphine. Up SBA.  Voiding adequate amount in BR. Palliative pending placement.

## 2022-04-20 NOTE — CONSULTS
Care Management Initial Consult    General Information  Assessment completed with:  Sydni and melissa Kaminski       Primary Care Provider verified and updated as needed:     Readmission within the last 30 days:        Reason for Consult:  (d/c planning)  Advance Care Planning:            Communication Assessment  Patient's communication style: spoken language (English or Bilingual)    Hearing Difficulty or Deaf: no   Wear Glasses or Blind: yes    Cognitive  Cognitive/Neuro/Behavioral: WDL           Mood/Behavior: cooperative          Living Environment:   People in home: spouse  Gene  Current living Arrangements: house      Able to return to prior arrangements: yes       Family/Social Support:  Care provided by:    Provides care for:                  Description of Support System:           Current Resources:   Patient receiving home care services:       Community Resources:    Equipment currently used at home: walker, rolling  Supplies currently used at home:      Employment/Financial:  Employment Status:          Financial Concerns:             Lifestyle & Psychosocial Needs:  Social Determinants of Health     Tobacco Use: Medium Risk     Smoking Tobacco Use: Former Smoker     Smokeless Tobacco Use: Never Used   Alcohol Use: Not on file   Financial Resource Strain: Not on file   Food Insecurity: Not on file   Transportation Needs: Not on file   Physical Activity: Not on file   Stress: Not on file   Social Connections: Not on file   Intimate Partner Violence: Not on file   Depression: Not at risk     PHQ-2 Score: 0   Housing Stability: Not on file       Functional Status:  Prior to admission patient needed assistance:              Mental Health Status:          Chemical Dependency Status:                Values/Beliefs:  Spiritual, Cultural Beliefs, Adventist Practices, Values that affect care:                 Additional Information:  SW consulted to assist with discharge planning, emotional support and  transportation arrangements.  PIA reviewed patient chart and discussed in rounds.   Pt is a 77 yr old  female who admitted on 4/18/22 with shortness of breath and emphysema.  SW met with patient to introduce self/role. Pt is interested in going on hospice and requested a layout of her options.  PIA provided options and supporting literature for 1-going home with hospice  2-LTC with hospice  3-Hospice home. Patient and  discussed and are interested in LTC with hospice.    PIA sent request for hospice consult to  AC Liaison.  PIA sent LTC referrals with request for a private room.    SW to continue to follow and assist with discharge planning.    HAILEE Maravilla  Daytime (8:00am-4:30pm): [phone]  After-Hours SW Pager (4:30pm-11:30pm): [phone]         HAILEE Laboy

## 2022-04-20 NOTE — CARE PLAN
Physical Therapy Discharge Summary    Reason for therapy discharge:    No further expectations of functional progress.  Change in medical status. Per chart and update from medical team, plan for transition to hospice at discharge.    Progress towards therapy goal(s). See goals on Care Plan in [facility] electronic health record for goal details.  Goals not met.  Barriers to achieving goals:   plan for transition to hopice at discharge.    Therapy recommendation(s):    No further therapy is recommended.

## 2022-04-20 NOTE — PROGRESS NOTES
Orientation/Cognitive: A/Ox 4  Observation Goals (Met/ Not Met): Not met  Mobility Level/Assist Equipment: SBAGB/W   Fall Risk (Y/N): No  Behavior Concerns: No  Pain Management: Denies pain  Tele/VS/O2: VSS on 3L NC  ABNL Lab/BG: NA  Diet: Regular   Bowel/Bladder: NA  Skin Concerns: NA  Drains/Devices: Oxygen  Tests/Procedures for next shift: NA  Anticipated DC date & active delays: 1-2 days    PRIMARY DIAGNOSIS: COPD EXACERBATION  OUTPATIENT/OBSERVATION GOALS TO BE MET BEFORE DISCHARGE:  1. Vital signs stable: Yes     2. Improvement of peak flow to greater than 70% sustained off nebulizer for 4 hours: No     3. Dyspnea improved and O2 sats >88% at RA or at prior home O2 therapy level: No       4. Short term supplemental O2 needed for use with activity at home:  Chronic O2 baseline 3-4L     5. Tolerating adequate PO diet and medications: Yes     6. Return to near baseline physical activity: No     Discharge Planner Nurse   Safe discharge environment identified: No  Barriers to discharge: Yes       Entered by: Fausto Huff 04/19/2022 10:35 PM  Please review provider order for any additional goals.   Nurse to notify provider when observation goals have been met and patient is ready for discharge.

## 2022-04-20 NOTE — PROGRESS NOTES
"[facility]    Hospitalist Progress Note    Date of Service (when I saw the patient): 04/20/2022    Assessment & Plan   Sydni Mendoza is a 77 year old female who was admitted on 4/18/2022.  Sydni Mendoza is a 77 year old female with severe end-stage COPD with chronic hypoxic respiratory failure, oxygen dependent with baseline 3 to 4 L, and heavy smoking history who was registered to observation on 4/18/22 with worsening shortness of breath and reported transient hypoxia at home.       Acute worsening of SOB with transient acutely worsening hypoxia at home  Severe, end stage COPD with chronic hypoxic respiratory failure with chronic oxygen dependence  Presented with worsening SOB and hypoxia down to 70s-90%s on home pulseox. She does not take her albuterol inhaler/nebs at home as she does not feel these help but cause more coughing which worsens SOB. Given solumedrol IV 125mg and duoneb in the ED with mild improvement to dyspnea. 3L O2 on admission which is baseline home O2 needs 3-4L. No evidence of pneumonia. Could be mild COPD exacerbation. Ddimer and troponin neg.   * Follows closely with PCP, Pulmonology see note as \"transferred records\" from 1/2022 - suggested palliative care for GOC as she has ehausted all measures for treatment of COPD, and Palliative Care. She has appt set up with [facility] care on 4/20 and is considering starting morphine which they have recommended.  * Admitting provider d/w patient regarding GOC, DNR/DNI, and consideration of comfort care approach and morphine which patient is contemplating.  - Observation status  - Steroids: Solumedrol IV 125mg followed by 40mg IV and start prednisone taper on 4/19 with 30mg x5 days, 20mg x5 days, 10mg x5 days  - Continue PTA azithromycin, Trelegy Ellipta triple therapy, tessalon, guaifenesin, nebs scheduled and PRN  - Trial Xopenex as patient c/o tachycardia, coughing with nebs  - Titrate oxygen PRN, baseline 3-4L at " home  - Flutter valve  - PT/OT/SW/CC consultations, patient lives at home with her spouse at baseline    Pallaitive care consult placed and they have seen her. Pt interested in enrolling with hospice   Started on sublingual  morphine to help with SOB   Hospice referral and consult pending     Pt desats to 80s with any activity and even with talking  and at times even at rest     Benign essential hypertension  PTA regimen: amlodipine 5mg/d  - Continue PTA antihypertensives with hold parameters  - PRN IV hydralazine available for SBP >180  - Will avoid beta blockade due to severe COPD  - Monitor BP trend and need for medication adjustments     Severe malnutrition and weight loss due to end stage COPD  Patient reports usual weight 137 pounds however the past 4 months she has declined and now weighs approximately 100 pounds.  She appears severely cachectic with bony processes protruding, loss of subcutaneous fat and muscle noted. Has had very poor oral intake related to her severe COPD and shortness of breath along with a another medication she was recently taken off of.  Reports trying to increase her caloric intake however finds this difficult.  - Regular diet  - Encourage p.o. intake  - Nutrition supplements between meals, Ensure Enlive  - RD consultation for malnutrition         Wt Readings from Last 4 Encounters:   08/28/20 62.1 kg (137 lb)   01/23/20 60.3 kg (133 lb)   01/06/20 61.2 kg (134 lb 14.4 oz)   09/04/19 62.5 kg (137 lb 12.8 oz)                  Diet: Regular Diet Adult  Snacks/Supplements Adult: Ensure Enlive; Between Meals  DVT Prophylaxis: Ambulate every shift  Vasquez Catheter: Not present  Central Lines: None  Cardiac Monitoring: None  Code Status: No CPR- Do NOT Intubate - confirmed with patient on admission        Clinically Significant Risk Factors Present on Admission               # Anion Gap Metabolic Acidosis: AG = <1 mmol/L (Ref range: 3 - 14 mmol/L) on admission, will monitor and treat as  appropriate                 Disposition Plan         DVT Prophylaxis: Ambulate every shift  Code Status: No CPR- Do NOT Intubate    Disposition: discharge to home versus care facility with hispice after hospice meeting today     Discussed with bedside RN, patient and palliative medicine today     Leesa Keller MD  [phone] (P)      Interval History   Pt is sitting comfortably at the edge of bed. Still intermittently gets SOB and hypoxic with her underlying advanced COPD. No acute events  since yesterday     -Data reviewed today:   I reviewed all new labs and imaging results over the last 24 hours. I personally reviewed all the labs and imaging since admission . Hospice team to meet with pt and her  today       Physical Exam   Temp: 98  F (36.7  C) Temp src: Oral BP: 95/47 Pulse: 83   Resp: 16 SpO2: 100 % O2 Device: Nasal cannula Oxygen Delivery: 3 LPM  There were no vitals filed for this visit.  Vital Signs with Ranges  Temp:  [97.5  F (36.4  C)-98.5  F (36.9  C)] 98  F (36.7  C)  Pulse:  [] 83  Resp:  [16-20] 16  BP: ()/(47-59) 95/47  SpO2:  [95 %-100 %] 100 %  I/O last 3 completed shifts:  In: 480 [P.O.:480]  Out: -     Constitutional: Awake, alert, cooperative, no apparent distress  Respiratory: Diminished air entry bilaterally and no wheezing today   Cardiovascular: Regular rate and rhythm, normal S1 and S2, and no murmur noted  GI: Normal bowel sounds, soft, non-distended, non-tender  Skin/Integumen: No rashes, no cyanosis, no edema  Other:     Medications       amLODIPine  5 mg Oral Daily     azithromycin  250 mg Oral Daily     Fluticasone-Umeclidin-Vilanterol  1 puff Inhalation Daily     guaiFENesin  1,200 mg Oral BID     levalbuterol  1.25 mg Nebulization 4x Daily     predniSONE  40 mg Oral Daily    Followed by     [START ON 4/24/2022] predniSONE  20 mg Oral Daily    Followed by     [START ON 4/29/2022] predniSONE  10 mg Oral Daily     sodium chloride (PF)  3 mL Intracatheter Q8H        Data   Recent Labs   Lab 04/19/22  0637 04/18/22  0810   WBC 7.2 8.5   HGB 10.7* 11.6*   MCV 97 95    239    138   POTASSIUM 4.3 3.6   CHLORIDE 100 99   CO2 39* 40*   BUN 25 19   CR 0.55 0.54   ANIONGAP <1* <1*   MARQUEZ 9.0 9.4   * 166*   ALBUMIN 3.4  --    PROTTOTAL 5.7*  --    BILITOTAL 0.6  --    ALKPHOS 49  --    ALT 22  --    AST 12  --        No results found for this or any previous visit (from the past 24 hour(s)).

## 2022-04-20 NOTE — PROGRESS NOTES
[facility]  Palliative Care Daily Progress Note       Recommendations & Counseling       Goals: Sydni is working with the  to find hospice placement as she does not feel safe at home knowing that her  is not able to care for her and she is short of breath sitting in bed not moving.      DNR/DNI    Severe dyspnea: Low-dose morphine did not improve patient's respiratory symptoms so increased morphine 5-10mg q3hrs PRN.  Patient will try higher dose morphine this afternoon after meeting with .    I do not believe that Uzma is safe to leave the hospital until she has been able to arrange hospice at a facility due to her severe dyspnea at rest.         Thank you for the opportunity to continue to participate in the care of this patient and family.  Please feel free to contact on-call palliative provider with any emergent needs.  We can be reached via team pager [phone] (answered 8-4:30 Monday-Friday); after-hours answering service ([phone]);     Attestation:    Total time on the floor involved in the patient's care:  30 minutes with >50% spent counseling and/or coordination of care regarding symptom management, plan of care, goals of care  Genie Umana MD / Palliative Medicine Physician     Assessments          Sydni Mendoza is a 77 year old female with a past medical history significant for severe end-stage COPD with chronic hypoxic respiratory failure, oxygen dependent with baseline 3 to 4 L, and heavy smoking history who  presents to the emergency room on 4/18/22 with worsening shortness of breath and reported transient hypoxia at home.      Today, the patient was seen for:  end-stage COPD  Acute on chronic respiratory failure  Failure to thrive  Goals of care    Prognosis, Goals, or Advance Care Planning was addressed today with: Yes.  I again discussed with Sydni options for discharge and she feels strongly about going to a facility  with hospice so that she can be assured that she will have the help she needs to prevent severe shortness of breath at end of life.  Mood, coping, and/or meaning in the context of serious illness were addressed today: No.  Summary/Comments:             Interval History:     Chart review/discussion with unit or clinical team members:   No acute events overnight    Per patient or family/caregivers today:  Low-dose morphine did not affect patient.  She did not feel anything    Key Palliative Symptoms:  # Pain severity the last 12 hours: none  # Dyspnea severity the last 12 hours: severe  # Nausea severity the last 12 hours: none    Patient is on opioids: assessed and bowels ok/no needed changes to plan of care today.           Review of Systems:     Besides above, ROS was reviewed and is unremarkable           Medications:     I have reviewed this patient's medication profile and medications during this hospitalization.    Noted meds:    Morphine 2.5-5mg PO q3 hrs PRN dyspnea - pt tried dose x 1 yesterday at 1500           Physical Exam:   Vitals were reviewed  Temp: 98.1  F (36.7  C) Temp src: Oral BP: 134/64 Pulse: 103   Resp: 16 SpO2: 96 % O2 Device: Nasal cannula Oxygen Delivery: 3 LPM  Gen: Lying in bed, appears frail and older than stated age, in mild respiratory distress  Eyes: Conjunctiva clear. Sclera anicteric   HENT: NCAT; mucous membranes slightly dry  Resp: Moderate increased work of breathing, using some accessory muscles to breathe  Mental status/Psych: Alert, engaged, appropriate; sensorium intact             Data Reviewed:     Reviewed recent labs and pertinent imaging          Erythromycin Pregnancy And Lactation Text: This medication is Pregnancy Category B and is considered safe during pregnancy. It is also excreted in breast milk.

## 2022-04-21 NOTE — PROGRESS NOTES
Observation goals  PRIOR TO DISCHARGE        Comments: -diagnostic tests and consults completed and resulted - partially met.  Palliative Care following.   -vital signs normal or at patient baseline - met.   -tolerating oral intake to maintain hydration - met  -dyspnea improved and O2 sats greater than 88% on room air or prior home oxygen levels - Met. Patient on 3 liters NC--baseline for the patient.   -safe disposition plan has been identified - not met. SW/CC consulted to assist with placement needs.  Pursuing LTC with hospice..

## 2022-04-21 NOTE — PROGRESS NOTES
Care Management Discharge Note    Discharge Date: 04/22/2022       Discharge Disposition:  LTC    Discharge Services:      Discharge DME:      Discharge Transportation: [facility]    Private pay costs discussed: private room/amenity fees, transportation costs and insurance costs out of pocket expenses and co-pays    PAS Confirmation Code:    Patient/family educated on Medicare website which has current facility and service quality ratings:      Education Provided on the Discharge Plan:  yes  Persons Notified of Discharge Plans: pt, , TCU, medical team  Patient/Family in Agreement with the Plan:  yes    Handoff Referral Completed: No    Additional Information:  Pt accepted into LTC bed at [facility] in [place]. Pt and  accepted bed and prefer [facility] transport, acknowledging/agreeing to privately pay both for LTC and transport.    SW left  for Meño at [facility], inquiring about admission time.    SW to continue to follow and assist with discharge planning.    HAILEE Maravilla  Daytime (8:00am-4:30pm): [phone]  After-Hours SW Pager (4:30pm-11:30pm): [phone]             HAILEE Laboy

## 2022-04-21 NOTE — UTILIZATION REVIEW
Concurrent stay review; Secondary Review Determination     [facility]          Under the authority of the Utilization Management Committee, the utilization review process indicated a secondary review on the above patient.  The review outcome is based on review of the medical records, discussions with staff, and applying clinical experience noted on the date of the review.          (x) Observation Status Appropriate - Concurrent stay review    RATIONALE FOR DETERMINATION   77 year old female with severe end-stage COPD with chronic hypoxic respiratory failure, oxygen dependent with baseline 3 to 4 L, and heavy smoking history who was registered to observation on 4/18/22 with worsening shortness of breath and reported transient hypoxia at home.  Currently patient is on 3 L of oxygen, plan is to discharge to long-term facility with hospice pending coordination of care and finally placement    There is no clear indication to change patient's status to inpatient. The severity of illness, intensity of service provided, expected LOS and risk for adverse outcome make the care appropriate for observation.      This document was produced using voice recognition software       The information on this document is developed by the utilization review team in order for the business office to ensure compliance.  This only denotes the appropriateness of proper admission status and does not reflect the quality of care rendered.         The definitions of Inpatient Status and Observation Status used in making the determination above are those provided in the CMS Coverage Manual, Chapter 1 and Chapter 6, section 70.4.      Sincerely,     BONY REYES MD    System Medical Director  Utilization Management  [facility].

## 2022-04-21 NOTE — PROGRESS NOTES
Observation goals  PRIOR TO DISCHARGE        Comments: -diagnostic tests and consults completed and resulted -  met  -vital signs normal or at patient baseline - met  -tolerating oral intake to maintain hydration - met  -dyspnea improved and O2 sats greater than 88% on room air or prior home oxygen levels - not met. 3 L NC.  -safe disposition plan has been identified - not met. SW following Hospice placement.  Nurse to notify provider when observation goals have been met and patient is ready for discharge.

## 2022-04-21 NOTE — PROGRESS NOTES
Observation goals  PRIOR TO DISCHARGE        Comments: -diagnostic tests and consults completed and resulted - not met  -vital signs normal or at patient baseline - met  -tolerating oral intake to maintain hydration - met  -dyspnea improved and O2 sats greater than 88% on room air or prior home oxygen levels - not met. 3 L NC.  -safe disposition plan has been identified - not met. SW following Hospice placement.  Nurse to notify provider when observation goals have been met and patient is ready for discharge.         Orientation/Cognitive: AOX4  Observation Goals (Met/ Not Met): not met  Mobility Level/Assist Equipment: Up with SBA.  Fall Risk (Y/N): Yes  Behavior Concern: None  Pain Management: Denies.  Tele/VS/O2: VSS on 3L NC. Chronic 3 L baseline.  ABNL Lab/BG: None  Diet: Reg  Bowel/Bladder: Continent  Skin Concerns: Scattered bruises  Drains/Devices: PIV SL.  Tests/Procedures for next shift: None  Anticipated DC date & active delays: Awaiting hospice placement. SW following.  Patient Stated Goal for Today:

## 2022-04-21 NOTE — PLAN OF CARE
Goal Outcome Evaluation: Goals in progress.  Orientation/Cognitive: A/O x4.   Observation Goals (Met/ Not Met): Partially met.   Mobility Level/Assist Equipment: SBA/Walker.  Fall Risk (Y/N): Yes.  Behavior Concerns: N/A   Pain Management: Denies need for pain medication.  Tele/VS/O2: VSS on 3L per NC.  ( 3L is baseline for the patient.)  ABNL Lab/BG: N/A  Diet: Regular.  Bowel/Bladder:Continent.  No BM this shift.   Skin Concerns: Fragile skin secondary to use of steroid medication.  No open areas at this time.   Drains/Devices: N/A   Tests/Procedures for next shift: N/A   Anticipated DC date & active delays: Patient medically for discharge charge.  Delay--- placement into LTC facility with hospice.   Patient Stated Goal for Today: Placement.

## 2022-04-21 NOTE — PROGRESS NOTES
"[facility]    Hospitalist Progress Note    Date of Service (when I saw the patient): 04/21/2022    Assessment & Plan   Sydni Mendoza is a 77 year old female who was admitted on 4/18/2022.  Sydni Mendoza is a 77 year old female with severe end-stage COPD with chronic hypoxic respiratory failure, oxygen dependent with baseline 3 to 4 L, and heavy smoking history who was registered to observation on 4/18/22 with worsening shortness of breath and reported transient hypoxia at home.       Acute worsening of SOB with transient acutely worsening hypoxia at home  Severe, end stage COPD with chronic hypoxic respiratory failure with chronic oxygen dependence  Presented with worsening SOB and hypoxia down to 70s-90%s on home pulseox. She does not take her albuterol inhaler/nebs at home as she does not feel these help but cause more coughing which worsens SOB. Given solumedrol IV 125mg and duoneb in the ED with mild improvement to dyspnea. 3L O2 on admission which is baseline home O2 needs 3-4L. No evidence of pneumonia. Could be mild COPD exacerbation. Ddimer and troponin neg.   * Follows closely with PCP, Pulmonology see note as \"transferred records\" from 1/2022 - suggested palliative care for GOC as she has ehausted all measures for treatment of COPD, and Palliative Care. She has appt set up with [facility] care on 4/20 and is considering starting morphine which they have recommended.  * Admitting provider d/w patient regarding GOC, DNR/DNI, and consideration of comfort care approach and morphine which patient is contemplating.  - Observation status  - Steroids: Solumedrol IV 125mg followed by 40mg IV and start prednisone taper on 4/19 with 30mg x5 days, 20mg x5 days, 10mg x5 days  - Continue PTA azithromycin, Trelegy Ellipta triple therapy, tessalon, guaifenesin, nebs scheduled and PRN  - Trial Xopenex as patient c/o tachycardia, coughing with nebs  - Titrate oxygen PRN, baseline 3-4L at " home  - Flutter valve  - PT/OT/SW/CC consultations, patient lives at home with her spouse at baseline    Pallaitive care consult placed and they have seen her. Pt interested in enrolling with hospice wants to go to LTC facility with hospice   Started on sublingual  morphine to help with SOB   Hospice referral and consult pending     Pt desats to 80s with any activity and even with talking  and at times even at rest     Benign essential hypertension  PTA regimen: amlodipine 5mg/d  - Continue PTA antihypertensives with hold parameters  - PRN IV hydralazine available for SBP >180  - Will avoid beta blockade due to severe COPD  - Monitor BP trend and need for medication adjustments     Severe malnutrition and weight loss due to end stage COPD  Patient reports usual weight 137 pounds however the past 4 months she has declined and now weighs approximately 100 pounds.  She appears severely cachectic with bony processes protruding, loss of subcutaneous fat and muscle noted. Has had very poor oral intake related to her severe COPD and shortness of breath along with a another medication she was recently taken off of.  Reports trying to increase her caloric intake however finds this difficult.  - Regular diet  - Encourage p.o. intake  - Nutrition supplements between meals, Ensure Enlive  - RD consultation for malnutrition         Wt Readings from Last 4 Encounters:   08/28/20 62.1 kg (137 lb)   01/23/20 60.3 kg (133 lb)   01/06/20 61.2 kg (134 lb 14.4 oz)   09/04/19 62.5 kg (137 lb 12.8 oz)                  Diet: Regular Diet Adult  Snacks/Supplements Adult: Ensure Enlive; Between Meals  DVT Prophylaxis: Ambulate every shift  Vasquez Catheter: Not present  Central Lines: None  Cardiac Monitoring: None  Code Status: No CPR- Do NOT Intubate - confirmed with patient on admission        Clinically Significant Risk Factors Present on Admission               # Anion Gap Metabolic Acidosis: AG = <1 mmol/L (Ref range: 3 - 14 mmol/L) on  admission, will monitor and treat as appropriate                 Disposition Plan         DVT Prophylaxis: Ambulate every shift  Code Status: No CPR- Do NOT Intubate       Disposition:to LTC facility  with hospice. Referrals sent   Discussed with bedside RN, patient and palliative medicine today     Leesa Keller MD  [phone] (P)      Interval History   Pt is sitting comfortably in chair. SOB stable today per patient     -Data reviewed today:   I reviewed all new labs and imaging results over the last 24 hours. I personally reviewed all the labs and imaging since admission .       Physical Exam   Temp: 98  F (36.7  C) Temp src: Oral BP: 112/54 Pulse: 74   Resp: 18 SpO2: 94 % O2 Device: Nasal cannula Oxygen Delivery: 4 LPM  There were no vitals filed for this visit.  Vital Signs with Ranges  Temp:  [98  F (36.7  C)-98.4  F (36.9  C)] 98  F (36.7  C)  Pulse:  [] 74  Resp:  [16-18] 18  BP: (100-134)/(52-82) 112/54  SpO2:  [94 %-100 %] 94 %  I/O last 3 completed shifts:  In: 280 [P.O.:280]  Out: -     Constitutional: Awake, alert, cooperative, no apparent distress, very frail   Respiratory: Diminished air entry bilaterally and no wheezing today   Cardiovascular: Regular rate and rhythm, normal S1 and S2, and no murmur noted  GI: Normal bowel sounds, soft, non-distended, non-tender  Skin/Integumen: No rashes, no cyanosis, no edema  Other:     Medications       amLODIPine  5 mg Oral Daily     azithromycin  250 mg Oral Daily     Fluticasone-Umeclidin-Vilanterol  1 puff Inhalation Daily     guaiFENesin  1,200 mg Oral BID     levalbuterol  1.25 mg Nebulization 4x Daily     predniSONE  40 mg Oral Daily    Followed by     [START ON 4/24/2022] predniSONE  20 mg Oral Daily    Followed by     [START ON 4/29/2022] predniSONE  10 mg Oral Daily     sodium chloride (PF)  3 mL Intracatheter Q8H       Data   Recent Labs   Lab 04/19/22  0637 04/18/22  0810   WBC 7.2 8.5   HGB 10.7* 11.6*   MCV 97 95    239    138    POTASSIUM 4.3 3.6   CHLORIDE 100 99   CO2 39* 40*   BUN 25 19   CR 0.55 0.54   ANIONGAP <1* <1*   MARQUEZ 9.0 9.4   * 166*   ALBUMIN 3.4  --    PROTTOTAL 5.7*  --    BILITOTAL 0.6  --    ALKPHOS 49  --    ALT 22  --    AST 12  --        No results found for this or any previous visit (from the past 24 hour(s)).

## 2022-04-21 NOTE — PLAN OF CARE
Orientation/Cognitive: A/O  Observation Goals (Met/ Not Met): Not met  Mobility Level/Assist Equipment: SBA with walker  Fall Risk (Y/N): No  Behavior Concerns: No  Pain Management: Denies pain  Tele/VS/O2: 3L NC  ABNL Lab/BG: NA  Diet: Regular (40 lb weight loss in recent months)  Bowel/Bladder: NA  Skin Concerns: NA  Drains/Devices: Oxygen  Tests/Procedures for next shift: NA  Anticipated DC date & active delays: 1-2 days        Started on Morphine today by Palliative team for LORENZANA/SOB. Patient discussing with  comfort care/hospice options.          Observation goals  PRIOR TO DISCHARGE        Comments: -diagnostic tests and consults completed and resulted -  met  -vital signs normal or at patient baseline - met  -tolerating oral intake to maintain hydration - met  -dyspnea improved and O2 sats greater than 88% on room air or prior home oxygen levels - not met. 3 L NC.  -safe disposition plan has been identified - not met. SW following Hospice placement.  Nurse to notify provider when observation goals have been met and patient is ready for discharge.

## 2022-04-21 NOTE — PROGRESS NOTES
Care Management Follow Up    Length of Stay (days): 3    Expected Discharge Date: 04/22/2022     Concerns to be Addressed:     discharge needs  Patient plan of care discussed at interdisciplinary rounds: Yes    Anticipated Discharge Disposition:  LTC w/hospice     Anticipated Discharge Services:    Anticipated Discharge DME:      Patient/family educated on Medicare website which has current facility and service quality ratings:  yes  Education Provided on the Discharge Plan:  yes  Patient/Family in Agreement with the Plan:  yes    Referrals Placed by CM/SW:  LTC and [facility]  Private pay costs discussed: private room/amenity fees and transportation costs    Additional Information:  SW sent LTC referrals yesterday;    1-[facility]- declined  2-[facility]- declined  3-[facility]- declined  4-[facility]- declined  5-[facility]- declined  6-[facility]- pending-declined    SW to obtain additional choices from pt and .    SW spoke to pt and  Ray about the need to cast a bigger net of referrals.  Additional referrals sent and pending.    SW to continue to follow and assist with discharge planning.    HAILEE Maravilla  Daytime (8:00am-4:30pm): [phone]  After-Hours SW Pager (4:30pm-11:30pm): [phone]         HAILEE Laboy

## 2022-04-22 NOTE — PROGRESS NOTES
Observation goals  PRIOR TO DISCHARGE        Comments: -diagnostic tests and consults completed and resulted - met  -vital signs normal or at patient baseline - met.   -tolerating oral intake to maintain hydration - met  -dyspnea improved and O2 sats greater than 88% on room air or prior home oxygen levels - Met. On 3 L NC--baseline for patient.   -safe disposition plan has been identified - not met

## 2022-04-22 NOTE — PLAN OF CARE
Goal Outcome Evaluation:    Plan of Care Reviewed With: patient     Orientation/Cognitive: AxOx4  Observation Goals (Met/ Not Met): Met  Mobility Level/Assist Equipment: SBA w/ walker  Fall Risk (Y/N): Yes  Behavior Concerns: green  Pain Management: Denies  Tele/VS/O2: VSS on 3-4L NC, exertional dyspnea, continuous pulse ox  ABNL Lab/BG: See results  Diet: Regular, ensure between meals  Bowel/Bladder: Continent   Skin Concerns: Scattered bruising  Drains/Devices: PIV SL   Tests/Procedures for next shift: NA  Anticipated DC date & active delays: Monday to LTC   Patient Stated Goal for Today: Get some rest

## 2022-04-22 NOTE — PROGRESS NOTES
Observation goals  PRIOR TO DISCHARGE        Comments: -diagnostic tests and consults completed and resulted - met  -vital signs normal or at patient baseline - met.   -tolerating oral intake to maintain hydration - met  -dyspnea improved and O2 sats greater than 88% on room air or prior home oxygen levels - Met. On 3 L NC--baseline for patient.   -safe disposition plan has been identified - not met, needs LTC with hospice

## 2022-04-22 NOTE — PLAN OF CARE
Goal Outcome Evaluation:    Plan of Care Reviewed With: patient     Overall Patient Progress: no change    Orientation/Cognitive: A/O x 4  Observation Goals (Met/ Not Met): Inpatient  Mobility Level/Assist Equipment: SBA GB walker, exertional dyspnea  Fall Risk (Y/N): Y  Behavior Concerns: none  Pain Management: denies pain. Morphine given to help with SOB, patient reported effective.   Tele/VS/O2: vitally stable on 3.5 L nc. On cont. Pulse ox.   ABNL Lab/BG: see results  Diet: regular with supplements, poor appetite. Declined dinner. Did have a pudding cup at bedtime.   Bowel/Bladder: continent, up with assist  Skin Concerns: scattered bruising, thin fragile skin  Drains/Devices: PIV sl  Tests/Procedures for next shift: na  Anticipated DC date & active delays: LTC tomorrow, awaiting time  Patient Stated Goal for Today: rest

## 2022-04-22 NOTE — PLAN OF CARE
Goal Outcome Evaluation:  Orientation/Cognitive: A&O x 4  Observation Goals (Met/ Not Met): partially met, awaiting time for discharge transport time  Mobility Level/Assist Equipment: SBA GB walker, exertional dyspnea  Fall Risk (Y/N): Y  Behavior Concerns: none  Pain Management: denies pain, morphine is available for dyspnea if pt needs it   Tele/VS/O2: vitally stable on 3.5 L nc. On cont. Pulse ox.   ABNL Lab/BG: see results  Diet: regular with supplements, poor appetite.  Bowel/Bladder: continent, up with assist  Skin Concerns: scattered bruising, thin fragile skin  Drains/Devices: PIV SL  Tests/Procedures for next shift: na  Anticipated DC date & active delays: discharge today on hospice to [facility] today, ride time pending  Patient Stated Goal for Today: rest

## 2022-04-22 NOTE — CONSULTS
PIA consulted cleared and replaced with CM/PIA consult.    HAILEE Maravilla  Daytime (8:00am-4:30pm): [phone]  After-Hours PIA Pager (4:30pm-11:30pm): [phone]

## 2022-04-22 NOTE — PROGRESS NOTES
Care Management Follow Up    Length of Stay (days): 0    Expected Discharge Date:       Concerns to be Addressed:       Patient plan of care discussed at interdisciplinary rounds: Yes    Anticipated Discharge Disposition:       Anticipated Discharge Services:    Anticipated Discharge DME:      Patient/family educated on Medicare website which has current facility and service quality ratings:    Education Provided on the Discharge Plan:    Patient/Family in Agreement with the Plan:      Referrals Placed by CM/SW:    Private pay costs discussed: private room/amenity fees and transportation costs    Additional Information:  See previous SW note from 4/22/22 at 1045.    HAILEE Maravilla  Daytime (8:00am-4:30pm): [phone]  After-Hours SW Pager (4:30pm-11:30pm): [phone]           HAILEE Laboy

## 2022-04-22 NOTE — PROGRESS NOTES
Care Management Follow Up    Length of Stay (days): 4    Expected Discharge Date:  4/22/22     Concerns to be Addressed:  discharge needs     Patient plan of care discussed at interdisciplinary rounds: Yes    Anticipated Discharge Disposition:  LTC w/Hospice     Anticipated Discharge Services:    Anticipated Discharge DME:      Patient/family educated on Medicare website which has current facility and service quality ratings:  yes  Education Provided on the Discharge Plan:  yes  Patient/Family in Agreement with the Plan:  yes    Referrals Placed by CM/SW:  LTC and Hospice  Private pay costs discussed: private room/amenity fees and transportation costs    Additional Information:  SW received VM's from [facility] stating they were mistaken, thinking pt needed TCU bed vs LTC. They have no LTC beds avail at this time, possibly something at the end of next week.    SW explained to pt and  who stated understanding and are aware that SW continues to follow up with pending referrals. In discussing option of going home for a short time while waiting for LTC bed, patient stated, 'I really don't feel comfortable with that.'    SW spoke to JACQUELINE Gaitan  Hospice Liaison who will meet with pt today.    SW to continue to follow and assist with discharge planning.    HAILEE Maravilla  Daytime (8:00am-4:30pm): [phone]  After-Hours SW Pager (4:30pm-11:30pm): [phone]           HAILEE Laboy

## 2022-04-22 NOTE — PROGRESS NOTES
Care Management Follow Up    Length of Stay (days): 4    Expected Discharge Date:  4/22 vs 4/23     Concerns to be Addressed:     discharge needs  Patient plan of care discussed at interdisciplinary rounds: Yes    Anticipated Discharge Disposition:  LTC with Hospice     Anticipated Discharge Services:    Anticipated Discharge DME:      Patient/family educated on Medicare website which has current facility and service quality ratings:    Education Provided on the Discharge Plan:    Patient/Family in Agreement with the Plan:      Referrals Placed by CM/SW:  LTC and Hospice  Private pay costs discussed: private room/amenity fees and transportation costs    Additional Information:  PIA left VM for Kandy with [facility] Hospice Liaison re: planned time for visiting patient today.  PIA left VM for Berenice in admissions at [facility], re: the latest possible time for admission today, as Hospice sign on not completed and transport not arranged.    Awaiting return calls.    ADDENDUM:  1543: SW received VM from [facility] indicating they prefer a Monday admission. PIA spoke to Zohra Stratton who also indicated they will need until Monday to get a team together to meet pt at the LTC. SW updated patient, her  and the medical team.      SW to continue to follow and assist with discharge planning.    HAILEE Maravilla  Daytime (8:00am-4:30pm): [phone]  After-Hours SW Pager (4:30pm-11:30pm): [phone]           HAILEE Laboy

## 2022-04-22 NOTE — PROGRESS NOTES
Observation goals  PRIOR TO DISCHARGE        Comments: -diagnostic tests and consults completed and resulted - met  -vital signs normal or at patient baseline - met.   -tolerating oral intake to maintain hydration - met  -dyspnea improved and O2 sats greater than 88% on room air or prior home oxygen levels - Met. On 3 L NC--baseline for patient.   -safe disposition plan has been identified - Met, discharge to [facility]

## 2022-04-22 NOTE — PROGRESS NOTES
"[facility]    Hospitalist Progress Note    Date of Service (when I saw the patient): 04/22/2022    Assessment & Plan   Sydni Mendoza is a 77 year old female who was admitted on 4/18/2022.  Sydni Mendoza is a 77 year old female with severe end-stage COPD with chronic hypoxic respiratory failure, oxygen dependent with baseline 3 to 4 L, and heavy smoking history who was registered to observation on 4/18/22 with worsening shortness of breath and reported transient hypoxia at home.       Acute worsening of SOB with transient acutely worsening hypoxia at home  Severe, end stage COPD with chronic hypoxic respiratory failure with chronic oxygen dependence  Presented with worsening SOB and hypoxia down to 70s-90%s on home pulseox. She does not take her albuterol inhaler/nebs at home as she does not feel these help but cause more coughing which worsens SOB. Given solumedrol IV 125mg and duoneb in the ED with mild improvement to dyspnea. 3L O2 on admission which is baseline home O2 needs 3-4L. No evidence of pneumonia. Could be mild COPD exacerbation. Ddimer and troponin neg.   * Follows closely with PCP, Pulmonology see note as \"transferred records\" from 1/2022 - suggested palliative care for GOC as she has ehausted all measures for treatment of COPD, and Palliative Care. She has appt set up with \[facility]\"" care on 4/20 and is considering starting morphine which they have recommended.  * Admitting provider d/w patient regarding GOC, DNR/DNI, and consideration of comfort care approach and morphine which patient is contemplating.  - Observation status  - Steroids: Solumedrol IV 125mg followed by 40mg IV and start prednisone taper on 4/19 with 30mg x5 days, 20mg x5 days, 10mg x5 days  - Continue PTA azithromycin, Trelegy Ellipta triple therapy, tessalon, guaifenesin, nebs scheduled and PRN  - Trial Xopenex as patient c/o tachycardia, coughing with nebs  - Titrate oxygen PRN, baseline 3-4L at " home  - Flutter valve  - PT/OT/SW/CC consultations, patient lives at home with her spouse at baseline    Pallaitive care consult placed and they have seen her. Pt interested in enrolling with hospice wants to go to LTC facility with hospice   Started on sublingual  morphine to help with SOB   Hospice referral and consult pending     Pt desats to 80s with any activity and even with talking  and at times even at rest     Benign essential hypertension  PTA regimen: amlodipine 5mg/d  - Continue PTA antihypertensives with hold parameters  - PRN IV hydralazine available for SBP >180  - Will avoid beta blockade due to severe COPD  - Monitor BP trend and need for medication adjustments     Severe malnutrition and weight loss due to end stage COPD  Patient reports usual weight 137 pounds however the past 4 months she has declined and now weighs approximately 100 pounds.  She appears severely cachectic with bony processes protruding, loss of subcutaneous fat and muscle noted. Has had very poor oral intake related to her severe COPD and shortness of breath along with a another medication she was recently taken off of.  Reports trying to increase her caloric intake however finds this difficult.  - Regular diet  - Encourage p.o. intake  - Nutrition supplements between meals, Ensure Enlive  - RD consultation for malnutrition         Wt Readings from Last 4 Encounters:   08/28/20 62.1 kg (137 lb)   01/23/20 60.3 kg (133 lb)   01/06/20 61.2 kg (134 lb 14.4 oz)   09/04/19 62.5 kg (137 lb 12.8 oz)                  Diet: Regular Diet Adult  Snacks/Supplements Adult: Ensure Enlive; Between Meals  DVT Prophylaxis: Ambulate every shift  Vasquez Catheter: Not present  Central Lines: None  Cardiac Monitoring: None  Code Status: No CPR- Do NOT Intubate - confirmed with patient on admission        Clinically Significant Risk Factors Present on Admission               # Anion Gap Metabolic Acidosis: AG = <1 mmol/L (Ref range: 3 - 14 mmol/L) on  admission, will monitor and treat as appropriate                 Disposition Plan         DVT Prophylaxis: Ambulate every shift  Code Status: No CPR- Do NOT Intubate       Disposition:to LTC facility  with hospice. Referrals sent   Discussed with bedside RN, patient  today     Leesa Keller MD  [phone] (P)      Interval History   Pt is sitting comfortably in bed . SOB stable today per patient . Awaiting placement     -Data reviewed today:   I reviewed all new labs and imaging results over the last 24 hours. I personally reviewed all the labs and imaging since admission .       Physical Exam   Temp: 97.9  F (36.6  C) Temp src: Oral BP: 105/54 Pulse: 81   Resp: 16 SpO2: 97 % O2 Device: Nasal cannula Oxygen Delivery: 3 LPM  There were no vitals filed for this visit.  Vital Signs with Ranges  Temp:  [97.7  F (36.5  C)-98.4  F (36.9  C)] 97.9  F (36.6  C)  Pulse:  [79-90] 81  Resp:  [16-17] 16  BP: (103-119)/(51-55) 105/54  SpO2:  [97 %-99 %] 97 %  I/O last 3 completed shifts:  In: 420 [P.O.:420]  Out: -     Constitutional: Awake, alert, cooperative, no apparent distress, very frail   Respiratory: Diminished air entry bilaterally and no wheezing today   Cardiovascular: Regular rate and rhythm, normal S1 and S2, and no murmur noted  GI: Normal bowel sounds, soft, non-distended, non-tender  Skin/Integumen: No rashes, no cyanosis, no edema  Other:     Medications       amLODIPine  5 mg Oral Daily     azithromycin  250 mg Oral Daily     Fluticasone-Umeclidin-Vilanterol  1 puff Inhalation Daily     guaiFENesin  1,200 mg Oral BID     levalbuterol  1.25 mg Nebulization 4x Daily     predniSONE  40 mg Oral Daily    Followed by     [START ON 4/24/2022] predniSONE  20 mg Oral Daily    Followed by     [START ON 4/29/2022] predniSONE  10 mg Oral Daily     sodium chloride (PF)  3 mL Intracatheter Q8H       Data   Recent Labs   Lab 04/19/22  0637 04/18/22  0810   WBC 7.2 8.5   HGB 10.7* 11.6*   MCV 97 95    239    138    POTASSIUM 4.3 3.6   CHLORIDE 100 99   CO2 39* 40*   BUN 25 19   CR 0.55 0.54   ANIONGAP <1* <1*   MARQUEZ 9.0 9.4   * 166*   ALBUMIN 3.4  --    PROTTOTAL 5.7*  --    BILITOTAL 0.6  --    ALKPHOS 49  --    ALT 22  --    AST 12  --        No results found for this or any previous visit (from the past 24 hour(s)).

## 2022-04-22 NOTE — PROGRESS NOTES
Observation goals  PRIOR TO DISCHARGE        Comments: -diagnostic tests and consults completed and resulted - partially met.  Palliative Care following.   -vital signs normal or at patient baseline - met.   -tolerating oral intake to maintain hydration - met  -dyspnea improved and O2 sats greater than 88% on room air or prior home oxygen levels - Met. Patient on 3 liters NC--baseline for the patient.   -safe disposition plan has been identified - met, transfer to LTC tomorrow.

## 2022-04-23 NOTE — PROGRESS NOTES
Observation goals  PRIOR TO DISCHARGE        Comments: -diagnostic tests and consults completed and resulted - met  -vital signs normal or at patient baseline - met.   -tolerating oral intake to maintain hydration - met  -dyspnea improved and O2 sats greater than 88% on room air or prior home oxygen levels - Met. On 3 L NC--baseline for patient.   -safe disposition plan has been identified - Met, discharge to [facility] with hospice on 4/25

## 2022-04-23 NOTE — PLAN OF CARE
Orientation/Cognitive: AO X4  Observation Goals (Met/ Not Met): Met  Mobility Level/Assist Equipment: SBA w/ walker  Fall Risk (Y/N): Yes  Behavior Concerns: None, calm  Pain Management: Denies  Tele/VS/O2: VSS on 3L NC, exertional dyspnea, continuous pulse ox in place  ABNL Lab/BG: See results  Diet: Regular, ensure between meals  Bowel/Bladder: Continent   Skin Concerns: Scattered bruising  Drains/Devices: PIV SL   Tests/Procedures for next shift: None  Anticipated DC date & active delays: Monday to LTC [facility]   Patient Stated Goal for Today: Rest and sleep    Observation goals  PRIOR TO DISCHARGE       Comments:   -diagnostic tests and consults completed and resulted - Met  -vital signs normal or at patient baseline - Met.   -tolerating oral intake to maintain hydration - Met  -dyspnea improved and O2 sats greater than 88% on room air or prior home oxygen levels - Met. On 3 L NC-baseline for patient.   -safe disposition plan has been identified - Met, discharge to [facility]

## 2022-04-23 NOTE — PROGRESS NOTES
MD Notification    Notified Person: MD    Notified Person Name: Arianna    Notification Date/Time: 4/24/22 @ 4:55 pm    Notification Interaction: Vocera text    Purpose of Notification: Pt has been declining morphine because it makes her nauseous. Is there a different alternative rather than morphine to give to help with SOB?     Orders Received: Dilaudid 2 mg tablet every 3 hours PRN    Comments:

## 2022-04-23 NOTE — PLAN OF CARE
Goal Outcome Evaluation:    Plan of Care Reviewed With: patient     Orientation/Cognitive: AxOx4  Observation Goals (Met/ Not Met): Met  Mobility Level/Assist Equipment: SBA w/walker  Fall Risk (Y/N): Yes  Behavior Concerns: green  Pain Management: Denies, Dilaudid PO given for SOB  Tele/VS/O2: VSS on 3-4L NC, exertional dyspnea, continuous pulse ox  ABNL Lab/BG: see results  Diet: Regular, ensure between meals  Bowel/Bladder: Continent   Skin Concerns: Scattered bruising, redness to sacrum mepilex applied  Drains/Devices: PIV SL   Tests/Procedures for next shift: NA  Anticipated DC date & active delays: 4/25 at 1300 to LTC   Patient Stated Goal for Today: Get some rest and ensure between meals

## 2022-04-23 NOTE — PROGRESS NOTES
Observation goals  PRIOR TO DISCHARGE        Comments: -diagnostic tests and consults completed and resulted - met  -vital signs normal or at patient baseline - met.   -tolerating oral intake to maintain hydration - met  -dyspnea improved and O2 sats greater than 88% on room air or prior home oxygen levels - Met. On 3 L NC--baseline for patient.   -safe disposition plan has been identified - Met, discharge to [facility] with hospice on 4/25

## 2022-04-23 NOTE — PROGRESS NOTES
Observation goals  PRIOR TO DISCHARGE        Comments: -diagnostic tests and consults completed and resulted - met  -vital signs normal or at patient baseline - met.   -tolerating oral intake to maintain hydration - met  -dyspnea improved and O2 sats greater than 88% on room air or prior home oxygen levels - Met. On 3 L NC--baseline for patient.   -safe disposition plan has been identified - Met, discharge to [facility] with hospice on 4/25, stretcher ride at 1300

## 2022-04-23 NOTE — PROGRESS NOTES
"[facility]    Hospitalist Progress Note    Date of Service (when I saw the patient): 04/23/2022    Assessment & Plan   Sydni Mendoza is a 77 year old female who was admitted on 4/18/2022.  Sydni Mendoza is a 77 year old female with severe end-stage COPD with chronic hypoxic respiratory failure, oxygen dependent with baseline 3 to 4 L, and heavy smoking history who was registered to observation on 4/18/22 with worsening shortness of breath and reported transient hypoxia at home.       Acute worsening of SOB with transient acutely worsening hypoxia at home  Severe, end stage COPD with chronic hypoxic respiratory failure with chronic oxygen dependence  Presented with worsening SOB and hypoxia down to 70s-90%s on home pulseox. She does not take her albuterol inhaler/nebs at home as she does not feel these help but cause more coughing which worsens SOB. Given solumedrol IV 125mg and duoneb in the ED with mild improvement to dyspnea. 3L O2 on admission which is baseline home O2 needs 3-4L. No evidence of pneumonia. Could be mild COPD exacerbation. Ddimer and troponin neg.   * Follows closely with PCP, Pulmonology see note as \"transferred records\" from 1/2022 - suggested palliative care for GOC as she has ehausted all measures for treatment of COPD, and Palliative Care. She has appt set up with [facility] care on 4/20 and is considering starting morphine which they have recommended.  * Admitting provider d/w patient regarding GOC, DNR/DNI, and consideration of comfort care approach and morphine which patient is contemplating.  - Observation status  - Steroids: Solumedrol IV 125mg followed by 40mg IV and start prednisone taper on 4/19 with 30mg x5 days, 20mg x5 days, 10mg x5 days  - Continue PTA azithromycin, Trelegy Ellipta triple therapy, tessalon, guaifenesin, nebs scheduled and PRN  - Trial Xopenex as patient c/o tachycardia, coughing with nebs  - Titrate oxygen PRN, baseline 3-4L at " home  - Flutter valve  - PT/OT/SW/CC consultations, patient lives at home with her spouse at baseline    Pallaitive care consult placed and they have seen her. Pt interested in enrolling with hospice wants to go to LTC facility with hospice   Started on sublingual  morphine to help with SOB   Hospice referral and consult pending     Pt desats to 80s with any activity and even with talking  and at times even at rest     Benign essential hypertension  PTA regimen: amlodipine 5mg/d  - Continue PTA antihypertensives with hold parameters  - PRN IV hydralazine available for SBP >180  - Will avoid beta blockade due to severe COPD  - Monitor BP trend and need for medication adjustments     Severe malnutrition and weight loss due to end stage COPD  Patient reports usual weight 137 pounds however the past 4 months she has declined and now weighs approximately 100 pounds.  She appears severely cachectic with bony processes protruding, loss of subcutaneous fat and muscle noted. Has had very poor oral intake related to her severe COPD and shortness of breath along with a another medication she was recently taken off of.  Reports trying to increase her caloric intake however finds this difficult.  - Regular diet  - Encourage p.o. intake  - Nutrition supplements between meals, Ensure Enlive  - RD consultation for malnutrition         Wt Readings from Last 4 Encounters:   08/28/20 62.1 kg (137 lb)   01/23/20 60.3 kg (133 lb)   01/06/20 61.2 kg (134 lb 14.4 oz)   09/04/19 62.5 kg (137 lb 12.8 oz)                  Diet: Regular Diet Adult  Snacks/Supplements Adult: Ensure Enlive; Between Meals  DVT Prophylaxis: Ambulate every shift  Vasquez Catheter: Not present  Central Lines: None  Cardiac Monitoring: None  Code Status: No CPR- Do NOT Intubate - confirmed with patient on admission        Clinically Significant Risk Factors Present on Admission               # Anion Gap Metabolic Acidosis: AG = <1 mmol/L (Ref range: 3 - 14 mmol/L) on  admission, will monitor and treat as appropriate                 Disposition Plan         DVT Prophylaxis: Ambulate every shift  Code Status: No CPR- Do NOT Intubate       Disposition:to LTC facility  with hospice. Referrals sent most likely on Monday per social work     Discussed with bedside RN, patient  today     Leesa Keller MD  [phone] (P)      Interval History   Pt is sitting comfortably in bed . SOB stable today per patient . Awaiting placement     -Data reviewed today:   I reviewed all new labs and imaging results over the last 24 hours. I personally reviewed all the labs and imaging since admission .       Physical Exam   Temp: 98.1  F (36.7  C) Temp src: Oral BP: 122/61 Pulse: 83   Resp: 16 SpO2: 96 % O2 Device: Nasal cannula Oxygen Delivery: 3 LPM  Vitals:    04/22/22 1124   Weight: 45.3 kg (99 lb 12.8 oz)     Vital Signs with Ranges  Temp:  [97.5  F (36.4  C)-98.5  F (36.9  C)] 98.1  F (36.7  C)  Pulse:  [71-91] 83  Resp:  [16] 16  BP: (101-122)/(44-63) 122/61  SpO2:  [94 %-97 %] 96 %  I/O last 3 completed shifts:  In: 120 [P.O.:120]  Out: -     Constitutional: Awake, alert, cooperative, no apparent distress, very frail   Respiratory: Diminished air entry bilaterally and no wheezing today   Cardiovascular: Regular rate and rhythm, normal S1 and S2, and no murmur noted  GI: Normal bowel sounds, soft, non-distended, non-tender  Skin/Integumen: No rashes, no cyanosis, no edema  Other:     Medications       amLODIPine  5 mg Oral Daily     azithromycin  250 mg Oral Daily     Fluticasone-Umeclidin-Vilanterol  1 puff Inhalation Daily     guaiFENesin  1,200 mg Oral BID     levalbuterol  1.25 mg Nebulization 4x Daily     [START ON 4/24/2022] predniSONE  20 mg Oral Daily    Followed by     [START ON 4/29/2022] predniSONE  10 mg Oral Daily     sodium chloride (PF)  3 mL Intracatheter Q8H       Data   Recent Labs   Lab 04/19/22  0637 04/18/22  0810   WBC 7.2 8.5   HGB 10.7* 11.6*   MCV 97 95    239   NA  138 138   POTASSIUM 4.3 3.6   CHLORIDE 100 99   CO2 39* 40*   BUN 25 19   CR 0.55 0.54   ANIONGAP <1* <1*   MARQUEZ 9.0 9.4   * 166*   ALBUMIN 3.4  --    PROTTOTAL 5.7*  --    BILITOTAL 0.6  --    ALKPHOS 49  --    ALT 22  --    AST 12  --        No results found for this or any previous visit (from the past 24 hour(s)).

## 2022-04-23 NOTE — PLAN OF CARE
Observation goals  PRIOR TO DISCHARGE       Comments:   -diagnostic tests and consults completed and resulted - Met  -vital signs normal or at patient baseline - Met.   -tolerating oral intake to maintain hydration - Met  -dyspnea improved and O2 sats greater than 88% on room air or prior home oxygen levels - Met. On 3 L NC-baseline for patient.   -safe disposition plan has been identified - Met, discharge to [facility]

## 2022-04-23 NOTE — PROGRESS NOTES
Care Management Follow Up    Length of Stay (days): 0    Expected Discharge Date: 04/25/2022     Concerns to be Addressed:       Patient plan of care discussed at interdisciplinary rounds: Yes    Anticipated Discharge Disposition:       Anticipated Discharge Services:    Anticipated Discharge DME:      Patient/family educated on Medicare website which has current facility and service quality ratings:    Education Provided on the Discharge Plan:    Patient/Family in Agreement with the Plan:      Referrals Placed by CM/SW:    Private pay costs discussed: transportation costs    Additional Information:  Per chart review- patient to discharge on Monday to LTC at [facility] with [facility]. Writer called [facility] transport- spoke to Hope.  Writer set up tentative stretcher ride at 1300 on 4/25 to [facility]. Medical necessity is Hospice patient and oxygen (unable to manage). SW to follow up on Monday with hospice agency and LTC to confirm transport.     Elena Flores, LORY, LGSW   Social Work   [facility]

## 2022-04-24 NOTE — PROGRESS NOTES
Observation goals  PRIOR TO DISCHARGE        Comments: -diagnostic tests and consults completed and resulted - met  -vital signs normal or at patient baseline - met.   -tolerating oral intake to maintain hydration - met  -dyspnea improved and O2 sats greater than 88% on room air or prior home oxygen levels - Met. On 3-4 L NC--baseline for patient.   -safe disposition plan has been identified - Met, discharge to [facility] with hospice on 4/25, stretcher ride at 1300

## 2022-04-24 NOTE — PROGRESS NOTES
"[facility]    Hospitalist Progress Note    Date of Service (when I saw the patient): 04/24/2022    Assessment & Plan   Sydni Mendoza is a 77 year old female who was admitted on 4/18/2022.  Sydni Mendoza is a 77 year old female with severe end-stage COPD with chronic hypoxic respiratory failure, oxygen dependent with baseline 3 to 4 L, and heavy smoking history who was registered to observation on 4/18/22 with worsening shortness of breath and reported transient hypoxia at home.       Acute worsening of SOB with transient acutely worsening hypoxia at home  Severe, end stage COPD with chronic hypoxic respiratory failure with chronic oxygen dependence  Presented with worsening SOB and hypoxia down to 70s-90%s on home pulseox. She does not take her albuterol inhaler/nebs at home as she does not feel these help but cause more coughing which worsens SOB. Given solumedrol IV 125mg and duoneb in the ED with mild improvement to dyspnea. 3L O2 on admission which is baseline home O2 needs 3-4L. No evidence of pneumonia. Could be mild COPD exacerbation. Ddimer and troponin neg.   * Follows closely with PCP, Pulmonology see note as \"transferred records\" from 1/2022 - suggested palliative care for GOC as she has ehausted all measures for treatment of COPD, and Palliative Care. She has appt set up with [facility] care on 4/20 and is considering starting morphine which they have recommended.  * Admitting provider d/w patient regarding GOC, DNR/DNI, and consideration of comfort care approach and morphine which patient is contemplating.  - Observation status  - Steroids: Solumedrol IV 125mg followed by 40mg IV and start prednisone taper on 4/19 with 30mg x5 days, 20mg x5 days, 10mg x5 days  - Continue PTA azithromycin, Trelegy Ellipta triple therapy, tessalon, guaifenesin, nebs scheduled and PRN  - Trial Xopenex as patient c/o tachycardia, coughing with nebs  - Titrate oxygen PRN, baseline 3-4L at " home  - Flutter valve  - PT/OT/SW/CC consultations, patient lives at home with her spouse at baseline    Pallaitive care consult placed and they have seen her. Pt interested in enrolling with hospice wants to go to LTC facility with hospice   Started on sublingual  morphine to help with SOB   Hospice referral and consult pending     Pt desats to 80s with any activity and even with talking  and at times even at rest     Benign essential hypertension  PTA regimen: amlodipine 5mg/d  - Continue PTA antihypertensives with hold parameters  - PRN IV hydralazine available for SBP >180  - Will avoid beta blockade due to severe COPD  - Monitor BP trend and need for medication adjustments     Severe malnutrition and weight loss due to end stage COPD  Patient reports usual weight 137 pounds however the past 4 months she has declined and now weighs approximately 100 pounds.  She appears severely cachectic with bony processes protruding, loss of subcutaneous fat and muscle noted. Has had very poor oral intake related to her severe COPD and shortness of breath along with a another medication she was recently taken off of.  Reports trying to increase her caloric intake however finds this difficult.  - Regular diet  - Encourage p.o. intake  - Nutrition supplements between meals, Ensure Enlive  - RD consultation for malnutrition         Wt Readings from Last 4 Encounters:   08/28/20 62.1 kg (137 lb)   01/23/20 60.3 kg (133 lb)   01/06/20 61.2 kg (134 lb 14.4 oz)   09/04/19 62.5 kg (137 lb 12.8 oz)                  Diet: Regular Diet Adult  Snacks/Supplements Adult: Ensure Enlive; Between Meals  DVT Prophylaxis: Ambulate every shift  Vasquez Catheter: Not present  Central Lines: None  Cardiac Monitoring: None  Code Status: No CPR- Do NOT Intubate - confirmed with patient on admission        Clinically Significant Risk Factors Present on Admission               # Anion Gap Metabolic Acidosis: AG = <1 mmol/L (Ref range: 3 - 14 mmol/L) on  admission, will monitor and treat as appropriate                 Disposition Plan         DVT Prophylaxis: Ambulate every shift  Code Status: No CPR- Do NOT Intubate       Disposition:to LTC facility  with hospice. Referrals sent most likely on Monday per social work     Discussed with bedside RN, patient  today     Leesa Keller MD  [phone] (P)      Interval History   Pt is sitting comfortably in bed . SOB stable today per patient. C/o nausea today.  Awaiting placement     -Data reviewed today:   I reviewed all new labs and imaging results over the last 24 hours. I personally reviewed all the labs and imaging since admission .       Physical Exam   Temp: 98.7  F (37.1  C) Temp src: Oral BP: 96/58 Pulse: 81   Resp: 20 SpO2: 99 % O2 Device: Nasal cannula Oxygen Delivery: 3 LPM  Vitals:    04/22/22 1124   Weight: 45.3 kg (99 lb 12.8 oz)     Vital Signs with Ranges  Temp:  [97.9  F (36.6  C)-98.7  F (37.1  C)] 98.7  F (37.1  C)  Pulse:  [73-97] 81  Resp:  [16-22] 20  BP: ()/(44-74) 96/58  SpO2:  [85 %-99 %] 99 %  I/O last 3 completed shifts:  In: 360 [P.O.:360]  Out: -     Constitutional: Awake, alert, cooperative, no apparent distress, very frail   Respiratory: Diminished air entry bilaterally and no wheezing today   Cardiovascular: Regular rate and rhythm, normal S1 and S2, and no murmur noted  GI: Normal bowel sounds, soft, non-distended, non-tender  Skin/Integumen: No rashes, no cyanosis, no edema  Other:     Medications       amLODIPine  5 mg Oral Daily     azithromycin  250 mg Oral Daily     Fluticasone-Umeclidin-Vilanterol  1 puff Inhalation Daily     guaiFENesin  1,200 mg Oral BID     levalbuterol  1.25 mg Nebulization 4x Daily     predniSONE  20 mg Oral Daily    Followed by     [START ON 4/29/2022] predniSONE  10 mg Oral Daily     sodium chloride (PF)  3 mL Intracatheter Q8H       Data   Recent Labs   Lab 04/19/22  0637 04/18/22  0810   WBC 7.2 8.5   HGB 10.7* 11.6*   MCV 97 95    239   NA  138 138   POTASSIUM 4.3 3.6   CHLORIDE 100 99   CO2 39* 40*   BUN 25 19   CR 0.55 0.54   ANIONGAP <1* <1*   MARQUEZ 9.0 9.4   * 166*   ALBUMIN 3.4  --    PROTTOTAL 5.7*  --    BILITOTAL 0.6  --    ALKPHOS 49  --    ALT 22  --    AST 12  --        No results found for this or any previous visit (from the past 24 hour(s)).

## 2022-04-24 NOTE — PLAN OF CARE
Observation goals  PRIOR TO DISCHARGE       Comments:   -diagnostic tests and consults completed and resulted - Met  -vital signs normal or at patient baseline - Met.   -tolerating oral intake to maintain hydration - Met  -dyspnea improved and O2 sats greater than 88% on room air or prior home oxygen levels - Met. On 3 L NC-baseline for patient.   -safe disposition plan has been identified - Met, discharge to [facility]

## 2022-04-24 NOTE — PLAN OF CARE
Orientation/Cognitive: AO X4  Observation Goals (Met/ Not Met): Met  Mobility Level/Assist Equipment: SBA w/ walker  Fall Risk (Y/N): Yes  Behavior Concerns: None, calm  Pain Management: Denies  Tele/VS/O2: VSS on 3L NC, exertional dyspnea, continuous pulse ox in place  ABNL Lab/BG: See results  Diet: Regular, ensure between meals  Bowel/Bladder: Continent   Skin Concerns: Scattered bruising  Drains/Devices: PIV SL   Tests/Procedures for next shift: None  Anticipated DC date & active delays: Monday to [facility] 4/25  at 1300  Patient Stated Goal for Today: Rest and sleep     Observation goals  PRIOR TO DISCHARGE       Comments:   -diagnostic tests and consults completed and resulted - Met  -vital signs normal or at patient baseline - Met.   -tolerating oral intake to maintain hydration - Met  -dyspnea improved and O2 sats greater than 88% on room air or prior home oxygen levels - Met. On 3 L NC-baseline for patient.   -safe disposition plan has been identified

## 2022-04-24 NOTE — PLAN OF CARE
Goal Outcome Evaluation:    Plan of Care Reviewed With: patient     Orientation/Cognitive: AxOx4  Observation Goals (Met/ Not Met): Met  Mobility Level/Assist Equipment: SBA w/walker  Fall Risk (Y/N): Yes  Behavior Concerns: green  Pain Management: Denies, Dilaudid PO given for SOB  Tele/VS/O2: VSS on 3-4L NC, exertional dyspnea, continuous pulse ox  ABNL Lab/BG: see results  Diet: Regular, ensure between meals, zofran PO given for nausea  Bowel/Bladder: Continent   Skin Concerns: Scattered bruising, redness to sacrum mepilex in place, redness behind ears gauze in place  Drains/Devices: PIV SL   Tests/Procedures for next shift: NA  Anticipated DC date & active delays: 4/25 at 1300 to LTC   Patient Stated Goal for Today: Get some rest and ensure between meals

## 2022-04-24 NOTE — PROGRESS NOTES
Observation goals  PRIOR TO DISCHARGE        Comments: -diagnostic tests and consults completed and resulted - met  -vital signs normal or at patient baseline - met.   -tolerating oral intake to maintain hydration - met  -dyspnea improved and O2 sats greater than 88% on room air or prior home oxygen levels - Met. On 3-4 L NC--baseline for patient.   -safe disposition plan has been identified - Met, discharge to [facility] with hospice on 4/25, stretcher ride at 1300

## 2022-04-24 NOTE — PLAN OF CARE
Goal Outcome Evaluation:         Observation goals  PRIOR TO DISCHARGE        Comments: -diagnostic tests and consults completed and resulted ,not met  -vital signs normal or at patient baseline ,not met  -tolerating oral intake to maintain hydration ,not met  -dyspnea improved and O2 sats greater than 88% on room air or prior home oxygen levels not met  -safe disposition plan has been identified not met  Nurse to notify provider when observation goals have been met and patient is ready for discharge.

## 2022-04-25 NOTE — PROGRESS NOTES
Observation goals  PRIOR TO DISCHARGE       Comments: -  diagnostic tests and consults completed and resulted: Partially met, awaiting discharge orders.    -vital signs normal or at patient baseline : Partially met, soft BP    -tolerating oral intake to maintain hydration: Met     -dyspnea improved and O2 sats greater than 88% on room air or prior home oxygen levels : Met    -safe disposition plan has been identified: Met    Nurse to notify provider when observation goals have been met and patient is ready for discharge.

## 2022-04-25 NOTE — PROGRESS NOTES
Care Management Follow Up    Length of Stay (days): 0    Expected Discharge Date: 04/25/2022     Concerns to be Addressed:       Patient plan of care discussed at interdisciplinary rounds: Yes    Anticipated Discharge Disposition:       Anticipated Discharge Services:    Anticipated Discharge DME:      Patient/family educated on Medicare website which has current facility and service quality ratings:    Education Provided on the Discharge Plan:    Patient/Family in Agreement with the Plan:      Referrals Placed by CM/SW:    Private pay costs discussed: Not applicable    Additional Information:  Tentative ride to [facility] at 1300. Writer left VM for for admissions at [facility] asking they call back to confirm transport time. Writer [facility] regarding discharge time and asking she confirm that.     Writer waiting to hear back from all parties.   Writer heard from Manjinder ([phone])  with [facility] that they can meet at 1630 to complete the sign on. Manjinder will update Spouse later this afternoon.     Confirmed with Kristal ([phone]) at [facility] that the 1300 transport time works. Writer [facility] to complete [facility] sign on, Writer provdied kristal with Ashley's contact with [facility]. Kristal asking that discharge orders be faxed to her at [phone]. Writer called and updated spouse on transport time and location for hospice and he is agreeable. Spouse is aware of what time [facility] is coming out and is agreeable. Writer paged MD for orders. Writer completed PCS form, faxed and placed on chart. Writer completed PAS and faxed to LTC. Writer updated charge RN and HUC. Writer faxed discharge orders to LTC. Writer confirmed with manjinder at [facility] that we DO NOT need to send 3 day supply of meds as they are working on filling meds.     Plan for patient to discharge at 1300 via stretcher  to [facility] with [facility].       Elena Flores, MSW, LGSW   Social Work   [facility]

## 2022-04-25 NOTE — PROGRESS NOTES
Patient will discharge at 1:00 today via stretcher transport.    Oxygen has been ordered for delivery to [facility]. Please plan to discharge patient with comfort medications and send with via transport at time of transport.  Please send patient with 15 day supply of comfort medications.        Medication recommendation      Lorazepam 2mg/mL; Take 0.5-1mg (0.25-0.5mL)) PO/SL every 4 hours as needed for anxiety or restlessness.  QTY 30mL  Haloperidol 2mg/mL; Take 1-2mg (0.5-1mL) every 6 hours as needed for severe anxiety or nausea.  QTY 30mL   Atropine %1 drops; give 2-4 drops PO/SL every 2 hours as needed for oral secretions.  QTY 1 bottle   Senna 8.6mg tab; take 1-2 tabs twice daily as needed for constipation.  QTY 30 tabs   Acetaminophen 650mg suppository; insert 1 supp TN every 4 hours as needed for pain/fever.  QTY 2 Supps   Bisacodyl 10mg suppository; insert 1 supp TN 1-2 times daily as needed for constipation.  QTY 2 Supps   Hydromorphone take 2-4mg PO/SL every 2 hours as needed for pain/dyspnea   QTY 30 mL     Thank you for this consult,    Kandy Jacinto RN  [facility]

## 2022-04-25 NOTE — PLAN OF CARE
Goal Outcome Evaluation:    Orientation/Cognitive: A&Ox4  Observation Goals (Met/ Not Met): Met  Mobility Level/Assist Equipment: SBA w/ walker  Fall Risk (Y/N): Yes  Behavior Concerns: None, calm  Pain Management: Denies  Tele/VS/O2: VSS on 3L NC, LORENZANA, continuous pulse ox in place  ABNL Lab/BG: No recent labs  Diet: Regular, ensure between meals  Bowel/Bladder: Continent   Skin Concerns: Scattered bruising  Drains/Devices: PIV SL   Tests/Procedures for next shift: None  Anticipated DC date & active delays: Today, Monday to [facility] with hospice.  Ride set at 1300  Patient Stated Goal for Today: Rest and sleep    SOB at times, LS diminished.  Sched nebs.  Infrequent productive cough.    Observation goals  PRIOR TO DISCHARGE        Comments:   -diagnostic tests and consults completed and resulted- MET  -vital signs normal or at patient baseline- MET  -tolerating oral intake to maintain hydration- MET   -dyspnea improved and O2 sats greater than 88% on room air or prior home oxygen levels- MET   -safe disposition plan has been identified- MET   Nurse to notify provider when observation goals have been met and patient is ready for discharge.

## 2022-04-25 NOTE — PLAN OF CARE
Orientation/Cognitive:alert and oriented times 4  Observation Goals (Met/ Not Met): not met  Mobility Level/Assist Equipment: SBA  Fall Risk (Y/N): y  Behavior Concerns: none  Pain Management: yes  Tele/VS/O2: oxygen 3-4 liters  ABNL Lab/BG: none  Diet: reg  Bowel/Bladder: continent  Skin Concerns: none, meplex on bottom  Drains/Devices: none  Tests/Procedures for next shift: none  Anticipated DC date & active delays: terr[facility].  Patient Stated Goal for Today: none    Alert and oriented, vs with soft blood pressure. Tolerating minimal foods. Up to bathoom,  dilaudid and tessolin pearls given for cough. To jose roberto [facility].

## 2022-04-25 NOTE — PROGRESS NOTES
Observation goals  PRIOR TO DISCHARGE        Comments:   -diagnostic tests and consults completed and resulted- MET  -vital signs normal or at patient baseline- MET  -tolerating oral intake to maintain hydration- MET   -dyspnea improved and O2 sats greater than 88% on room air or prior home oxygen levels- MET   -safe disposition plan has been identified- MET   Nurse to notify provider when observation goals have been met and patient is ready for discharge.

## 2022-04-25 NOTE — DISCHARGE SUMMARY
"[facility]  Discharge Summary        Sydni Mendoza MRN# [id number]   YOB: 1945 Age: 77 year old     Date of Admission:  4/18/2022  Date of Discharge:  4/25/2022  Admitting Physician:  Satinder Suarez MD  Discharge Physician: Leesa Keller MD  Discharging Service: Hospitalist     Primary Provider: Chan Putnam  Primary Care Physician Phone Number: [phone]         Discharge Diagnoses/Problem Oriented Hospital Course (Providers):    Sydni Mendoza was admitted on 4/18/2022 by Satinder Suarez MD and I would refer you to their history and physical.  The following problems were addressed during her hospitalization:  Sydni Mendoza is a 77 year old female with severe end-stage COPD with chronic hypoxic respiratory failure, oxygen dependent with baseline 3 to 4 L, and heavy smoking history who was registered to observation on 4/18/22 with worsening shortness of breath and reported transient hypoxia at home.       Acute worsening of SOB with transient acutely worsening hypoxia at home  Severe, end stage COPD with chronic hypoxic respiratory failure with chronic oxygen dependence  Presented with worsening SOB and hypoxia down to 70s-90%s on home pulseox. She does not take her albuterol inhaler/nebs at home as she does not feel these help but cause more coughing which worsens SOB. Given solumedrol IV 125mg and duoneb in the ED with mild improvement to dyspnea. 3L O2 on admission which is baseline home O2 needs 3-4L. No evidence of pneumonia. Could be mild COPD exacerbation. Ddimer and troponin neg.   * Follows closely with PCP, Pulmonology see note as \"transferred records\" from 1/2022 - suggested palliative care for GOC as she has ehausted all measures for treatment of COPD, and Palliative Care. She has appt set up with [facility] on 4/20 and is considering starting morphine which they have recommended.  * Admitting provider d/w patient regarding GOC, DNR/DNI, and " consideration of comfort care approach and morphine which patient is contemplating.  - Observation status  - Steroids: Solumedrol IV 125mg followed by 40mg IV and start prednisone taper on 4/19 with 30mg x5 days, 20mg x5 days, 10mg x5 days  - Continue PTA azithromycin, Trelegy Ellipta triple therapy, tessalon, guaifenesin, nebs scheduled and PRN  - Trial Xopenex as patient c/o tachycardia, coughing with nebs  - Titrate oxygen PRN, baseline 3-4L at home  - Flutter valve  - PT/OT/SW/CC consultations, patient lives at home with her spouse at baseline     Pallaitive care consult placed and they have seen her. Pt interested in enrolling with hospice wants to go to LTC facility with hospice   Started on sublingual  morphine to help with SOB   Hospice referral and consult done   Pt to go to LTC with hospice      Benign essential hypertension  PTA regimen: amlodipine 5mg/d  - Continue PTA antihypertensives with hold parameters  - PRN IV hydralazine available for SBP >180  - Will avoid beta blockade due to severe COPD  - Monitor BP trend and need for medication adjustments     Severe malnutrition and weight loss due to end stage COPD  Patient reports usual weight 137 pounds however the past 4 months she has declined and now weighs approximately 100 pounds.  She appears severely cachectic with bony processes protruding, loss of subcutaneous fat and muscle noted. Has had very poor oral intake related to her severe COPD and shortness of breath along with a another medication she was recently taken off of.  Reports trying to increase her caloric intake however finds this difficult.  - Regular diet  - Encourage p.o. intake  - Nutrition supplements between meals, Ensure Enlive  - RD consultation for malnutrition           Wt Readings from Last 4 Encounters:   08/28/20 62.1 kg (137 lb)   01/23/20 60.3 kg (133 lb)   01/06/20 61.2 kg (134 lb 14.4 oz)   09/04/19 62.5 kg (137 lb 12.8 oz)                  Diet: Regular Diet  Adult  Snacks/Supplements Adult: Ensure Enlive; Between Meals  DVT Prophylaxis: Ambulate every shift  Vasquez Catheter: Not present  Central Lines: None  Cardiac Monitoring: None  Code Status: No CPR- Do NOT Intubate - confirmed with patient on admission        Clinically Significant Risk Factors Present on Admission                # Anion Gap Metabolic Acidosis: AG = <1 mmol/L (Ref range: 3 - 14 mmol/L) on admission, will monitor and treat as appropriate                 Disposition Plan           DVT Prophylaxis: Ambulate every shift  Code Status: No CPR- Do NOT Intubate         Disposition:to LTC facility  with hospice.  Discussed with bedside RN, patient  today      Leesa Keller MD  [phone] (P)             Code Status:      DNR / DNI         Important Results:      See below          Pending Results:        Unresulted Labs Ordered in the Past 30 Days of this Admission     No orders found from 3/19/2022 to 4/19/2022.               Discharge Instructions and Follow-Up:      Follow-up Appointments     Follow Up and recommended labs and tests      F/u with Hospice PRN                  Discharge Disposition:      Discharged to long-term care facility         Discharge Medications:        Current Discharge Medication List      START taking these medications    Details   acetaminophen (TYLENOL) 650 MG suppository Place 1 suppository (650 mg) rectally every 6 hours as needed for mild pain or other (and adjunct with moderate or severe pain or per patient request)  Qty: 15 suppository, Refills: 0    Associated Diagnoses: End of life care      atropine 1 % ophthalmic solution Take 2 drops by mouth every 4 hours as needed for secretions  Qty: 1 mL, Refills: 0    Associated Diagnoses: End of life care      bisacodyl (DULCOLAX) 10 MG suppository Place 1 suppository (10 mg) rectally daily as needed for constipation  Qty: 10 suppository, Refills: 0    Associated Diagnoses: End of life care      haloperidol (HALDOL) 1 MG  tablet Take 1 tablet (1 mg) by mouth every 6 hours as needed for agitation  Qty: 30 tablet, Refills: 0    Associated Diagnoses: End of life care      HYDROmorphone (DILAUDID) 2 MG tablet Take 1-2 tablets (2-4 mg) by mouth every 4 hours as needed for pain or shortness of breath / dyspnea  Qty: 20 tablet, Refills: 0    Associated Diagnoses: End of life care      LORazepam (ATIVAN) 2 MG/ML (HIGH CONC) oral solution Take 0.25-0.5 mLs (0.5-1 mg) by mouth every 4 hours as needed for anxiety or agitation (sob)  Qty: 15 mL, Refills: 0    Associated Diagnoses: End of life care      senna-docusate (SENOKOT-S/PERICOLACE) 8.6-50 MG tablet Take 1 tablet by mouth 2 times daily as needed for constipation  Qty: 20 tablet, Refills: 0    Associated Diagnoses: End of life care         CONTINUE these medications which have CHANGED    Details   ipratropium - albuterol 0.5 mg/2.5 mg/3 mL (DUONEB) 0.5-2.5 (3) MG/3ML neb solution Take 1 vial (3 mLs) by nebulization 4 times daily  Qty: 90 mL, Refills: 0    Associated Diagnoses: COPD, severe (H)      predniSONE (DELTASONE) 20 MG tablet Take 1 tablet (20 mg) by mouth daily Take 20mg po daily for 3days then reduce to 10mg PO daily  Qty: 30 tablet, Refills: 0    Associated Diagnoses: COPD, severe (H)         CONTINUE these medications which have NOT CHANGED    Details   amLODIPine (NORVASC) 5 MG tablet Take 1 tablet (5 mg) by mouth daily  Qty: 90 tablet, Refills: 1    Associated Diagnoses: Hypertension, unspecified type      azithromycin (ZITHROMAX) 250 MG tablet TAKE 1 TABLET BY MOUTH EVERY DAY  Qty: 30 tablet, Refills: 1    Associated Diagnoses: COPD, severe (H)      benzonatate (TESSALON) 100 MG capsule TAKE 1 CAPSULE BY MOUTH UP TO 4 TIMES PER DAY AS NEEDED FOR COUGHING.  Qty: 360 capsule, Refills: 1    Associated Diagnoses: COPD, severe (H)      COMBIVENT RESPIMAT  MCG/ACT inhaler Inhale 1 puff into the lungs 4 times daily as needed      furosemide (LASIX) 40 MG tablet TAKE 1 TABLET  "(40 MG) BY MOUTH DAILY AS NEEDED (LYMPHEDEMA IN LEGS)  Qty: 90 tablet, Refills: 1    Associated Diagnoses: Lymphedema      guaiFENesin (MUCINEX MAXIMUM STRENGTH) 1200 MG TB12 Take 1,200 mg by mouth daily      omeprazole (PRILOSEC) 20 MG DR capsule Take 20 mg by mouth daily as needed      TRELEGY ELLIPTA 100-62.5-25 MCG/INH oral inhaler Inhale 1 puff into the lungs daily      albuterol (PROAIR HFA/PROVENTIL HFA/VENTOLIN HFA) 108 (90 Base) MCG/ACT inhaler Inhale 2 puffs into the lungs every 6 hours as needed for shortness of breath / dyspnea or wheezing    Comments: Pharmacy may dispense brand covered by insurance (Proair, or proventil or ventolin or generic albuterol inhaler)                  Allergies:         Allergies   Allergen Reactions     Albuterol Palpitations     Is fine taking albuterol inhaler, tachycardia goes away.            Consultations This Hospital Stay:      Consultation during this admission received from palliative medicine and hospice          Condition and Physical Exam on Discharge:      Discharge condition: Stable   Discharge vitals: Blood pressure 111/61, pulse 79, temperature 98.2  F (36.8  C), temperature source Oral, resp. rate 20, height 1.626 m (5' 4\"), weight 45.3 kg (99 lb 12.8 oz), SpO2 98 %, not currently breastfeeding.     Constitutional: Alert, awake, NAD, very frail    Lungs: CTA b/l    Cardiovascular: RRR   Abdomen: Soft, NT, nd< bs+   Skin: WARM AND DRY    Other:            Discharge Orders for Skilled Facility (from Discharge Orders):        After Care Instructions     Activity - Up with nursing assistance      Diet      Follow this diet upon discharge: Regular diet         General info for SNF      Length of Stay Estimate: Long Term Care  Condition at Discharge: Stable  Level of care:board and care  Rehabilitation Potential: Poor  Admission H&P remains valid and up-to-date: Yes  Recent Chemotherapy: N/A  Use Nursing Home Standing Orders: No. Pt is going to enroll with hospice "         Mantoux instructions      Give two-step Mantoux (PPD) Per Facility Policy Yes                    Rehab orders for Skilled Facility (from Discharge Orders):               Discharge Time:      Greater than 30 minutes.        Image Results From This Hospital Stay (For Non-EPIC Providers):        Results for orders placed or performed during the hospital encounter of 04/18/22   XR Chest 2 Views    Narrative    XR CHEST 2 VW  4/18/2022 8:48 AM       INDICATION: shortness of breath  COMPARISON: 9/26/2020       Impression    IMPRESSION: The lungs are hyperinflated due to emphysema. There is  scarring in both medial apices, similar to previous. No acute  infiltrate or significant change. Heart size normal.    EASTON HARMAN MD         SYSTEM ID:  [id number]             Most Recent Lab Results In EPIC (For Non-EPIC Providers):    Most Recent 3 CBC's:  Recent Labs   Lab Test 04/19/22  0637 04/18/22  0810 09/26/20  0855   WBC 7.2 8.5 11.9*   HGB 10.7* 11.6* 11.6*   MCV 97 95 96    239 289      Most Recent 3 BMP's:  Recent Labs   Lab Test 04/19/22  0637 04/18/22  0810 10/06/20  1145 09/26/20  0855    138 139 141   POTASSIUM 4.3 3.6 4.8 4.0   CHLORIDE 100 99 95* 101   CO2 39* 40*  --  42*   BUN 25 19 19  26 14   CR 0.55 0.54 0.73 0.62   ANIONGAP <1* <1*  --  <1*   MARQUEZ 9.0 9.4 10.1 8.9   * 166* 116* 93     Most Recent 3 Troponin's:  Recent Labs   Lab Test 09/26/20  0855 01/02/20  0918 08/31/19  1737   TROPI <0.015 <0.015 <0.015     Most Recent 3 INR's:No lab results found.  Most Recent 2 LFT's:  Recent Labs   Lab Test 04/19/22  0637 09/26/20  0855   AST 12 14   ALT 22 18   ALKPHOS 49 53   BILITOTAL 0.6 0.5     Most Recent Cholesterol Panel:  Recent Labs   Lab Test 08/15/18  1343   CHOL 195   LDL 74      TRIG 50     Most Recent 6 Bacteria Isolates From Any Culture (See EPIC Reports for Culture Details):  Recent Labs   Lab Test 01/02/20  1520 01/02/20  1043 01/02/20  0918 09/01/19  1950  08/31/19  1756 08/31/19  1737   CULT Moderate growth  Normal dilip    Moderate growth  Achromobacter xylosoxidans/denitrificans  *  Moderate growth  Stenotrophomonas maltophilia  * No growth No growth Light growth  Normal dilip    Light growth  Stenotrophomonas maltophilia  *  Light growth  Aspergillus fumigatus  * No growth No growth     Most Recent TSH, T4 and HgbA1c:  Recent Labs   Lab Test 07/22/20  1400 01/03/20  0921 08/31/19  1737   TSH  --   --  0.21*   T4 6.7  --  1.51*   A1C  --  5.7*  --

## 2022-04-25 NOTE — PLAN OF CARE
Pt is discharging to [facility] at this time w/ all pt's belonging and  w/ Hospice services via [facility] transport using a stretcher. AVS and education given. Questions answered. Meds will be sent later to [facility] via a carrier.

## 2022-09-19 NOTE — PROGRESS NOTES
Community Paramedic Program  Community Health Worker Follow Up    Intervention and Education during outreach:     Received voicemail  Message from pt's  Gene. He reported that he received the [facility] Community Paramedic program's contact information from pt's [facility] Case Manager, Kim Carrasquillo. Pt's  would like pt to receive both her flu shot and bivalent COVID booster at home and requested our assistance.    Per chart review, pt's PCP is with the [facility]. Confirmed this when I returned pt's  Gene's call. I let him know that unfortunately, for our program, pts must have their PCP with [facility]. He said he understood and that he appreciated the call back.     Suggested that pt's  look into [facility] and [facility]'s Public Health department as two possible options that could assist with in home vaccinations. Pt's  took down the information during our call and thanked me for getting back to him.    Notified pt's  that I will update pt's hospice case manager so she is aware of our program's requirement that pts have an F PCP.

## 2023-01-01 ENCOUNTER — MEDICAL CORRESPONDENCE (OUTPATIENT)
Dept: FAMILY MEDICINE | Facility: CLINIC | Age: 78
End: 2023-01-01

## 2023-10-03 NOTE — TELEPHONE ENCOUNTER
Received fax from [facility] stating patients rx for Spiriva is not covered anymore by insurance.  Per pharmacist-Maddy- patient can change to Incruse Ellipta.  Sent rx over to [facility].  Called and let patient know of the change.    
There are no Wet Read(s) to document.